# Patient Record
Sex: FEMALE | Race: WHITE | NOT HISPANIC OR LATINO | Employment: UNEMPLOYED | URBAN - METROPOLITAN AREA
[De-identification: names, ages, dates, MRNs, and addresses within clinical notes are randomized per-mention and may not be internally consistent; named-entity substitution may affect disease eponyms.]

---

## 2021-01-23 ENCOUNTER — HOSPITAL ENCOUNTER (INPATIENT)
Facility: HOSPITAL | Age: 66
LOS: 2 days | Discharge: HOME | DRG: 872 | End: 2021-01-25
Attending: FAMILY MEDICINE | Admitting: FAMILY MEDICINE
Payer: OTHER GOVERNMENT

## 2021-01-23 ENCOUNTER — HOSPITAL ENCOUNTER (EMERGENCY)
Facility: HOSPITAL | Age: 66
Discharge: ANOTHER HOSPITAL | End: 2021-01-23
Attending: EMERGENCY MEDICINE
Payer: OTHER GOVERNMENT

## 2021-01-23 VITALS
OXYGEN SATURATION: 98 % | WEIGHT: 132 LBS | HEART RATE: 90 BPM | HEIGHT: 67 IN | SYSTOLIC BLOOD PRESSURE: 104 MMHG | TEMPERATURE: 98 F | RESPIRATION RATE: 20 BRPM | DIASTOLIC BLOOD PRESSURE: 50 MMHG | BODY MASS INDEX: 20.72 KG/M2

## 2021-01-23 DIAGNOSIS — Z99.81 HYPOXEMIA REQUIRING SUPPLEMENTAL OXYGEN: ICD-10-CM

## 2021-01-23 DIAGNOSIS — E87.20 LACTIC ACIDOSIS: ICD-10-CM

## 2021-01-23 DIAGNOSIS — R07.9 CHEST PAIN: ICD-10-CM

## 2021-01-23 DIAGNOSIS — C78.7 COLON CANCER METASTASIZED TO LIVER: ICD-10-CM

## 2021-01-23 DIAGNOSIS — R19.7 NAUSEA VOMITING AND DIARRHEA: ICD-10-CM

## 2021-01-23 DIAGNOSIS — A41.9 SEPSIS, DUE TO UNSPECIFIED ORGANISM, UNSPECIFIED WHETHER ACUTE ORGAN DYSFUNCTION PRESENT: Primary | ICD-10-CM

## 2021-01-23 DIAGNOSIS — C19 COLORECTAL CARCINOMA: Primary | ICD-10-CM

## 2021-01-23 DIAGNOSIS — R53.1 WEAKNESS: ICD-10-CM

## 2021-01-23 DIAGNOSIS — Z85.850 HX OF THYROID CANCER: ICD-10-CM

## 2021-01-23 DIAGNOSIS — R11.2 NAUSEA VOMITING AND DIARRHEA: ICD-10-CM

## 2021-01-23 DIAGNOSIS — Z20.822 SUSPECTED COVID-19 VIRUS INFECTION: ICD-10-CM

## 2021-01-23 DIAGNOSIS — R09.02 HYPOXEMIA REQUIRING SUPPLEMENTAL OXYGEN: ICD-10-CM

## 2021-01-23 DIAGNOSIS — D64.9 ANEMIA REQUIRING TRANSFUSIONS: ICD-10-CM

## 2021-01-23 DIAGNOSIS — D50.9 MICROCYTIC ANEMIA: ICD-10-CM

## 2021-01-23 DIAGNOSIS — C18.9 COLON CANCER METASTASIZED TO LIVER: ICD-10-CM

## 2021-01-23 DIAGNOSIS — A41.9 SEPSIS: ICD-10-CM

## 2021-01-23 DIAGNOSIS — R74.01 TRANSAMINITIS: ICD-10-CM

## 2021-01-23 DIAGNOSIS — C78.7 SECONDARY MALIGNANT NEOPLASM OF LIVER: ICD-10-CM

## 2021-01-23 DIAGNOSIS — G93.41 ENCEPHALOPATHY IN SEPSIS: ICD-10-CM

## 2021-01-23 LAB
ABO + RH BLD: NORMAL
ALBUMIN SERPL BCP-MCNC: 2.6 G/DL (ref 3.5–5.2)
ALLENS TEST: ABNORMAL
ALP SERPL-CCNC: 477 U/L (ref 55–135)
ALT SERPL W/O P-5'-P-CCNC: 71 U/L (ref 10–44)
ANION GAP SERPL CALC-SCNC: 12 MMOL/L (ref 8–16)
ANISOCYTOSIS BLD QL SMEAR: ABNORMAL
APTT BLDCRRT: 27.2 SEC (ref 21–32)
AST SERPL-CCNC: 291 U/L (ref 10–40)
BASOPHILS NFR BLD: 0 % (ref 0–1.9)
BILIRUB SERPL-MCNC: 0.9 MG/DL (ref 0.1–1)
BLD GP AB SCN CELLS X3 SERPL QL: NORMAL
BNP SERPL-MCNC: 45 PG/ML (ref 0–99)
BUN SERPL-MCNC: 20 MG/DL (ref 8–23)
CALCIUM SERPL-MCNC: 8.1 MG/DL (ref 8.7–10.5)
CHLORIDE SERPL-SCNC: 99 MMOL/L (ref 95–110)
CK SERPL-CCNC: 1014 U/L (ref 20–180)
CO2 SERPL-SCNC: 22 MMOL/L (ref 23–29)
CREAT SERPL-MCNC: 0.8 MG/DL (ref 0.5–1.4)
CRP SERPL-MCNC: 8 MG/DL (ref 0–0.75)
DELSYS: ABNORMAL
DIFFERENTIAL METHOD: ABNORMAL
EOSINOPHIL NFR BLD: 0 % (ref 0–8)
ERYTHROCYTE [DISTWIDTH] IN BLOOD BY AUTOMATED COUNT: 19.8 % (ref 11.5–14.5)
EST. GFR  (AFRICAN AMERICAN): >60 ML/MIN/1.73 M^2
EST. GFR  (NON AFRICAN AMERICAN): >60 ML/MIN/1.73 M^2
FERRITIN SERPL-MCNC: 273 NG/ML (ref 20–300)
FIO2: 21
GLUCOSE SERPL-MCNC: 178 MG/DL (ref 70–110)
HCO3 UR-SCNC: 20.7 MMOL/L (ref 24–28)
HCT VFR BLD AUTO: 20.3 % (ref 37–48.5)
HGB BLD-MCNC: 5.5 G/DL (ref 12–16)
IMM GRANULOCYTES # BLD AUTO: ABNORMAL K/UL (ref 0–0.04)
IMM GRANULOCYTES NFR BLD AUTO: ABNORMAL % (ref 0–0.5)
INFLUENZA A, MOLECULAR: NEGATIVE
INFLUENZA B, MOLECULAR: NEGATIVE
INR PPP: 1.1 (ref 0.8–1.2)
LACTATE SERPL-SCNC: 1.5 MMOL/L (ref 0.5–2.2)
LACTATE SERPL-SCNC: 3.5 MMOL/L (ref 0.5–2.2)
LDH SERPL L TO P-CCNC: 4928 U/L (ref 110–260)
LIPASE SERPL-CCNC: 23 U/L (ref 4–60)
LYMPHOCYTES NFR BLD: 5 % (ref 18–48)
MCH RBC QN AUTO: 20.1 PG (ref 27–31)
MCHC RBC AUTO-ENTMCNC: 27.1 G/DL (ref 32–36)
MCV RBC AUTO: 74 FL (ref 82–98)
MODE: ABNORMAL
MONOCYTES NFR BLD: 3 % (ref 4–15)
NEUTROPHILS NFR BLD: 80 % (ref 38–73)
NEUTS BAND NFR BLD MANUAL: 12 %
NRBC BLD-RTO: 0 /100 WBC
PCO2 BLDA: 25.4 MMHG (ref 35–45)
PH SMN: 7.52 [PH] (ref 7.35–7.45)
PLATELET # BLD AUTO: 607 K/UL (ref 150–350)
PLATELET BLD QL SMEAR: ABNORMAL
PMV BLD AUTO: 9.1 FL (ref 9.2–12.9)
PO2 BLDA: 73 MMHG (ref 80–100)
POC BE: -2 MMOL/L
POC SATURATED O2: 96 % (ref 95–100)
POC TCO2: 21 MMOL/L (ref 23–27)
POIKILOCYTOSIS BLD QL SMEAR: ABNORMAL
POLYCHROMASIA BLD QL SMEAR: ABNORMAL
POTASSIUM SERPL-SCNC: 4.3 MMOL/L (ref 3.5–5.1)
PROCALCITONIN SERPL IA-MCNC: 30.6 NG/ML
PROT SERPL-MCNC: 6.9 G/DL (ref 6–8.4)
PROTHROMBIN TIME: 12 SEC (ref 9–12.5)
RBC # BLD AUTO: 2.74 M/UL (ref 4–5.4)
SAMPLE: ABNORMAL
SARS-COV-2 RDRP RESP QL NAA+PROBE: NEGATIVE
SITE: ABNORMAL
SODIUM SERPL-SCNC: 133 MMOL/L (ref 136–145)
SPECIMEN SOURCE: NORMAL
TARGETS BLD QL SMEAR: ABNORMAL
TOXIC GRANULES BLD QL SMEAR: PRESENT
TROPONIN I SERPL DL<=0.01 NG/ML-MCNC: 0.03 NG/ML (ref 0.02–0.5)
WBC # BLD AUTO: 24.64 K/UL (ref 3.9–12.7)

## 2021-01-23 PROCEDURE — 63600175 PHARM REV CODE 636 W HCPCS: Performed by: EMERGENCY MEDICINE

## 2021-01-23 PROCEDURE — 86902 BLOOD TYPE ANTIGEN DONOR EA: CPT | Mod: 59

## 2021-01-23 PROCEDURE — 86870 RBC ANTIBODY IDENTIFICATION: CPT | Mod: 59

## 2021-01-23 PROCEDURE — 84145 PROCALCITONIN (PCT): CPT

## 2021-01-23 PROCEDURE — 74176 CT ABD & PELVIS W/O CONTRAST: CPT | Mod: TC

## 2021-01-23 PROCEDURE — 25000003 PHARM REV CODE 250: Performed by: EMERGENCY MEDICINE

## 2021-01-23 PROCEDURE — U0002 COVID-19 LAB TEST NON-CDC: HCPCS

## 2021-01-23 PROCEDURE — 82550 ASSAY OF CK (CPK): CPT

## 2021-01-23 PROCEDURE — 82728 ASSAY OF FERRITIN: CPT

## 2021-01-23 PROCEDURE — 83690 ASSAY OF LIPASE: CPT

## 2021-01-23 PROCEDURE — 71045 X-RAY EXAM CHEST 1 VIEW: CPT | Mod: TC,FY

## 2021-01-23 PROCEDURE — 99291 CRITICAL CARE FIRST HOUR: CPT | Mod: 25

## 2021-01-23 PROCEDURE — 86900 BLOOD TYPING SEROLOGIC ABO: CPT

## 2021-01-23 PROCEDURE — 83605 ASSAY OF LACTIC ACID: CPT

## 2021-01-23 PROCEDURE — 93005 ELECTROCARDIOGRAM TRACING: CPT

## 2021-01-23 PROCEDURE — 36415 COLL VENOUS BLD VENIPUNCTURE: CPT

## 2021-01-23 PROCEDURE — 83880 ASSAY OF NATRIURETIC PEPTIDE: CPT

## 2021-01-23 PROCEDURE — 85730 THROMBOPLASTIN TIME PARTIAL: CPT

## 2021-01-23 PROCEDURE — 87040 BLOOD CULTURE FOR BACTERIA: CPT | Mod: 59

## 2021-01-23 PROCEDURE — 96365 THER/PROPH/DIAG IV INF INIT: CPT

## 2021-01-23 PROCEDURE — 85610 PROTHROMBIN TIME: CPT

## 2021-01-23 PROCEDURE — 82803 BLOOD GASES ANY COMBINATION: CPT

## 2021-01-23 PROCEDURE — 83605 ASSAY OF LACTIC ACID: CPT | Mod: 91

## 2021-01-23 PROCEDURE — 27000221 HC OXYGEN, UP TO 24 HOURS

## 2021-01-23 PROCEDURE — 99292 CRITICAL CARE ADDL 30 MIN: CPT | Mod: 25

## 2021-01-23 PROCEDURE — 96361 HYDRATE IV INFUSION ADD-ON: CPT

## 2021-01-23 PROCEDURE — 96367 TX/PROPH/DG ADDL SEQ IV INF: CPT

## 2021-01-23 PROCEDURE — 71045 XR CHEST AP PORTABLE: ICD-10-PCS | Mod: 26,,, | Performed by: RADIOLOGY

## 2021-01-23 PROCEDURE — 86870 RBC ANTIBODY IDENTIFICATION: CPT

## 2021-01-23 PROCEDURE — 20000000 HC ICU ROOM

## 2021-01-23 PROCEDURE — 86140 C-REACTIVE PROTEIN: CPT

## 2021-01-23 PROCEDURE — 36600 WITHDRAWAL OF ARTERIAL BLOOD: CPT

## 2021-01-23 PROCEDURE — 86900 BLOOD TYPING SEROLOGIC ABO: CPT | Mod: 91

## 2021-01-23 PROCEDURE — 86880 COOMBS TEST DIRECT: CPT

## 2021-01-23 PROCEDURE — 87502 INFLUENZA DNA AMP PROBE: CPT

## 2021-01-23 PROCEDURE — S0030 INJECTION, METRONIDAZOLE: HCPCS | Performed by: EMERGENCY MEDICINE

## 2021-01-23 PROCEDURE — 71045 X-RAY EXAM CHEST 1 VIEW: CPT | Mod: 26,,, | Performed by: RADIOLOGY

## 2021-01-23 PROCEDURE — 84484 ASSAY OF TROPONIN QUANT: CPT

## 2021-01-23 PROCEDURE — 85027 COMPLETE CBC AUTOMATED: CPT

## 2021-01-23 PROCEDURE — 80053 COMPREHEN METABOLIC PANEL: CPT

## 2021-01-23 PROCEDURE — 74176 CT ABDOMEN PELVIS WITHOUT CONTRAST: ICD-10-PCS | Mod: 26,,, | Performed by: RADIOLOGY

## 2021-01-23 PROCEDURE — 83615 LACTATE (LD) (LDH) ENZYME: CPT

## 2021-01-23 PROCEDURE — 85007 BL SMEAR W/DIFF WBC COUNT: CPT

## 2021-01-23 PROCEDURE — 74176 CT ABD & PELVIS W/O CONTRAST: CPT | Mod: 26,,, | Performed by: RADIOLOGY

## 2021-01-23 PROCEDURE — 99900035 HC TECH TIME PER 15 MIN (STAT)

## 2021-01-23 PROCEDURE — 86901 BLOOD TYPING SEROLOGIC RH(D): CPT

## 2021-01-23 RX ORDER — SODIUM CHLORIDE 0.9 % (FLUSH) 0.9 %
5 SYRINGE (ML) INJECTION
Status: DISCONTINUED | OUTPATIENT
Start: 2021-01-24 | End: 2021-01-26 | Stop reason: HOSPADM

## 2021-01-23 RX ORDER — GLUCAGON 1 MG
1 KIT INJECTION
Status: DISCONTINUED | OUTPATIENT
Start: 2021-01-24 | End: 2021-01-26 | Stop reason: HOSPADM

## 2021-01-23 RX ORDER — HYDROCODONE BITARTRATE AND ACETAMINOPHEN 500; 5 MG/1; MG/1
TABLET ORAL
Status: DISCONTINUED | OUTPATIENT
Start: 2021-01-23 | End: 2021-01-23 | Stop reason: HOSPADM

## 2021-01-23 RX ORDER — ACETAMINOPHEN 325 MG/1
650 TABLET ORAL EVERY 8 HOURS PRN
Status: DISCONTINUED | OUTPATIENT
Start: 2021-01-24 | End: 2021-01-26 | Stop reason: HOSPADM

## 2021-01-23 RX ORDER — MORPHINE SULFATE 4 MG/ML
4 INJECTION, SOLUTION INTRAMUSCULAR; INTRAVENOUS EVERY 4 HOURS PRN
Status: DISCONTINUED | OUTPATIENT
Start: 2021-01-24 | End: 2021-01-26 | Stop reason: HOSPADM

## 2021-01-23 RX ORDER — ACETAMINOPHEN 500 MG
1000 TABLET ORAL
Status: COMPLETED | OUTPATIENT
Start: 2021-01-23 | End: 2021-01-23

## 2021-01-23 RX ORDER — TALC
9 POWDER (GRAM) TOPICAL NIGHTLY PRN
Status: DISCONTINUED | OUTPATIENT
Start: 2021-01-24 | End: 2021-01-26 | Stop reason: HOSPADM

## 2021-01-23 RX ORDER — METRONIDAZOLE 500 MG/100ML
500 INJECTION, SOLUTION INTRAVENOUS
Status: COMPLETED | OUTPATIENT
Start: 2021-01-23 | End: 2021-01-23

## 2021-01-23 RX ORDER — IBUPROFEN 200 MG
24 TABLET ORAL
Status: DISCONTINUED | OUTPATIENT
Start: 2021-01-24 | End: 2021-01-26 | Stop reason: HOSPADM

## 2021-01-23 RX ORDER — IBUPROFEN 200 MG
16 TABLET ORAL
Status: DISCONTINUED | OUTPATIENT
Start: 2021-01-24 | End: 2021-01-26 | Stop reason: HOSPADM

## 2021-01-23 RX ORDER — MUPIROCIN 20 MG/G
OINTMENT TOPICAL 2 TIMES DAILY
Status: DISCONTINUED | OUTPATIENT
Start: 2021-01-24 | End: 2021-01-26 | Stop reason: HOSPADM

## 2021-01-23 RX ORDER — ONDANSETRON 8 MG/1
8 TABLET, ORALLY DISINTEGRATING ORAL EVERY 6 HOURS PRN
Status: DISCONTINUED | OUTPATIENT
Start: 2021-01-24 | End: 2021-01-26 | Stop reason: HOSPADM

## 2021-01-23 RX ORDER — IBUPROFEN 400 MG/1
800 TABLET ORAL
Status: COMPLETED | OUTPATIENT
Start: 2021-01-23 | End: 2021-01-23

## 2021-01-23 RX ORDER — LEVOTHYROXINE SODIUM 112 UG/1
112 TABLET ORAL
Status: DISCONTINUED | OUTPATIENT
Start: 2021-01-24 | End: 2021-01-26 | Stop reason: HOSPADM

## 2021-01-23 RX ORDER — ATENOLOL 25 MG/1
50 TABLET ORAL DAILY
Status: DISCONTINUED | OUTPATIENT
Start: 2021-01-24 | End: 2021-01-24

## 2021-01-23 RX ORDER — ACETAMINOPHEN 325 MG/1
650 TABLET ORAL EVERY 4 HOURS PRN
Status: DISCONTINUED | OUTPATIENT
Start: 2021-01-24 | End: 2021-01-26 | Stop reason: HOSPADM

## 2021-01-23 RX ADMIN — METRONIDAZOLE 500 MG: 500 INJECTION, SOLUTION INTRAVENOUS at 07:01

## 2021-01-23 RX ADMIN — ACETAMINOPHEN 1000 MG: 500 TABLET ORAL at 03:01

## 2021-01-23 RX ADMIN — CEFTRIAXONE 1 G: 1 INJECTION, SOLUTION INTRAVENOUS at 03:01

## 2021-01-23 RX ADMIN — IBUPROFEN 800 MG: 400 TABLET, FILM COATED ORAL at 03:01

## 2021-01-23 RX ADMIN — SODIUM CHLORIDE 1797 ML: 0.9 INJECTION, SOLUTION INTRAVENOUS at 03:01

## 2021-01-24 PROBLEM — D50.9 MICROCYTIC ANEMIA: Status: ACTIVE | Noted: 2021-01-23

## 2021-01-24 PROBLEM — C78.7 SECONDARY MALIGNANT NEOPLASM OF LIVER: Status: ACTIVE | Noted: 2021-01-24

## 2021-01-24 LAB
ABO + RH BLD: NORMAL
ALBUMIN SERPL BCP-MCNC: 2 G/DL (ref 3.5–5.2)
ALP SERPL-CCNC: 403 U/L (ref 55–135)
ALT SERPL W/O P-5'-P-CCNC: 53 U/L (ref 10–44)
ANION GAP SERPL CALC-SCNC: 11 MMOL/L (ref 8–16)
AST SERPL-CCNC: 156 U/L (ref 10–40)
BASOPHILS # BLD AUTO: 0.02 K/UL (ref 0–0.2)
BASOPHILS NFR BLD: 0.1 % (ref 0–1.9)
BILIRUB SERPL-MCNC: 0.4 MG/DL (ref 0.1–1)
BILIRUB UR QL STRIP: NEGATIVE
BLD GP AB SCN CELLS X3 SERPL QL: NORMAL
BLD PROD TYP BPU: NORMAL
BLD PROD TYP BPU: NORMAL
BLOOD UNIT EXPIRATION DATE: NORMAL
BLOOD UNIT EXPIRATION DATE: NORMAL
BLOOD UNIT TYPE CODE: 5100
BLOOD UNIT TYPE CODE: 5100
BLOOD UNIT TYPE: NORMAL
BLOOD UNIT TYPE: NORMAL
BUN SERPL-MCNC: 12 MG/DL (ref 8–23)
CALCIUM SERPL-MCNC: 7.2 MG/DL (ref 8.7–10.5)
CHLORIDE SERPL-SCNC: 109 MMOL/L (ref 95–110)
CHOLEST SERPL-MCNC: 157 MG/DL (ref 120–199)
CHOLEST/HDLC SERPL: 7.1 {RATIO} (ref 2–5)
CLARITY UR: CLEAR
CO2 SERPL-SCNC: 18 MMOL/L (ref 23–29)
CODING SYSTEM: NORMAL
CODING SYSTEM: NORMAL
COLOR UR: YELLOW
CREAT SERPL-MCNC: 0.6 MG/DL (ref 0.5–1.4)
DAT IGG-SP REAG RBC-IMP: NORMAL
DIFFERENTIAL METHOD: ABNORMAL
DISPENSE STATUS: NORMAL
DISPENSE STATUS: NORMAL
EOSINOPHIL # BLD AUTO: 0 K/UL (ref 0–0.5)
EOSINOPHIL NFR BLD: 0 % (ref 0–8)
ERYTHROCYTE [DISTWIDTH] IN BLOOD BY AUTOMATED COUNT: 19.7 % (ref 11.5–14.5)
EST. GFR  (AFRICAN AMERICAN): >60 ML/MIN/1.73 M^2
EST. GFR  (NON AFRICAN AMERICAN): >60 ML/MIN/1.73 M^2
ESTIMATED AVG GLUCOSE: 88 MG/DL (ref 68–131)
FOLATE SERPL-MCNC: 10.3 NG/ML (ref 4–24)
GLUCOSE SERPL-MCNC: 110 MG/DL (ref 70–110)
GLUCOSE UR QL STRIP: NEGATIVE
HAPTOGLOB SERPL-MCNC: 451 MG/DL (ref 30–250)
HBA1C MFR BLD HPLC: 4.7 % (ref 4–5.6)
HCT VFR BLD AUTO: 18.8 % (ref 37–48.5)
HCT VFR BLD AUTO: 21.1 % (ref 37–48.5)
HCT VFR BLD AUTO: 23.1 % (ref 37–48.5)
HCT VFR BLD AUTO: 25.3 % (ref 37–48.5)
HCT VFR BLD AUTO: 25.4 % (ref 37–48.5)
HDLC SERPL-MCNC: 22 MG/DL (ref 40–75)
HDLC SERPL: 14 % (ref 20–50)
HGB BLD-MCNC: 5 G/DL (ref 12–16)
HGB BLD-MCNC: 5.5 G/DL (ref 12–16)
HGB BLD-MCNC: 7 G/DL (ref 12–16)
HGB BLD-MCNC: 7.5 G/DL (ref 12–16)
HGB BLD-MCNC: 7.6 G/DL (ref 12–16)
HGB UR QL STRIP: NEGATIVE
IMM GRANULOCYTES # BLD AUTO: 0.11 K/UL (ref 0–0.04)
IMM GRANULOCYTES NFR BLD AUTO: 0.6 % (ref 0–0.5)
INR PPP: 1.3 (ref 0.8–1.2)
IRON SERPL-MCNC: <10 UG/DL (ref 30–160)
KETONES UR QL STRIP: NEGATIVE
LACTATE SERPL-SCNC: 1.5 MMOL/L (ref 0.5–2.2)
LDH SERPL L TO P-CCNC: 3329 U/L (ref 110–260)
LDLC SERPL CALC-MCNC: 118 MG/DL (ref 63–159)
LEUKOCYTE ESTERASE UR QL STRIP: NEGATIVE
LYMPHOCYTES # BLD AUTO: 0.9 K/UL (ref 1–4.8)
LYMPHOCYTES NFR BLD: 5.4 % (ref 18–48)
MAGNESIUM SERPL-MCNC: 1.8 MG/DL (ref 1.6–2.6)
MAGNESIUM SERPL-MCNC: 2.3 MG/DL (ref 1.6–2.6)
MCH RBC QN AUTO: 20.3 PG (ref 27–31)
MCHC RBC AUTO-ENTMCNC: 26.6 G/DL (ref 32–36)
MCV RBC AUTO: 76 FL (ref 82–98)
MONOCYTES # BLD AUTO: 1.3 K/UL (ref 0.3–1)
MONOCYTES NFR BLD: 7.4 % (ref 4–15)
NEUTROPHILS # BLD AUTO: 15 K/UL (ref 1.8–7.7)
NEUTROPHILS NFR BLD: 86.5 % (ref 38–73)
NITRITE UR QL STRIP: NEGATIVE
NONHDLC SERPL-MCNC: 135 MG/DL
NRBC BLD-RTO: 0 /100 WBC
PH UR STRIP: 6 [PH] (ref 5–8)
PHOSPHATE SERPL-MCNC: 2.3 MG/DL (ref 2.7–4.5)
PHOSPHATE SERPL-MCNC: 3.2 MG/DL (ref 2.7–4.5)
PLATELET # BLD AUTO: 503 K/UL (ref 150–350)
PMV BLD AUTO: 9.5 FL (ref 9.2–12.9)
POTASSIUM SERPL-SCNC: 4 MMOL/L (ref 3.5–5.1)
PROT SERPL-MCNC: 6.2 G/DL (ref 6–8.4)
PROT UR QL STRIP: ABNORMAL
PROTHROMBIN TIME: 13.7 SEC (ref 9–12.5)
RBC # BLD AUTO: 2.46 M/UL (ref 4–5.4)
RETICS/RBC NFR AUTO: 2.5 % (ref 0.5–2.5)
SATURATED IRON: ABNORMAL % (ref 20–50)
SODIUM SERPL-SCNC: 138 MMOL/L (ref 136–145)
SP GR UR STRIP: 1.01 (ref 1–1.03)
TOTAL IRON BINDING CAPACITY: 293 UG/DL (ref 250–450)
TRANS ERYTHROCYTES VOL PATIENT: NORMAL ML
TRANS ERYTHROCYTES VOL PATIENT: NORMAL ML
TRANSFERRIN SERPL-MCNC: 198 MG/DL (ref 200–375)
TRIGL SERPL-MCNC: 85 MG/DL (ref 30–150)
TSH SERPL DL<=0.005 MIU/L-ACNC: 2.56 UIU/ML (ref 0.4–4)
URN SPEC COLLECT METH UR: ABNORMAL
UROBILINOGEN UR STRIP-ACNC: NEGATIVE EU/DL
VIT B12 SERPL-MCNC: 898 PG/ML (ref 210–950)
WBC # BLD AUTO: 17.31 K/UL (ref 3.9–12.7)

## 2021-01-24 PROCEDURE — 25000003 PHARM REV CODE 250: Performed by: FAMILY MEDICINE

## 2021-01-24 PROCEDURE — 84443 ASSAY THYROID STIM HORMONE: CPT

## 2021-01-24 PROCEDURE — 85025 COMPLETE CBC W/AUTO DIFF WBC: CPT

## 2021-01-24 PROCEDURE — 82607 VITAMIN B-12: CPT

## 2021-01-24 PROCEDURE — 83540 ASSAY OF IRON: CPT

## 2021-01-24 PROCEDURE — 83605 ASSAY OF LACTIC ACID: CPT

## 2021-01-24 PROCEDURE — 25000003 PHARM REV CODE 250: Performed by: STUDENT IN AN ORGANIZED HEALTH CARE EDUCATION/TRAINING PROGRAM

## 2021-01-24 PROCEDURE — 36415 COLL VENOUS BLD VENIPUNCTURE: CPT

## 2021-01-24 PROCEDURE — 86880 COOMBS TEST DIRECT: CPT

## 2021-01-24 PROCEDURE — 94761 N-INVAS EAR/PLS OXIMETRY MLT: CPT

## 2021-01-24 PROCEDURE — 63600175 PHARM REV CODE 636 W HCPCS: Performed by: FAMILY MEDICINE

## 2021-01-24 PROCEDURE — 87086 URINE CULTURE/COLONY COUNT: CPT

## 2021-01-24 PROCEDURE — 63600175 PHARM REV CODE 636 W HCPCS: Performed by: STUDENT IN AN ORGANIZED HEALTH CARE EDUCATION/TRAINING PROGRAM

## 2021-01-24 PROCEDURE — 99222 PR INITIAL HOSPITAL CARE,LEVL II: ICD-10-PCS | Mod: ,,, | Performed by: INTERNAL MEDICINE

## 2021-01-24 PROCEDURE — 81003 URINALYSIS AUTO W/O SCOPE: CPT

## 2021-01-24 PROCEDURE — 85014 HEMATOCRIT: CPT

## 2021-01-24 PROCEDURE — 83036 HEMOGLOBIN GLYCOSYLATED A1C: CPT

## 2021-01-24 PROCEDURE — 85014 HEMATOCRIT: CPT | Mod: 91

## 2021-01-24 PROCEDURE — 82746 ASSAY OF FOLIC ACID SERUM: CPT

## 2021-01-24 PROCEDURE — 99223 PR INITIAL HOSPITAL CARE,LEVL III: ICD-10-PCS | Mod: ,,, | Performed by: INTERNAL MEDICINE

## 2021-01-24 PROCEDURE — 99222 1ST HOSP IP/OBS MODERATE 55: CPT | Mod: ,,, | Performed by: INTERNAL MEDICINE

## 2021-01-24 PROCEDURE — 83735 ASSAY OF MAGNESIUM: CPT | Mod: 91

## 2021-01-24 PROCEDURE — 85610 PROTHROMBIN TIME: CPT

## 2021-01-24 PROCEDURE — 80061 LIPID PANEL: CPT

## 2021-01-24 PROCEDURE — 86922 COMPATIBILITY TEST ANTIGLOB: CPT

## 2021-01-24 PROCEDURE — 86900 BLOOD TYPING SEROLOGIC ABO: CPT

## 2021-01-24 PROCEDURE — 85018 HEMOGLOBIN: CPT

## 2021-01-24 PROCEDURE — 80053 COMPREHEN METABOLIC PANEL: CPT

## 2021-01-24 PROCEDURE — 84100 ASSAY OF PHOSPHORUS: CPT | Mod: 91

## 2021-01-24 PROCEDURE — 83615 LACTATE (LD) (LDH) ENZYME: CPT

## 2021-01-24 PROCEDURE — 25000003 PHARM REV CODE 250: Performed by: HOSPITALIST

## 2021-01-24 PROCEDURE — 83010 ASSAY OF HAPTOGLOBIN QUANT: CPT

## 2021-01-24 PROCEDURE — 99223 1ST HOSP IP/OBS HIGH 75: CPT | Mod: ,,, | Performed by: INTERNAL MEDICINE

## 2021-01-24 PROCEDURE — P9021 RED BLOOD CELLS UNIT: HCPCS

## 2021-01-24 PROCEDURE — 11000001 HC ACUTE MED/SURG PRIVATE ROOM

## 2021-01-24 PROCEDURE — 84100 ASSAY OF PHOSPHORUS: CPT

## 2021-01-24 PROCEDURE — 99900035 HC TECH TIME PER 15 MIN (STAT)

## 2021-01-24 PROCEDURE — 27000221 HC OXYGEN, UP TO 24 HOURS

## 2021-01-24 PROCEDURE — 63600175 PHARM REV CODE 636 W HCPCS: Performed by: HOSPITALIST

## 2021-01-24 PROCEDURE — 83735 ASSAY OF MAGNESIUM: CPT

## 2021-01-24 PROCEDURE — 85018 HEMOGLOBIN: CPT | Mod: 91

## 2021-01-24 PROCEDURE — 85045 AUTOMATED RETICULOCYTE COUNT: CPT

## 2021-01-24 PROCEDURE — C9113 INJ PANTOPRAZOLE SODIUM, VIA: HCPCS | Performed by: HOSPITALIST

## 2021-01-24 RX ORDER — PANTOPRAZOLE SODIUM 40 MG/10ML
40 INJECTION, POWDER, LYOPHILIZED, FOR SOLUTION INTRAVENOUS DAILY
Status: DISCONTINUED | OUTPATIENT
Start: 2021-01-24 | End: 2021-01-26 | Stop reason: HOSPADM

## 2021-01-24 RX ORDER — SODIUM CHLORIDE, SODIUM LACTATE, POTASSIUM CHLORIDE, CALCIUM CHLORIDE 600; 310; 30; 20 MG/100ML; MG/100ML; MG/100ML; MG/100ML
INJECTION, SOLUTION INTRAVENOUS CONTINUOUS
Status: ACTIVE | OUTPATIENT
Start: 2021-01-24 | End: 2021-01-25

## 2021-01-24 RX ORDER — ACETAMINOPHEN 500 MG
1000 TABLET ORAL ONCE
Status: COMPLETED | OUTPATIENT
Start: 2021-01-24 | End: 2021-01-24

## 2021-01-24 RX ORDER — HYDROMORPHONE HYDROCHLORIDE 1 MG/ML
1 INJECTION, SOLUTION INTRAMUSCULAR; INTRAVENOUS; SUBCUTANEOUS ONCE
Status: COMPLETED | OUTPATIENT
Start: 2021-01-24 | End: 2021-01-24

## 2021-01-24 RX ORDER — ENOXAPARIN SODIUM 100 MG/ML
40 INJECTION SUBCUTANEOUS EVERY 24 HOURS
Status: DISCONTINUED | OUTPATIENT
Start: 2021-01-24 | End: 2021-01-26 | Stop reason: HOSPADM

## 2021-01-24 RX ORDER — HYDROCODONE BITARTRATE AND ACETAMINOPHEN 500; 5 MG/1; MG/1
TABLET ORAL
Status: DISCONTINUED | OUTPATIENT
Start: 2021-01-24 | End: 2021-01-26 | Stop reason: HOSPADM

## 2021-01-24 RX ADMIN — MUPIROCIN: 20 OINTMENT TOPICAL at 08:01

## 2021-01-24 RX ADMIN — MUPIROCIN: 20 OINTMENT TOPICAL at 10:01

## 2021-01-24 RX ADMIN — ENOXAPARIN SODIUM 40 MG: 40 INJECTION SUBCUTANEOUS at 05:01

## 2021-01-24 RX ADMIN — SODIUM CHLORIDE, SODIUM LACTATE, POTASSIUM CHLORIDE, AND CALCIUM CHLORIDE: .6; .31; .03; .02 INJECTION, SOLUTION INTRAVENOUS at 09:01

## 2021-01-24 RX ADMIN — HYDROMORPHONE HYDROCHLORIDE 1 MG: 1 INJECTION, SOLUTION INTRAMUSCULAR; INTRAVENOUS; SUBCUTANEOUS at 07:01

## 2021-01-24 RX ADMIN — VANCOMYCIN HYDROCHLORIDE 1500 MG: 1.5 INJECTION, POWDER, LYOPHILIZED, FOR SOLUTION INTRAVENOUS at 04:01

## 2021-01-24 RX ADMIN — MUPIROCIN: 20 OINTMENT TOPICAL at 01:01

## 2021-01-24 RX ADMIN — LEVOTHYROXINE SODIUM 112 MCG: 112 TABLET ORAL at 05:01

## 2021-01-24 RX ADMIN — ACETAMINOPHEN 650 MG: 325 TABLET ORAL at 04:01

## 2021-01-24 RX ADMIN — SODIUM CHLORIDE 2500 ML: 0.9 INJECTION, SOLUTION INTRAVENOUS at 01:01

## 2021-01-24 RX ADMIN — POTASSIUM PHOSPHATE, MONOBASIC AND POTASSIUM PHOSPHATE, DIBASIC 15 MMOL: 224; 236 INJECTION, SOLUTION, CONCENTRATE INTRAVENOUS at 10:01

## 2021-01-24 RX ADMIN — PANTOPRAZOLE SODIUM 40 MG: 40 INJECTION, POWDER, FOR SOLUTION INTRAVENOUS at 10:01

## 2021-01-24 RX ADMIN — ACETAMINOPHEN 650 MG: 325 TABLET ORAL at 11:01

## 2021-01-24 RX ADMIN — PIPERACILLIN AND TAZOBACTAM 4.5 G: 4; .5 INJECTION, POWDER, LYOPHILIZED, FOR SOLUTION INTRAVENOUS; PARENTERAL at 10:01

## 2021-01-24 RX ADMIN — ACETAMINOPHEN 1000 MG: 500 TABLET ORAL at 07:01

## 2021-01-24 RX ADMIN — PIPERACILLIN AND TAZOBACTAM 4.5 G: 4; .5 INJECTION, POWDER, LYOPHILIZED, FOR SOLUTION INTRAVENOUS; PARENTERAL at 01:01

## 2021-01-24 RX ADMIN — PIPERACILLIN AND TAZOBACTAM 4.5 G: 4; .5 INJECTION, POWDER, LYOPHILIZED, FOR SOLUTION INTRAVENOUS; PARENTERAL at 05:01

## 2021-01-25 VITALS
OXYGEN SATURATION: 98 % | HEART RATE: 112 BPM | TEMPERATURE: 101 F | RESPIRATION RATE: 20 BRPM | BODY MASS INDEX: 25.21 KG/M2 | WEIGHT: 160.94 LBS | DIASTOLIC BLOOD PRESSURE: 64 MMHG | SYSTOLIC BLOOD PRESSURE: 124 MMHG

## 2021-01-25 LAB
ALBUMIN SERPL BCP-MCNC: 1.8 G/DL (ref 3.5–5.2)
ALP SERPL-CCNC: 297 U/L (ref 55–135)
ALT SERPL W/O P-5'-P-CCNC: 34 U/L (ref 10–44)
ANION GAP SERPL CALC-SCNC: 9 MMOL/L (ref 8–16)
AST SERPL-CCNC: 64 U/L (ref 10–40)
BASOPHILS # BLD AUTO: 0.03 K/UL (ref 0–0.2)
BASOPHILS NFR BLD: 0.2 % (ref 0–1.9)
BILIRUB SERPL-MCNC: 0.7 MG/DL (ref 0.1–1)
BLD PROD TYP BPU: NORMAL
BLOOD GROUP ANTIBODIES SERPL: NORMAL
BLOOD UNIT EXPIRATION DATE: NORMAL
BLOOD UNIT TYPE CODE: 5100
BLOOD UNIT TYPE: NORMAL
BUN SERPL-MCNC: 10 MG/DL (ref 8–23)
CALCIUM SERPL-MCNC: 7.4 MG/DL (ref 8.7–10.5)
CHLORIDE SERPL-SCNC: 102 MMOL/L (ref 95–110)
CO2 SERPL-SCNC: 22 MMOL/L (ref 23–29)
CODING SYSTEM: NORMAL
CREAT SERPL-MCNC: 0.7 MG/DL (ref 0.5–1.4)
DIFFERENTIAL METHOD: ABNORMAL
DISPENSE STATUS: NORMAL
EOSINOPHIL # BLD AUTO: 0 K/UL (ref 0–0.5)
EOSINOPHIL NFR BLD: 0 % (ref 0–8)
ERYTHROCYTE [DISTWIDTH] IN BLOOD BY AUTOMATED COUNT: 20.3 % (ref 11.5–14.5)
EST. GFR  (AFRICAN AMERICAN): >60 ML/MIN/1.73 M^2
EST. GFR  (NON AFRICAN AMERICAN): >60 ML/MIN/1.73 M^2
GLUCOSE SERPL-MCNC: 114 MG/DL (ref 70–110)
HCT VFR BLD AUTO: 23 % (ref 37–48.5)
HCT VFR BLD AUTO: 23.3 % (ref 37–48.5)
HCT VFR BLD AUTO: 23.5 % (ref 37–48.5)
HGB BLD-MCNC: 6.9 G/DL (ref 12–16)
HGB BLD-MCNC: 6.9 G/DL (ref 12–16)
HGB BLD-MCNC: 7 G/DL (ref 12–16)
IMM GRANULOCYTES # BLD AUTO: 0.1 K/UL (ref 0–0.04)
IMM GRANULOCYTES NFR BLD AUTO: 0.6 % (ref 0–0.5)
LYMPHOCYTES # BLD AUTO: 1 K/UL (ref 1–4.8)
LYMPHOCYTES NFR BLD: 6.6 % (ref 18–48)
MAGNESIUM SERPL-MCNC: 2 MG/DL (ref 1.6–2.6)
MCH RBC QN AUTO: 23.4 PG (ref 27–31)
MCHC RBC AUTO-ENTMCNC: 29.6 G/DL (ref 32–36)
MCV RBC AUTO: 79 FL (ref 82–98)
MONOCYTES # BLD AUTO: 1.1 K/UL (ref 0.3–1)
MONOCYTES NFR BLD: 6.9 % (ref 4–15)
NEUTROPHILS # BLD AUTO: 13.5 K/UL (ref 1.8–7.7)
NEUTROPHILS NFR BLD: 85.7 % (ref 38–73)
NRBC BLD-RTO: 0 /100 WBC
OB PNL STL: POSITIVE
PHOSPHATE SERPL-MCNC: 2.9 MG/DL (ref 2.7–4.5)
PLATELET # BLD AUTO: 419 K/UL (ref 150–350)
PMV BLD AUTO: 9.2 FL (ref 9.2–12.9)
POTASSIUM SERPL-SCNC: 3.6 MMOL/L (ref 3.5–5.1)
PROT SERPL-MCNC: 5.9 G/DL (ref 6–8.4)
RBC # BLD AUTO: 2.95 M/UL (ref 4–5.4)
SODIUM SERPL-SCNC: 133 MMOL/L (ref 136–145)
TRANS ERYTHROCYTES VOL PATIENT: NORMAL ML
WBC # BLD AUTO: 15.71 K/UL (ref 3.9–12.7)

## 2021-01-25 PROCEDURE — 85014 HEMATOCRIT: CPT

## 2021-01-25 PROCEDURE — 25000003 PHARM REV CODE 250: Performed by: FAMILY MEDICINE

## 2021-01-25 PROCEDURE — 36430 TRANSFUSION BLD/BLD COMPNT: CPT

## 2021-01-25 PROCEDURE — 97116 GAIT TRAINING THERAPY: CPT | Performed by: PHYSICAL THERAPIST

## 2021-01-25 PROCEDURE — 27000221 HC OXYGEN, UP TO 24 HOURS

## 2021-01-25 PROCEDURE — 63600175 PHARM REV CODE 636 W HCPCS: Performed by: HOSPITALIST

## 2021-01-25 PROCEDURE — 82272 OCCULT BLD FECES 1-3 TESTS: CPT

## 2021-01-25 PROCEDURE — 97165 OT EVAL LOW COMPLEX 30 MIN: CPT

## 2021-01-25 PROCEDURE — 97535 SELF CARE MNGMENT TRAINING: CPT

## 2021-01-25 PROCEDURE — 11000001 HC ACUTE MED/SURG PRIVATE ROOM

## 2021-01-25 PROCEDURE — 83735 ASSAY OF MAGNESIUM: CPT

## 2021-01-25 PROCEDURE — 99900035 HC TECH TIME PER 15 MIN (STAT)

## 2021-01-25 PROCEDURE — 84100 ASSAY OF PHOSPHORUS: CPT

## 2021-01-25 PROCEDURE — C9113 INJ PANTOPRAZOLE SODIUM, VIA: HCPCS | Performed by: HOSPITALIST

## 2021-01-25 PROCEDURE — 97162 PT EVAL MOD COMPLEX 30 MIN: CPT | Performed by: PHYSICAL THERAPIST

## 2021-01-25 PROCEDURE — 94761 N-INVAS EAR/PLS OXIMETRY MLT: CPT

## 2021-01-25 PROCEDURE — 85025 COMPLETE CBC W/AUTO DIFF WBC: CPT

## 2021-01-25 PROCEDURE — 85018 HEMOGLOBIN: CPT

## 2021-01-25 PROCEDURE — P9021 RED BLOOD CELLS UNIT: HCPCS

## 2021-01-25 PROCEDURE — 63600175 PHARM REV CODE 636 W HCPCS: Performed by: FAMILY MEDICINE

## 2021-01-25 PROCEDURE — 80053 COMPREHEN METABOLIC PANEL: CPT

## 2021-01-25 PROCEDURE — 25000003 PHARM REV CODE 250: Performed by: STUDENT IN AN ORGANIZED HEALTH CARE EDUCATION/TRAINING PROGRAM

## 2021-01-25 PROCEDURE — 36415 COLL VENOUS BLD VENIPUNCTURE: CPT

## 2021-01-25 PROCEDURE — 63600175 PHARM REV CODE 636 W HCPCS: Performed by: STUDENT IN AN ORGANIZED HEALTH CARE EDUCATION/TRAINING PROGRAM

## 2021-01-25 RX ORDER — LOPERAMIDE HYDROCHLORIDE 2 MG/1
2 CAPSULE ORAL 4 TIMES DAILY PRN
Qty: 60 CAPSULE | Refills: 0 | Status: SHIPPED | OUTPATIENT
Start: 2021-01-25 | End: 2021-02-24

## 2021-01-25 RX ORDER — FERROUS SULFATE 325(65) MG
325 TABLET ORAL 2 TIMES DAILY
Qty: 60 TABLET | Refills: 0 | Status: SHIPPED | OUTPATIENT
Start: 2021-01-25 | End: 2021-02-24

## 2021-01-25 RX ORDER — HYDROCODONE BITARTRATE AND ACETAMINOPHEN 500; 5 MG/1; MG/1
TABLET ORAL
Status: DISCONTINUED | OUTPATIENT
Start: 2021-01-25 | End: 2021-01-26 | Stop reason: HOSPADM

## 2021-01-25 RX ADMIN — PANTOPRAZOLE SODIUM 40 MG: 40 INJECTION, POWDER, FOR SOLUTION INTRAVENOUS at 09:01

## 2021-01-25 RX ADMIN — VANCOMYCIN HYDROCHLORIDE 1500 MG: 1.5 INJECTION, POWDER, LYOPHILIZED, FOR SOLUTION INTRAVENOUS at 03:01

## 2021-01-25 RX ADMIN — ACETAMINOPHEN 650 MG: 325 TABLET ORAL at 05:01

## 2021-01-25 RX ADMIN — LEVOTHYROXINE SODIUM 112 MCG: 112 TABLET ORAL at 05:01

## 2021-01-25 RX ADMIN — MUPIROCIN: 20 OINTMENT TOPICAL at 09:01

## 2021-01-25 RX ADMIN — PIPERACILLIN AND TAZOBACTAM 4.5 G: 4; .5 INJECTION, POWDER, LYOPHILIZED, FOR SOLUTION INTRAVENOUS; PARENTERAL at 12:01

## 2021-01-25 RX ADMIN — ACETAMINOPHEN 650 MG: 325 TABLET ORAL at 10:01

## 2021-01-25 RX ADMIN — PIPERACILLIN AND TAZOBACTAM 4.5 G: 4; .5 INJECTION, POWDER, LYOPHILIZED, FOR SOLUTION INTRAVENOUS; PARENTERAL at 09:01

## 2021-01-26 LAB — BACTERIA UR CULT: NO GROWTH

## 2021-01-29 LAB
BACTERIA BLD CULT: NORMAL
BACTERIA BLD CULT: NORMAL

## 2021-02-08 ENCOUNTER — HOSPITAL ENCOUNTER (EMERGENCY)
Facility: HOSPITAL | Age: 66
Discharge: HOME | End: 2021-02-08
Attending: EMERGENCY MEDICINE
Payer: OTHER GOVERNMENT

## 2021-02-08 VITALS
SYSTOLIC BLOOD PRESSURE: 141 MMHG | HEIGHT: 67 IN | DIASTOLIC BLOOD PRESSURE: 76 MMHG | RESPIRATION RATE: 12 BRPM | HEART RATE: 82 BPM | WEIGHT: 144 LBS | TEMPERATURE: 98 F | BODY MASS INDEX: 22.6 KG/M2 | OXYGEN SATURATION: 96 %

## 2021-02-08 DIAGNOSIS — C78.7 METASTATIC COLON CANCER TO LIVER: ICD-10-CM

## 2021-02-08 DIAGNOSIS — Z09 CHEMOTHERAPY FOLLOW-UP EXAMINATION: ICD-10-CM

## 2021-02-08 DIAGNOSIS — C18.9 METASTATIC COLON CANCER TO LIVER: ICD-10-CM

## 2021-02-08 DIAGNOSIS — R00.0 TACHYCARDIA: ICD-10-CM

## 2021-02-08 DIAGNOSIS — R50.9 FEVER, UNSPECIFIED FEVER CAUSE: Primary | ICD-10-CM

## 2021-02-08 DIAGNOSIS — R74.01 TRANSAMINITIS: ICD-10-CM

## 2021-02-08 DIAGNOSIS — D64.9 CHRONIC ANEMIA: ICD-10-CM

## 2021-02-08 LAB
ALBUMIN SERPL BCP-MCNC: 2.5 G/DL (ref 3.5–5.2)
ALP SERPL-CCNC: 355 U/L (ref 55–135)
ALT SERPL W/O P-5'-P-CCNC: 46 U/L (ref 10–44)
ANION GAP SERPL CALC-SCNC: 11 MMOL/L (ref 8–16)
APTT BLDCRRT: 27.5 SEC (ref 21–32)
AST SERPL-CCNC: 126 U/L (ref 10–40)
BACTERIA #/AREA URNS HPF: NORMAL /HPF
BASOPHILS # BLD AUTO: 0.04 K/UL (ref 0–0.2)
BASOPHILS NFR BLD: 0.3 % (ref 0–1.9)
BILIRUB SERPL-MCNC: 1.1 MG/DL (ref 0.1–1)
BILIRUB UR QL STRIP: NEGATIVE
BUN SERPL-MCNC: 18 MG/DL (ref 8–23)
CALCIUM SERPL-MCNC: 7.6 MG/DL (ref 8.7–10.5)
CHLORIDE SERPL-SCNC: 99 MMOL/L (ref 95–110)
CLARITY UR: ABNORMAL
CO2 SERPL-SCNC: 22 MMOL/L (ref 23–29)
COLOR UR: YELLOW
CREAT SERPL-MCNC: 0.7 MG/DL (ref 0.5–1.4)
DIFFERENTIAL METHOD: ABNORMAL
EOSINOPHIL # BLD AUTO: 0 K/UL (ref 0–0.5)
EOSINOPHIL NFR BLD: 0.1 % (ref 0–8)
ERYTHROCYTE [DISTWIDTH] IN BLOOD BY AUTOMATED COUNT: 23.8 % (ref 11.5–14.5)
EST. GFR  (AFRICAN AMERICAN): >60 ML/MIN/1.73 M^2
EST. GFR  (NON AFRICAN AMERICAN): >60 ML/MIN/1.73 M^2
GLUCOSE SERPL-MCNC: 110 MG/DL (ref 70–110)
GLUCOSE UR QL STRIP: NEGATIVE
HCT VFR BLD AUTO: 32.8 % (ref 37–48.5)
HGB BLD-MCNC: 9.4 G/DL (ref 12–16)
HGB UR QL STRIP: ABNORMAL
IMM GRANULOCYTES # BLD AUTO: 0.08 K/UL (ref 0–0.04)
IMM GRANULOCYTES NFR BLD AUTO: 0.7 % (ref 0–0.5)
INFLUENZA A, MOLECULAR: NEGATIVE
INFLUENZA B, MOLECULAR: NEGATIVE
INR PPP: 1 (ref 0.8–1.2)
KETONES UR QL STRIP: NEGATIVE
LEUKOCYTE ESTERASE UR QL STRIP: ABNORMAL
LYMPHOCYTES # BLD AUTO: 1.8 K/UL (ref 1–4.8)
LYMPHOCYTES NFR BLD: 15.5 % (ref 18–48)
MCH RBC QN AUTO: 23.3 PG (ref 27–31)
MCHC RBC AUTO-ENTMCNC: 28.7 G/DL (ref 32–36)
MCV RBC AUTO: 81 FL (ref 82–98)
MICROSCOPIC COMMENT: NORMAL
MONOCYTES # BLD AUTO: 0.9 K/UL (ref 0.3–1)
MONOCYTES NFR BLD: 7.7 % (ref 4–15)
NEUTROPHILS # BLD AUTO: 8.9 K/UL (ref 1.8–7.7)
NEUTROPHILS NFR BLD: 75.7 % (ref 38–73)
NITRITE UR QL STRIP: NEGATIVE
NRBC BLD-RTO: 0 /100 WBC
PH UR STRIP: 7 [PH] (ref 5–8)
PLATELET # BLD AUTO: 534 K/UL (ref 150–350)
PMV BLD AUTO: 10.2 FL (ref 9.2–12.9)
POTASSIUM SERPL-SCNC: 3.4 MMOL/L (ref 3.5–5.1)
PROT SERPL-MCNC: 6.4 G/DL (ref 6–8.4)
PROT UR QL STRIP: NEGATIVE
PROTHROMBIN TIME: 11 SEC (ref 9–12.5)
RBC # BLD AUTO: 4.03 M/UL (ref 4–5.4)
RBC #/AREA URNS HPF: 1 /HPF (ref 0–4)
SARS-COV-2 RDRP RESP QL NAA+PROBE: NEGATIVE
SODIUM SERPL-SCNC: 132 MMOL/L (ref 136–145)
SP GR UR STRIP: 1.01 (ref 1–1.03)
SPECIMEN SOURCE: NORMAL
SQUAMOUS #/AREA URNS HPF: 2 /HPF
TROPONIN I SERPL DL<=0.01 NG/ML-MCNC: 0.02 NG/ML (ref 0.02–0.5)
URN SPEC COLLECT METH UR: ABNORMAL
UROBILINOGEN UR STRIP-ACNC: NEGATIVE EU/DL
WBC # BLD AUTO: 11.7 K/UL (ref 3.9–12.7)
WBC #/AREA URNS HPF: 2 /HPF (ref 0–5)

## 2021-02-08 PROCEDURE — 84484 ASSAY OF TROPONIN QUANT: CPT

## 2021-02-08 PROCEDURE — 85610 PROTHROMBIN TIME: CPT

## 2021-02-08 PROCEDURE — 25000003 PHARM REV CODE 250: Performed by: EMERGENCY MEDICINE

## 2021-02-08 PROCEDURE — 85025 COMPLETE CBC W/AUTO DIFF WBC: CPT

## 2021-02-08 PROCEDURE — 71045 X-RAY EXAM CHEST 1 VIEW: CPT | Mod: 26,,, | Performed by: RADIOLOGY

## 2021-02-08 PROCEDURE — 99285 EMERGENCY DEPT VISIT HI MDM: CPT | Mod: 25

## 2021-02-08 PROCEDURE — 71045 XR CHEST AP PORTABLE: ICD-10-PCS | Mod: 26,,, | Performed by: RADIOLOGY

## 2021-02-08 PROCEDURE — 71045 X-RAY EXAM CHEST 1 VIEW: CPT | Mod: TC,FY

## 2021-02-08 PROCEDURE — 93005 ELECTROCARDIOGRAM TRACING: CPT

## 2021-02-08 PROCEDURE — 81000 URINALYSIS NONAUTO W/SCOPE: CPT

## 2021-02-08 PROCEDURE — U0002 COVID-19 LAB TEST NON-CDC: HCPCS

## 2021-02-08 PROCEDURE — 87502 INFLUENZA DNA AMP PROBE: CPT

## 2021-02-08 PROCEDURE — 85730 THROMBOPLASTIN TIME PARTIAL: CPT

## 2021-02-08 PROCEDURE — 80053 COMPREHEN METABOLIC PANEL: CPT

## 2021-02-08 RX ORDER — CAPECITABINE 500 MG/1
500 TABLET, FILM COATED ORAL 2 TIMES DAILY
COMMUNITY
End: 2022-03-09

## 2021-02-08 RX ADMIN — SODIUM CHLORIDE 1000 ML: 0.9 INJECTION, SOLUTION INTRAVENOUS at 03:02

## 2021-05-03 ENCOUNTER — HOSPITAL ENCOUNTER (EMERGENCY)
Facility: HOSPITAL | Age: 66
Discharge: HOME | End: 2021-05-03
Attending: EMERGENCY MEDICINE
Payer: OTHER GOVERNMENT

## 2021-05-03 VITALS
RESPIRATION RATE: 12 BRPM | OXYGEN SATURATION: 95 % | BODY MASS INDEX: 22.76 KG/M2 | SYSTOLIC BLOOD PRESSURE: 126 MMHG | WEIGHT: 145 LBS | HEART RATE: 101 BPM | TEMPERATURE: 98 F | DIASTOLIC BLOOD PRESSURE: 74 MMHG | HEIGHT: 67 IN

## 2021-05-03 DIAGNOSIS — R19.7 DIARRHEA: ICD-10-CM

## 2021-05-03 DIAGNOSIS — K52.1 CHEMOTHERAPY INDUCED DIARRHEA: ICD-10-CM

## 2021-05-03 DIAGNOSIS — C18.9 MALIGNANT NEOPLASM OF COLON, UNSPECIFIED PART OF COLON: Primary | ICD-10-CM

## 2021-05-03 DIAGNOSIS — T45.1X5A CHEMOTHERAPY INDUCED DIARRHEA: ICD-10-CM

## 2021-05-03 LAB
ALBUMIN SERPL BCP-MCNC: 2.6 G/DL (ref 3.5–5.2)
ALP SERPL-CCNC: 154 U/L (ref 55–135)
ALT SERPL W/O P-5'-P-CCNC: 28 U/L (ref 10–44)
ANION GAP SERPL CALC-SCNC: 10 MMOL/L (ref 8–16)
AST SERPL-CCNC: 53 U/L (ref 10–40)
BASOPHILS # BLD AUTO: 0.01 K/UL (ref 0–0.2)
BASOPHILS NFR BLD: 0.2 % (ref 0–1.9)
BILIRUB SERPL-MCNC: 0.7 MG/DL (ref 0.1–1)
BUN SERPL-MCNC: 17 MG/DL (ref 8–23)
C DIFF GDH STL QL: NEGATIVE
C DIFF TOX A+B STL QL IA: NEGATIVE
CALCIUM SERPL-MCNC: 7.3 MG/DL (ref 8.7–10.5)
CHLORIDE SERPL-SCNC: 102 MMOL/L (ref 95–110)
CO2 SERPL-SCNC: 23 MMOL/L (ref 23–29)
CREAT SERPL-MCNC: 0.7 MG/DL (ref 0.5–1.4)
DIFFERENTIAL METHOD: ABNORMAL
EOSINOPHIL # BLD AUTO: 0 K/UL (ref 0–0.5)
EOSINOPHIL NFR BLD: 0.4 % (ref 0–8)
ERYTHROCYTE [DISTWIDTH] IN BLOOD BY AUTOMATED COUNT: 19.1 % (ref 11.5–14.5)
EST. GFR  (AFRICAN AMERICAN): >60 ML/MIN/1.73 M^2
EST. GFR  (NON AFRICAN AMERICAN): >60 ML/MIN/1.73 M^2
GLUCOSE SERPL-MCNC: 150 MG/DL (ref 70–110)
HCT VFR BLD AUTO: 42.7 % (ref 37–48.5)
HGB BLD-MCNC: 14.2 G/DL (ref 12–16)
IMM GRANULOCYTES # BLD AUTO: 0.02 K/UL (ref 0–0.04)
IMM GRANULOCYTES NFR BLD AUTO: 0.4 % (ref 0–0.5)
LIPASE SERPL-CCNC: 23 U/L (ref 4–60)
LYMPHOCYTES # BLD AUTO: 2 K/UL (ref 1–4.8)
LYMPHOCYTES NFR BLD: 39.7 % (ref 18–48)
MCH RBC QN AUTO: 33.6 PG (ref 27–31)
MCHC RBC AUTO-ENTMCNC: 33.3 G/DL (ref 32–36)
MCV RBC AUTO: 101 FL (ref 82–98)
MONOCYTES # BLD AUTO: 0.7 K/UL (ref 0.3–1)
MONOCYTES NFR BLD: 13.1 % (ref 4–15)
NEUTROPHILS # BLD AUTO: 2.3 K/UL (ref 1.8–7.7)
NEUTROPHILS NFR BLD: 46.2 % (ref 38–73)
NRBC BLD-RTO: 0 /100 WBC
PLATELET # BLD AUTO: 180 K/UL (ref 150–450)
PMV BLD AUTO: 9.8 FL (ref 9.2–12.9)
POTASSIUM SERPL-SCNC: 3.6 MMOL/L (ref 3.5–5.1)
PROT SERPL-MCNC: 5.6 G/DL (ref 6–8.4)
RBC # BLD AUTO: 4.22 M/UL (ref 4–5.4)
SODIUM SERPL-SCNC: 135 MMOL/L (ref 136–145)
TROPONIN I SERPL DL<=0.01 NG/ML-MCNC: <0.01 NG/ML (ref 0.02–0.5)
WBC # BLD AUTO: 4.96 K/UL (ref 3.9–12.7)

## 2021-05-03 PROCEDURE — 84484 ASSAY OF TROPONIN QUANT: CPT | Performed by: EMERGENCY MEDICINE

## 2021-05-03 PROCEDURE — 99284 EMERGENCY DEPT VISIT MOD MDM: CPT | Mod: 25

## 2021-05-03 PROCEDURE — 85025 COMPLETE CBC W/AUTO DIFF WBC: CPT | Performed by: EMERGENCY MEDICINE

## 2021-05-03 PROCEDURE — 93005 ELECTROCARDIOGRAM TRACING: CPT

## 2021-05-03 PROCEDURE — 96360 HYDRATION IV INFUSION INIT: CPT

## 2021-05-03 PROCEDURE — 83690 ASSAY OF LIPASE: CPT | Performed by: EMERGENCY MEDICINE

## 2021-05-03 PROCEDURE — 87324 CLOSTRIDIUM AG IA: CPT | Performed by: EMERGENCY MEDICINE

## 2021-05-03 PROCEDURE — 25000003 PHARM REV CODE 250: Performed by: EMERGENCY MEDICINE

## 2021-05-03 PROCEDURE — 87449 NOS EACH ORGANISM AG IA: CPT | Performed by: EMERGENCY MEDICINE

## 2021-05-03 PROCEDURE — 80053 COMPREHEN METABOLIC PANEL: CPT | Performed by: EMERGENCY MEDICINE

## 2021-05-03 RX ADMIN — SODIUM CHLORIDE 1000 ML: 0.9 INJECTION, SOLUTION INTRAVENOUS at 06:05

## 2021-08-09 ENCOUNTER — HOSPITAL ENCOUNTER (EMERGENCY)
Facility: HOSPITAL | Age: 66
Discharge: HOME | End: 2021-08-10
Attending: FAMILY MEDICINE
Payer: OTHER GOVERNMENT

## 2021-08-09 DIAGNOSIS — R11.2 NAUSEA & VOMITING: ICD-10-CM

## 2021-08-09 DIAGNOSIS — K52.9 ENTEROCOLITIS: Primary | ICD-10-CM

## 2021-08-09 LAB
ALBUMIN SERPL BCP-MCNC: 2.6 G/DL (ref 3.5–5.2)
ALP SERPL-CCNC: 153 U/L (ref 55–135)
ALT SERPL W/O P-5'-P-CCNC: 24 U/L (ref 10–44)
ANION GAP SERPL CALC-SCNC: 10 MMOL/L (ref 8–16)
AST SERPL-CCNC: 27 U/L (ref 10–40)
BASOPHILS # BLD AUTO: 0.01 K/UL (ref 0–0.2)
BASOPHILS NFR BLD: 0.1 % (ref 0–1.9)
BILIRUB SERPL-MCNC: 1.1 MG/DL (ref 0.1–1)
BUN SERPL-MCNC: 20 MG/DL (ref 8–23)
CALCIUM SERPL-MCNC: 8.4 MG/DL (ref 8.7–10.5)
CHLORIDE SERPL-SCNC: 101 MMOL/L (ref 95–110)
CO2 SERPL-SCNC: 22 MMOL/L (ref 23–29)
CREAT SERPL-MCNC: 1 MG/DL (ref 0.5–1.4)
DIFFERENTIAL METHOD: ABNORMAL
EOSINOPHIL # BLD AUTO: 0 K/UL (ref 0–0.5)
EOSINOPHIL NFR BLD: 0 % (ref 0–8)
ERYTHROCYTE [DISTWIDTH] IN BLOOD BY AUTOMATED COUNT: 18.6 % (ref 11.5–14.5)
EST. GFR  (AFRICAN AMERICAN): >60 ML/MIN/1.73 M^2
EST. GFR  (NON AFRICAN AMERICAN): 58.4 ML/MIN/1.73 M^2
GLUCOSE SERPL-MCNC: 146 MG/DL (ref 70–110)
HCT VFR BLD AUTO: 37.4 % (ref 37–48.5)
HGB BLD-MCNC: 12.9 G/DL (ref 12–16)
IMM GRANULOCYTES # BLD AUTO: 0.01 K/UL (ref 0–0.04)
IMM GRANULOCYTES NFR BLD AUTO: 0.1 % (ref 0–0.5)
LACTATE SERPL-SCNC: 3.5 MMOL/L (ref 0.5–2.2)
LIPASE SERPL-CCNC: 49 U/L (ref 4–60)
LYMPHOCYTES # BLD AUTO: 2.2 K/UL (ref 1–4.8)
LYMPHOCYTES NFR BLD: 30.8 % (ref 18–48)
MAGNESIUM SERPL-MCNC: 2 MG/DL (ref 1.6–2.6)
MAGNESIUM SERPL-MCNC: 2 MG/DL (ref 1.6–2.6)
MCH RBC QN AUTO: 34 PG (ref 27–31)
MCHC RBC AUTO-ENTMCNC: 34.5 G/DL (ref 32–36)
MCV RBC AUTO: 99 FL (ref 82–98)
MONOCYTES # BLD AUTO: 0.6 K/UL (ref 0.3–1)
MONOCYTES NFR BLD: 7.8 % (ref 4–15)
NEUTROPHILS # BLD AUTO: 4.4 K/UL (ref 1.8–7.7)
NEUTROPHILS NFR BLD: 61.2 % (ref 38–73)
NRBC BLD-RTO: 1 /100 WBC
PLATELET # BLD AUTO: 118 K/UL (ref 150–450)
PMV BLD AUTO: 10.5 FL (ref 9.2–12.9)
POTASSIUM SERPL-SCNC: 5.4 MMOL/L (ref 3.5–5.1)
PROT SERPL-MCNC: 5.2 G/DL (ref 6–8.4)
RBC # BLD AUTO: 3.79 M/UL (ref 4–5.4)
SARS-COV-2 RDRP RESP QL NAA+PROBE: NEGATIVE
SODIUM SERPL-SCNC: 133 MMOL/L (ref 136–145)
WBC # BLD AUTO: 7.27 K/UL (ref 3.9–12.7)

## 2021-08-09 PROCEDURE — 83605 ASSAY OF LACTIC ACID: CPT | Performed by: FAMILY MEDICINE

## 2021-08-09 PROCEDURE — 96360 HYDRATION IV INFUSION INIT: CPT

## 2021-08-09 PROCEDURE — U0002 COVID-19 LAB TEST NON-CDC: HCPCS | Performed by: FAMILY MEDICINE

## 2021-08-09 PROCEDURE — 83735 ASSAY OF MAGNESIUM: CPT | Performed by: FAMILY MEDICINE

## 2021-08-09 PROCEDURE — 85025 COMPLETE CBC W/AUTO DIFF WBC: CPT | Performed by: FAMILY MEDICINE

## 2021-08-09 PROCEDURE — 25000003 PHARM REV CODE 250: Performed by: FAMILY MEDICINE

## 2021-08-09 PROCEDURE — 74019 RADEX ABDOMEN 2 VIEWS: CPT | Mod: TC,FY

## 2021-08-09 PROCEDURE — 74019 XR ABDOMEN FLAT AND ERECT: ICD-10-PCS | Mod: 26,,, | Performed by: RADIOLOGY

## 2021-08-09 PROCEDURE — 96361 HYDRATE IV INFUSION ADD-ON: CPT

## 2021-08-09 PROCEDURE — 74019 RADEX ABDOMEN 2 VIEWS: CPT | Mod: 26,,, | Performed by: RADIOLOGY

## 2021-08-09 PROCEDURE — 99285 EMERGENCY DEPT VISIT HI MDM: CPT | Mod: 25

## 2021-08-09 PROCEDURE — 80053 COMPREHEN METABOLIC PANEL: CPT | Performed by: FAMILY MEDICINE

## 2021-08-09 PROCEDURE — 83690 ASSAY OF LIPASE: CPT | Performed by: FAMILY MEDICINE

## 2021-08-09 RX ORDER — PREDNISONE 10 MG/1
20 TABLET ORAL DAILY
COMMUNITY
Start: 2021-07-21 | End: 2022-05-19

## 2021-08-09 RX ORDER — LEVOTHYROXINE SODIUM 88 UG/1
TABLET ORAL
COMMUNITY
Start: 2021-03-21 | End: 2022-03-08 | Stop reason: DRUGHIGH

## 2021-08-09 RX ORDER — DIPHENOXYLATE HYDROCHLORIDE AND ATROPINE SULFATE 2.5; .025 MG/1; MG/1
TABLET ORAL
COMMUNITY
Start: 2021-08-04 | End: 2022-03-08

## 2021-08-09 RX ORDER — POTASSIUM CHLORIDE 1500 MG/1
20 TABLET, EXTENDED RELEASE ORAL 2 TIMES DAILY
Status: ON HOLD | COMMUNITY
Start: 2021-08-02 | End: 2022-07-01 | Stop reason: HOSPADM

## 2021-08-09 RX ADMIN — SODIUM CHLORIDE 1000 ML: 0.9 INJECTION, SOLUTION INTRAVENOUS at 10:08

## 2021-08-09 RX ADMIN — SODIUM CHLORIDE 1000 ML: 0.9 INJECTION, SOLUTION INTRAVENOUS at 09:08

## 2021-08-10 VITALS
OXYGEN SATURATION: 99 % | BODY MASS INDEX: 21.97 KG/M2 | WEIGHT: 140 LBS | HEIGHT: 67 IN | RESPIRATION RATE: 8 BRPM | HEART RATE: 92 BPM | DIASTOLIC BLOOD PRESSURE: 73 MMHG | TEMPERATURE: 98 F | SYSTOLIC BLOOD PRESSURE: 98 MMHG

## 2021-08-10 LAB — LACTATE SERPL-SCNC: 2.8 MMOL/L (ref 0.5–2.2)

## 2021-08-10 PROCEDURE — 83605 ASSAY OF LACTIC ACID: CPT | Performed by: EMERGENCY MEDICINE

## 2021-08-10 PROCEDURE — 25500020 PHARM REV CODE 255: Performed by: EMERGENCY MEDICINE

## 2021-08-10 PROCEDURE — 36415 COLL VENOUS BLD VENIPUNCTURE: CPT | Performed by: EMERGENCY MEDICINE

## 2021-08-10 RX ORDER — METRONIDAZOLE 500 MG/1
500 TABLET ORAL 3 TIMES DAILY
Qty: 21 TABLET | Refills: 0 | Status: SHIPPED | OUTPATIENT
Start: 2021-08-10 | End: 2021-08-17

## 2021-08-10 RX ORDER — CIPROFLOXACIN 500 MG/1
500 TABLET ORAL 2 TIMES DAILY
Qty: 14 TABLET | Refills: 0 | Status: SHIPPED | OUTPATIENT
Start: 2021-08-10 | End: 2021-08-17

## 2021-08-10 RX ADMIN — IOHEXOL 75 ML: 350 INJECTION, SOLUTION INTRAVENOUS at 05:08

## 2022-02-26 ENCOUNTER — HOSPITAL ENCOUNTER (EMERGENCY)
Facility: HOSPITAL | Age: 67
Discharge: ANOTHER HOSPITAL | End: 2022-02-27
Attending: FAMILY MEDICINE
Payer: OTHER GOVERNMENT

## 2022-02-26 DIAGNOSIS — E86.0 DEHYDRATION: ICD-10-CM

## 2022-02-26 DIAGNOSIS — R06.02 SOB (SHORTNESS OF BREATH): ICD-10-CM

## 2022-02-26 DIAGNOSIS — C18.9 MALIGNANT NEOPLASM OF COLON, UNSPECIFIED PART OF COLON: ICD-10-CM

## 2022-02-26 DIAGNOSIS — C79.9 METASTATIC MALIGNANT NEOPLASM, UNSPECIFIED SITE: ICD-10-CM

## 2022-02-26 DIAGNOSIS — R53.1 WEAKNESS: Primary | ICD-10-CM

## 2022-02-26 LAB
ALBUMIN SERPL BCP-MCNC: 2.7 G/DL (ref 3.5–5.2)
ALP SERPL-CCNC: 264 U/L (ref 55–135)
ALT SERPL W/O P-5'-P-CCNC: 58 U/L (ref 10–44)
ANION GAP SERPL CALC-SCNC: 16 MMOL/L (ref 8–16)
AST SERPL-CCNC: 60 U/L (ref 10–40)
BASOPHILS # BLD AUTO: 0.03 K/UL (ref 0–0.2)
BASOPHILS NFR BLD: 0.2 % (ref 0–1.9)
BILIRUB SERPL-MCNC: 2 MG/DL (ref 0.1–1)
BUN SERPL-MCNC: 23 MG/DL (ref 8–23)
CALCIUM SERPL-MCNC: 7.6 MG/DL (ref 8.7–10.5)
CHLORIDE SERPL-SCNC: 105 MMOL/L (ref 95–110)
CK SERPL-CCNC: 62 U/L (ref 20–180)
CO2 SERPL-SCNC: 17 MMOL/L (ref 23–29)
CREAT SERPL-MCNC: 0.8 MG/DL (ref 0.5–1.4)
DIFFERENTIAL METHOD: ABNORMAL
EOSINOPHIL # BLD AUTO: 0 K/UL (ref 0–0.5)
EOSINOPHIL NFR BLD: 0.1 % (ref 0–8)
ERYTHROCYTE [DISTWIDTH] IN BLOOD BY AUTOMATED COUNT: 23 % (ref 11.5–14.5)
EST. GFR  (AFRICAN AMERICAN): >60 ML/MIN/1.73 M^2
EST. GFR  (NON AFRICAN AMERICAN): >60 ML/MIN/1.73 M^2
GLUCOSE SERPL-MCNC: 169 MG/DL (ref 70–110)
HCT VFR BLD AUTO: 38.2 % (ref 37–48.5)
HGB BLD-MCNC: 13 G/DL (ref 12–16)
IMM GRANULOCYTES # BLD AUTO: 0.06 K/UL (ref 0–0.04)
IMM GRANULOCYTES NFR BLD AUTO: 0.5 % (ref 0–0.5)
LACTATE SERPL-SCNC: 2.7 MMOL/L (ref 0.5–2.2)
LYMPHOCYTES # BLD AUTO: 2.4 K/UL (ref 1–4.8)
LYMPHOCYTES NFR BLD: 19.4 % (ref 18–48)
MCH RBC QN AUTO: 31 PG (ref 27–31)
MCHC RBC AUTO-ENTMCNC: 34 G/DL (ref 32–36)
MCV RBC AUTO: 91 FL (ref 82–98)
MONOCYTES # BLD AUTO: 0.9 K/UL (ref 0.3–1)
MONOCYTES NFR BLD: 7.3 % (ref 4–15)
NEUTROPHILS # BLD AUTO: 9 K/UL (ref 1.8–7.7)
NEUTROPHILS NFR BLD: 72.5 % (ref 38–73)
NRBC BLD-RTO: 0 /100 WBC
PLATELET # BLD AUTO: 96 K/UL (ref 150–450)
PMV BLD AUTO: 11.9 FL (ref 9.2–12.9)
POTASSIUM SERPL-SCNC: 4.3 MMOL/L (ref 3.5–5.1)
PROT SERPL-MCNC: 4.9 G/DL (ref 6–8.4)
RBC # BLD AUTO: 4.2 M/UL (ref 4–5.4)
SARS-COV-2 RDRP RESP QL NAA+PROBE: NEGATIVE
SODIUM SERPL-SCNC: 138 MMOL/L (ref 136–145)
WBC # BLD AUTO: 12.38 K/UL (ref 3.9–12.7)

## 2022-02-26 PROCEDURE — 99285 EMERGENCY DEPT VISIT HI MDM: CPT | Mod: 25

## 2022-02-26 PROCEDURE — 71045 X-RAY EXAM CHEST 1 VIEW: CPT | Mod: TC,FY

## 2022-02-26 PROCEDURE — 25000003 PHARM REV CODE 250: Performed by: FAMILY MEDICINE

## 2022-02-26 PROCEDURE — 83605 ASSAY OF LACTIC ACID: CPT | Performed by: FAMILY MEDICINE

## 2022-02-26 PROCEDURE — 71045 X-RAY EXAM CHEST 1 VIEW: CPT | Mod: 26,,, | Performed by: RADIOLOGY

## 2022-02-26 PROCEDURE — 93005 ELECTROCARDIOGRAM TRACING: CPT

## 2022-02-26 PROCEDURE — U0002 COVID-19 LAB TEST NON-CDC: HCPCS | Performed by: FAMILY MEDICINE

## 2022-02-26 PROCEDURE — 71045 XR CHEST AP PORTABLE: ICD-10-PCS | Mod: 26,,, | Performed by: RADIOLOGY

## 2022-02-26 PROCEDURE — 96375 TX/PRO/DX INJ NEW DRUG ADDON: CPT

## 2022-02-26 PROCEDURE — 36415 COLL VENOUS BLD VENIPUNCTURE: CPT | Performed by: FAMILY MEDICINE

## 2022-02-26 PROCEDURE — 63600175 PHARM REV CODE 636 W HCPCS: Performed by: FAMILY MEDICINE

## 2022-02-26 PROCEDURE — 93010 ELECTROCARDIOGRAM REPORT: CPT | Mod: ,,, | Performed by: INTERNAL MEDICINE

## 2022-02-26 PROCEDURE — 93010 EKG 12-LEAD: ICD-10-PCS | Mod: ,,, | Performed by: INTERNAL MEDICINE

## 2022-02-26 PROCEDURE — 85025 COMPLETE CBC W/AUTO DIFF WBC: CPT | Performed by: FAMILY MEDICINE

## 2022-02-26 PROCEDURE — 80053 COMPREHEN METABOLIC PANEL: CPT | Performed by: FAMILY MEDICINE

## 2022-02-26 PROCEDURE — 96365 THER/PROPH/DIAG IV INF INIT: CPT

## 2022-02-26 PROCEDURE — 96361 HYDRATE IV INFUSION ADD-ON: CPT

## 2022-02-26 PROCEDURE — 82550 ASSAY OF CK (CPK): CPT | Performed by: FAMILY MEDICINE

## 2022-02-26 RX ORDER — SODIUM CHLORIDE 9 MG/ML
1000 INJECTION, SOLUTION INTRAVENOUS
Status: DISCONTINUED | OUTPATIENT
Start: 2022-02-26 | End: 2022-02-26

## 2022-02-26 RX ORDER — ONDANSETRON 2 MG/ML
4 INJECTION INTRAMUSCULAR; INTRAVENOUS
Status: COMPLETED | OUTPATIENT
Start: 2022-02-26 | End: 2022-02-26

## 2022-02-26 RX ORDER — SODIUM CHLORIDE 9 MG/ML
1000 INJECTION, SOLUTION INTRAVENOUS
Status: COMPLETED | OUTPATIENT
Start: 2022-02-26 | End: 2022-02-26

## 2022-02-26 RX ADMIN — SODIUM CHLORIDE 500 ML: 0.9 INJECTION, SOLUTION INTRAVENOUS at 10:02

## 2022-02-26 RX ADMIN — SODIUM CHLORIDE 500 ML: 0.9 INJECTION, SOLUTION INTRAVENOUS at 06:02

## 2022-02-26 RX ADMIN — SODIUM CHLORIDE 500 ML: 9 INJECTION, SOLUTION INTRAVENOUS at 11:02

## 2022-02-26 RX ADMIN — ONDANSETRON 4 MG: 2 INJECTION INTRAMUSCULAR; INTRAVENOUS at 06:02

## 2022-02-26 RX ADMIN — CEFTRIAXONE SODIUM 1 G: 1 INJECTION, POWDER, FOR SOLUTION INTRAMUSCULAR; INTRAVENOUS at 09:02

## 2022-02-26 RX ADMIN — SODIUM CHLORIDE 1000 ML: 0.9 INJECTION, SOLUTION INTRAVENOUS at 09:02

## 2022-02-27 ENCOUNTER — HOSPITAL ENCOUNTER (INPATIENT)
Facility: HOSPITAL | Age: 67
LOS: 6 days | Discharge: HOME | DRG: 871 | End: 2022-03-05
Attending: HOSPITALIST | Admitting: HOSPITALIST
Payer: OTHER GOVERNMENT

## 2022-02-27 VITALS
RESPIRATION RATE: 13 BRPM | OXYGEN SATURATION: 98 % | BODY MASS INDEX: 22.6 KG/M2 | HEIGHT: 67 IN | HEART RATE: 107 BPM | DIASTOLIC BLOOD PRESSURE: 65 MMHG | WEIGHT: 144 LBS | SYSTOLIC BLOOD PRESSURE: 100 MMHG | TEMPERATURE: 98 F

## 2022-02-27 DIAGNOSIS — A41.9 SEPSIS: ICD-10-CM

## 2022-02-27 DIAGNOSIS — A41.9 SEVERE SEPSIS: ICD-10-CM

## 2022-02-27 DIAGNOSIS — R65.20 SEVERE SEPSIS: ICD-10-CM

## 2022-02-27 DIAGNOSIS — E86.0 DEHYDRATION: Primary | ICD-10-CM

## 2022-02-27 PROBLEM — Z71.89 GOALS OF CARE, COUNSELING/DISCUSSION: Status: ACTIVE | Noted: 2022-02-27

## 2022-02-27 LAB
ALBUMIN SERPL BCP-MCNC: 2 G/DL (ref 3.5–5.2)
ALP SERPL-CCNC: 175 U/L (ref 55–135)
ALT SERPL W/O P-5'-P-CCNC: 39 U/L (ref 10–44)
AMMONIA PLAS-SCNC: 37 UMOL/L (ref 10–50)
ANION GAP SERPL CALC-SCNC: 8 MMOL/L (ref 8–16)
ANISOCYTOSIS BLD QL SMEAR: SLIGHT
AST SERPL-CCNC: 36 U/L (ref 10–40)
BASOPHILS NFR BLD: 0 % (ref 0–1.9)
BILIRUB SERPL-MCNC: 0.9 MG/DL (ref 0.1–1)
BILIRUB UR QL STRIP: NEGATIVE
BUN SERPL-MCNC: 21 MG/DL (ref 8–23)
CALCIUM SERPL-MCNC: 6.5 MG/DL (ref 8.7–10.5)
CHLORIDE SERPL-SCNC: 113 MMOL/L (ref 95–110)
CK MB SERPL-MCNC: 1.7 NG/ML (ref 0.1–6.5)
CK MB SERPL-RTO: 2.9 % (ref 0–5)
CK SERPL-CCNC: 58 U/L (ref 20–180)
CLARITY UR: CLEAR
CO2 SERPL-SCNC: 18 MMOL/L (ref 23–29)
COLOR UR: YELLOW
CREAT SERPL-MCNC: 0.6 MG/DL (ref 0.5–1.4)
DIFFERENTIAL METHOD: ABNORMAL
EOSINOPHIL NFR BLD: 1 % (ref 0–8)
ERYTHROCYTE [DISTWIDTH] IN BLOOD BY AUTOMATED COUNT: 23.3 % (ref 11.5–14.5)
EST. GFR  (AFRICAN AMERICAN): >60 ML/MIN/1.73 M^2
EST. GFR  (NON AFRICAN AMERICAN): >60 ML/MIN/1.73 M^2
GLUCOSE SERPL-MCNC: 102 MG/DL (ref 70–110)
GLUCOSE UR QL STRIP: NEGATIVE
HCT VFR BLD AUTO: 29.8 % (ref 37–48.5)
HGB BLD-MCNC: 9.7 G/DL (ref 12–16)
HGB UR QL STRIP: ABNORMAL
IMM GRANULOCYTES # BLD AUTO: ABNORMAL K/UL
IMM GRANULOCYTES NFR BLD AUTO: ABNORMAL %
KETONES UR QL STRIP: ABNORMAL
LACTATE SERPL-SCNC: 2.4 MMOL/L (ref 0.5–2.2)
LACTATE SERPL-SCNC: 2.4 MMOL/L (ref 0.5–2.2)
LACTATE SERPL-SCNC: 2.5 MMOL/L (ref 0.5–2.2)
LACTATE SERPL-SCNC: 2.7 MMOL/L (ref 0.5–2.2)
LACTATE SERPL-SCNC: 4.4 MMOL/L (ref 0.5–2.2)
LEUKOCYTE ESTERASE UR QL STRIP: NEGATIVE
LYMPHOCYTES NFR BLD: 40 % (ref 18–48)
MCH RBC QN AUTO: 30.4 PG (ref 27–31)
MCHC RBC AUTO-ENTMCNC: 32.6 G/DL (ref 32–36)
MCV RBC AUTO: 93 FL (ref 82–98)
MONOCYTES NFR BLD: 3 % (ref 4–15)
NEUTROPHILS NFR BLD: 56 % (ref 38–73)
NITRITE UR QL STRIP: NEGATIVE
NRBC BLD-RTO: 0 /100 WBC
OVALOCYTES BLD QL SMEAR: ABNORMAL
PH UR STRIP: 6 [PH] (ref 5–8)
PLATELET # BLD AUTO: 66 K/UL (ref 150–450)
PMV BLD AUTO: 12.2 FL (ref 9.2–12.9)
POTASSIUM SERPL-SCNC: 3.5 MMOL/L (ref 3.5–5.1)
PROCALCITONIN SERPL IA-MCNC: 4.69 NG/ML
PROT SERPL-MCNC: 3.8 G/DL (ref 6–8.4)
PROT UR QL STRIP: NEGATIVE
RBC # BLD AUTO: 3.19 M/UL (ref 4–5.4)
SODIUM SERPL-SCNC: 139 MMOL/L (ref 136–145)
SP GR UR STRIP: 1.02 (ref 1–1.03)
T4 FREE SERPL-MCNC: 0.58 NG/DL (ref 0.71–1.51)
TROPONIN I SERPL DL<=0.01 NG/ML-MCNC: 0.03 NG/ML (ref 0–0.03)
TSH SERPL DL<=0.005 MIU/L-ACNC: 18.79 UIU/ML (ref 0.4–4)
URN SPEC COLLECT METH UR: ABNORMAL
UROBILINOGEN UR STRIP-ACNC: NEGATIVE EU/DL
WBC # BLD AUTO: 5.65 K/UL (ref 3.9–12.7)

## 2022-02-27 PROCEDURE — 84443 ASSAY THYROID STIM HORMONE: CPT | Performed by: INTERNAL MEDICINE

## 2022-02-27 PROCEDURE — 81003 URINALYSIS AUTO W/O SCOPE: CPT | Performed by: INTERNAL MEDICINE

## 2022-02-27 PROCEDURE — 12000002 HC ACUTE/MED SURGE SEMI-PRIVATE ROOM

## 2022-02-27 PROCEDURE — 36415 COLL VENOUS BLD VENIPUNCTURE: CPT

## 2022-02-27 PROCEDURE — 83605 ASSAY OF LACTIC ACID: CPT | Mod: 91 | Performed by: INTERNAL MEDICINE

## 2022-02-27 PROCEDURE — 99223 1ST HOSP IP/OBS HIGH 75: CPT | Mod: ,,, | Performed by: INTERNAL MEDICINE

## 2022-02-27 PROCEDURE — 85027 COMPLETE CBC AUTOMATED: CPT | Performed by: INTERNAL MEDICINE

## 2022-02-27 PROCEDURE — 96361 HYDRATE IV INFUSION ADD-ON: CPT

## 2022-02-27 PROCEDURE — 87040 BLOOD CULTURE FOR BACTERIA: CPT | Performed by: FAMILY MEDICINE

## 2022-02-27 PROCEDURE — 85007 BL SMEAR W/DIFF WBC COUNT: CPT | Performed by: INTERNAL MEDICINE

## 2022-02-27 PROCEDURE — 25000003 PHARM REV CODE 250

## 2022-02-27 PROCEDURE — 25000003 PHARM REV CODE 250: Performed by: INTERNAL MEDICINE

## 2022-02-27 PROCEDURE — 36415 COLL VENOUS BLD VENIPUNCTURE: CPT | Performed by: FAMILY MEDICINE

## 2022-02-27 PROCEDURE — 94760 N-INVAS EAR/PLS OXIMETRY 1: CPT

## 2022-02-27 PROCEDURE — 84145 PROCALCITONIN (PCT): CPT | Performed by: INTERNAL MEDICINE

## 2022-02-27 PROCEDURE — 83605 ASSAY OF LACTIC ACID: CPT | Mod: 91

## 2022-02-27 PROCEDURE — 99223 PR INITIAL HOSPITAL CARE,LEVL III: ICD-10-PCS | Mod: ,,, | Performed by: INTERNAL MEDICINE

## 2022-02-27 PROCEDURE — 99900035 HC TECH TIME PER 15 MIN (STAT)

## 2022-02-27 PROCEDURE — 83605 ASSAY OF LACTIC ACID: CPT | Performed by: FAMILY MEDICINE

## 2022-02-27 PROCEDURE — 87449 NOS EACH ORGANISM AG IA: CPT | Performed by: NURSE PRACTITIONER

## 2022-02-27 PROCEDURE — 63600175 PHARM REV CODE 636 W HCPCS: Performed by: INTERNAL MEDICINE

## 2022-02-27 PROCEDURE — 82553 CREATINE MB FRACTION: CPT | Performed by: INTERNAL MEDICINE

## 2022-02-27 PROCEDURE — 36415 COLL VENOUS BLD VENIPUNCTURE: CPT | Performed by: INTERNAL MEDICINE

## 2022-02-27 PROCEDURE — 84484 ASSAY OF TROPONIN QUANT: CPT | Performed by: INTERNAL MEDICINE

## 2022-02-27 PROCEDURE — 27000207 HC ISOLATION

## 2022-02-27 PROCEDURE — 82140 ASSAY OF AMMONIA: CPT | Performed by: FAMILY MEDICINE

## 2022-02-27 PROCEDURE — 80053 COMPREHEN METABOLIC PANEL: CPT | Performed by: INTERNAL MEDICINE

## 2022-02-27 PROCEDURE — 84439 ASSAY OF FREE THYROXINE: CPT | Performed by: INTERNAL MEDICINE

## 2022-02-27 PROCEDURE — 80074 ACUTE HEPATITIS PANEL: CPT | Performed by: INTERNAL MEDICINE

## 2022-02-27 RX ORDER — IBUPROFEN 200 MG
24 TABLET ORAL
Status: DISCONTINUED | OUTPATIENT
Start: 2022-02-27 | End: 2022-03-05 | Stop reason: HOSPADM

## 2022-02-27 RX ORDER — TALC
9 POWDER (GRAM) TOPICAL NIGHTLY PRN
Status: DISCONTINUED | OUTPATIENT
Start: 2022-02-27 | End: 2022-03-05 | Stop reason: HOSPADM

## 2022-02-27 RX ORDER — LANOLIN ALCOHOL/MO/W.PET/CERES
800 CREAM (GRAM) TOPICAL
Status: DISCONTINUED | OUTPATIENT
Start: 2022-02-27 | End: 2022-03-05 | Stop reason: HOSPADM

## 2022-02-27 RX ORDER — ACETAMINOPHEN 325 MG/1
650 TABLET ORAL EVERY 6 HOURS PRN
Status: DISCONTINUED | OUTPATIENT
Start: 2022-02-27 | End: 2022-03-02

## 2022-02-27 RX ORDER — SODIUM CHLORIDE 9 MG/ML
INJECTION, SOLUTION INTRAVENOUS CONTINUOUS
Status: DISCONTINUED | OUTPATIENT
Start: 2022-02-27 | End: 2022-03-04

## 2022-02-27 RX ORDER — NALOXONE HCL 0.4 MG/ML
0.02 VIAL (ML) INJECTION
Status: DISCONTINUED | OUTPATIENT
Start: 2022-02-27 | End: 2022-03-05 | Stop reason: HOSPADM

## 2022-02-27 RX ORDER — ACETAMINOPHEN 325 MG/1
650 TABLET ORAL EVERY 4 HOURS PRN
Status: DISCONTINUED | OUTPATIENT
Start: 2022-02-27 | End: 2022-03-02

## 2022-02-27 RX ORDER — PANTOPRAZOLE SODIUM 40 MG/1
40 TABLET, DELAYED RELEASE ORAL DAILY
Status: DISCONTINUED | OUTPATIENT
Start: 2022-02-27 | End: 2022-03-05 | Stop reason: HOSPADM

## 2022-02-27 RX ORDER — AMOXICILLIN 250 MG
1 CAPSULE ORAL 2 TIMES DAILY
Status: DISCONTINUED | OUTPATIENT
Start: 2022-02-27 | End: 2022-03-03

## 2022-02-27 RX ORDER — SODIUM CHLORIDE 0.9 % (FLUSH) 0.9 %
10 SYRINGE (ML) INJECTION EVERY 12 HOURS PRN
Status: DISCONTINUED | OUTPATIENT
Start: 2022-02-27 | End: 2022-03-05 | Stop reason: HOSPADM

## 2022-02-27 RX ORDER — ONDANSETRON 2 MG/ML
4 INJECTION INTRAMUSCULAR; INTRAVENOUS EVERY 6 HOURS PRN
Status: DISCONTINUED | OUTPATIENT
Start: 2022-02-27 | End: 2022-03-05 | Stop reason: HOSPADM

## 2022-02-27 RX ORDER — ERGOCALCIFEROL 1.25 MG/1
50000 CAPSULE ORAL
Status: DISCONTINUED | OUTPATIENT
Start: 2022-02-27 | End: 2022-03-05 | Stop reason: HOSPADM

## 2022-02-27 RX ORDER — GLUCAGON 1 MG
1 KIT INJECTION
Status: DISCONTINUED | OUTPATIENT
Start: 2022-02-27 | End: 2022-03-05 | Stop reason: HOSPADM

## 2022-02-27 RX ORDER — IBUPROFEN 200 MG
16 TABLET ORAL
Status: DISCONTINUED | OUTPATIENT
Start: 2022-02-27 | End: 2022-03-05 | Stop reason: HOSPADM

## 2022-02-27 RX ORDER — LEVOTHYROXINE SODIUM 88 UG/1
88 TABLET ORAL
Status: DISCONTINUED | OUTPATIENT
Start: 2022-02-28 | End: 2022-03-05 | Stop reason: HOSPADM

## 2022-02-27 RX ORDER — IPRATROPIUM BROMIDE AND ALBUTEROL SULFATE 2.5; .5 MG/3ML; MG/3ML
3 SOLUTION RESPIRATORY (INHALATION) EVERY 6 HOURS PRN
Status: DISCONTINUED | OUTPATIENT
Start: 2022-02-27 | End: 2022-03-05 | Stop reason: HOSPADM

## 2022-02-27 RX ORDER — LOPERAMIDE HYDROCHLORIDE 2 MG/1
2 CAPSULE ORAL 4 TIMES DAILY PRN
Status: DISCONTINUED | OUTPATIENT
Start: 2022-02-27 | End: 2022-02-28 | Stop reason: SDUPTHER

## 2022-02-27 RX ORDER — ENOXAPARIN SODIUM 100 MG/ML
40 INJECTION SUBCUTANEOUS EVERY 24 HOURS
Status: DISCONTINUED | OUTPATIENT
Start: 2022-02-27 | End: 2022-03-05 | Stop reason: HOSPADM

## 2022-02-27 RX ADMIN — VANCOMYCIN HYDROCHLORIDE 1500 MG: 1.5 INJECTION, POWDER, LYOPHILIZED, FOR SOLUTION INTRAVENOUS at 04:02

## 2022-02-27 RX ADMIN — SODIUM CHLORIDE 500 ML: 0.9 INJECTION, SOLUTION INTRAVENOUS at 10:02

## 2022-02-27 RX ADMIN — ERGOCALCIFEROL 50000 UNITS: 1.25 CAPSULE ORAL at 10:02

## 2022-02-27 RX ADMIN — CALCIUM GLUCONATE 1 G: 98 INJECTION, SOLUTION INTRAVENOUS at 12:02

## 2022-02-27 RX ADMIN — ENOXAPARIN SODIUM 40 MG: 100 INJECTION SUBCUTANEOUS at 05:02

## 2022-02-27 RX ADMIN — PANTOPRAZOLE SODIUM 40 MG: 40 TABLET, DELAYED RELEASE ORAL at 10:02

## 2022-02-27 RX ADMIN — PIPERACILLIN AND TAZOBACTAM 4.5 G: 4; .5 INJECTION, POWDER, LYOPHILIZED, FOR SOLUTION INTRAVENOUS; PARENTERAL at 10:02

## 2022-02-27 RX ADMIN — PIPERACILLIN AND TAZOBACTAM 4.5 G: 4; .5 INJECTION, POWDER, LYOPHILIZED, FOR SOLUTION INTRAVENOUS; PARENTERAL at 06:02

## 2022-02-27 RX ADMIN — SODIUM CHLORIDE: 0.9 INJECTION, SOLUTION INTRAVENOUS at 08:02

## 2022-02-27 NOTE — ASSESSMENT & PLAN NOTE
Advance Care Planning     Living Will  During this visit, I engaged the patient  in the advance care planning process.  The patient and I reviewed the role for advance directives and their purpose in directing future healthcare if the patient's unable to speak for him/herself.  At this point in time, the patient does have full decision-making capacity.  We discussed different extreme health states that she could experience, and reviewed what kind of medical care she would want in those situations.  The patient communicated that if she were comatose and had little chance of a meaningful recovery, she would want machines/life-sustaining treatments used. I spent a total of 16 minutes engaging the patient in this advance care planning discussion.

## 2022-02-27 NOTE — PLAN OF CARE
[facility]  Initial Discharge Assessment       Primary Care Provider: Dr. Jad Hong DO [phone]    Admission Diagnosis: Sepsis [A41.9]    Admission Date: 2/27/2022  Expected Discharge Date:     Discharge Barriers Identified: None    Payor: [employer] [place] [place] / Plan: [employer] / Product Type: PPO /     Extended Emergency Contact Information  Primary Emergency Contact: Edouard Lemus   [place]  Work Phone: [phone]  Mobile Phone: [phone]  Relation: Spouse    Discharge Plan A: Home  Discharge Plan B: Home Health      [id number] - [place], [place] - [address]  [address] [place] [address]  Phone: [phone] Fax: [phone]    SW met with patient at bedside to complete discharge planning assessment.  Patient alert and oriented xs 4.  Patient verified all demographic information on facesheet is correct.  Patient verified PCP is Dr. Hal [place].  Patient verified primary health insurance is [employer] and secondary is Medicare PART A.  Patient with NO home health but has listed DME.  Patient with NO POA or Living Will.  Patient not on dialysis or medication coumadin.  Patient with no 30 day admission.  Patient with no financial issues at this time.  Patient family will provide transportation upon discharge from facility.  Patient independent with ADLs, live with spouse, drives self.      Initial Assessment (most recent)     Adult Discharge Assessment - 02/27/22 1625        Discharge Assessment    Assessment Type Discharge Planning Assessment     Confirmed/corrected address, phone number and insurance Yes     Confirmed Demographics Correct on Facesheet     Source of Information patient;family     Communicated CATHY with patient/caregiver Date not available/Unable to determine     Lives With spouse     Facility Arrived From: home     Do you expect to return to your current living situation? Yes     Do you have help at home or someone to help  you manage your care at home? Yes     Who are your caregiver(s) and their phone number(s)? spouse     Prior to hospitilization cognitive status: Alert/Oriented     Current cognitive status: Alert/Oriented     Walking or Climbing Stairs Difficulty ambulation difficulty, requires equipment;stair climbing difficulty, requires equipment     Dressing/Bathing Difficulty bathing difficulty, requires equipment     Equipment Currently Used at Home walker, rolling;cane, straight     Readmission within 30 days? No     Patient currently being followed by outpatient case management? No     Do you currently have service(s) that help you manage your care at home? No     Do you take prescription medications? Yes     Do you have prescription coverage? Yes     Do you have any problems affording any of your prescribed medications? No     Is the patient taking medications as prescribed? yes     Who is going to help you get home at discharge? spouse     How do you get to doctors appointments? car, drives self     Are you on dialysis? No     Do you take coumadin? No     Discharge Plan A Home     Discharge Plan B Home Health     DME Needed Upon Discharge  none     Discharge Plan discussed with: Patient;Spouse/sig other     Discharge Barriers Identified None

## 2022-02-27 NOTE — PROVIDER TRANSFER
(Physician in Lead of Transfers)   Outside Transfer Acceptance Note / Regional Referral Center    Referring facility: [facility]   Referring provider: MOO MEDINA WILLIAM E.  Accepting facility: [facility]  Accepting provider: VANCE FRANCIS JAMES S.  Reason for transfer:  Higher level of care  Transfer diagnosis: sepsis   Transfer specialty requested: Critical Care Medicine  Oncology  Transfer specialty notified: Yes  Transfer level: 2  Isolation status: No active isolations   Admission class or status: IP- Inpatient      Narrative     Patient is a 66 y.o. female who has a past medical history of Colon cancer on chemotherapy, Hypertension, Liver cancer, Nontoxic uninodular goiter, Osteoarthrosis, unspecified whether generalized or localized, hand, Other abnormal glucose, and Pure hypercholesterolemia presented with generalized weakness, fatigue and poor oral intake. On arrival to ED patient found to be hypotensive and tachycardic. Sepsis protocol initiated. Patient treated with IVF's and Rocephin. Source of sepsis unclear at this time. Work up still pending. Lactic acid elevated 2.7. BP improved with IVF's. Repeat lactic acid pending. Facility seeking transfer for higher level of care and evaluation by oncology.         Objective     Vitals: Temp: 98 °F (36.7 °C) (02/26/22 1738)  Pulse: 109 (02/27/22 0053)  Resp: 16 (02/27/22 0053)  BP: 90/64 (02/27/22 0053)  SpO2: 100 % (02/27/22 0053)    Recent Labs: see EPIC  CBC:  Recent Labs   Lab 02/26/22  1851   WBC 12.38   HGB 13.0   HCT 38.2   PLT 96*     CMP:  Recent Labs   Lab 02/26/22  1823   CALCIUM 7.6*   ALBUMIN 2.7*   PROT 4.9*      K 4.3   CO2 17*      BUN 23   CREATININE 0.8   ALKPHOS 264*   ALT 58*   AST 60*   BILITOT 2.0*     Recent Labs   Lab 02/26/22  2308   LACTATE 2.7*     Recent Labs   Lab 02/26/22 1823   CPK 62     BNP  No results for input(s): BNP,  BNPTRIAGEBLO in the last 168 hours.  ABG  No results for input(s): PH, PO2, PCO2, HCO3, BE in the last 168 hours.   Lab Results   Component Value Date    INR 1.0 02/08/2021    INR 1.3 (H) 01/24/2021    INR 1.1 01/23/2021       Recent imaging:   X-Ray Abdomen Flat And Erect  Narrative: EXAMINATION:  XR ABDOMEN FLAT AND ERECT    CLINICAL HISTORY:  Nausea with vomiting, unspecified    TECHNIQUE:  Flat and erect AP views of the abdomen were performed.    COMPARISON:  None    FINDINGS:  Air and stool throughout the colon and rectum.  No plain film evidence for bowel obstruction.  No dilated loops of small bowel.    No significant abdominal calcifications.    Bony pelvis intact.  Mild dextroscoliosis.  Impression: No plain film evidence for bowel obstruction.    Electronically signed by: Adalberto Gonzalez  Date:    08/10/2021  Time:    06:22  CT Abdomen Pelvis With Contrast  Narrative: EXAMINATION:  CT ABDOMEN PELVIS WITH CONTRAST    CLINICAL HISTORY:  Abdominal distension;Abdominal pain, acute, nonlocalized;    TECHNIQUE:  Low dose axial images, sagittal and coronal reformations were obtained from the lung bases to the pubic symphysis following the IV administration of 75 mL of Omnipaque 350 .  Oral contrast was not given.    COMPARISON:  CT 01/23/2021.    FINDINGS:  There is a small 4 mm focus of nodular pleural scarring along the anterior right lung base.  The left lung base is clear.  No pleural or pericardial effusions.    The liver is normal in size and attenuation.  There is an enlarged poorly defined hypoattenuating mass along the periphery of the junction of the right and left hepatic lobes demonstrating subtle postcontrast enhancement measuring approximately 4.1 x 7.4 cm.  This is increased in size over the interval in its consistent with either primary or secondary malignancy.  There are multiple small poorly defined peripherally enhancing hepatic lesions the largest within the left hepatic lobe measuring 2.0 cm.   This is consistent with metastatic disease.  No significant perihepatic ascites.    The gallbladder is distended.  No calcified gallstones.  No definite intra or extrahepatic biliary ductal dilatation.  The spleen, pancreas and adrenal glands are unremarkable.  Kidneys are normal in size and enhance homogeneously.  No renal calculi.  No changes of hydronephrosis.  No perinephric inflammatory change.    Surgical clips from previous partial distal colectomy.  Fluid distends the loops of colon.  There is edematous circumferential wall thickening of the descending and sigmoid colon extending to the rectum measuring up to 8 mm.  Minimal associated mesenteric inflammatory change.  There is also mild edematous circumferential bowel wall thickening of the distal loops and terminal ileum measuring up to 6 mm.  The SMA and ZION are identified and opacify normally with contrast.    The bladder is only partially distended.  Uterus and ovaries are mildly atrophic.  No free fluid within the cul-de-sac.    No significant mesenteric or retroperitoneal lymphadenopathy.  There is a mild S shaped scoliosis.  Impression: 1. Enlarging hypoattenuating mass involving the junction of the right and left hepatic lobes consistent with either primary or secondary malignancy.  2. Multiple small peripherally enhancing hepatic lesions consistent with diffuse metastatic disease.  3. Prior distal colectomy.  4. Edematous circumferential wall thickening of the small bowel and colon.  This is suspicious for either inflammatory or infectious enterocolitis.  Differential diagnosis includes ischemic bowel, however this is considered less likely as both the SMA and ZION appear to opacify normally.  This report was flagged in Epic as abnormal.    Electronically signed by: Adalberto Gonzalez  Date:    08/10/2021  Time:    06:02      Airway:     Vent settings:     IV access:        Peripheral IV - Single Lumen 02/26/22 1815 20 G Right Shoulder (Active)   Site  Assessment Clean;Dry;Intact 02/26/22 1815   Extremity Assessment Distal to IV No redness;No swelling;No warmth 02/26/22 1815   Line Status Blood return noted;Flushed 02/26/22 1815   Dressing Status Clean;Dry;Intact 02/26/22 1815   Dressing Intervention First dressing 02/26/22 1815     Allergies:   Review of patient's allergies indicates:   Allergen Reactions    Lisinopril Rash      NPO: No      Anticoagulation:   Anticoagulants     None           Instructions      [facility]  Admit to Hospital Medicine  Upon patient arrival to floor, please contact Hospital Medicine on call.

## 2022-02-27 NOTE — H&P
[facility] Hospital Medicine  History & Physical    Patient Name: Regine Lemus  MRN: [id number]  Patient Class: IP- Inpatient  Admission Date: 2/27/2022  Attending Physician: Jazz Higginbotham MD   Primary Care Provider: Primary Doctor No         Patient information was obtained from patient, spouse/SO and ER records.     Subjective:     Principal Problem:Severe sepsis    Chief Complaint:  Progressively worsening weakness and decreased oral intake.      HPI: Patient is a 66-year-old  female with past medical history significant for metastatic colon carcinoma (receiving chemotherapy including Xeloda and prednisone, to [facility]), liver Mets, hypertension, history of post surgical hypothyroidism who has been admitted to Hospital Medicine after getting transferred from [facility] Emergency Room for management of sepsis.  [facility] is on capacity diversion.  Patient went to the emergency room with complaint of progressively worsening generalized weakness and decreased appetite and reduced oral intake.  In the emergency room patient was noted to be hypotensive and tachycardic.  Reportedly patient received IV fluid hydration per sepsis protocol and received a dose of intravenous Rocephin.  Source of sepsis is not apparent at this time.  Blood cultures have been obtained.  Urine analysis is pending.  Patient denies any new abdominal pain, vomiting, melena, hematemesis, bleeding per rectum, cough and expectoration.  Denies any new focal neuro deficits.  No urinary difficulties or any rash reported.            Past Medical History:   Diagnosis Date    Allergy     Colon cancer     Hypertension     Liver cancer     Nontoxic uninodular goiter     Osteoarthrosis, unspecified whether generalized or localized, hand     Other abnormal glucose     Pure hypercholesterolemia        Past Surgical History:   Procedure Laterality Date    CYST REMOVAL  1994    cyst removed  from neck    infusaport placement Right     THYROIDECTOMY      TONSILLECTOMY  age 14       Review of patient's allergies indicates:   Allergen Reactions    Lisinopril Rash       Current Facility-Administered Medications on File Prior to Encounter   Medication    [COMPLETED] 0.9%  NaCl infusion    [COMPLETED] cefTRIAXone (ROCEPHIN) 1 g/50 mL D5W IVPB    [COMPLETED] ondansetron injection 4 mg    [COMPLETED] sodium chloride 0.9% bolus 500 mL    [COMPLETED] sodium chloride 0.9% bolus 500 mL    [COMPLETED] sodium chloride 0.9% bolus 500 mL    [COMPLETED] sodium chloride 0.9% bolus 500 mL    [DISCONTINUED] 0.9%  NaCl infusion     Current Outpatient Medications on File Prior to Encounter   Medication Sig    atenolol (TENORMIN) 50 MG tablet Take 1 tablet (50 mg total) by mouth once daily.    capecitabine (XELODA) 500 MG Tab Take 500 mg by mouth 2 (two) times daily.    diphenoxylate-atropine 2.5-0.025 mg (LOMOTIL) 2.5-0.025 mg per tablet Take by mouth.    ergocalciferol (ERGOCALCIFEROL) 50,000 unit Cap Take 50,000 Units by mouth every 7 days.    KLOR-CON M20 20 mEq tablet Take 20 mEq by mouth 2 (two) times daily.    levothyroxine (SYNTHROID) 88 MCG tablet TAKE 1 TABLET DAILY MONDAY THRU SATURDAY AND 1 & 1/2 TABS ON SUNDAYS AS DIRECTED    predniSONE (DELTASONE) 10 MG tablet Take 20 mg by mouth once daily.     Family History       Problem Relation (Age of Onset)    Lung cancer Father          Tobacco Use    Smoking status: Never Smoker    Smokeless tobacco: Not on file   Substance and Sexual Activity    Alcohol use: No    Drug use: Never    Sexual activity: Not on file     Review of Systems   Constitutional:  Positive for appetite change and fatigue.   Neurological:  Positive for weakness.   All other systems reviewed and are negative.  Objective:     Vital Signs (Most Recent):  Temp: 97.6 °F (36.4 °C) (02/27/22 0819)  Pulse: 108 (02/27/22 0819)  Resp: 16 (02/27/22 0819)  BP: (!) 115/57 (02/27/22  0819)  SpO2: 99 % (02/27/22 0819)   Vital Signs (24h Range):  Temp:  [97.6 °F (36.4 °C)-98 °F (36.7 °C)] 97.6 °F (36.4 °C)  Pulse:  [105-141] 108  Resp:  [11-36] 16  SpO2:  [92 %-100 %] 99 %  BP: ()/(42-81) 115/57        There is no height or weight on file to calculate BMI.    Physical Exam  Constitutional:       Appearance: She is well-developed.      Comments: Ill-appearing.   HENT:      Head: Normocephalic and atraumatic.      Mouth/Throat:      Mouth: Mucous membranes are dry.   Eyes:      Conjunctiva/sclera: Conjunctivae normal.      Pupils: Pupils are equal, round, and reactive to light.   Neck:      Thyroid: No thyromegaly.      Vascular: No JVD.   Cardiovascular:      Rate and Rhythm: Normal rate and regular rhythm.      Heart sounds: No murmur heard.    No friction rub. No gallop.   Pulmonary:      Effort: Pulmonary effort is normal.      Breath sounds: Normal breath sounds.   Abdominal:      General: Bowel sounds are normal. There is no distension.      Palpations: Abdomen is soft. There is no mass.      Tenderness: There is no abdominal tenderness.   Musculoskeletal:         General: Normal range of motion.      Cervical back: Neck supple.   Skin:     General: Skin is warm and dry.   Neurological:      Mental Status: She is oriented to person, place, and time.      Cranial Nerves: No cranial nerve deficit.   Psychiatric:         Behavior: Behavior normal.         CRANIAL NERVES     CN III, IV, VI   Pupils are equal, round, and reactive to light.     Significant Labs: All pertinent labs within the past 24 hours have been reviewed.  CBC:   Recent Labs   Lab 02/26/22  1851   WBC 12.38   HGB 13.0   HCT 38.2   PLT 96*     CMP:   Recent Labs   Lab 02/26/22  1823      K 4.3      CO2 17*   *   BUN 23   CREATININE 0.8   CALCIUM 7.6*   PROT 4.9*   ALBUMIN 2.7*   BILITOT 2.0*   ALKPHOS 264*   AST 60*   ALT 58*   ANIONGAP 16   EGFRNONAA >60.0     Coagulation: No results for input(s): PT,  "INR, APTT in the last 48 hours.  Lactic Acid:   Recent Labs   Lab 02/26/22 2308 02/27/22  0206   LACTATE 2.7* 4.4*     Lipase: No results for input(s): LIPASE in the last 48 hours.  TSH: No results for input(s): TSH in the last 4320 hours.  Urine Studies: No results for input(s): COLORU, APPEARANCEUA, PHUR, SPECGRAV, PROTEINUA, GLUCUA, KETONESU, BILIRUBINUA, OCCULTUA, NITRITE, UROBILINOGEN, LEUKOCYTESUR, RBCUA, WBCUA, BACTERIA, SQUAMEPITHEL, HYALINECASTS in the last 48 hours.    Invalid input(s): WRIGHTSUR    Significant Imaging:   CXR: There is right basilar atelectasis.  There is no focal pulmonary consolidation.    Assessment/Plan:     * Severe sepsis  This patient does have evidence of infective focus  My overall impression is sepsis. Vital signs were reviewed and noted in progress note.  Antibiotics given-   Antibiotics (From admission, onward)            Start     Stop Route Frequency Ordered    02/27/22 1015  piperacillin-tazobactam 4.5 g in dextrose 5 % 100 mL IVPB (ready to mix system)         -- IV Every 6 hours (non-standard times) 02/27/22 0911    02/27/22 1011  vancomycin - pharmacy to dose  (vancomycin IVPB)        "And" Linked Group Details    -- IV pharmacy to manage frequency 02/27/22 0911        Cultures were taken-   Microbiology Results (last 7 days)     ** No results found for the last 168 hours. **        Latest lactate reviewed, they are-  Recent Labs   Lab 02/26/22 2308 02/27/22  0206   LACTATE 2.7* 4.4*       Organ dysfunction indicated by Acute liver injury  Source- unspecified.    Source control Achieved by- start empiric antibiotic therapy with intravenous Zosyn and vancomycin.  Pharmacist to dose vancomycin.  Follow microbiology results and urine analysis and procalcitonin level.  If blood pressure remains low, will consider intravenous hydrocortisone supplementation.  Patient is considered immunocompromised host in the setting of recent chemotherapy use and colon carcinoma.      Goals " of care, counseling/discussion  Advance Care Planning     Living Will  During this visit, I engaged the patient  in the advance care planning process.  The patient and I reviewed the role for advance directives and their purpose in directing future healthcare if the patient's unable to speak for him/herself.  At this point in time, the patient does have full decision-making capacity.  We discussed different extreme health states that she could experience, and reviewed what kind of medical care she would want in those situations.  The patient communicated that if she were comatose and had little chance of a meaningful recovery, she would want machines/life-sustaining treatments used. I spent a total of 16 minutes engaging the patient in this advance care planning discussion.            Colon cancer  As above.      Secondary malignant neoplasm of liver  Abnormal liver enzyme likely related to liver Mets.  Follow hepatitis panel.  Trend LFTs.      Colorectal carcinoma  Receiving chemotherapy at [facility].      Postoperative hypothyroidism  Check TSH.  Continue Synthroid supplementation 88 mcg daily.      Essential (primary) hypertension  Hold antihypertensive agents for now.  Continue tele monitoring and blood pressure monitoring.      Hypercholesteremia  Noted.  Patient does not take any lipid-lowering agent.        VTE Risk Mitigation (From admission, onward)         Ordered     enoxaparin injection 40 mg  Daily         02/27/22 0912     IP VTE HIGH RISK PATIENT  Once         02/27/22 0838     Place sequential compression device  Until discontinued         02/27/22 0838     Reason for No Pharmacological VTE Prophylaxis  Once        Question:  Reasons:  Answer:  Thrombocytopenia    02/27/22 0838                   Jazz Higginbotham MD  Department of Hospital Medicine   [facility]

## 2022-02-27 NOTE — ASSESSMENT & PLAN NOTE
"This patient does have evidence of infective focus  My overall impression is sepsis. Vital signs were reviewed and noted in progress note.  Antibiotics given-   Antibiotics (From admission, onward)            Start     Stop Route Frequency Ordered    02/27/22 1015  piperacillin-tazobactam 4.5 g in dextrose 5 % 100 mL IVPB (ready to mix system)         -- IV Every 6 hours (non-standard times) 02/27/22 0911    02/27/22 1011  vancomycin - pharmacy to dose  (vancomycin IVPB)        "And" Linked Group Details    -- IV pharmacy to manage frequency 02/27/22 0911        Cultures were taken-   Microbiology Results (last 7 days)     ** No results found for the last 168 hours. **        Latest lactate reviewed, they are-  Recent Labs   Lab 02/26/22  2308 02/27/22  0206   LACTATE 2.7* 4.4*       Organ dysfunction indicated by Acute liver injury  Source- unspecified.    Source control Achieved by- start empiric antibiotic therapy with intravenous Zosyn and vancomycin.  Pharmacist to dose vancomycin.  Follow microbiology results and urine analysis and procalcitonin level.  If blood pressure remains low, will consider intravenous hydrocortisone supplementation.  Patient is considered immunocompromised host in the setting of recent chemotherapy use and colon carcinoma.    "

## 2022-02-27 NOTE — ED PROVIDER NOTES
Encounter Date: 2/26/2022       History     Chief Complaint   Patient presents with    Weakness     Not eating or drinking on chemo      66-year-old female presents to the ED per private vehicle brought in by her  complaining of decreased eating and drinking at home she is currently under active chemotherapy, no radiation, this is done and directed from [facility] in [place], she is status post diagnosis of colon cancer with apparent metastasis to the liver, she denies any fever chills but has been weak over the last 2 days she has no known exposure to COVID-19        Review of patient's allergies indicates:   Allergen Reactions    Lisinopril Rash     Past Medical History:   Diagnosis Date    Allergy     Colon cancer     Hypertension     Liver cancer     Nontoxic uninodular goiter     Osteoarthrosis, unspecified whether generalized or localized, hand     Other abnormal glucose     Pure hypercholesterolemia      Past Surgical History:   Procedure Laterality Date    CYST REMOVAL  1994    cyst removed from neck    infusaport placement Right     THYROIDECTOMY      TONSILLECTOMY  age 14     Family History   Problem Relation Age of Onset    Lung cancer Father      Social History     Tobacco Use    Smoking status: Never Smoker   Substance Use Topics    Alcohol use: No    Drug use: Never     Review of Systems   Constitutional: Positive for appetite change and fatigue. Negative for fever.   HENT: Negative for sore throat.    Respiratory: Negative for shortness of breath.    Cardiovascular: Negative for chest pain.   Gastrointestinal: Negative for nausea.   Genitourinary: Negative for dysuria.   Musculoskeletal: Negative for back pain.   Skin: Negative for rash.   Neurological: Positive for weakness. Negative for dizziness.   Hematological: Does not bruise/bleed easily.       Physical Exam     Initial Vitals [02/26/22 1738]   BP Pulse Resp Temp SpO2   (!) 87/65 (!) 141 (!) 22 98 °F  (36.7 °C) 95 %      MAP       --         Physical Exam    Nursing note and vitals reviewed.  Constitutional: She appears well-developed and well-nourished. She is not diaphoretic. No distress.   HENT:   Head: Normocephalic and atraumatic.   Right Ear: External ear normal.   Left Ear: External ear normal.   Eyes: Pupils are equal, round, and reactive to light. Right eye exhibits no discharge. Left eye exhibits no discharge.   Neck: No tracheal deviation present. No JVD present.   Cardiovascular: Exam reveals no friction rub.    No murmur heard.  Pulmonary/Chest: No stridor. No respiratory distress. She has no wheezes. She has no rales.   Abdominal: Bowel sounds are normal. She exhibits no distension.   Musculoskeletal:         General: Normal range of motion.     Neurological: She is alert.   Skin: Skin is warm.   Psychiatric: She has a normal mood and affect.         ED Course   Procedures  Labs Reviewed   CBC W/ AUTO DIFFERENTIAL - Abnormal; Notable for the following components:       Result Value    RDW 23.0 (*)     Platelets 96 (*)     Gran # (ANC) 9.0 (*)     Immature Grans (Abs) 0.06 (*)     All other components within normal limits    Narrative:     Recoll. [id number] by [facility] at 02/26/2022 18:31, reason: Specimen   clotted   COMPREHENSIVE METABOLIC PANEL - Abnormal; Notable for the following components:    CO2 17 (*)     Glucose 169 (*)     Calcium 7.6 (*)     Total Protein 4.9 (*)     Albumin 2.7 (*)     Total Bilirubin 2.0 (*)     Alkaline Phosphatase 264 (*)     AST 60 (*)     ALT 58 (*)     All other components within normal limits   LACTIC ACID, PLASMA - Abnormal; Notable for the following components:    Lactate (Lactic Acid) 2.7 (*)     All other components within normal limits   CULTURE, BLOOD   CULTURE, BLOOD   CK   SARS-COV-2 RNA AMPLIFICATION, QUAL    Narrative:     Is the patient symptomatic?->Yes   AMMONIA   URINALYSIS, REFLEX TO URINE CULTURE          Imaging Results          X-Ray Chest AP  Portable (In process)                  Medications   sodium chloride 0.9% bolus 500 mL (500 mLs Intravenous New Bag 2/26/22 2330)   sodium chloride 0.9% bolus 500 mL (0 mLs Intravenous Stopped 2/26/22 2015)   ondansetron injection 4 mg (4 mg Intravenous Given 2/26/22 1826)   0.9%  NaCl infusion (0 mLs Intravenous Stopped 2/26/22 2353)   cefTRIAXone (ROCEPHIN) 1 g/50 mL D5W IVPB (0 g Intravenous Stopped 2/26/22 2146)   sodium chloride 0.9% bolus 500 mL (0 mLs Intravenous Stopped 2/26/22 2312)                 ED Course as of 02/27/22 0617   Sat Feb 26, 2022   2226 Case discussed with [facility] referral [place] with request to transfer to [facility] where her oncology treatment and oncology team is [WK]   2258 Patient's next appointment for chemotherapy is March 11th [WK]   2337 There is no availability at [facility], the patient and her  mentioned [facility] as possibility, I have spoken to  Andry Fortune [WK]   Sun Feb 27, 2022   0341 Case discussed with Dr. JESUS edmond is accepted patient to [facility] [WK]   0508 Resting quietly blood pressure is 124/68 heart rate 104 [WK]      ED Course User Index  [WK] Luis Swenson MD           I have discussed case with incoming ED physician Dr. Burnette  who will finish care and disposition of patient.  Clinical Impression:   Final diagnoses:  [R06.02] SOB (shortness of breath)  [R53.1] Weakness (Primary)  [E86.0] Dehydration  [C18.9] Malignant neoplasm of colon, unspecified part of colon  [C79.9] Metastatic malignant neoplasm, unspecified site          ED Disposition Condition    Transfer to Another Facility Stable              Luis Swenson MD  02/27/22 0619

## 2022-02-27 NOTE — PROGRESS NOTES
"Pharmacist Renal Dose Adjustment Note    Regine Lemus is a 66 y.o. female being treated with the medication Zosyn    Patient Data:    Vital Signs (Most Recent):  Temp: 97.6 °F (36.4 °C) (02/27/22 0819)  Pulse: 108 (02/27/22 0819)  Resp: 16 (02/27/22 0819)  BP: (!) 115/57 (02/27/22 0819)  SpO2: 99 % (02/27/22 0819)   Vital Signs (72h Range):  Temp:  [97.6 °F (36.4 °C)-98 °F (36.7 °C)]   Pulse:  [105-141]   Resp:  [11-36]   BP: ()/(42-81)   SpO2:  [92 %-100 %]      Recent Labs   Lab 02/26/22 1823   CREATININE 0.8     Serum creatinine: 0.8 mg/dL 02/26/22 1823  Estimated creatinine clearance: 65.5 mL/min    Pharmacist Renal Dose Adjustment Note  Patient is on Zosyn 4.5 g IV Q6h over 30 minutes (intermittent infusion). Due to the patient's current diagnosis, zosyn via extended infusion provides better clinical outcomes regarding decreased mortality and decreased length of stay in comparison to intermittent infusion. Zosyn dose will be adjusted to Zosyn 4.5 g IV Q8h over 4 hours. Per pharmacy protocol, Zosyn can be adjusted automatically. Providers can override adjustment by re-ordering intermittent infusion with "dispense as written" in the administration instructions.     Odilon Toro  [phone]     "

## 2022-02-27 NOTE — CONSULTS
Pharmacokinetic Initial Assessment: IV Vancomycin    Assessment/Plan:    Initiate intravenous vancomycin with a dose of 1500 mg every 18 hours  Desired empiric serum trough concentration is 15 to 20 mcg/mL  Draw vancomycin trough level 60 min prior to third dose on 3/1 at approximately 0300  Pharmacy will continue to follow and monitor vancomycin.      Please contact pharmacy at extension [phone] with any questions regarding this assessment.     Thank you for the consult,   Odilon Toro       Patient brief summary:  Regine Lemus is a 66 y.o. female initiated on antimicrobial therapy with IV Vancomycin for treatment of suspected bacteremia    Drug Allergies:   Review of patient's allergies indicates:   Allergen Reactions    Lisinopril Rash       Actual Body Weight:   69.1    Renal Function:   Estimated Creatinine Clearance: 87.3 mL/min (based on SCr of 0.6 mg/dL).,     Dialysis Method (if applicable):  N/A    CBC (last 72 hours):  Recent Labs   Lab Result Units 02/26/22  1851 02/27/22  0955   WBC K/uL 12.38 5.65   Hemoglobin g/dL 13.0 9.7*   Hematocrit % 38.2 29.8*   Platelets K/uL 96* 66*   Gran % % 72.5 56.0   Lymph % % 19.4 40.0   Mono % % 7.3 3.0*   Eosinophil % % 0.1 1.0   Basophil % % 0.2 0.0   Differential Method  Automated Manual       Metabolic Panel (last 72 hours):  Recent Labs   Lab Result Units 02/26/22  1823 02/27/22  0955 02/27/22  1137   Sodium mmol/L 138 139  --    Potassium mmol/L 4.3 3.5  --    Chloride mmol/L 105 113*  --    CO2 mmol/L 17* 18*  --    Glucose mg/dL 169* 102  --    Glucose, UA   --   --  Negative   BUN mg/dL 23 21  --    Creatinine mg/dL 0.8 0.6  --    Albumin g/dL 2.7* 2.0*  --    Total Bilirubin mg/dL 2.0* 0.9  --    Alkaline Phosphatase U/L 264* 175*  --    AST U/L 60* 36  --    ALT U/L 58* 39  --        Drug levels (last 3 results):  No results for input(s): VANCOMYCINRA, VANCOMYCINPE, VANCOMYCINTR in the last 72 hours.    Microbiologic Results:  Microbiology Results (last 7  days)       ** No results found for the last 168 hours. **

## 2022-02-27 NOTE — PROGRESS NOTES
Monishalevia S Allan [id number] is a 66 y.o. female who had been consulted for vancomycin dosing.    Vancomycin trough has been rescheduled for 2/28 at 0900.      Thank you for allowing us to participate in this patient's care.     Odilon Toro

## 2022-02-27 NOTE — CONSULTS
[facility]  Hematology/Oncology  Consult Note    Patient Name: Regine Lemus  MRN: [id number]  Admission Date: 2/27/2022  Hospital Length of Stay: 0 days  Code Status: Full Code   Attending Provider: Jazz Higginbotham MD  Consulting Provider: Aurash Khoobehi, MD  Primary Care Physician: Primary Doctor No  Principal Problem:Severe sepsis    Consults  Subjective:     HPI:  This is a 66-year-old female who has been admitted with presumed sepsis.  Patient presented with 1 day of diarrhea with some episodes of nausea and vomiting.  She has not had any episodes since admission.  She denies any fever at home.  She currently has metastatic colorectal cancer and is being treated with XELOX for what she states has been about 1 year.  She sees a physician at [facility].  She has overall been tolerating her treatment well and has never had any diarrhea, nausea, or vomiting associated with it.  She denies any dysuria, cough, abdominal pain, or shortness of breath.    Oncology Treatment Plan:   [Could not find a treatment plan. This SmartLink may be configured incorrectly. Contact a  for help.]    Medications:  Continuous Infusions:   sodium chloride 0.9% 100 mL/hr at 02/27/22 0844     Scheduled Meds:   enoxaparin  40 mg Subcutaneous Daily    ergocalciferol  50,000 Units Oral Q7 Days    [START ON 2/28/2022] levothyroxine  88 mcg Oral Before breakfast    pantoprazole  40 mg Oral Daily    piperacillin-tazobactam (ZOSYN) IVPB  4.5 g Intravenous Q8H    senna-docusate 8.6-50 mg  1 tablet Oral BID    vancomycin (VANCOCIN) IVPB  1,500 mg Intravenous Q18H     PRN Meds:acetaminophen, acetaminophen, albuterol-ipratropium, dextrose 10%, dextrose 10%, glucagon (human recombinant), glucose, glucose, magnesium oxide, magnesium oxide, melatonin, naloxone, ondansetron, potassium bicarbonate, potassium bicarbonate, potassium bicarbonate, sodium chloride 0.9%, Pharmacy to dose Vancomycin consult  **AND** vancomycin - pharmacy to dose     Review of patient's allergies indicates:   Allergen Reactions    Lisinopril Rash        Past Medical History:   Diagnosis Date    Allergy     Colon cancer     Hypertension     Liver cancer     Nontoxic uninodular goiter     Osteoarthrosis, unspecified whether generalized or localized, hand     Other abnormal glucose     Pure hypercholesterolemia      Past Surgical History:   Procedure Laterality Date    CYST REMOVAL  1994    cyst removed from neck    infusaport placement Right     THYROIDECTOMY      TONSILLECTOMY  age 14     Family History     Problem Relation (Age of Onset)    Lung cancer Father        Tobacco Use    Smoking status: Never Smoker    Smokeless tobacco: Not on file   Substance and Sexual Activity    Alcohol use: No    Drug use: Never    Sexual activity: Not on file       Review of Systems   Constitutional: Negative.    HENT: Negative.    Eyes: Negative.    Respiratory: Negative.    Cardiovascular: Negative.    Gastrointestinal: Positive for diarrhea, nausea and vomiting.   Endocrine: Negative.    Genitourinary: Negative.    Musculoskeletal: Negative.    Integumentary:  Negative.   Neurological: Negative.    Hematological: Negative.    Psychiatric/Behavioral: Negative.      Objective:     Vital Signs (Most Recent):  Temp: 97.1 °F (36.2 °C) (02/27/22 1236)  Pulse: (!) 111 (02/27/22 1236)  Resp: 18 (02/27/22 1236)  BP: 131/81 (02/27/22 1236)  SpO2: 99 % (02/27/22 1236) Vital Signs (24h Range):  Temp:  [97.1 °F (36.2 °C)-98 °F (36.7 °C)] 97.1 °F (36.2 °C)  Pulse:  [105-141] 111  Resp:  [11-36] 18  SpO2:  [92 %-100 %] 99 %  BP: ()/(42-81) 131/81     Weight: 69.1 kg (152 lb 5.4 oz)  Body mass index is 23.86 kg/m².  Body surface area is 1.81 meters squared.    No intake or output data in the 24 hours ending 02/27/22 1344    Physical Exam  Constitutional:       Appearance: Normal appearance.   HENT:      Head: Normocephalic and atraumatic.       Nose: Nose normal.      Mouth/Throat:      Mouth: Mucous membranes are moist.      Pharynx: Oropharynx is clear.   Eyes:      Conjunctiva/sclera: Conjunctivae normal.   Cardiovascular:      Rate and Rhythm: Normal rate and regular rhythm.      Heart sounds: Normal heart sounds.   Pulmonary:      Effort: Pulmonary effort is normal.      Breath sounds: Normal breath sounds.   Abdominal:      General: Abdomen is flat. Bowel sounds are normal.      Palpations: Abdomen is soft.   Musculoskeletal:         General: Normal range of motion.      Cervical back: Normal range of motion and neck supple.   Skin:     General: Skin is warm and dry.   Neurological:      General: No focal deficit present.      Mental Status: She is alert and oriented to person, place, and time. Mental status is at baseline.   Psychiatric:         Mood and Affect: Mood normal.         Significant Labs:   CBC:   Recent Labs   Lab 02/26/22  1851 02/27/22  0955   WBC 12.38 5.65   HGB 13.0 9.7*   HCT 38.2 29.8*   PLT 96* 66*    and CMP:   Recent Labs   Lab 02/26/22  1823 02/27/22  0955    139   K 4.3 3.5    113*   CO2 17* 18*   * 102   BUN 23 21   CREATININE 0.8 0.6   CALCIUM 7.6* 6.5*   PROT 4.9* 3.8*   ALBUMIN 2.7* 2.0*   BILITOT 2.0* 0.9   ALKPHOS 264* 175*   AST 60* 36   ALT 58* 39   ANIONGAP 16 8   EGFRNONAA >60.0 >60       Diagnostic Results:  I have reviewed all pertinent imaging results/findings within the past 24 hours.    Assessment/Plan:     Active Diagnoses:    Diagnosis Date Noted POA    PRINCIPAL PROBLEM:  Severe sepsis [A41.9, R65.20]  Yes    Goals of care, counseling/discussion [Z71.89] 02/27/2022 Not Applicable    Colon cancer [C18.9]  Yes    Secondary malignant neoplasm of liver [C78.7] 01/24/2021 Yes    Colorectal carcinoma [C19] 01/23/2021 Yes    Postoperative hypothyroidism [E89.0] 12/14/2015 Yes    Hypercholesteremia [E78.00] 07/29/2014 Yes    Essential (primary) hypertension [I10] 07/23/2014 Yes       Problems Resolved During this Admission:       Metastatic colon cancer   -currently on treatment with XELOX for about 1 year  -recommend to hold capecitabine while patient is here with active infection presumably  -can resume treatment once discharged.  Patient is thinking about switching care over to [facility].  If she does decide to do this, she will need an appointment set up with us    Diarrhea/nausea/vomiting  -presumed to have infectious source with elevated procalcitonin.  As per primary team.    Thank you for your consult. I will follow-up with patient. Please contact us if you have any additional questions.    Aurash Khoobehi, MD  Hematology/Oncology  [facility]

## 2022-02-27 NOTE — HPI
Patient is a 66-year-old  female with past medical history significant for metastatic colon carcinoma (receiving chemotherapy including Xeloda and prednisone, [facility]), liver Mets, hypertension, history of post surgical hypothyroidism who has been admitted to Hospital Medicine after getting transferred from [facility] Emergency Room for management of sepsis.  [facility] is on capacity diversion.  Patient went to the emergency room with complaint of progressively worsening generalized weakness and decreased appetite and reduced oral intake.  In the emergency room patient was noted to be hypotensive and tachycardic.  Reportedly patient received IV fluid hydration per sepsis protocol and received a dose of intravenous Rocephin.  Source of sepsis is not apparent at this time.  Blood cultures have been obtained.  Urine analysis is pending.  Patient denies any new abdominal pain, vomiting, melena, hematemesis, bleeding per rectum, cough and expectoration.  Denies any new focal neuro deficits.  No urinary difficulties or any rash reported.

## 2022-02-27 NOTE — ED NOTES
Pt states that she has been able to eat due to chemo pt states that when she does try to drink or eat any little bit that she vomits. Pt states that she has had increased weakness over the last several of days. Pt states that she has been vomiting perusal.

## 2022-02-27 NOTE — PT/OT/SLP PROGRESS
Physical Therapy      Patient Name:  Regine DENTON Allan   MRN:  [id number]    Patient not seen today secondary to just received her lunch. Will follow-up on 02/28/22.

## 2022-02-28 PROBLEM — R19.7 DIARRHEA: Status: ACTIVE | Noted: 2022-02-28

## 2022-02-28 LAB
ALBUMIN SERPL BCP-MCNC: 2.2 G/DL (ref 3.5–5.2)
ALP SERPL-CCNC: 165 U/L (ref 55–135)
ALT SERPL W/O P-5'-P-CCNC: 35 U/L (ref 10–44)
ANION GAP SERPL CALC-SCNC: 10 MMOL/L (ref 8–16)
AST SERPL-CCNC: 39 U/L (ref 10–40)
BASOPHILS # BLD AUTO: 0.01 K/UL (ref 0–0.2)
BASOPHILS NFR BLD: 0.1 % (ref 0–1.9)
BILIRUB SERPL-MCNC: 1.2 MG/DL (ref 0.1–1)
BUN SERPL-MCNC: 12 MG/DL (ref 8–23)
C DIFF GDH STL QL: NEGATIVE
C DIFF TOX A+B STL QL IA: NEGATIVE
CA-I BLDV-SCNC: 1.03 MMOL/L (ref 1.06–1.42)
CALCIUM SERPL-MCNC: 6.8 MG/DL (ref 8.7–10.5)
CHLORIDE SERPL-SCNC: 111 MMOL/L (ref 95–110)
CO2 SERPL-SCNC: 14 MMOL/L (ref 23–29)
CREAT SERPL-MCNC: 0.7 MG/DL (ref 0.5–1.4)
DIFFERENTIAL METHOD: ABNORMAL
EOSINOPHIL # BLD AUTO: 0 K/UL (ref 0–0.5)
EOSINOPHIL NFR BLD: 0.5 % (ref 0–8)
ERYTHROCYTE [DISTWIDTH] IN BLOOD BY AUTOMATED COUNT: 23.1 % (ref 11.5–14.5)
EST. GFR  (AFRICAN AMERICAN): >60 ML/MIN/1.73 M^2
EST. GFR  (NON AFRICAN AMERICAN): >60 ML/MIN/1.73 M^2
GLUCOSE SERPL-MCNC: 119 MG/DL (ref 70–110)
HCT VFR BLD AUTO: 32.7 % (ref 37–48.5)
HGB BLD-MCNC: 10.7 G/DL (ref 12–16)
IMM GRANULOCYTES # BLD AUTO: 0.02 K/UL (ref 0–0.04)
IMM GRANULOCYTES NFR BLD AUTO: 0.3 % (ref 0–0.5)
LACTATE SERPL-SCNC: 1.7 MMOL/L (ref 0.5–2.2)
LACTATE SERPL-SCNC: 1.8 MMOL/L (ref 0.5–2.2)
LACTATE SERPL-SCNC: 1.9 MMOL/L (ref 0.5–2.2)
LACTATE SERPL-SCNC: 2.2 MMOL/L (ref 0.5–2.2)
LYMPHOCYTES # BLD AUTO: 2.5 K/UL (ref 1–4.8)
LYMPHOCYTES NFR BLD: 34.1 % (ref 18–48)
MAGNESIUM SERPL-MCNC: 1.5 MG/DL (ref 1.6–2.6)
MCH RBC QN AUTO: 30.4 PG (ref 27–31)
MCHC RBC AUTO-ENTMCNC: 32.7 G/DL (ref 32–36)
MCV RBC AUTO: 93 FL (ref 82–98)
MONOCYTES # BLD AUTO: 0.6 K/UL (ref 0.3–1)
MONOCYTES NFR BLD: 8.1 % (ref 4–15)
NEUTROPHILS # BLD AUTO: 4.2 K/UL (ref 1.8–7.7)
NEUTROPHILS NFR BLD: 56.9 % (ref 38–73)
NRBC BLD-RTO: 0 /100 WBC
PHOSPHATE SERPL-MCNC: 2.4 MG/DL (ref 2.7–4.5)
PLATELET # BLD AUTO: 77 K/UL (ref 150–450)
PMV BLD AUTO: 11.3 FL (ref 9.2–12.9)
POTASSIUM SERPL-SCNC: 3.3 MMOL/L (ref 3.5–5.1)
PROT SERPL-MCNC: 4.5 G/DL (ref 6–8.4)
RBC # BLD AUTO: 3.52 M/UL (ref 4–5.4)
SODIUM SERPL-SCNC: 135 MMOL/L (ref 136–145)
VANCOMYCIN TROUGH SERPL-MCNC: 5.3 UG/ML (ref 10–22)
WBC # BLD AUTO: 7.37 K/UL (ref 3.9–12.7)

## 2022-02-28 PROCEDURE — 80053 COMPREHEN METABOLIC PANEL: CPT

## 2022-02-28 PROCEDURE — 25000003 PHARM REV CODE 250: Performed by: INTERNAL MEDICINE

## 2022-02-28 PROCEDURE — 99900035 HC TECH TIME PER 15 MIN (STAT)

## 2022-02-28 PROCEDURE — 84100 ASSAY OF PHOSPHORUS: CPT

## 2022-02-28 PROCEDURE — 94761 N-INVAS EAR/PLS OXIMETRY MLT: CPT

## 2022-02-28 PROCEDURE — 83605 ASSAY OF LACTIC ACID: CPT | Mod: 91 | Performed by: INTERNAL MEDICINE

## 2022-02-28 PROCEDURE — 83735 ASSAY OF MAGNESIUM: CPT

## 2022-02-28 PROCEDURE — 63600175 PHARM REV CODE 636 W HCPCS

## 2022-02-28 PROCEDURE — 12000002 HC ACUTE/MED SURGE SEMI-PRIVATE ROOM

## 2022-02-28 PROCEDURE — 36415 COLL VENOUS BLD VENIPUNCTURE: CPT | Performed by: NURSE PRACTITIONER

## 2022-02-28 PROCEDURE — 82330 ASSAY OF CALCIUM: CPT | Performed by: NURSE PRACTITIONER

## 2022-02-28 PROCEDURE — 99233 PR SUBSEQUENT HOSPITAL CARE,LEVL III: ICD-10-PCS | Mod: ,,, | Performed by: INTERNAL MEDICINE

## 2022-02-28 PROCEDURE — 36415 COLL VENOUS BLD VENIPUNCTURE: CPT | Performed by: INTERNAL MEDICINE

## 2022-02-28 PROCEDURE — 99233 SBSQ HOSP IP/OBS HIGH 50: CPT | Mod: ,,, | Performed by: INTERNAL MEDICINE

## 2022-02-28 PROCEDURE — 25000003 PHARM REV CODE 250

## 2022-02-28 PROCEDURE — 25000003 PHARM REV CODE 250: Performed by: NURSE PRACTITIONER

## 2022-02-28 PROCEDURE — 63600175 PHARM REV CODE 636 W HCPCS: Performed by: INTERNAL MEDICINE

## 2022-02-28 PROCEDURE — 80202 ASSAY OF VANCOMYCIN: CPT | Performed by: INTERNAL MEDICINE

## 2022-02-28 PROCEDURE — 85025 COMPLETE CBC W/AUTO DIFF WBC: CPT

## 2022-02-28 RX ORDER — PREDNISONE 5 MG/1
15 TABLET ORAL DAILY
Status: DISCONTINUED | OUTPATIENT
Start: 2022-02-28 | End: 2022-03-05 | Stop reason: HOSPADM

## 2022-02-28 RX ORDER — MAGNESIUM SULFATE HEPTAHYDRATE 40 MG/ML
2 INJECTION, SOLUTION INTRAVENOUS ONCE
Status: COMPLETED | OUTPATIENT
Start: 2022-02-28 | End: 2022-02-28

## 2022-02-28 RX ORDER — DIPHENOXYLATE HYDROCHLORIDE AND ATROPINE SULFATE 2.5; .025 MG/1; MG/1
1 TABLET ORAL 4 TIMES DAILY PRN
Status: DISCONTINUED | OUTPATIENT
Start: 2022-02-28 | End: 2022-03-02

## 2022-02-28 RX ORDER — VANCOMYCIN HCL IN 5 % DEXTROSE 1G/250ML
1000 PLASTIC BAG, INJECTION (ML) INTRAVENOUS
Status: DISCONTINUED | OUTPATIENT
Start: 2022-02-28 | End: 2022-03-01

## 2022-02-28 RX ORDER — L. ACIDOPHILUS/L.BULGARICUS 100MM CELL
1 GRANULES IN PACKET (EA) ORAL 2 TIMES DAILY
Status: DISCONTINUED | OUTPATIENT
Start: 2022-02-28 | End: 2022-03-05 | Stop reason: HOSPADM

## 2022-02-28 RX ADMIN — ONDANSETRON 4 MG: 2 INJECTION INTRAMUSCULAR; INTRAVENOUS at 03:02

## 2022-02-28 RX ADMIN — VANCOMYCIN HYDROCHLORIDE 1000 MG: 1 INJECTION, POWDER, LYOPHILIZED, FOR SOLUTION INTRAVENOUS at 05:02

## 2022-02-28 RX ADMIN — DIPHENOXYLATE HYDROCHLORIDE AND ATROPINE SULFATE 1 TABLET: 2.5; .025 TABLET ORAL at 10:02

## 2022-02-28 RX ADMIN — Medication 9 MG: at 11:02

## 2022-02-28 RX ADMIN — LEVOTHYROXINE SODIUM 88 MCG: 0.09 TABLET ORAL at 06:02

## 2022-02-28 RX ADMIN — DIPHENOXYLATE HYDROCHLORIDE AND ATROPINE SULFATE 1 TABLET: 2.5; .025 TABLET ORAL at 11:02

## 2022-02-28 RX ADMIN — DIPHENOXYLATE HYDROCHLORIDE AND ATROPINE SULFATE 1 TABLET: 2.5; .025 TABLET ORAL at 05:02

## 2022-02-28 RX ADMIN — PREDNISONE 15 MG: 5 TABLET ORAL at 05:02

## 2022-02-28 RX ADMIN — LACTOBACILLUS ACIDOPHILUS / LACTOBACILLUS BULGARICUS 1 EACH: 100 MILLION CFU STRENGTH GRANULES at 09:02

## 2022-02-28 RX ADMIN — ENOXAPARIN SODIUM 40 MG: 100 INJECTION SUBCUTANEOUS at 05:02

## 2022-02-28 RX ADMIN — SODIUM CHLORIDE: 0.9 INJECTION, SOLUTION INTRAVENOUS at 11:02

## 2022-02-28 RX ADMIN — CALCIUM GLUCONATE 1 G: 98 INJECTION, SOLUTION INTRAVENOUS at 10:02

## 2022-02-28 RX ADMIN — PANTOPRAZOLE SODIUM 40 MG: 40 TABLET, DELAYED RELEASE ORAL at 10:02

## 2022-02-28 RX ADMIN — ONDANSETRON 4 MG: 2 INJECTION INTRAMUSCULAR; INTRAVENOUS at 10:02

## 2022-02-28 RX ADMIN — SODIUM CHLORIDE: 0.9 INJECTION, SOLUTION INTRAVENOUS at 07:02

## 2022-02-28 RX ADMIN — DIPHENOXYLATE HYDROCHLORIDE AND ATROPINE SULFATE 1 TABLET: 2.5; .025 TABLET ORAL at 03:02

## 2022-02-28 RX ADMIN — POTASSIUM PHOSPHATE, MONOBASIC AND POTASSIUM PHOSPHATE, DIBASIC 30 MMOL: 224; 236 INJECTION, SOLUTION, CONCENTRATE INTRAVENOUS at 07:02

## 2022-02-28 RX ADMIN — MAGNESIUM SULFATE HEPTAHYDRATE 2 G: 2 INJECTION, SOLUTION INTRAVENOUS at 11:02

## 2022-02-28 RX ADMIN — PIPERACILLIN AND TAZOBACTAM 4.5 G: 4; .5 INJECTION, POWDER, LYOPHILIZED, FOR SOLUTION INTRAVENOUS; PARENTERAL at 03:02

## 2022-02-28 RX ADMIN — PIPERACILLIN AND TAZOBACTAM 4.5 G: 4; .5 INJECTION, POWDER, LYOPHILIZED, FOR SOLUTION INTRAVENOUS; PARENTERAL at 02:02

## 2022-02-28 RX ADMIN — PIPERACILLIN AND TAZOBACTAM 4.5 G: 4; .5 INJECTION, POWDER, LYOPHILIZED, FOR SOLUTION INTRAVENOUS; PARENTERAL at 10:02

## 2022-02-28 NOTE — PROGRESS NOTES
Pharmacokinetic Assessment Follow Up: IV Vancomycin    Vancomycin serum concentration assessment(s):    The random level was drawn correctly and can be used to guide therapy at this time. The measurement is below the desired definitive target range of 15 to 20 mcg/mL.    Vancomycin Regimen Plan:    Change regimen to Vancomycin 1000 mg IV every 12 hours with next serum trough concentration measured at one hour prior to 3rd dose on 3/1 @ 1345    Drug levels (last 3 results):  Recent Labs   Lab Result Units 02/28/22  0926   Vancomycin-Trough ug/mL 5.3*       Pharmacy will continue to follow and monitor vancomycin.    Please contact pharmacy at extension [phone] for questions regarding this assessment.    Thank you for the consult,   Anaid Ortiz       Patient brief summary:  Regine Lemus is a 66 y.o. female initiated on antimicrobial therapy with IV Vancomycin for treatment of bacteremia      Drug Allergies:   Review of patient's allergies indicates:   Allergen Reactions    Lisinopril Rash       Actual Body Weight:   69.1 kg    Renal Function:   Estimated Creatinine Clearance: 74.8 mL/min (based on SCr of 0.7 mg/dL).,     Dialysis Method (if applicable):  N/A    CBC (last 72 hours):  Recent Labs   Lab Result Units 02/26/22  1851 02/27/22  0955 02/28/22  0540   WBC K/uL 12.38 5.65 7.37   Hemoglobin g/dL 13.0 9.7* 10.7*   Hematocrit % 38.2 29.8* 32.7*   Platelets K/uL 96* 66* 77*   Gran % % 72.5 56.0 56.9   Lymph % % 19.4 40.0 34.1   Mono % % 7.3 3.0* 8.1   Eosinophil % % 0.1 1.0 0.5   Basophil % % 0.2 0.0 0.1   Differential Method  Automated Manual Automated       Metabolic Panel (last 72 hours):  Recent Labs   Lab Result Units 02/26/22  1823 02/27/22  0955 02/27/22  1137 02/28/22  0540   Sodium mmol/L 138 139  --  135*   Potassium mmol/L 4.3 3.5  --  3.3*   Chloride mmol/L 105 113*  --  111*   CO2 mmol/L 17* 18*  --  14*   Glucose mg/dL 169* 102  --  119*   Glucose, UA   --   --  Negative  --    BUN mg/dL 23 21  --   12   Creatinine mg/dL 0.8 0.6  --  0.7   Albumin g/dL 2.7* 2.0*  --  2.2*   Total Bilirubin mg/dL 2.0* 0.9  --  1.2*   Alkaline Phosphatase U/L 264* 175*  --  165*   AST U/L 60* 36  --  39   ALT U/L 58* 39  --  35   Magnesium mg/dL  --   --   --  1.5*   Phosphorus mg/dL  --   --   --  2.4*       Vancomycin Administrations:  vancomycin given in the last 96 hours                     vancomycin 1.5 g in dextrose 5 % 250 mL IVPB (ready to mix) (mg) 1,500 mg New Bag 02/27/22 1633                    Microbiologic Results:  Microbiology Results (last 7 days)       Procedure Component Value Units Date/Time    Clostridium difficile EIA [[id number]] Collected: 02/27/22 2231    Order Status: Completed Specimen: Stool Updated: 02/28/22 0255     C. diff Antigen Negative     C difficile Toxins A+B, EIA Negative     Comment: Testing not recommended for children <24 months old.

## 2022-02-28 NOTE — PROGRESS NOTES
Pt remains free of falls, VS stable, RR even and unlabored, Abd non-distended, Hyperactive BS in all four quadrants, Multiple loose BM's, clear speech, makes needs known, bed in low position with wheels locked call light in reach,  at bedside, continues with ABT's, no signs of adverse reaction

## 2022-02-28 NOTE — NURSING
Spoke with Dr. Higginbotham about MRI with sedation scheduled Wednesday 3/2/22 @ 1823. Md stated that MRI is canceled and Pt can reschedule as outpatient. Stated that MRI is not related to Pt's current reason for admission. Md stated that pt's  is aware. RN informed surgery desk via phone and anesthesia & MRI via email.

## 2022-02-28 NOTE — PLAN OF CARE
Plan of care reviewed with patient. IV fluids /IV antibiotics administered as per orders. SR on telemetry.no complaints of pain during night. Multiple watery stools during night . Stool sent for C-diff ( negative) prn Imodium given. Remains free from falls/injury. Instructed to call for assistance as needed, verbalized understanding. Call light in reach, bed alarm on .

## 2022-02-28 NOTE — SUBJECTIVE & OBJECTIVE
Interval History: Afebrile. Mircobiology results negative.  Reports 8 lose bowel movements.  Tested negative for C diff.  Now receiving Lomotil.  No abdominal pain.   at bedside.  Patient denies any chest pain shortness breast or any focal neuro deficits.    Review of Systems   Constitutional:  Positive for appetite change and fatigue.   Neurological:  Positive for weakness.   All other systems reviewed and are negative.  Objective:     Vital Signs (Most Recent):  Temp: 96.8 °F (36 °C) (02/28/22 0743)  Pulse: 86 (02/28/22 0743)  Resp: 14 (02/28/22 0743)  BP: 129/70 (02/28/22 0743)  SpO2: 98 % (02/28/22 0743) Vital Signs (24h Range):  Temp:  [96.3 °F (35.7 °C)-97.6 °F (36.4 °C)] 96.8 °F (36 °C)  Pulse:  [] 86  Resp:  [14-18] 14  SpO2:  [97 %-99 %] 98 %  BP: (129-140)/(60-81) 129/70     Weight: 69.1 kg (152 lb 5.4 oz)  Body mass index is 23.86 kg/m².    Intake/Output Summary (Last 24 hours) at 2/28/2022 0914  Last data filed at 2/28/2022 0804  Gross per 24 hour   Intake 1140 ml   Output --   Net 1140 ml      Physical Exam  Constitutional:       Appearance: She is well-developed.      Comments: Ill-appearing.   HENT:      Head: Normocephalic and atraumatic.      Mouth/Throat:      Mouth: Mucous membranes are dry.   Eyes:      Conjunctiva/sclera: Conjunctivae normal.      Pupils: Pupils are equal, round, and reactive to light.   Neck:      Thyroid: No thyromegaly.      Vascular: No JVD.   Cardiovascular:      Rate and Rhythm: Normal rate and regular rhythm.      Heart sounds: No murmur heard.    No friction rub. No gallop.   Pulmonary:      Effort: Pulmonary effort is normal.      Breath sounds: Normal breath sounds.   Abdominal:      General: Bowel sounds are normal. There is no distension.      Palpations: Abdomen is soft. There is no mass.      Tenderness: There is no abdominal tenderness.   Musculoskeletal:         General: Normal range of motion.      Cervical back: Neck supple.   Skin:     General:  Skin is warm and dry.   Neurological:      Mental Status: She is oriented to person, place, and time.      Cranial Nerves: No cranial nerve deficit.   Psychiatric:         Behavior: Behavior normal.       Significant Labs: All pertinent labs within the past 24 hours have been reviewed.  CBC:   Recent Labs   Lab 02/26/22  1851 02/27/22  0955 02/28/22  0540   WBC 12.38 5.65 7.37   HGB 13.0 9.7* 10.7*   HCT 38.2 29.8* 32.7*   PLT 96* 66* 77*     CMP:   Recent Labs   Lab 02/26/22  1823 02/27/22  0955 02/28/22  0540    139 135*   K 4.3 3.5 3.3*    113* 111*   CO2 17* 18* 14*   * 102 119*   BUN 23 21 12   CREATININE 0.8 0.6 0.7   CALCIUM 7.6* 6.5* 6.8*   PROT 4.9* 3.8* 4.5*   ALBUMIN 2.7* 2.0* 2.2*   BILITOT 2.0* 0.9 1.2*   ALKPHOS 264* 175* 165*   AST 60* 36 39   ALT 58* 39 35   ANIONGAP 16 8 10   EGFRNONAA >60.0 >60 >60     Coagulation: No results for input(s): PT, INR, APTT in the last 48 hours.  Lactic Acid:   Recent Labs   Lab 02/27/22 2035 02/28/22  0030 02/28/22  0540   LACTATE 2.4* 1.8 2.2     Urine Studies:   Recent Labs   Lab 02/27/22  1137   COLORU Yellow   APPEARANCEUA Clear   PHUR 6.0   SPECGRAV 1.025   PROTEINUA Negative   GLUCUA Negative   KETONESU Trace*   BILIRUBINUA Negative   OCCULTUA Trace*   NITRITE Negative   UROBILINOGEN Negative   LEUKOCYTESUR Negative     Microbiology Results (last 7 days)       Procedure Component Value Units Date/Time    Clostridium difficile EIA [[id number]] Collected: 02/27/22 2231    Order Status: Completed Specimen: Stool Updated: 02/28/22 0255     C. diff Antigen Negative     C difficile Toxins A+B, EIA Negative     Comment: Testing not recommended for children <24 months old.             Significant Imaging:   CXR: There is right basilar atelectasis.  There is no focal pulmonary consolidation.    RUQ US:  There are sonographic findings suggesting cirrhosis.  The patient's known hepatic lesion is not well appreciated on today's ultrasound. The gallbladder  is not visualized and may be contracted.

## 2022-02-28 NOTE — PT/OT/SLP PROGRESS
Physical Therapy      Patient Name:  Regine DENTON Allan   MRN:  [id number]    Patient not seen today secondary to patient declines in a.m. secondary to fatigue reporting she did not sleep well. She is sleeping in p.m. with spouse present requesting PT evaluation deferred until tomorrow. Will follow-up 03/01/22.

## 2022-02-28 NOTE — PROGRESS NOTES
[facility] Medicine  Progress Note    Patient Name: Regine DENTON Allan  MRN: [id number]  Patient Class: IP- Inpatient   Admission Date: 2/27/2022  Length of Stay: 1 days  Attending Physician: Jazz Higginbotham MD  Primary Care Provider: Primary Doctor No        Subjective:     Principal Problem:Severe sepsis        HPI:  Patient is a 66-year-old  female with past medical history significant for metastatic colon carcinoma (receiving chemotherapy including Xeloda and prednisone, [facility]), liver Mets, hypertension, history of post surgical hypothyroidism who has been admitted to Hospital Medicine after getting transferred from [facility] Emergency Room for management of sepsis.  [facility] is on capacity diversion.  Patient went to the emergency room with complaint of progressively worsening generalized weakness and decreased appetite and reduced oral intake.  In the emergency room patient was noted to be hypotensive and tachycardic.  Reportedly patient received IV fluid hydration per sepsis protocol and received a dose of intravenous Rocephin.  Source of sepsis is not apparent at this time.  Blood cultures have been obtained.  Urine analysis is pending.  Patient denies any new abdominal pain, vomiting, melena, hematemesis, bleeding per rectum, cough and expectoration.  Denies any new focal neuro deficits.  No urinary difficulties or any rash reported.            Overview/Hospital Course:  No notes on file    Interval History: Afebrile. Mircobiology results negative.  Reports 8 lose bowel movements.  Tested negative for C diff.  Now receiving Lomotil.  No abdominal pain.   at bedside.  Patient denies any chest pain shortness breast or any focal neuro deficits.    Review of Systems   Constitutional:  Positive for appetite change and fatigue.   Neurological:  Positive for weakness.   All other systems reviewed and are negative.  Objective:     Vital  Signs (Most Recent):  Temp: 96.8 °F (36 °C) (02/28/22 0743)  Pulse: 86 (02/28/22 0743)  Resp: 14 (02/28/22 0743)  BP: 129/70 (02/28/22 0743)  SpO2: 98 % (02/28/22 0743) Vital Signs (24h Range):  Temp:  [96.3 °F (35.7 °C)-97.6 °F (36.4 °C)] 96.8 °F (36 °C)  Pulse:  [] 86  Resp:  [14-18] 14  SpO2:  [97 %-99 %] 98 %  BP: (129-140)/(60-81) 129/70     Weight: 69.1 kg (152 lb 5.4 oz)  Body mass index is 23.86 kg/m².    Intake/Output Summary (Last 24 hours) at 2/28/2022 0914  Last data filed at 2/28/2022 0804  Gross per 24 hour   Intake 1140 ml   Output --   Net 1140 ml      Physical Exam  Constitutional:       Appearance: She is well-developed.      Comments: Ill-appearing.   HENT:      Head: Normocephalic and atraumatic.      Mouth/Throat:      Mouth: Mucous membranes are dry.   Eyes:      Conjunctiva/sclera: Conjunctivae normal.      Pupils: Pupils are equal, round, and reactive to light.   Neck:      Thyroid: No thyromegaly.      Vascular: No JVD.   Cardiovascular:      Rate and Rhythm: Normal rate and regular rhythm.      Heart sounds: No murmur heard.    No friction rub. No gallop.   Pulmonary:      Effort: Pulmonary effort is normal.      Breath sounds: Normal breath sounds.   Abdominal:      General: Bowel sounds are normal. There is no distension.      Palpations: Abdomen is soft. There is no mass.      Tenderness: There is no abdominal tenderness.   Musculoskeletal:         General: Normal range of motion.      Cervical back: Neck supple.   Skin:     General: Skin is warm and dry.   Neurological:      Mental Status: She is oriented to person, place, and time.      Cranial Nerves: No cranial nerve deficit.   Psychiatric:         Behavior: Behavior normal.       Significant Labs: All pertinent labs within the past 24 hours have been reviewed.  CBC:   Recent Labs   Lab 02/26/22  1851 02/27/22  0955 02/28/22  0540   WBC 12.38 5.65 7.37   HGB 13.0 9.7* 10.7*   HCT 38.2 29.8* 32.7*   PLT 96* 66* 77*     CMP:    Recent Labs   Lab 02/26/22  1823 02/27/22  0955 02/28/22  0540    139 135*   K 4.3 3.5 3.3*    113* 111*   CO2 17* 18* 14*   * 102 119*   BUN 23 21 12   CREATININE 0.8 0.6 0.7   CALCIUM 7.6* 6.5* 6.8*   PROT 4.9* 3.8* 4.5*   ALBUMIN 2.7* 2.0* 2.2*   BILITOT 2.0* 0.9 1.2*   ALKPHOS 264* 175* 165*   AST 60* 36 39   ALT 58* 39 35   ANIONGAP 16 8 10   EGFRNONAA >60.0 >60 >60     Coagulation: No results for input(s): PT, INR, APTT in the last 48 hours.  Lactic Acid:   Recent Labs   Lab 02/27/22  2035 02/28/22  0030 02/28/22  0540   LACTATE 2.4* 1.8 2.2     Urine Studies:   Recent Labs   Lab 02/27/22  1137   COLORU Yellow   APPEARANCEUA Clear   PHUR 6.0   SPECGRAV 1.025   PROTEINUA Negative   GLUCUA Negative   KETONESU Trace*   BILIRUBINUA Negative   OCCULTUA Trace*   NITRITE Negative   UROBILINOGEN Negative   LEUKOCYTESUR Negative     Microbiology Results (last 7 days)       Procedure Component Value Units Date/Time    Clostridium difficile EIA [[id number]] Collected: 02/27/22 2231    Order Status: Completed Specimen: Stool Updated: 02/28/22 0255     C. diff Antigen Negative     C difficile Toxins A+B, EIA Negative     Comment: Testing not recommended for children <24 months old.             Significant Imaging:   CXR: There is right basilar atelectasis.  There is no focal pulmonary consolidation.    RUQ US:  There are sonographic findings suggesting cirrhosis.  The patient's known hepatic lesion is not well appreciated on today's ultrasound. The gallbladder is not visualized and may be contracted.      Assessment/Plan:      * Severe sepsis  This patient does have evidence of infective focus  My overall impression is sepsis. Vital signs were reviewed and noted in progress note.  Antibiotics given-   Antibiotics (From admission, onward)            Start     Stop Route Frequency Ordered    02/27/22 1015  piperacillin-tazobactam 4.5 g in dextrose 5 % 100 mL IVPB (ready to mix system)         -- IV Every  "6 hours (non-standard times) 02/27/22 0911    02/27/22 1011  vancomycin - pharmacy to dose  (vancomycin IVPB)        "And" Linked Group Details    -- IV pharmacy to manage frequency 02/27/22 0911        Cultures were taken-   Microbiology Results (last 7 days)     ** No results found for the last 168 hours. **        Latest lactate reviewed, they are-  Recent Labs   Lab 02/26/22  2308 02/27/22  0206   LACTATE 2.7* 4.4*       Organ dysfunction indicated by Acute liver injury  Source- unspecified.    Source control Achieved by- start empiric antibiotic therapy with intravenous Zosyn and vancomycin.  Pharmacist to dose vancomycin.  Follow microbiology results and urine analysis and procalcitonin level.  If blood pressure remains low, will consider intravenous hydrocortisone supplementation.  Patient is considered immunocompromised host in the setting of recent chemotherapy use and colon carcinoma.      Diarrhea  Tested negative for C diff.  Continue supportive care, IV fluid hydration, antiemetics and use of Lactinex and Lomotil p.r.n.      Goals of care, counseling/discussion  Advance Care Planning     Living Will  During this visit, I engaged the patient  in the advance care planning process.  The patient and I reviewed the role for advance directives and their purpose in directing future healthcare if the patient's unable to speak for him/herself.  At this point in time, the patient does have full decision-making capacity.  We discussed different extreme health states that she could experience, and reviewed what kind of medical care she would want in those situations.  The patient communicated that if she were comatose and had little chance of a meaningful recovery, she would want machines/life-sustaining treatments used. I spent a total of 16 minutes engaging the patient in this advance care planning discussion.            Colon cancer  As above.      Secondary malignant neoplasm of liver  Abnormal liver enzyme " likely related to liver Mets.  Follow hepatitis panel.  Trend LFTs.      Colorectal carcinoma  Receiving chemotherapy at [facility].  Patient is requesting to switch Oncology follow-up with [facility] oncology services, CM notified.  Continue to hold Capecitabine during hospitalization.      Postoperative hypothyroidism  Check TSH.  Continue Synthroid supplementation 88 mcg daily.      Essential (primary) hypertension  Hold antihypertensive agents for now.  Continue tele monitoring and blood pressure monitoring.      Hypercholesteremia  Noted.  Patient does not take any lipid-lowering agent.      Discussed with patient's , answered all questions.  Discussed with  who will be assisting patient with post hospitalization oncology follow-up with Dr. Pino.  VTE Risk Mitigation (From admission, onward)         Ordered     enoxaparin injection 40 mg  Daily         02/27/22 0912     IP VTE HIGH RISK PATIENT  Once         02/27/22 0838     Place sequential compression device  Until discontinued         02/27/22 0838     Reason for No Pharmacological VTE Prophylaxis  Once        Question:  Reasons:  Answer:  Thrombocytopenia    02/27/22 0838                Discharge Planning   CATHY:  03/2/22    Code Status: Full Code   Is the patient medically ready for discharge?:     Reason for patient still in hospital (select all that apply): Patient trending condition  Discharge Plan A: Home                  Jazz Higginbotham MD  Department of Hospital Medicine   [facility]

## 2022-02-28 NOTE — ASSESSMENT & PLAN NOTE
Receiving chemotherapy at [facility].  Patient is requesting to switch Oncology follow-up with [facility] oncology services, CM notified.  Continue to hold Capecitabine during hospitalization.

## 2022-02-28 NOTE — ASSESSMENT & PLAN NOTE
Tested negative for C diff.  Continue supportive care, IV fluid hydration, antiemetics and use of Lactinex and Lomotil p.r.n.

## 2022-02-28 NOTE — PT/OT/SLP PROGRESS
Occupational Therapy      Patient Name:  Regine DENTON Allan   MRN:  [id number]    Patient not seen today secondary to Patient ill (Comment) (Patient pleasantly deferring OT today secondary to up all night with diarrhea and multiple episodes of diarrhea this morning. Nurse, Celeste aware. Per patient (Celeste confirming), patient able to ambulate bed<>toilet with SBA and able to feed/groom with Set-up of items). Patient reports she lives with spouse and PLOF of Independent. Will follow-up 3/1/2022.    2/28/2022

## 2022-02-28 NOTE — PROGRESS NOTES
HPI    This is a 66-year-old female who has been admitted with presumed sepsis.  Patient presented with 1 day of diarrhea with some episodes of nausea and vomiting.  She has not had any episodes since admission.  She denies any fever at home.  She currently has metastatic colorectal cancer and is being treated with XELOX for what she states has been about 1 year.  She sees a physician at [facility].  She has overall been tolerating her treatment well and has never had any diarrhea, nausea, or vomiting associated with it.  She denies any dysuria, cough, abdominal pain, or shortness of breath.     Oncology Treatment Plan:   [Could not find a treatment plan. This SmartLink may be configured incorrectly. Contact a  for help.]    Past Medical History:   Diagnosis Date    Allergy     Colon cancer     Hypertension     Liver cancer     Nontoxic uninodular goiter     Osteoarthrosis, unspecified whether generalized or localized, hand     Other abnormal glucose     Pure hypercholesterolemia        Past Surgical History:   Procedure Laterality Date    CYST REMOVAL  1994    cyst removed from neck    infusaport placement Right     THYROIDECTOMY      TONSILLECTOMY  age 14     Social History     Socioeconomic History    Marital status:    Tobacco Use    Smoking status: Never Smoker   Substance and Sexual Activity    Alcohol use: No    Drug use: Never     Review of patient's allergies indicates:   Allergen Reactions    Lisinopril Rash       Physical exam  Vitals:    02/28/22 1123   BP: (!) 147/73   Pulse: 88   Resp: 17   Temp: 96.3 °F (35.7 °C)     Physical Exam  Constitutional:       Appearance: She is well-developed.      Comments: Ill-appearing.   HENT:      Head: Normocephalic and atraumatic.      Mouth/Throat:      Mouth: Mucous membranes are dry.   Eyes:      Conjunctiva/sclera: Conjunctivae normal.      Pupils: Pupils are equal, round, and reactive to light.   Neck:       Thyroid: No thyromegaly.      Vascular: No JVD.   Cardiovascular:      Rate and Rhythm: Normal rate and regular rhythm.      Heart sounds: No murmur heard.    No friction rub. No gallop.   Pulmonary:      Effort: Pulmonary effort is normal.      Breath sounds: Normal breath sounds.   Abdominal:      General: Bowel sounds are normal. There is no distension.      Palpations: Abdomen is soft. There is no mass.      Tenderness: There is no abdominal tenderness.   Musculoskeletal:         General: Normal range of motion.      Cervical back: Neck supple.   Skin:     General: Skin is warm and dry.   Neurological:      Mental Status: She is oriented to person, place, and time.      Cranial Nerves: No cranial nerve deficit.   Psychiatric:         Behavior: Behavior normal.     Lab Results   Component Value Date    WBC 7.37 02/28/2022    HGB 10.7 (L) 02/28/2022    HCT 32.7 (L) 02/28/2022    MCV 93 02/28/2022    PLT 77 (L) 02/28/2022       CMP  Sodium   Date Value Ref Range Status   02/28/2022 135 (L) 136 - 145 mmol/L Final     Potassium   Date Value Ref Range Status   02/28/2022 3.3 (L) 3.5 - 5.1 mmol/L Final     Chloride   Date Value Ref Range Status   02/28/2022 111 (H) 95 - 110 mmol/L Final     CO2   Date Value Ref Range Status   02/28/2022 14 (L) 23 - 29 mmol/L Final     Glucose   Date Value Ref Range Status   02/28/2022 119 (H) 70 - 110 mg/dL Final     BUN   Date Value Ref Range Status   02/28/2022 12 8 - 23 mg/dL Final     Creatinine   Date Value Ref Range Status   02/28/2022 0.7 0.5 - 1.4 mg/dL Final     Calcium   Date Value Ref Range Status   02/28/2022 6.8 (LL) 8.7 - 10.5 mg/dL Final     Comment:     CA critical result(s) called and verbal readback obtained from   Eusebia Márquez RN Med Surg by [email or web address] 02/28/2022 06:36       Total Protein   Date Value Ref Range Status   02/28/2022 4.5 (L) 6.0 - 8.4 g/dL Final     Albumin   Date Value Ref Range Status   02/28/2022 2.2 (L) 3.5 - 5.2 g/dL Final     Total Bilirubin   Date Value  Ref Range Status   02/28/2022 1.2 (H) 0.1 - 1.0 mg/dL Final     Comment:     For infants and newborns, interpretation of results should be based  on gestational age, weight and in agreement with clinical  observations.    Premature Infant recommended reference ranges:  Up to 24 hours.............<8.0 mg/dL  Up to 48 hours............<12.0 mg/dL  3-5 days..................<15.0 mg/dL  6-29 days.................<15.0 mg/dL       Alkaline Phosphatase   Date Value Ref Range Status   02/28/2022 165 (H) 55 - 135 U/L Final     AST   Date Value Ref Range Status   02/28/2022 39 10 - 40 U/L Final     ALT   Date Value Ref Range Status   02/28/2022 35 10 - 44 U/L Final     Anion Gap   Date Value Ref Range Status   02/28/2022 10 8 - 16 mmol/L Final     eGFR if    Date Value Ref Range Status   02/28/2022 >60 >60 mL/min/1.73 m^2 Final     eGFR if non    Date Value Ref Range Status   02/28/2022 >60 >60 mL/min/1.73 m^2 Final     Comment:     Calculation used to obtain the estimated glomerular filtration  rate (eGFR) is the CKD-EPI equation.          Assessment plan    Metastatic colon cancer   -currently on treatment with XELOX for about 1 year  -hold capecitabine   -Patient is thinking about switching care over to [facility]. Will have her follow up with us after D/C     Diarrhea/nausea/vomiting  -presumed to have infectious source with elevated procalcitonin.  As per primary team.

## 2022-03-01 LAB
ALBUMIN SERPL BCP-MCNC: 1.9 G/DL (ref 3.5–5.2)
ALP SERPL-CCNC: 124 U/L (ref 55–135)
ALT SERPL W/O P-5'-P-CCNC: 27 U/L (ref 10–44)
ANION GAP SERPL CALC-SCNC: 7 MMOL/L (ref 8–16)
AST SERPL-CCNC: 28 U/L (ref 10–40)
BASOPHILS # BLD AUTO: 0 K/UL (ref 0–0.2)
BASOPHILS NFR BLD: 0 % (ref 0–1.9)
BILIRUB SERPL-MCNC: 0.6 MG/DL (ref 0.1–1)
BUN SERPL-MCNC: 8 MG/DL (ref 8–23)
CA-I BLDV-SCNC: 0.94 MMOL/L (ref 1.06–1.42)
CALCIUM SERPL-MCNC: 6.2 MG/DL (ref 8.7–10.5)
CHLORIDE SERPL-SCNC: 113 MMOL/L (ref 95–110)
CO2 SERPL-SCNC: 15 MMOL/L (ref 23–29)
CREAT SERPL-MCNC: 0.7 MG/DL (ref 0.5–1.4)
DIFFERENTIAL METHOD: ABNORMAL
EOSINOPHIL # BLD AUTO: 0 K/UL (ref 0–0.5)
EOSINOPHIL NFR BLD: 0 % (ref 0–8)
ERYTHROCYTE [DISTWIDTH] IN BLOOD BY AUTOMATED COUNT: 22.6 % (ref 11.5–14.5)
EST. GFR  (AFRICAN AMERICAN): >60 ML/MIN/1.73 M^2
EST. GFR  (NON AFRICAN AMERICAN): >60 ML/MIN/1.73 M^2
GLUCOSE SERPL-MCNC: 159 MG/DL (ref 70–110)
HCT VFR BLD AUTO: 29 % (ref 37–48.5)
HGB BLD-MCNC: 9.2 G/DL (ref 12–16)
IMM GRANULOCYTES # BLD AUTO: 0.01 K/UL (ref 0–0.04)
IMM GRANULOCYTES NFR BLD AUTO: 0.3 % (ref 0–0.5)
LACTATE SERPL-SCNC: 2.9 MMOL/L (ref 0.5–2.2)
LYMPHOCYTES # BLD AUTO: 0.9 K/UL (ref 1–4.8)
LYMPHOCYTES NFR BLD: 25.8 % (ref 18–48)
MAGNESIUM SERPL-MCNC: 1.8 MG/DL (ref 1.6–2.6)
MCH RBC QN AUTO: 30.9 PG (ref 27–31)
MCHC RBC AUTO-ENTMCNC: 31.7 G/DL (ref 32–36)
MCV RBC AUTO: 97 FL (ref 82–98)
MONOCYTES # BLD AUTO: 0.3 K/UL (ref 0.3–1)
MONOCYTES NFR BLD: 9.2 % (ref 4–15)
NEUTROPHILS # BLD AUTO: 2.3 K/UL (ref 1.8–7.7)
NEUTROPHILS NFR BLD: 64.7 % (ref 38–73)
NRBC BLD-RTO: 0 /100 WBC
PHOSPHATE SERPL-MCNC: 3.5 MG/DL (ref 2.7–4.5)
PLATELET # BLD AUTO: 63 K/UL (ref 150–450)
PMV BLD AUTO: 10.9 FL (ref 9.2–12.9)
POTASSIUM SERPL-SCNC: 3.6 MMOL/L (ref 3.5–5.1)
PROT SERPL-MCNC: 3.9 G/DL (ref 6–8.4)
RBC # BLD AUTO: 2.98 M/UL (ref 4–5.4)
SODIUM SERPL-SCNC: 135 MMOL/L (ref 136–145)
VANCOMYCIN TROUGH SERPL-MCNC: 9.9 UG/ML (ref 10–22)
WBC # BLD AUTO: 3.57 K/UL (ref 3.9–12.7)

## 2022-03-01 PROCEDURE — 63600175 PHARM REV CODE 636 W HCPCS

## 2022-03-01 PROCEDURE — 63600175 PHARM REV CODE 636 W HCPCS: Performed by: HOSPITALIST

## 2022-03-01 PROCEDURE — 97165 OT EVAL LOW COMPLEX 30 MIN: CPT

## 2022-03-01 PROCEDURE — 97161 PT EVAL LOW COMPLEX 20 MIN: CPT

## 2022-03-01 PROCEDURE — 97116 GAIT TRAINING THERAPY: CPT

## 2022-03-01 PROCEDURE — 99900035 HC TECH TIME PER 15 MIN (STAT)

## 2022-03-01 PROCEDURE — 83735 ASSAY OF MAGNESIUM: CPT

## 2022-03-01 PROCEDURE — 12000002 HC ACUTE/MED SURGE SEMI-PRIVATE ROOM

## 2022-03-01 PROCEDURE — 85025 COMPLETE CBC W/AUTO DIFF WBC: CPT

## 2022-03-01 PROCEDURE — 36415 COLL VENOUS BLD VENIPUNCTURE: CPT | Performed by: HOSPITALIST

## 2022-03-01 PROCEDURE — 83605 ASSAY OF LACTIC ACID: CPT | Performed by: INTERNAL MEDICINE

## 2022-03-01 PROCEDURE — 80053 COMPREHEN METABOLIC PANEL: CPT

## 2022-03-01 PROCEDURE — 36415 COLL VENOUS BLD VENIPUNCTURE: CPT | Performed by: INTERNAL MEDICINE

## 2022-03-01 PROCEDURE — 80202 ASSAY OF VANCOMYCIN: CPT | Performed by: INTERNAL MEDICINE

## 2022-03-01 PROCEDURE — 94761 N-INVAS EAR/PLS OXIMETRY MLT: CPT

## 2022-03-01 PROCEDURE — 25000003 PHARM REV CODE 250

## 2022-03-01 PROCEDURE — 97530 THERAPEUTIC ACTIVITIES: CPT

## 2022-03-01 PROCEDURE — 36415 COLL VENOUS BLD VENIPUNCTURE: CPT

## 2022-03-01 PROCEDURE — 84100 ASSAY OF PHOSPHORUS: CPT

## 2022-03-01 PROCEDURE — 82330 ASSAY OF CALCIUM: CPT | Performed by: HOSPITALIST

## 2022-03-01 PROCEDURE — 25000003 PHARM REV CODE 250: Performed by: INTERNAL MEDICINE

## 2022-03-01 PROCEDURE — 25000003 PHARM REV CODE 250: Performed by: NURSE PRACTITIONER

## 2022-03-01 PROCEDURE — 25000003 PHARM REV CODE 250: Performed by: HOSPITALIST

## 2022-03-01 PROCEDURE — 63600175 PHARM REV CODE 636 W HCPCS: Performed by: INTERNAL MEDICINE

## 2022-03-01 RX ORDER — HYDROCODONE BITARTRATE AND ACETAMINOPHEN 5; 325 MG/1; MG/1
1 TABLET ORAL EVERY 6 HOURS PRN
Status: DISCONTINUED | OUTPATIENT
Start: 2022-03-01 | End: 2022-03-05 | Stop reason: HOSPADM

## 2022-03-01 RX ORDER — CALCIUM CARBONATE 200(500)MG
1000 TABLET,CHEWABLE ORAL
Status: DISCONTINUED | OUTPATIENT
Start: 2022-03-01 | End: 2022-03-05 | Stop reason: HOSPADM

## 2022-03-01 RX ADMIN — DIPHENOXYLATE HYDROCHLORIDE AND ATROPINE SULFATE 1 TABLET: 2.5; .025 TABLET ORAL at 05:03

## 2022-03-01 RX ADMIN — CALCIUM CARBONATE (ANTACID) CHEW TAB 500 MG 1000 MG: 500 CHEW TAB at 01:03

## 2022-03-01 RX ADMIN — VANCOMYCIN HYDROCHLORIDE 1000 MG: 1 INJECTION, POWDER, LYOPHILIZED, FOR SOLUTION INTRAVENOUS at 05:03

## 2022-03-01 RX ADMIN — CALCIUM CARBONATE (ANTACID) CHEW TAB 500 MG 1000 MG: 500 CHEW TAB at 05:03

## 2022-03-01 RX ADMIN — DIPHENOXYLATE HYDROCHLORIDE AND ATROPINE SULFATE 1 TABLET: 2.5; .025 TABLET ORAL at 07:03

## 2022-03-01 RX ADMIN — PIPERACILLIN AND TAZOBACTAM 4.5 G: 4; .5 INJECTION, POWDER, LYOPHILIZED, FOR SOLUTION INTRAVENOUS; PARENTERAL at 05:03

## 2022-03-01 RX ADMIN — ONDANSETRON 4 MG: 2 INJECTION INTRAMUSCULAR; INTRAVENOUS at 03:03

## 2022-03-01 RX ADMIN — LEVOTHYROXINE SODIUM 88 MCG: 0.09 TABLET ORAL at 05:03

## 2022-03-01 RX ADMIN — LACTOBACILLUS ACIDOPHILUS / LACTOBACILLUS BULGARICUS 1 EACH: 100 MILLION CFU STRENGTH GRANULES at 09:03

## 2022-03-01 RX ADMIN — PIPERACILLIN AND TAZOBACTAM 4.5 G: 4; .5 INJECTION, POWDER, LYOPHILIZED, FOR SOLUTION INTRAVENOUS; PARENTERAL at 01:03

## 2022-03-01 RX ADMIN — Medication 800 MG: at 10:03

## 2022-03-01 RX ADMIN — PANTOPRAZOLE SODIUM 40 MG: 40 TABLET, DELAYED RELEASE ORAL at 09:03

## 2022-03-01 RX ADMIN — VANCOMYCIN HYDROCHLORIDE 1250 MG: 1.25 INJECTION, POWDER, LYOPHILIZED, FOR SOLUTION INTRAVENOUS at 06:03

## 2022-03-01 RX ADMIN — DIPHENOXYLATE HYDROCHLORIDE AND ATROPINE SULFATE 1 TABLET: 2.5; .025 TABLET ORAL at 01:03

## 2022-03-01 RX ADMIN — PIPERACILLIN AND TAZOBACTAM 4.5 G: 4; .5 INJECTION, POWDER, LYOPHILIZED, FOR SOLUTION INTRAVENOUS; PARENTERAL at 10:03

## 2022-03-01 RX ADMIN — ENOXAPARIN SODIUM 40 MG: 100 INJECTION SUBCUTANEOUS at 05:03

## 2022-03-01 RX ADMIN — SODIUM CHLORIDE: 0.9 INJECTION, SOLUTION INTRAVENOUS at 06:03

## 2022-03-01 RX ADMIN — PREDNISONE 15 MG: 5 TABLET ORAL at 09:03

## 2022-03-01 RX ADMIN — CALCIUM GLUCONATE 1 G: 98 INJECTION, SOLUTION INTRAVENOUS at 09:03

## 2022-03-01 RX ADMIN — Medication 800 MG: at 04:03

## 2022-03-01 NOTE — ASSESSMENT & PLAN NOTE
Patient has chronic hypothyroidism. TFTs reviewed-   Lab Results   Component Value Date    TSH 18.792 (H) 02/27/2022   . Will continue chronic levothyroxine and adjust for and clinical changes.

## 2022-03-01 NOTE — SUBJECTIVE & OBJECTIVE
Interval History:  Patient seen and examined.  Diarrhea appears to be improved.  Lactic acid remains elevated, the patient symptomatically feels much better.  Plan of care discussed with the patient and her  at the bedside in detail.    Review of Systems   Constitutional:  Positive for fatigue.   Respiratory:  Negative for cough and shortness of breath.    Cardiovascular:  Negative for chest pain and leg swelling.   Gastrointestinal:  Positive for abdominal pain. Negative for nausea.   Neurological:  Negative for weakness.   Psychiatric/Behavioral:  Negative for confusion.    All other systems reviewed and are negative.  Objective:     Vital Signs (Most Recent):  Temp: 97.2 °F (36.2 °C) (03/01/22 1300)  Pulse: 82 (03/01/22 1300)  Resp: 16 (03/01/22 1300)  BP: 123/67 (03/01/22 1300)  SpO2: 99 % (03/01/22 1300) Vital Signs (24h Range):  Temp:  [97 °F (36.1 °C)-98.7 °F (37.1 °C)] 97.2 °F (36.2 °C)  Pulse:  [77-91] 82  Resp:  [15-18] 16  SpO2:  [98 %-100 %] 99 %  BP: (104-154)/(57-71) 123/67     Weight: 69.1 kg (152 lb 5.4 oz)  Body mass index is 23.86 kg/m².    Intake/Output Summary (Last 24 hours) at 3/1/2022 1357  Last data filed at 3/1/2022 1311  Gross per 24 hour   Intake 2835.65 ml   Output 300 ml   Net 2535.65 ml      Physical Exam  Vitals and nursing note reviewed.   Constitutional:       General: She is not in acute distress.  Eyes:      Pupils: Pupils are equal, round, and reactive to light.   Cardiovascular:      Rate and Rhythm: Normal rate and regular rhythm.      Heart sounds: No murmur heard.  Pulmonary:      Effort: Pulmonary effort is normal.      Breath sounds: Normal breath sounds.   Abdominal:      General: There is no distension.      Palpations: Abdomen is soft.      Tenderness: There is no abdominal tenderness.   Skin:     Coloration: Skin is not pale.      Findings: No rash.      Comments: Multiple ecchymoses   Neurological:      Mental Status: She is alert and oriented to person, place,  and time.       Significant Labs: All pertinent labs within the past 24 hours have been reviewed.    Significant Imaging: I have reviewed all pertinent imaging results/findings within the past 24 hours.

## 2022-03-01 NOTE — ASSESSMENT & PLAN NOTE
"This patient does have evidence of infective focus  My overall impression is sepsis. Vital signs were reviewed and noted in progress note.  Antibiotics given-   Antibiotics (From admission, onward)            Start     Stop Route Frequency Ordered    02/28/22 1345  vancomycin in dextrose 5 % 1 gram/250 mL IVPB 1,000 mg         -- IV Every 12 hours (non-standard times) 02/28/22 1246    02/27/22 1030  piperacillin-tazobactam 4.5 g in dextrose 5 % 100 mL IVPB (ready to mix system)         -- IV Every 8 hours (non-standard times) 02/27/22 0911    02/27/22 1011  vancomycin - pharmacy to dose  (vancomycin IVPB)        "And" Linked Group Details    -- IV pharmacy to manage frequency 02/27/22 0911        Cultures were taken-   Microbiology Results (last 7 days)     Procedure Component Value Units Date/Time    Clostridium difficile EIA [[id number]] Collected: 02/27/22 2231    Order Status: Completed Specimen: Stool Updated: 02/28/22 0255     C. diff Antigen Negative     C difficile Toxins A+B, EIA Negative     Comment: Testing not recommended for children <24 months old.           Latest lactate reviewed, they are-  Recent Labs   Lab 02/28/22  0926 02/28/22  1504 03/01/22  0828   LACTATE 1.9 1.7 2.9*       Organ dysfunction indicated by Acute liver injury  Source- unspecified.    Will continue IV antibiotics for now even though source is as yet unidentified. Track cultures/    "

## 2022-03-01 NOTE — PT/OT/SLP EVAL
Occupational Therapy   Evaluation and Discharge Note    Name: Regine Lemus  MRN: [id number]  Admitting Diagnosis:  Severe sepsis   Recent Surgery: * No surgery found *      Recommendations:     Discharge Recommendations:    Discharge Equipment Recommendations:  none  Barriers to discharge:       Assessment:     Regine Lemus is a 66 y.o. female with a medical diagnosis of Severe sepsis. At this time, patient demonstrates decreased endurance for ADL's/IADL's. Pt was agreeable to OT Eval, but ultimately did not feel that further OT was indicated. OT provided instruction in home safety with ADL's/IADL's including review of home set up and DME/AE. Pt/spouse verbalized understanding    Plan:     During this hospitalization, patient does not require further acute OT services.  Please re-consult if situation changes.    · Plan of Care Reviewed with: patient, spouse    Subjective     Chief Complaint: Fatigue  Patient/Family Comments/goals: To go home    Occupational Profile:  Living Environment: Pt lives with spouse in 1 story home with threshold ROYCE. Pt has walk in shower and standard toilet.  Previous level of function: Independent  Equipment Used at home:  cane, straight, walker, rolling, bedside commode, shower chair  Assistance upon Discharge: Spouse    Pain/Comfort:  · Pain Rating 1: 0/10  · Pain Rating Post-Intervention 1: 0/10    Patients cultural, spiritual, Anabaptism conflicts given the current situation:      Objective:     Communicated with: Nurse Contreras prior to session.  Patient found HOB elevated with peripheral IV, telemetry upon OT entry to room.    General Precautions: Standard, fall   Orthopedic Precautions:N/A   Braces: N/A  Respiratory Status: Room air     Occupational Performance:    Bed Mobility:    · Patient completed Supine to Sit with supervision  · Patient completed Sit to Supine with supervision    Functional Mobility/Transfers:  · Patient completed Sit <> Stand Transfer with stand by  assistance  with  no assistive device     Activities of Daily Living:  · Feeding:  independence    · Grooming: stand by assistance set up in sitting  · Bathing: stand by assistance  as a precaution  · Upper Body Dressing: stand by assistance set up in sitting  · Lower Body Dressing: minimum assistance    · Toileting: stand by assistance       Cognitive/Visual Perceptual:  Pt alert and oriented    Physical Exam:  Upper Extremity Strength:    -       Right Upper Extremity: WFL  -       Left Upper Extremity: WFL    AMPAC 6 Click ADL:  AMPAC Total Score: 24    Treatment & Education:  OT provided education in role of OT. Pt/spouse verbalized understanding and pt was agreeable to OT Eval.  OT provided instruction in home safety with ADL's/IADL's including review of home set up and DME/AE. Pt/spouse verbalized understanding.  Education:    Patient left HOB elevated with all lines intact, call button in reach, Nurse Diane notified and Spouse present    GOALS:   Multidisciplinary Problems     Occupational Therapy Goals        Problem: Occupational Therapy Goal                    History:     Past Medical History:   Diagnosis Date    Allergy     Colon cancer     Hypertension     Liver cancer     Nontoxic uninodular goiter     Osteoarthrosis, unspecified whether generalized or localized, hand     Other abnormal glucose     Pure hypercholesterolemia        Past Surgical History:   Procedure Laterality Date    CYST REMOVAL  1994    cyst removed from neck    infusaport placement Right     THYROIDECTOMY      TONSILLECTOMY  age 14       Time Tracking:     OT Date of Treatment: 03/01/22  OT Start Time: 1047  OT Stop Time: 1123  OT Total Time (min): 36 min    Billable Minutes:Evaluation 8  Therapeutic Activity 28    3/1/2022

## 2022-03-01 NOTE — ASSESSMENT & PLAN NOTE
Receiving chemotherapy at [facility].  Patient is requesting to switch Oncology follow-up with [facility] oncology services, CM notified.  Continue to hold Capecitabine during hospitalization.

## 2022-03-01 NOTE — PROGRESS NOTES
Pharmacokinetic Assessment Follow Up: IV Vancomycin    Vancomycin serum concentration assessment(s):    The trough level was drawn correctly and can be used to guide therapy at this time. The measurement is below the desired definitive target range of 15 to 20 mcg/mL.    Vancomycin Regimen Plan:    Change regimen to Vancomycin 1250 mg IV every 12 hours with next serum trough concentration measured at 1800 prior to 3rd dose on 3/2/2022.    Drug levels (last 3 results):  Recent Labs   Lab Result Units 02/28/22  0926 03/01/22  1713   Vancomycin-Trough ug/mL 5.3* 9.9*       Pharmacy will continue to follow and monitor vancomycin.    Please contact pharmacy at extension [phone] for questions regarding this assessment.    Thank you for the consult,   Satnam Velez, PharmD  [phone]         Patient brief summary:  Regine Lemus is a 66 y.o. female initiated on antimicrobial therapy with IV Vancomycin for treatment of bacteremia    The patient's current regimen is vancomycin 1000 mg every 12 hours.    Drug Allergies:   Review of patient's allergies indicates:   Allergen Reactions    Lisinopril Rash       Actual Body Weight:   69.1 kg (152 lb 5.4 oz)    Renal Function:   Estimated Creatinine Clearance: 74.8 mL/min (based on SCr of 0.7 mg/dL).,     Dialysis Method (if applicable):  N/A    CBC (last 72 hours):  Recent Labs   Lab Result Units 02/26/22  1851 02/27/22  0955 02/28/22  0540 03/01/22  0448   WBC K/uL 12.38 5.65 7.37 3.57*   Hemoglobin g/dL 13.0 9.7* 10.7* 9.2*   Hematocrit % 38.2 29.8* 32.7* 29.0*   Platelets K/uL 96* 66* 77* 63*   Gran % % 72.5 56.0 56.9 64.7   Lymph % % 19.4 40.0 34.1 25.8   Mono % % 7.3 3.0* 8.1 9.2   Eosinophil % % 0.1 1.0 0.5 0.0   Basophil % % 0.2 0.0 0.1 0.0   Differential Method  Automated Manual Automated Automated       Metabolic Panel (last 72 hours):  Recent Labs   Lab Result Units 02/26/22  1823 02/27/22  0955 02/27/22  1137 02/28/22  0540 03/01/22  0448   Sodium mmol/L 138 139  --   135* 135*   Potassium mmol/L 4.3 3.5  --  3.3* 3.6   Chloride mmol/L 105 113*  --  111* 113*   CO2 mmol/L 17* 18*  --  14* 15*   Glucose mg/dL 169* 102  --  119* 159*   Glucose, UA   --   --  Negative  --   --    BUN mg/dL 23 21  --  12 8   Creatinine mg/dL 0.8 0.6  --  0.7 0.7   Albumin g/dL 2.7* 2.0*  --  2.2* 1.9*   Total Bilirubin mg/dL 2.0* 0.9  --  1.2* 0.6   Alkaline Phosphatase U/L 264* 175*  --  165* 124   AST U/L 60* 36  --  39 28   ALT U/L 58* 39  --  35 27   Magnesium mg/dL  --   --   --  1.5* 1.8   Phosphorus mg/dL  --   --   --  2.4* 3.5       Vancomycin Administrations:  vancomycin given in the last 96 hours                     vancomycin in dextrose 5 % 1 gram/250 mL IVPB 1,000 mg (mg) 1,000 mg New Bag 03/01/22 0532     1,000 mg New Bag 02/28/22 1750    vancomycin 1.5 g in dextrose 5 % 250 mL IVPB (ready to mix) (mg) 1,500 mg New Bag 02/27/22 1633                    Microbiologic Results:  Microbiology Results (last 7 days)       Procedure Component Value Units Date/Time    Clostridium difficile EIA [[id number]] Collected: 02/27/22 2231    Order Status: Completed Specimen: Stool Updated: 02/28/22 0255     C. diff Antigen Negative     C difficile Toxins A+B, EIA Negative     Comment: Testing not recommended for children <24 months old.

## 2022-03-01 NOTE — CARE UPDATE
03/01/22 0823   Patient Assessment/Suction   Level of Consciousness (AVPU) alert   Respiratory Effort Normal   Expansion/Accessory Muscles/Retractions no retractions   All Lung Fields Breath Sounds Anterior:   EDILMA Breath Sounds clear   RUL Breath Sounds clear   Rhythm/Pattern, Respiratory pattern regular   Cough Frequency no cough   PRE-TX-O2   O2 Device (Oxygen Therapy) room air   Oxygen Concentration (%) 21   SpO2 98 %   Pulse Oximetry Type Intermittent   $ Pulse Oximetry - Multiple Charge Pulse Oximetry - Multiple   Pulse 82   Resp 15   Positioning HOB elevated 30 degrees   Aerosol Therapy   $ Aerosol Therapy Charges PRN treatment not required   POx, nebs prn

## 2022-03-01 NOTE — ASSESSMENT & PLAN NOTE
Chronic, uncontrolled.  Latest blood pressure and vitals reviewed-   Temp:  [97 °F (36.1 °C)-98.7 °F (37.1 °C)]   Pulse:  [77-91]   Resp:  [15-18]   BP: (104-154)/(57-71)   SpO2:  [98 %-100 %] .   Home meds for hypertension were reviewed and noted below.   Hypertension Medications             atenolol (TENORMIN) 50 MG tablet Take 1 tablet (50 mg total) by mouth once daily.          While in the hospital, will manage blood pressure as follows; Adjust home antihypertensive regimen as follows- -  Hold BP meds for now d/t low BP.    Will utilize p.r.n. blood pressure medication only if patient's blood pressure greater than  180/110 and she develops symptoms such as worsening chest pain or shortness of breath.

## 2022-03-01 NOTE — PT/OT/SLP EVAL
Physical Therapy Evaluation    Patient Name:  Regine Lemus   MRN:  [id number]    Recommendations:     Discharge Recommendations:  home health PT (however, patient declines need for follow up PT after discharge despite noting increased weakness right now)   Discharge Equipment Recommendations: none   Barriers to discharge: None    Assessment:     Regine Lemus is a 66 y.o. female admitted with a medical diagnosis of Severe sepsis.  She presents with the following impairments/functional limitations:  weakness, impaired endurance, impaired functional mobilty, gait instability, impaired balance, decreased lower extremity function. Patient is agreeable to participation with PT evaluation. She reports typically using a rollator for ambulation at baseline. She states she has compression fractures in her spine, but they do not cause her pain. She requires SBA for supine to sit and CGA for sit to stand with patient holding IV pole. She ambulated 10' to restroom and 10' to sink holding IV pole with CGA. She then ambulated 160' with no AD and CGA. She declines sitting up in chair upon return to room and returned to bed with all needs met and questions answered.     Rehab Prognosis: Good; patient would benefit from acute skilled PT services to address these deficits and reach maximum level of function.    Recent Surgery: * No surgery found *      Plan:     During this hospitalization, patient to be seen 6 x/week to address the identified rehab impairments via gait training, therapeutic activities, therapeutic exercises and progress toward the following goals:    · Plan of Care Expires:  04/01/22    Subjective     Chief Complaint: tired  Patient/Family Comments/goals: use restroom   Pain/Comfort:  · Pain Rating 1: 0/10    Patients cultural, spiritual, Latter-day conflicts given the current situation:      Living Environment:  Patient lives with spouse in a [facility] with threshold to enter through garage   Prior to admission,  patients level of function was [place] with rollator. She denies any recent falls. She had OPPT for dry needling for back pain prior to compression fracture diagnosis. She endorses osteoporosis attributed to cancer treatment.   Equipment used at home: rollator, cane, straight, bedside commode, shower chair.  DME owned (not currently used): none.  Upon discharge, patient will have assistance from spouse.    Objective:     Communicated with JYACEE Contreras prior to session.  Patient found HOB elevated with peripheral IV, telemetry  upon PT entry to room.    General Precautions: Standard, fall   Orthopedic Precautions:N/A   Braces: N/A  Respiratory Status: Room air    Exams:  · RUE ROM: WFL AROM shoulder flexion  · LUE ROM: WFL AROM shoulder flexion  · RLE Strength: 3+/5  · LLE Strength: 3+/5    Functional Mobility:  · Bed Mobility:     · Supine to Sit: stand by assistance  · Transfers:     · Sit to Stand:  contact guard assistance with no AD  · Gait: 10' to restroom and 10' to sink holding IV pole with CGA. She then ambulated 160' with no AD and CGA    Therapeutic Activities and Exercises:   Patient was educated on the importance of OOB activity and functional mobility to negate negative effects of prolonged bed rest during hospitalization, safe transfers and ambulation, placing BSC over toilet, and D/C planning     Patient requires SBA and VC for toilet transfer and performs hygiene independently     AM-PAC 6 CLICK MOBILITY  Total Score:21     Patient left HOB elevated with all lines intact, call button in reach, RN notified and spouse present.    GOALS:   Multidisciplinary Problems     Physical Therapy Goals        Problem: Physical Therapy Goal    Goal Priority Disciplines Outcome Goal Variances Interventions   Physical Therapy Goal     PT, PT/OT      Description: Goals to be met by: 22    Patient will increase functional independence with mobility by performin. Supine to sit with Supervision  2. Sit to stand  transfer with Supervision  3. Bed to chair transfer with Supervision using no AD or Rolling Walker  4. Gait  x 250 feet with Supervision using no AD or Rolling Walker.   5. Lower extremity exercise program x20 reps per handout, with supervision                     History:     Past Medical History:   Diagnosis Date    Allergy     Colon cancer     Hypertension     Liver cancer     Nontoxic uninodular goiter     Osteoarthrosis, unspecified whether generalized or localized, hand     Other abnormal glucose     Pure hypercholesterolemia        Past Surgical History:   Procedure Laterality Date    CYST REMOVAL  1994    cyst removed from neck    infusaport placement Right     THYROIDECTOMY      TONSILLECTOMY  age 14       Time Tracking:     PT Received On: 03/01/22  PT Start Time: 0852     PT Stop Time: 0913  PT Total Time (min): 21 min     Billable Minutes: Evaluation 10 and Gait Training 11      03/01/2022

## 2022-03-01 NOTE — PLAN OF CARE
Plan of care reviewed with patient. IV fluids /IV antibiotics administered as per orders. SR on telemetry.no complaints of pain during night. Multiple watery stools during night ,prn Imodium given. Remains free from falls/injury. Instructed to call for assistance as needed, verbalized understanding. Call light in reach, bed alarm on .

## 2022-03-01 NOTE — PLAN OF CARE
Patient resting in bed comfortably. Iv intact and infusing free of irration,  fall precautions maintained no falls noted. Call light in reach bed locked and in lowest position. Non skid socks on while out of bed. Patient instructed to call for assistance. Skin integrity maintained as patient is independent with frequent positioning, C/o pain managed with PRN meds, No other complaints or concerns. Will continue to monitor and follow careplan of care.

## 2022-03-01 NOTE — PROGRESS NOTES
Monishalevia S Allan [id number] is a 66 y.o. female who has been consulted for vancomycin dosing.    Vancomycin trough has been changed to 3/1 at 1700.      Patient will be followed by pharmacy for changes in renal function, toxicity, and efficacy.    Thank you for allowing us to participate in this patient's care.     Neftali ChaudhryD

## 2022-03-01 NOTE — ASSESSMENT & PLAN NOTE
Patient is not chronically on statin.will not continue for now. Monitor clinically. Last LDL was   Lab Results   Component Value Date    LDLCALC 118.0 01/24/2021

## 2022-03-01 NOTE — PROGRESS NOTES
[facility] Medicine  Progress Note    Patient Name: Regine DENTON Allan  MRN: [id number]  Patient Class: IP- Inpatient   Admission Date: 2/27/2022  Length of Stay: 2 days  Attending Physician: Xavier Prado MD  Primary Care Provider: Primary Doctor No        Subjective:     Principal Problem:Severe sepsis        HPI:  Patient is a 66-year-old  female with past medical history significant for metastatic colon carcinoma (receiving chemotherapy including Xeloda and prednisone, [facility]), liver Mets, hypertension, history of post surgical hypothyroidism who has been admitted to Hospital Medicine after getting transferred from [facility] Emergency Room for management of sepsis.  [facility] is on capacity diversion.  Patient went to the emergency room with complaint of progressively worsening generalized weakness and decreased appetite and reduced oral intake.  In the emergency room patient was noted to be hypotensive and tachycardic.  Reportedly patient received IV fluid hydration per sepsis protocol and received a dose of intravenous Rocephin.  Source of sepsis is not apparent at this time.  Blood cultures have been obtained.  Urine analysis is pending.  Patient denies any new abdominal pain, vomiting, melena, hematemesis, bleeding per rectum, cough and expectoration.  Denies any new focal neuro deficits.  No urinary difficulties or any rash reported.            Overview/Hospital Course:  No notes on file    Interval History:  Patient seen and examined.  Diarrhea appears to be improved.  Lactic acid remains elevated, the patient symptomatically feels much better.  Plan of care discussed with the patient and her  at the bedside in detail.    Review of Systems   Constitutional:  Positive for fatigue.   Respiratory:  Negative for cough and shortness of breath.    Cardiovascular:  Negative for chest pain and leg swelling.   Gastrointestinal:   Positive for abdominal pain. Negative for nausea.   Neurological:  Negative for weakness.   Psychiatric/Behavioral:  Negative for confusion.    All other systems reviewed and are negative.  Objective:     Vital Signs (Most Recent):  Temp: 97.2 °F (36.2 °C) (03/01/22 1300)  Pulse: 82 (03/01/22 1300)  Resp: 16 (03/01/22 1300)  BP: 123/67 (03/01/22 1300)  SpO2: 99 % (03/01/22 1300) Vital Signs (24h Range):  Temp:  [97 °F (36.1 °C)-98.7 °F (37.1 °C)] 97.2 °F (36.2 °C)  Pulse:  [77-91] 82  Resp:  [15-18] 16  SpO2:  [98 %-100 %] 99 %  BP: (104-154)/(57-71) 123/67     Weight: 69.1 kg (152 lb 5.4 oz)  Body mass index is 23.86 kg/m².    Intake/Output Summary (Last 24 hours) at 3/1/2022 1357  Last data filed at 3/1/2022 1311  Gross per 24 hour   Intake 2835.65 ml   Output 300 ml   Net 2535.65 ml      Physical Exam  Vitals and nursing note reviewed.   Constitutional:       General: She is not in acute distress.  Eyes:      Pupils: Pupils are equal, round, and reactive to light.   Cardiovascular:      Rate and Rhythm: Normal rate and regular rhythm.      Heart sounds: No murmur heard.  Pulmonary:      Effort: Pulmonary effort is normal.      Breath sounds: Normal breath sounds.   Abdominal:      General: There is no distension.      Palpations: Abdomen is soft.      Tenderness: There is no abdominal tenderness.   Skin:     Coloration: Skin is not pale.      Findings: No rash.      Comments: Multiple ecchymoses   Neurological:      Mental Status: She is alert and oriented to person, place, and time.       Significant Labs: All pertinent labs within the past 24 hours have been reviewed.    Significant Imaging: I have reviewed all pertinent imaging results/findings within the past 24 hours.      Assessment/Plan:      * Severe sepsis  This patient does have evidence of infective focus  My overall impression is sepsis. Vital signs were reviewed and noted in progress note.  Antibiotics given-   Antibiotics (From admission, onward)     "        Start     Stop Route Frequency Ordered    02/28/22 1345  vancomycin in dextrose 5 % 1 gram/250 mL IVPB 1,000 mg         -- IV Every 12 hours (non-standard times) 02/28/22 1246    02/27/22 1030  piperacillin-tazobactam 4.5 g in dextrose 5 % 100 mL IVPB (ready to mix system)         -- IV Every 8 hours (non-standard times) 02/27/22 0911    02/27/22 1011  vancomycin - pharmacy to dose  (vancomycin IVPB)        "And" Linked Group Details    -- IV pharmacy to manage frequency 02/27/22 0911        Cultures were taken-   Microbiology Results (last 7 days)     Procedure Component Value Units Date/Time    Clostridium difficile EIA [[id number]] Collected: 02/27/22 2231    Order Status: Completed Specimen: Stool Updated: 02/28/22 0255     C. diff Antigen Negative     C difficile Toxins A+B, EIA Negative     Comment: Testing not recommended for children <24 months old.           Latest lactate reviewed, they are-  Recent Labs   Lab 02/28/22  0926 02/28/22  1504 03/01/22  0828   LACTATE 1.9 1.7 2.9*       Organ dysfunction indicated by Acute liver injury  Source- unspecified.    Will continue IV antibiotics for now even though source is as yet unidentified. Track cultures/      Diarrhea  Tested negative for C diff.  Continue supportive care, IV fluid hydration, antiemetics and use of Lactinex and Lomotil p.r.n.      Goals of care, counseling/discussion  Advance Care Planning     Date: 03/01/2022    Code Status  In light of the patients advanced and life limiting illness,I engaged the the patient and family in a conversation about the patient's preferences for care  at the very end of life. The patient wishes to have a natural, peaceful death.  Along those lines, the patient does not wish to have CPR or other invasive treatments performed when her heart and/or breathing stops. I communicated to the patient and family that a DNR order would be placed in her medical record to reflect this preference.  I spent a total of 37 " minutes engaging the patient in this advance care planning discussion.                   Colon cancer  As above.      Secondary malignant neoplasm of liver  Abnormal liver enzyme likely related to liver Mets.  Follow hepatitis panel.  Trend LFTs.      Colorectal carcinoma  Receiving chemotherapy at [facility].  Patient is requesting to switch Oncology follow-up with [facility] oncology services, CM notified.  Continue to hold Capecitabine during hospitalization.      Postoperative hypothyroidism  Patient has chronic hypothyroidism. TFTs reviewed-   Lab Results   Component Value Date    TSH 18.792 (H) 02/27/2022   . Will continue chronic levothyroxine and adjust for and clinical changes.        Essential (primary) hypertension  Chronic, uncontrolled.  Latest blood pressure and vitals reviewed-   Temp:  [97 °F (36.1 °C)-98.7 °F (37.1 °C)]   Pulse:  [77-91]   Resp:  [15-18]   BP: (104-154)/(57-71)   SpO2:  [98 %-100 %] .   Home meds for hypertension were reviewed and noted below.   Hypertension Medications             atenolol (TENORMIN) 50 MG tablet Take 1 tablet (50 mg total) by mouth once daily.          While in the hospital, will manage blood pressure as follows; Adjust home antihypertensive regimen as follows- -  Hold BP meds for now d/t low BP.    Will utilize p.r.n. blood pressure medication only if patient's blood pressure greater than  180/110 and she develops symptoms such as worsening chest pain or shortness of breath.        Hypercholesteremia   Patient is not chronically on statin.will not continue for now. Monitor clinically. Last LDL was   Lab Results   Component Value Date    LDLCALC 118.0 01/24/2021              VTE Risk Mitigation (From admission, onward)         Ordered     enoxaparin injection 40 mg  Daily         02/27/22 0912     IP VTE HIGH RISK PATIENT  Once         02/27/22 0838     Place sequential compression device  Until discontinued         02/27/22 0838     Reason for No  Pharmacological VTE Prophylaxis  Once        Question:  Reasons:  Answer:  Thrombocytopenia    02/27/22 0838                Discharge Planning   CATHY:      Code Status: DNR   Is the patient medically ready for discharge?:     Reason for patient still in hospital (select all that apply): Treatment  Discharge Plan A: Home                  Xavier Prado MD  Department of Hospital Medicine   [facility]

## 2022-03-01 NOTE — ASSESSMENT & PLAN NOTE
Advance Care Planning     Date: 03/01/2022    Code Status  In light of the patients advanced and life limiting illness,I engaged the the patient and family in a conversation about the patient's preferences for care  at the very end of life. The patient wishes to have a natural, peaceful death.  Along those lines, the patient does not wish to have CPR or other invasive treatments performed when her heart and/or breathing stops. I communicated to the patient and family that a DNR order would be placed in her medical record to reflect this preference.  I spent a total of 37 minutes engaging the patient in this advance care planning discussion.

## 2022-03-01 NOTE — PLAN OF CARE
Patient A&Ox4, resting in bed comfortably. Patient continues to have diarrhea through the shift multiple times. Medication administered as ordered. Tele in place NSR. Patient ate very minimal today, stated she just didn't have an appetite. Vitals WNL. Will continue to monitor for changes.

## 2022-03-02 PROBLEM — E43 SEVERE MALNUTRITION: Status: ACTIVE | Noted: 2022-03-02

## 2022-03-02 PROBLEM — D50.9 MICROCYTIC ANEMIA: Status: RESOLVED | Noted: 2021-01-23 | Resolved: 2022-03-02

## 2022-03-02 PROBLEM — A41.9 SEPSIS: Status: RESOLVED | Noted: 2021-01-23 | Resolved: 2022-03-02

## 2022-03-02 LAB
ALBUMIN SERPL BCP-MCNC: 2.1 G/DL (ref 3.5–5.2)
ALP SERPL-CCNC: 132 U/L (ref 55–135)
ALT SERPL W/O P-5'-P-CCNC: 24 U/L (ref 10–44)
ANION GAP SERPL CALC-SCNC: 9 MMOL/L (ref 8–16)
AST SERPL-CCNC: 26 U/L (ref 10–40)
BASOPHILS # BLD AUTO: 0.03 K/UL (ref 0–0.2)
BASOPHILS NFR BLD: 0.6 % (ref 0–1.9)
BILIRUB SERPL-MCNC: 1 MG/DL (ref 0.1–1)
BUN SERPL-MCNC: 6 MG/DL (ref 8–23)
CALCIUM SERPL-MCNC: 6.5 MG/DL (ref 8.7–10.5)
CHLORIDE SERPL-SCNC: 112 MMOL/L (ref 95–110)
CO2 SERPL-SCNC: 17 MMOL/L (ref 23–29)
CREAT SERPL-MCNC: 0.8 MG/DL (ref 0.5–1.4)
DIFFERENTIAL METHOD: ABNORMAL
EOSINOPHIL # BLD AUTO: 0 K/UL (ref 0–0.5)
EOSINOPHIL NFR BLD: 0.4 % (ref 0–8)
ERYTHROCYTE [DISTWIDTH] IN BLOOD BY AUTOMATED COUNT: 23.6 % (ref 11.5–14.5)
EST. GFR  (AFRICAN AMERICAN): >60 ML/MIN/1.73 M^2
EST. GFR  (NON AFRICAN AMERICAN): >60 ML/MIN/1.73 M^2
GLUCOSE SERPL-MCNC: 126 MG/DL (ref 70–110)
HAV IGM SERPL QL IA: NEGATIVE
HBV CORE IGM SERPL QL IA: NEGATIVE
HBV SURFACE AG SERPL QL IA: NEGATIVE
HCT VFR BLD AUTO: 32.4 % (ref 37–48.5)
HCV AB SERPL QL IA: NEGATIVE
HGB BLD-MCNC: 10.3 G/DL (ref 12–16)
IMM GRANULOCYTES # BLD AUTO: 0.01 K/UL (ref 0–0.04)
IMM GRANULOCYTES NFR BLD AUTO: 0.2 % (ref 0–0.5)
LACTATE SERPL-SCNC: 3.2 MMOL/L (ref 0.5–2.2)
LYMPHOCYTES # BLD AUTO: 1.2 K/UL (ref 1–4.8)
LYMPHOCYTES NFR BLD: 25.3 % (ref 18–48)
MAGNESIUM SERPL-MCNC: 1.6 MG/DL (ref 1.6–2.6)
MCH RBC QN AUTO: 30.7 PG (ref 27–31)
MCHC RBC AUTO-ENTMCNC: 31.8 G/DL (ref 32–36)
MCV RBC AUTO: 96 FL (ref 82–98)
MONOCYTES # BLD AUTO: 0.8 K/UL (ref 0.3–1)
MONOCYTES NFR BLD: 15.8 % (ref 4–15)
NEUTROPHILS # BLD AUTO: 2.7 K/UL (ref 1.8–7.7)
NEUTROPHILS NFR BLD: 57.7 % (ref 38–73)
NRBC BLD-RTO: 0 /100 WBC
PHOSPHATE SERPL-MCNC: 3.2 MG/DL (ref 2.7–4.5)
PLATELET # BLD AUTO: 77 K/UL (ref 150–450)
PMV BLD AUTO: 9.7 FL (ref 9.2–12.9)
POTASSIUM SERPL-SCNC: 3.1 MMOL/L (ref 3.5–5.1)
PROT SERPL-MCNC: 4.2 G/DL (ref 6–8.4)
RBC # BLD AUTO: 3.36 M/UL (ref 4–5.4)
SODIUM SERPL-SCNC: 138 MMOL/L (ref 136–145)
VANCOMYCIN TROUGH SERPL-MCNC: 23.3 UG/ML (ref 10–22)
WBC # BLD AUTO: 4.74 K/UL (ref 3.9–12.7)

## 2022-03-02 PROCEDURE — 36415 COLL VENOUS BLD VENIPUNCTURE: CPT

## 2022-03-02 PROCEDURE — 80202 ASSAY OF VANCOMYCIN: CPT | Performed by: HOSPITALIST

## 2022-03-02 PROCEDURE — 99900035 HC TECH TIME PER 15 MIN (STAT): Performed by: HOSPITALIST

## 2022-03-02 PROCEDURE — 99900035 HC TECH TIME PER 15 MIN (STAT)

## 2022-03-02 PROCEDURE — 85025 COMPLETE CBC W/AUTO DIFF WBC: CPT

## 2022-03-02 PROCEDURE — 63600175 PHARM REV CODE 636 W HCPCS: Performed by: HOSPITALIST

## 2022-03-02 PROCEDURE — 83735 ASSAY OF MAGNESIUM: CPT

## 2022-03-02 PROCEDURE — 12000002 HC ACUTE/MED SURGE SEMI-PRIVATE ROOM

## 2022-03-02 PROCEDURE — 63600175 PHARM REV CODE 636 W HCPCS

## 2022-03-02 PROCEDURE — 25000003 PHARM REV CODE 250: Performed by: INTERNAL MEDICINE

## 2022-03-02 PROCEDURE — 84100 ASSAY OF PHOSPHORUS: CPT

## 2022-03-02 PROCEDURE — 25000003 PHARM REV CODE 250

## 2022-03-02 PROCEDURE — 83605 ASSAY OF LACTIC ACID: CPT | Performed by: HOSPITALIST

## 2022-03-02 PROCEDURE — 63600175 PHARM REV CODE 636 W HCPCS: Performed by: INTERNAL MEDICINE

## 2022-03-02 PROCEDURE — 25000003 PHARM REV CODE 250: Performed by: NURSE PRACTITIONER

## 2022-03-02 PROCEDURE — 25000003 PHARM REV CODE 250: Performed by: HOSPITALIST

## 2022-03-02 PROCEDURE — 80053 COMPREHEN METABOLIC PANEL: CPT

## 2022-03-02 PROCEDURE — 36415 COLL VENOUS BLD VENIPUNCTURE: CPT | Performed by: HOSPITALIST

## 2022-03-02 PROCEDURE — 94761 N-INVAS EAR/PLS OXIMETRY MLT: CPT

## 2022-03-02 RX ORDER — ACETAMINOPHEN 500 MG
1000 TABLET ORAL EVERY 6 HOURS PRN
Status: DISCONTINUED | OUTPATIENT
Start: 2022-03-02 | End: 2022-03-05 | Stop reason: HOSPADM

## 2022-03-02 RX ORDER — DIPHENOXYLATE HYDROCHLORIDE AND ATROPINE SULFATE 2.5; .025 MG/1; MG/1
1 TABLET ORAL 4 TIMES DAILY
Status: DISCONTINUED | OUTPATIENT
Start: 2022-03-02 | End: 2022-03-05 | Stop reason: HOSPADM

## 2022-03-02 RX ADMIN — POTASSIUM BICARBONATE 35 MEQ: 391 TABLET, EFFERVESCENT ORAL at 10:03

## 2022-03-02 RX ADMIN — Medication 9 MG: at 02:03

## 2022-03-02 RX ADMIN — CALCIUM CARBONATE (ANTACID) CHEW TAB 500 MG 1000 MG: 500 CHEW TAB at 04:03

## 2022-03-02 RX ADMIN — DIPHENOXYLATE HYDROCHLORIDE AND ATROPINE SULFATE 1 TABLET: 2.5; .025 TABLET ORAL at 09:03

## 2022-03-02 RX ADMIN — Medication 9 MG: at 09:03

## 2022-03-02 RX ADMIN — CALCIUM GLUCONATE 1 G: 94 INJECTION, SOLUTION INTRAVENOUS at 10:03

## 2022-03-02 RX ADMIN — VANCOMYCIN HYDROCHLORIDE 1250 MG: 1.25 INJECTION, POWDER, LYOPHILIZED, FOR SOLUTION INTRAVENOUS at 08:03

## 2022-03-02 RX ADMIN — PANTOPRAZOLE SODIUM 40 MG: 40 TABLET, DELAYED RELEASE ORAL at 08:03

## 2022-03-02 RX ADMIN — PIPERACILLIN AND TAZOBACTAM 4.5 G: 4; .5 INJECTION, POWDER, LYOPHILIZED, FOR SOLUTION INTRAVENOUS; PARENTERAL at 09:03

## 2022-03-02 RX ADMIN — DIPHENOXYLATE HYDROCHLORIDE AND ATROPINE SULFATE 1 TABLET: 2.5; .025 TABLET ORAL at 01:03

## 2022-03-02 RX ADMIN — ENOXAPARIN SODIUM 40 MG: 100 INJECTION SUBCUTANEOUS at 04:03

## 2022-03-02 RX ADMIN — ONDANSETRON 4 MG: 2 INJECTION INTRAMUSCULAR; INTRAVENOUS at 04:03

## 2022-03-02 RX ADMIN — PIPERACILLIN AND TAZOBACTAM 4.5 G: 4; .5 INJECTION, POWDER, LYOPHILIZED, FOR SOLUTION INTRAVENOUS; PARENTERAL at 06:03

## 2022-03-02 RX ADMIN — PIPERACILLIN AND TAZOBACTAM 4.5 G: 4; .5 INJECTION, POWDER, LYOPHILIZED, FOR SOLUTION INTRAVENOUS; PARENTERAL at 01:03

## 2022-03-02 RX ADMIN — SENNOSIDES AND DOCUSATE SODIUM 1 TABLET: 50; 8.6 TABLET ORAL at 09:03

## 2022-03-02 RX ADMIN — LACTOBACILLUS ACIDOPHILUS / LACTOBACILLUS BULGARICUS 1 EACH: 100 MILLION CFU STRENGTH GRANULES at 09:03

## 2022-03-02 RX ADMIN — PREDNISONE 15 MG: 5 TABLET ORAL at 08:03

## 2022-03-02 RX ADMIN — LEVOTHYROXINE SODIUM 88 MCG: 0.09 TABLET ORAL at 06:03

## 2022-03-02 RX ADMIN — POTASSIUM BICARBONATE 35 MEQ: 391 TABLET, EFFERVESCENT ORAL at 01:03

## 2022-03-02 RX ADMIN — DIPHENOXYLATE HYDROCHLORIDE AND ATROPINE SULFATE 1 TABLET: 2.5; .025 TABLET ORAL at 04:03

## 2022-03-02 RX ADMIN — CALCIUM CARBONATE (ANTACID) CHEW TAB 500 MG 1000 MG: 500 CHEW TAB at 10:03

## 2022-03-02 RX ADMIN — LACTOBACILLUS ACIDOPHILUS / LACTOBACILLUS BULGARICUS 1 EACH: 100 MILLION CFU STRENGTH GRANULES at 08:03

## 2022-03-02 RX ADMIN — SODIUM CHLORIDE: 0.9 INJECTION, SOLUTION INTRAVENOUS at 06:03

## 2022-03-02 RX ADMIN — DIPHENOXYLATE HYDROCHLORIDE AND ATROPINE SULFATE 1 TABLET: 2.5; .025 TABLET ORAL at 02:03

## 2022-03-02 NOTE — SUBJECTIVE & OBJECTIVE
Interval History:  Patient seen and examined. Having worse diarrhea overnight Lactic acid remains elevated, the patient symptomatically feels much better.  Plan of care discussed with the patient and her  at the bedside in detail.    Review of Systems   Constitutional:  Positive for activity change, appetite change and fatigue.   Respiratory:  Negative for cough and shortness of breath.    Cardiovascular:  Negative for chest pain and leg swelling.   Gastrointestinal:  Positive for abdominal pain. Negative for nausea.   Neurological:  Negative for weakness.   Psychiatric/Behavioral:  Negative for confusion.    All other systems reviewed and are negative.  Objective:     Vital Signs (Most Recent):  Temp: 97.5 °F (36.4 °C) (03/02/22 1138)  Pulse: 81 (03/02/22 1138)  Resp: 17 (03/02/22 1138)  BP: 128/60 (03/02/22 1138)  SpO2: 98 % (03/02/22 1138) Vital Signs (24h Range):  Temp:  [96.9 °F (36.1 °C)-98 °F (36.7 °C)] 97.5 °F (36.4 °C)  Pulse:  [81-94] 81  Resp:  [16-18] 17  SpO2:  [95 %-99 %] 98 %  BP: (116-133)/(58-66) 128/60     Weight: 69.1 kg (152 lb 5.4 oz)  Body mass index is 23.86 kg/m².    Intake/Output Summary (Last 24 hours) at 3/2/2022 1449  Last data filed at 3/2/2022 0613  Gross per 24 hour   Intake 3041.09 ml   Output --   Net 3041.09 ml        Physical Exam  Vitals and nursing note reviewed.   Constitutional:       General: She is not in acute distress.  Eyes:      Pupils: Pupils are equal, round, and reactive to light.   Cardiovascular:      Rate and Rhythm: Normal rate and regular rhythm.      Heart sounds: No murmur heard.  Pulmonary:      Effort: Pulmonary effort is normal.      Breath sounds: Normal breath sounds.   Abdominal:      General: There is no distension.      Palpations: Abdomen is soft.      Tenderness: There is no abdominal tenderness.   Skin:     Coloration: Skin is not pale.      Findings: No rash.      Comments: Multiple ecchymoses   Neurological:      Mental Status: She is alert  and oriented to person, place, and time.       Significant Labs: All pertinent labs within the past 24 hours have been reviewed.    Significant Imaging: I have reviewed all pertinent imaging results/findings within the past 24 hours.

## 2022-03-02 NOTE — PT/OT/SLP DISCHARGE
Physical Therapy Discharge Summary    Name: Regine Lemus  MRN: [id number]   Principal Problem: Severe sepsis     Patient Discharged from acute Physical Therapy on 3/2/2022.  Please refer to prior PT noted date on 3/1/22 for functional status.     Assessment:     Spouse present (patient sleeping) and requests that PT orders be discontinued and states that he and his wife will request when they would like PT to be resumed. Discussed with charge nurse.    Objective:     GOALS:   Multidisciplinary Problems     Physical Therapy Goals        Problem: Physical Therapy Goal    Goal Priority Disciplines Outcome Goal Variances Interventions   Physical Therapy Goal     PT, PT/OT      Description: Goals to be met by: 22    Patient will increase functional independence with mobility by performin. Supine to sit with Supervision  2. Sit to stand transfer with Supervision  3. Bed to chair transfer with Supervision using no AD or Rolling Walker  4. Gait  x 250 feet with Supervision using no AD or Rolling Walker.   5. Lower extremity exercise program x20 reps per handout, with supervision                     Reasons for Discontinuation of Therapy Services  Patient/family declines to continue care.      Plan:     Patient Discharged to: nursing care.      3/2/2022

## 2022-03-02 NOTE — ASSESSMENT & PLAN NOTE
Chronic, uncontrolled.  Latest blood pressure and vitals reviewed-   Temp:  [96.9 °F (36.1 °C)-98 °F (36.7 °C)]   Pulse:  [81-94]   Resp:  [16-18]   BP: (116-133)/(58-66)   SpO2:  [95 %-99 %] .   Home meds for hypertension were reviewed and noted below.   Hypertension Medications             atenolol (TENORMIN) 50 MG tablet Take 1 tablet (50 mg total) by mouth once daily.          While in the hospital, will manage blood pressure as follows; Adjust home antihypertensive regimen as follows- -  Hold BP meds for now d/t low BP.    Will utilize p.r.n. blood pressure medication only if patient's blood pressure greater than  180/110 and she develops symptoms such as worsening chest pain or shortness of breath.

## 2022-03-02 NOTE — CARE UPDATE
03/02/22 0913   Patient Assessment/Suction   Level of Consciousness (AVPU) alert   Respiratory Effort Normal   All Lung Fields Breath Sounds diminished;clear   PRE-TX-O2   O2 Device (Oxygen Therapy) room air   SpO2 95 %   Pulse Oximetry Type Intermittent   $ Pulse Oximetry - Multiple Charge Pulse Oximetry - Multiple

## 2022-03-02 NOTE — PLAN OF CARE
Plan of care reviewed with patient. IV fluids /IV antibiotics administered as per orders. SR on telemetry.no complaints of pain during night. Multiple watery stools during night ,prn Imodium given. Medicated for nausea once during night, mild relief obtained. Remains free from falls/injury. Instructed to call for assistance as needed, verbalized understanding. Call light in reach, bed alarm on .

## 2022-03-02 NOTE — ASSESSMENT & PLAN NOTE
"This patient does have evidence of infective focus  My overall impression is sepsis. Vital signs were reviewed and noted in progress note.  Antibiotics given-   Antibiotics (From admission, onward)            Start     Stop Route Frequency Ordered    03/01/22 1900  vancomycin 1.25 g in dextrose 5% 250 mL IVPB (ready to mix)         -- IV Every 12 hours (non-standard times) 03/01/22 1755    02/27/22 1030  piperacillin-tazobactam 4.5 g in dextrose 5 % 100 mL IVPB (ready to mix system)         -- IV Every 8 hours (non-standard times) 02/27/22 0911    02/27/22 1011  vancomycin - pharmacy to dose  (vancomycin IVPB)        "And" Linked Group Details    -- IV pharmacy to manage frequency 02/27/22 0911        Cultures were taken-   Microbiology Results (last 7 days)     Procedure Component Value Units Date/Time    Clostridium difficile EIA [[id number]] Collected: 02/27/22 2231    Order Status: Completed Specimen: Stool Updated: 02/28/22 0255     C. diff Antigen Negative     C difficile Toxins A+B, EIA Negative     Comment: Testing not recommended for children <24 months old.           Latest lactate reviewed, they are-  Recent Labs   Lab 02/28/22  0926 02/28/22  1504 03/01/22  0828   LACTATE 1.9 1.7 2.9*       Organ dysfunction indicated by Acute liver injury  Source- unspecified.    Will continue IV antibiotics for now even though source is as yet unidentified. Track cultures.     "

## 2022-03-02 NOTE — CARE UPDATE
03/01/22 2023   Patient Assessment/Suction   Level of Consciousness (AVPU) alert   Respiratory Effort Normal;Unlabored   Expansion/Accessory Muscles/Retractions no use of accessory muscles;no retractions;expansion symmetric   All Lung Fields Breath Sounds Anterior:;Lateral:;clear   Rhythm/Pattern, Respiratory pattern regular;unlabored;depth regular   PRE-TX-O2   O2 Device (Oxygen Therapy) room air   SpO2 98 %   Pulse Oximetry Type Intermittent   $ Pulse Oximetry - Multiple Charge Pulse Oximetry - Multiple   Pulse 86   Resp 18   Aerosol Therapy   $ Aerosol Therapy Charges PRN treatment not required   Respiratory Treatment Status (SVN) PRN treatment not required

## 2022-03-02 NOTE — ASSESSMENT & PLAN NOTE
Tested negative for C diff.  Continue supportive care, IV fluid hydration, antiemetics and schedule lomotil. Likely d/t colon cancer and malnutrition.

## 2022-03-02 NOTE — PT/OT/SLP PROGRESS
Physical Therapy      Patient Name:  Regine DENTON Allan   MRN:  [id number]    Patient not seen today secondary to patient sleeping upon entering the room. Spouse present and requests that PT orders be discontinued and states that he and his wife will request when they would like PT to be resumed. Discussed with charge nurse.

## 2022-03-02 NOTE — ASSESSMENT & PLAN NOTE
Nutrition consulted. Most recent weight and BMI monitored-   Wt Readings from Last 1 Encounters:   02/27/22 69.1 kg (152 lb 5.4 oz)   Body mass index is 23.86 kg/m².. Encourage maximal PO intake. Diet supplementation ordered per nutrition approval. Will encourage PO and monitor closely for weight changes.

## 2022-03-02 NOTE — PROGRESS NOTES
[facility] Medicine  Progress Note    Patient Name: Regine DENTON Allan  MRN: [id number]  Patient Class: IP- Inpatient   Admission Date: 2/27/2022  Length of Stay: 3 days  Attending Physician: Xavier Prado MD  Primary Care Provider: Primary Doctor No        Subjective:     Principal Problem:Severe sepsis        HPI:  Patient is a 66-year-old  female with past medical history significant for metastatic colon carcinoma (receiving chemotherapy including Xeloda and prednisone, [facility]), liver Mets, hypertension, history of post surgical hypothyroidism who has been admitted to Hospital Medicine after getting transferred from [facility] Emergency Room for management of sepsis.  [facility] is on capacity diversion.  Patient went to the emergency room with complaint of progressively worsening generalized weakness and decreased appetite and reduced oral intake.  In the emergency room patient was noted to be hypotensive and tachycardic.  Reportedly patient received IV fluid hydration per sepsis protocol and received a dose of intravenous Rocephin.  Source of sepsis is not apparent at this time.  Blood cultures have been obtained.  Urine analysis is pending.  Patient denies any new abdominal pain, vomiting, melena, hematemesis, bleeding per rectum, cough and expectoration.  Denies any new focal neuro deficits.  No urinary difficulties or any rash reported.            Overview/Hospital Course:  No notes on file    Interval History:  Patient seen and examined. Having worse diarrhea overnight Lactic acid remains elevated, the patient symptomatically feels much better.  Plan of care discussed with the patient and her  at the bedside in detail.    Review of Systems   Constitutional:  Positive for activity change, appetite change and fatigue.   Respiratory:  Negative for cough and shortness of breath.    Cardiovascular:  Negative for chest pain and leg  swelling.   Gastrointestinal:  Positive for abdominal pain. Negative for nausea.   Neurological:  Negative for weakness.   Psychiatric/Behavioral:  Negative for confusion.    All other systems reviewed and are negative.  Objective:     Vital Signs (Most Recent):  Temp: 97.5 °F (36.4 °C) (03/02/22 1138)  Pulse: 81 (03/02/22 1138)  Resp: 17 (03/02/22 1138)  BP: 128/60 (03/02/22 1138)  SpO2: 98 % (03/02/22 1138) Vital Signs (24h Range):  Temp:  [96.9 °F (36.1 °C)-98 °F (36.7 °C)] 97.5 °F (36.4 °C)  Pulse:  [81-94] 81  Resp:  [16-18] 17  SpO2:  [95 %-99 %] 98 %  BP: (116-133)/(58-66) 128/60     Weight: 69.1 kg (152 lb 5.4 oz)  Body mass index is 23.86 kg/m².    Intake/Output Summary (Last 24 hours) at 3/2/2022 1449  Last data filed at 3/2/2022 0613  Gross per 24 hour   Intake 3041.09 ml   Output --   Net 3041.09 ml        Physical Exam  Vitals and nursing note reviewed.   Constitutional:       General: She is not in acute distress.  Eyes:      Pupils: Pupils are equal, round, and reactive to light.   Cardiovascular:      Rate and Rhythm: Normal rate and regular rhythm.      Heart sounds: No murmur heard.  Pulmonary:      Effort: Pulmonary effort is normal.      Breath sounds: Normal breath sounds.   Abdominal:      General: There is no distension.      Palpations: Abdomen is soft.      Tenderness: There is no abdominal tenderness.   Skin:     Coloration: Skin is not pale.      Findings: No rash.      Comments: Multiple ecchymoses   Neurological:      Mental Status: She is alert and oriented to person, place, and time.       Significant Labs: All pertinent labs within the past 24 hours have been reviewed.    Significant Imaging: I have reviewed all pertinent imaging results/findings within the past 24 hours.      Assessment/Plan:      * Severe sepsis  This patient does have evidence of infective focus  My overall impression is sepsis. Vital signs were reviewed and noted in progress note.  Antibiotics given-   Antibiotics  "(From admission, onward)            Start     Stop Route Frequency Ordered    03/01/22 1900  vancomycin 1.25 g in dextrose 5% 250 mL IVPB (ready to mix)         -- IV Every 12 hours (non-standard times) 03/01/22 1755    02/27/22 1030  piperacillin-tazobactam 4.5 g in dextrose 5 % 100 mL IVPB (ready to mix system)         -- IV Every 8 hours (non-standard times) 02/27/22 0911    02/27/22 1011  vancomycin - pharmacy to dose  (vancomycin IVPB)        "And" Linked Group Details    -- IV pharmacy to manage frequency 02/27/22 0911        Cultures were taken-   Microbiology Results (last 7 days)     Procedure Component Value Units Date/Time    Clostridium difficile EIA [[id number]] Collected: 02/27/22 2231    Order Status: Completed Specimen: Stool Updated: 02/28/22 0255     C. diff Antigen Negative     C difficile Toxins A+B, EIA Negative     Comment: Testing not recommended for children <24 months old.           Latest lactate reviewed, they are-  Recent Labs   Lab 02/28/22  0926 02/28/22  1504 03/01/22  0828   LACTATE 1.9 1.7 2.9*       Organ dysfunction indicated by Acute liver injury  Source- unspecified.    Will continue IV antibiotics for now even though source is as yet unidentified. Track cultures.       Severe malnutrition  Nutrition consulted. Most recent weight and BMI monitored-   Wt Readings from Last 1 Encounters:   02/27/22 69.1 kg (152 lb 5.4 oz)   Body mass index is 23.86 kg/m².. Encourage maximal PO intake. Diet supplementation ordered per nutrition approval. Will encourage PO and monitor closely for weight changes.        Diarrhea  Tested negative for C diff.  Continue supportive care, IV fluid hydration, antiemetics and schedule lomotil. Likely d/t colon cancer and malnutrition.      Goals of care, counseling/discussion  Advance Care Planning     Date: 03/01/2022    Code Status  In light of the patients advanced and life limiting illness,I engaged the the patient and family in a conversation about the " patient's preferences for care  at the very end of life. The patient wishes to have a natural, peaceful death.  Along those lines, the patient does not wish to have CPR or other invasive treatments performed when her heart and/or breathing stops. I communicated to the patient and family that a DNR order would be placed in her medical record to reflect this preference.  I spent a total of 37 minutes engaging the patient in this advance care planning discussion.                   Colon cancer  As above.      Secondary malignant neoplasm of liver  Abnormal liver enzyme likely related to liver Mets. Hep panel negative.  Trend LFTs.      Colorectal carcinoma  Receiving chemotherapy at [facility].  Patient is requesting to switch Oncology follow-up with [facility] oncology services, CM notified.  Continue to hold Capecitabine during hospitalization.      Postoperative hypothyroidism  Patient has chronic hypothyroidism. TFTs reviewed-   Lab Results   Component Value Date    TSH 18.792 (H) 02/27/2022   . Will continue chronic levothyroxine and adjust for and clinical changes.        Essential (primary) hypertension  Chronic, uncontrolled.  Latest blood pressure and vitals reviewed-   Temp:  [96.9 °F (36.1 °C)-98 °F (36.7 °C)]   Pulse:  [81-94]   Resp:  [16-18]   BP: (116-133)/(58-66)   SpO2:  [95 %-99 %] .   Home meds for hypertension were reviewed and noted below.   Hypertension Medications             atenolol (TENORMIN) 50 MG tablet Take 1 tablet (50 mg total) by mouth once daily.          While in the hospital, will manage blood pressure as follows; Adjust home antihypertensive regimen as follows- -  Hold BP meds for now d/t low BP.    Will utilize p.r.n. blood pressure medication only if patient's blood pressure greater than  180/110 and she develops symptoms such as worsening chest pain or shortness of breath.        Hypercholesteremia   Patient is not chronically on statin.will not continue for now. Monitor  clinically. Last LDL was   Lab Results   Component Value Date    LDLCALC 118.0 01/24/2021              VTE Risk Mitigation (From admission, onward)         Ordered     enoxaparin injection 40 mg  Daily         02/27/22 0912     IP VTE HIGH RISK PATIENT  Once         02/27/22 0838     Place sequential compression device  Until discontinued         02/27/22 0838     Reason for No Pharmacological VTE Prophylaxis  Once        Question:  Reasons:  Answer:  Thrombocytopenia    02/27/22 0838                Discharge Planning   CATHY:      Code Status: DNR   Is the patient medically ready for discharge?:     Reason for patient still in hospital (select all that apply): Treatment  Discharge Plan A: Home                  Xavier Prado MD  Department of Hospital Medicine   [facility]

## 2022-03-03 ENCOUNTER — TELEPHONE (OUTPATIENT)
Dept: HEMATOLOGY/ONCOLOGY | Facility: CLINIC | Age: 67
End: 2022-03-03
Payer: OTHER GOVERNMENT

## 2022-03-03 LAB
ANION GAP SERPL CALC-SCNC: 10 MMOL/L (ref 8–16)
BASOPHILS # BLD AUTO: 0.01 K/UL (ref 0–0.2)
BASOPHILS NFR BLD: 0.2 % (ref 0–1.9)
BUN SERPL-MCNC: 8 MG/DL (ref 8–23)
CA-I BLDV-SCNC: 0.99 MMOL/L (ref 1.06–1.42)
CALCIUM SERPL-MCNC: 6.6 MG/DL (ref 8.7–10.5)
CHLORIDE SERPL-SCNC: 113 MMOL/L (ref 95–110)
CO2 SERPL-SCNC: 16 MMOL/L (ref 23–29)
CREAT SERPL-MCNC: 1 MG/DL (ref 0.5–1.4)
DIFFERENTIAL METHOD: ABNORMAL
EOSINOPHIL # BLD AUTO: 0 K/UL (ref 0–0.5)
EOSINOPHIL NFR BLD: 0.7 % (ref 0–8)
ERYTHROCYTE [DISTWIDTH] IN BLOOD BY AUTOMATED COUNT: 24.5 % (ref 11.5–14.5)
EST. GFR  (AFRICAN AMERICAN): >60 ML/MIN/1.73 M^2
EST. GFR  (NON AFRICAN AMERICAN): 58 ML/MIN/1.73 M^2
GLUCOSE SERPL-MCNC: 85 MG/DL (ref 70–110)
HCT VFR BLD AUTO: 28.7 % (ref 37–48.5)
HGB BLD-MCNC: 9.3 G/DL (ref 12–16)
IMM GRANULOCYTES # BLD AUTO: 0.01 K/UL (ref 0–0.04)
IMM GRANULOCYTES NFR BLD AUTO: 0.2 % (ref 0–0.5)
LACTATE SERPL-SCNC: 2 MMOL/L (ref 0.5–2.2)
LYMPHOCYTES # BLD AUTO: 2 K/UL (ref 1–4.8)
LYMPHOCYTES NFR BLD: 43.7 % (ref 18–48)
MAGNESIUM SERPL-MCNC: 1.6 MG/DL (ref 1.6–2.6)
MCH RBC QN AUTO: 30.2 PG (ref 27–31)
MCHC RBC AUTO-ENTMCNC: 32.4 G/DL (ref 32–36)
MCV RBC AUTO: 93 FL (ref 82–98)
MONOCYTES # BLD AUTO: 1 K/UL (ref 0.3–1)
MONOCYTES NFR BLD: 22 % (ref 4–15)
NEUTROPHILS # BLD AUTO: 1.5 K/UL (ref 1.8–7.7)
NEUTROPHILS NFR BLD: 33.2 % (ref 38–73)
NRBC BLD-RTO: 0 /100 WBC
PLATELET # BLD AUTO: 86 K/UL (ref 150–450)
PMV BLD AUTO: 9.4 FL (ref 9.2–12.9)
POTASSIUM SERPL-SCNC: 3.1 MMOL/L (ref 3.5–5.1)
RBC # BLD AUTO: 3.08 M/UL (ref 4–5.4)
SODIUM SERPL-SCNC: 139 MMOL/L (ref 136–145)
WBC # BLD AUTO: 4.46 K/UL (ref 3.9–12.7)

## 2022-03-03 PROCEDURE — 25000003 PHARM REV CODE 250: Performed by: INTERNAL MEDICINE

## 2022-03-03 PROCEDURE — 83735 ASSAY OF MAGNESIUM: CPT | Performed by: HOSPITALIST

## 2022-03-03 PROCEDURE — 25000003 PHARM REV CODE 250

## 2022-03-03 PROCEDURE — 94761 N-INVAS EAR/PLS OXIMETRY MLT: CPT

## 2022-03-03 PROCEDURE — 25000003 PHARM REV CODE 250: Performed by: HOSPITALIST

## 2022-03-03 PROCEDURE — 85025 COMPLETE CBC W/AUTO DIFF WBC: CPT | Performed by: HOSPITALIST

## 2022-03-03 PROCEDURE — 82330 ASSAY OF CALCIUM: CPT | Performed by: HOSPITALIST

## 2022-03-03 PROCEDURE — 83605 ASSAY OF LACTIC ACID: CPT | Performed by: HOSPITALIST

## 2022-03-03 PROCEDURE — 36415 COLL VENOUS BLD VENIPUNCTURE: CPT | Performed by: HOSPITALIST

## 2022-03-03 PROCEDURE — 12000002 HC ACUTE/MED SURGE SEMI-PRIVATE ROOM

## 2022-03-03 PROCEDURE — 99233 SBSQ HOSP IP/OBS HIGH 50: CPT | Mod: ,,, | Performed by: NURSE PRACTITIONER

## 2022-03-03 PROCEDURE — 99900035 HC TECH TIME PER 15 MIN (STAT)

## 2022-03-03 PROCEDURE — 99233 PR SUBSEQUENT HOSPITAL CARE,LEVL III: ICD-10-PCS | Mod: ,,, | Performed by: NURSE PRACTITIONER

## 2022-03-03 PROCEDURE — 80048 BASIC METABOLIC PNL TOTAL CA: CPT | Performed by: HOSPITALIST

## 2022-03-03 PROCEDURE — 63600175 PHARM REV CODE 636 W HCPCS: Performed by: INTERNAL MEDICINE

## 2022-03-03 PROCEDURE — 63600175 PHARM REV CODE 636 W HCPCS: Performed by: HOSPITALIST

## 2022-03-03 RX ORDER — LOPERAMIDE HYDROCHLORIDE 2 MG/1
2 CAPSULE ORAL 4 TIMES DAILY PRN
Status: DISCONTINUED | OUTPATIENT
Start: 2022-03-03 | End: 2022-03-05 | Stop reason: HOSPADM

## 2022-03-03 RX ORDER — POTASSIUM CHLORIDE 20 MEQ/1
60 TABLET, EXTENDED RELEASE ORAL DAILY
Status: DISCONTINUED | OUTPATIENT
Start: 2022-03-03 | End: 2022-03-05 | Stop reason: HOSPADM

## 2022-03-03 RX ADMIN — Medication 9 MG: at 10:03

## 2022-03-03 RX ADMIN — LEVOTHYROXINE SODIUM 88 MCG: 0.09 TABLET ORAL at 05:03

## 2022-03-03 RX ADMIN — SODIUM CHLORIDE: 0.9 INJECTION, SOLUTION INTRAVENOUS at 04:03

## 2022-03-03 RX ADMIN — CALCIUM CARBONATE (ANTACID) CHEW TAB 500 MG 1000 MG: 500 CHEW TAB at 04:03

## 2022-03-03 RX ADMIN — PREDNISONE 15 MG: 5 TABLET ORAL at 10:03

## 2022-03-03 RX ADMIN — DIPHENOXYLATE HYDROCHLORIDE AND ATROPINE SULFATE 1 TABLET: 2.5; .025 TABLET ORAL at 09:03

## 2022-03-03 RX ADMIN — CALCIUM CARBONATE (ANTACID) CHEW TAB 500 MG 1000 MG: 500 CHEW TAB at 10:03

## 2022-03-03 RX ADMIN — PIPERACILLIN AND TAZOBACTAM 4.5 G: 4; .5 INJECTION, POWDER, LYOPHILIZED, FOR SOLUTION INTRAVENOUS; PARENTERAL at 05:03

## 2022-03-03 RX ADMIN — PIPERACILLIN AND TAZOBACTAM 4.5 G: 4; .5 INJECTION, POWDER, LYOPHILIZED, FOR SOLUTION INTRAVENOUS; PARENTERAL at 10:03

## 2022-03-03 RX ADMIN — LACTOBACILLUS ACIDOPHILUS / LACTOBACILLUS BULGARICUS 1 EACH: 100 MILLION CFU STRENGTH GRANULES at 10:03

## 2022-03-03 RX ADMIN — ENOXAPARIN SODIUM 40 MG: 100 INJECTION SUBCUTANEOUS at 04:03

## 2022-03-03 RX ADMIN — POTASSIUM CHLORIDE 60 MEQ: 1500 TABLET, EXTENDED RELEASE ORAL at 10:03

## 2022-03-03 RX ADMIN — VANCOMYCIN HYDROCHLORIDE 1250 MG: 1.25 INJECTION, POWDER, LYOPHILIZED, FOR SOLUTION INTRAVENOUS at 10:03

## 2022-03-03 RX ADMIN — SODIUM CHLORIDE: 0.9 INJECTION, SOLUTION INTRAVENOUS at 05:03

## 2022-03-03 RX ADMIN — DIPHENOXYLATE HYDROCHLORIDE AND ATROPINE SULFATE 1 TABLET: 2.5; .025 TABLET ORAL at 04:03

## 2022-03-03 RX ADMIN — CALCIUM CARBONATE (ANTACID) CHEW TAB 500 MG 1000 MG: 500 CHEW TAB at 06:03

## 2022-03-03 RX ADMIN — PIPERACILLIN AND TAZOBACTAM 4.5 G: 4; .5 INJECTION, POWDER, LYOPHILIZED, FOR SOLUTION INTRAVENOUS; PARENTERAL at 01:03

## 2022-03-03 RX ADMIN — LACTOBACILLUS ACIDOPHILUS / LACTOBACILLUS BULGARICUS 1 EACH: 100 MILLION CFU STRENGTH GRANULES at 09:03

## 2022-03-03 RX ADMIN — Medication 800 MG: at 10:03

## 2022-03-03 RX ADMIN — DIPHENOXYLATE HYDROCHLORIDE AND ATROPINE SULFATE 1 TABLET: 2.5; .025 TABLET ORAL at 01:03

## 2022-03-03 RX ADMIN — DIPHENOXYLATE HYDROCHLORIDE AND ATROPINE SULFATE 1 TABLET: 2.5; .025 TABLET ORAL at 10:03

## 2022-03-03 RX ADMIN — PANTOPRAZOLE SODIUM 40 MG: 40 TABLET, DELAYED RELEASE ORAL at 10:03

## 2022-03-03 NOTE — ASSESSMENT & PLAN NOTE
Receiving chemotherapy at [facility].  Patient is requesting to switch Oncology follow-up with [facility] oncology services, CM notified.  Continue to hold Capecitabine during hospitalization.

## 2022-03-03 NOTE — PROGRESS NOTES
[facility]  Adult Nutrition  Progress Note    SUMMARY    Intervention: general healthful diet and nutrition supplement therapy    Recommendation:   1) Continue regular diet + Pt preferences   2) Premier protein shakes ( brought in by ) 2 x daily   3) weigh weekly   4) probiotic and/or soluble fiber supplement for diarrhea    Goals: 1) PO intakes > 50% of meals and supplement at f/u  Nutrition Goal Status: new  Communication of RD Recs:  (POC, sticky note)    Assessment and Plan    Inadequate energy intake  R/t decreased appetite  As evidenced by PO intakes < 50% of needs x > 5 days  Intervention: above  improving     Malnutrition Assessment     Skin (Micronutrient):  (Wei = 18)  Teeth (Micronutrient):  (WDL)   Micronutrient Evaluation: suspected deficiency  Micronutrient Evaluation Comments: check iron, K   Energy Intake (Malnutrition): less than or equal to 50% for greater than or equal to 5 days           Edema (Fluid Accumulation): 0-->no edema present   Subcutaneous Fat Loss (Final Summary): well nourished  Muscle Loss Evaluation (Final Summary): well nourished         Reason for Assessment    Reason For Assessment: identified at risk by screening criteria  Diagnosis:  (severe sepsis)  Relevant Medical History: HTN, metestatic colon CA ( to liver) on chemo xeloda/prednisone  Interdisciplinary Rounds: did not attend    General Information Comments: 65 y/o female admitted with sepsis nausea and watery stool. PTA x 1 week poor appetite after chemo, appetite just started coming back since yesterday, tells me  bringing in food- drank 1 premier protein shake this morning, 1/2 cup cereal 1/2 banana and some walnuts, sipping on a fruit/vegetable and peanut butter shake from smoothie karolina now. Can't tolerate Boost- diarrhea worse. We discussed pt preferences as she has such little appetite at this time. NFPE done 3/3/22 no wasting seen. No significant wt loss per chart  "review.    Nutrition Discharge Planning: To be determined- Regular to promote PO intakes + premier protein shakes BID    Nutrition Risk Screen    Nutrition Risk Screen: no indicators present    Nutrition/Diet History    Patient Reported Diet/Restrictions/Preferences: general  Typical Food/Fluid Intake: Poor appetite for a few days s/p each chemo treatment  Spiritual, Cultural Beliefs, Adventism Practices, Values that Affect Care: no  Food Allergies: NKFA    Anthropometrics    Temp: 96.1 °F (35.6 °C)  Height Method: Measured  Height: 5' 7"  Height (inches): 67 in  Weight Method: Bed Scale  Weight: 69.1 kg (152 lb 5.4 oz)  Weight (lb): 152.34 lb  Ideal Body Weight (IBW), Female: 135 lb  % Ideal Body Weight, Female (lb): 112.84 %  BMI (Calculated): 23.9  Usual Body Weight (UBW), k kg (2021)  % Usual Body Weight: 94.86  % Weight Change From Usual Weight: -5.34 %       Lab/Procedures/Meds    Pertinent Labs Reviewed: reviewed  BMP  Lab Results   Component Value Date     2022    K 3.1 (L) 2022     (H) 2022    CO2 16 (L) 2022    BUN 8 2022    CREATININE 1.0 2022    CALCIUM 6.6 (LL) 2022    ANIONGAP 10 2022    ESTGFRAFRICA >60 2022    EGFRNONAA 58 (A) 2022     Lab Results   Component Value Date    CALCIUM 6.6 (LL) 2022    PHOS 3.2 2022     Lab Results   Component Value Date    ALBUMIN 2.1 (L) 2022       Pertinent Medications Reviewed: reviewed  Pertinent Medications Comments: probiotic, vitamin D, KCl, prednisone, zofran, Ca, NS @ 100 ml/hr    Estimated/Assessed Needs    Weight Used For Calorie Calculations: 69.1 kg (152 lb 5.4 oz)  Energy Calorie Requirements (kcal): MSJ ( X 1.2-1.4) = 8025-4292 kcal  Energy Need Method: [place]- [place]  Protein Requirements: 1.2 g protein/kg ( chemo/age) = 83 g  Weight Used For Protein Calculations: 69.1 kg (152 lb 5.4 oz)  Fluid Requirements (mL): 7428-4110 ml or per MD     CHO Requirement: " N/A      Nutrition Prescription Ordered    Current Diet Order: Regular + boost plus with meals    Evaluation of Received Nutrient/Fluid Intake    Energy Calories Required: not meeting needs  Protein Required: not meeting needs  Fluid Required: meeting needs  Tolerance: tolerating  % Intake of Estimated Energy Needs: 25-50%  % Meal Intake: 25-50%    Nutrition Risk    Level of Risk/Frequency of Follow-up: moderate 2 x weekly    Monitor and Evaluation    Food and Nutrient Intake: energy intake, food and beverage intake  Food and Nutrient Adminstration: diet order  Anthropometric Measurements: weight  Biochemical Data, Medical Tests and Procedures: electrolyte and renal panel, gastrointestinal profile  Nutrition-Focused Physical Findings: overall appearance     Nutrition Follow-Up    RD Follow-up?: Yes

## 2022-03-03 NOTE — PROGRESS NOTES
Pharmacokinetic Assessment Follow Up: IV Vancomycin    Vancomycin serum concentration assessment(s):    The trough level was drawn correctly and can be used to guide therapy at this time. The measurement is above the desired definitive target range of 15 to 20 mcg/mL.    Vancomycin Regimen Plan:    Change regimen to Vancomycin 1250 mg IV every 24 hours with next serum trough concentration measured at 0800 prior to third dose on 3/5/22    Drug levels (last 3 results):  Recent Labs   Lab Result Units 02/28/22  0926 03/01/22  1713 03/02/22  1754   Vancomycin-Trough ug/mL 5.3* 9.9* 23.3*       Pharmacy will continue to follow and monitor vancomycin.    Please contact pharmacy at extension [phone] for questions regarding this assessment.    Thank you for the consult,   Christy Velez       Patient brief summary:  Regine Lemus is a 66 y.o. female initiated on antimicrobial therapy with IV Vancomycin for treatment of bacteremia      Drug Allergies:   Review of patient's allergies indicates:   Allergen Reactions    Lisinopril Rash       Actual Body Weight:   69.1 kg    Renal Function:   Estimated Creatinine Clearance: 65.5 mL/min (based on SCr of 0.8 mg/dL).,     Dialysis Method (if applicable):  N/A    CBC (last 72 hours):  Recent Labs   Lab Result Units 02/28/22  0540 03/01/22  0448 03/02/22  0537   WBC K/uL 7.37 3.57* 4.74   Hemoglobin g/dL 10.7* 9.2* 10.3*   Hematocrit % 32.7* 29.0* 32.4*   Platelets K/uL 77* 63* 77*   Gran % % 56.9 64.7 57.7   Lymph % % 34.1 25.8 25.3   Mono % % 8.1 9.2 15.8*   Eosinophil % % 0.5 0.0 0.4   Basophil % % 0.1 0.0 0.6   Differential Method  Automated Automated Automated       Metabolic Panel (last 72 hours):  Recent Labs   Lab Result Units 02/28/22  0540 03/01/22  0448 03/02/22  0537   Sodium mmol/L 135* 135* 138   Potassium mmol/L 3.3* 3.6 3.1*   Chloride mmol/L 111* 113* 112*   CO2 mmol/L 14* 15* 17*   Glucose mg/dL 119* 159* 126*   BUN mg/dL 12 8 6*   Creatinine mg/dL 0.7 0.7 0.8   Albumin  g/dL 2.2* 1.9* 2.1*   Total Bilirubin mg/dL 1.2* 0.6 1.0   Alkaline Phosphatase U/L 165* 124 132   AST U/L 39 28 26   ALT U/L 35 27 24   Magnesium mg/dL 1.5* 1.8 1.6   Phosphorus mg/dL 2.4* 3.5 3.2       Vancomycin Administrations:  vancomycin given in the last 96 hours                     vancomycin 1.25 g in dextrose 5% 250 mL IVPB (ready to mix) (mg) 1,250 mg New Bag 03/02/22 0812     1,250 mg New Bag 03/01/22 1830    vancomycin in dextrose 5 % 1 gram/250 mL IVPB 1,000 mg (mg) 1,000 mg New Bag 03/01/22 0532     1,000 mg New Bag 02/28/22 1750    vancomycin 1.5 g in dextrose 5 % 250 mL IVPB (ready to mix) (mg) 1,500 mg New Bag 02/27/22 1633                    Microbiologic Results:  Microbiology Results (last 7 days)       Procedure Component Value Units Date/Time    Clostridium difficile EIA [[id number]] Collected: 02/27/22 2231    Order Status: Completed Specimen: Stool Updated: 02/28/22 0255     C. diff Antigen Negative     C difficile Toxins A+B, EIA Negative     Comment: Testing not recommended for children <24 months old.

## 2022-03-03 NOTE — SUBJECTIVE & OBJECTIVE
Interval History:  Patient seen and examined. Diarrhea improved with medication. Lactate trending down.    Review of Systems   Constitutional:  Positive for activity change, appetite change and fatigue.   Respiratory:  Negative for cough and shortness of breath.    Cardiovascular:  Negative for chest pain and leg swelling.   Gastrointestinal:  Positive for abdominal pain. Negative for nausea.   Neurological:  Negative for weakness.   Psychiatric/Behavioral:  Negative for confusion.    All other systems reviewed and are negative.  Objective:     Vital Signs (Most Recent):  Temp: 96.8 °F (36 °C) (03/03/22 1207)  Pulse: 80 (03/03/22 1207)  Resp: 18 (03/03/22 1207)  BP: 119/62 (03/03/22 1207)  SpO2: 98 % (03/03/22 1207) Vital Signs (24h Range):  Temp:  [96.1 °F (35.6 °C)-98.1 °F (36.7 °C)] 96.8 °F (36 °C)  Pulse:  [74-83] 80  Resp:  [17-18] 18  SpO2:  [97 %-100 %] 98 %  BP: (109-128)/(55-73) 119/62     Weight: 69.1 kg (152 lb 5.4 oz)  Body mass index is 23.86 kg/m².    Intake/Output Summary (Last 24 hours) at 3/3/2022 1324  Last data filed at 3/2/2022 1837  Gross per 24 hour   Intake 1456.92 ml   Output --   Net 1456.92 ml        Physical Exam  Vitals and nursing note reviewed.   Constitutional:       General: She is not in acute distress.  Eyes:      Pupils: Pupils are equal, round, and reactive to light.   Cardiovascular:      Rate and Rhythm: Normal rate and regular rhythm.      Heart sounds: No murmur heard.  Pulmonary:      Effort: Pulmonary effort is normal.      Breath sounds: Normal breath sounds.   Abdominal:      General: There is no distension.      Palpations: Abdomen is soft.      Tenderness: There is no abdominal tenderness.   Skin:     Coloration: Skin is not pale.      Findings: No rash.      Comments: Multiple ecchymoses   Neurological:      Mental Status: She is alert and oriented to person, place, and time.       Significant Labs: All pertinent labs within the past 24 hours have been  reviewed.    Significant Imaging: I have reviewed all pertinent imaging results/findings within the past 24 hours.

## 2022-03-03 NOTE — PLAN OF CARE
Intervention: general healthful diet and nutrition supplement therapy    Recommendation:   1) Continue regular diet + Pt preferences   2) Premier protein shakes ( brought in by ) 2 x daily   3) weigh weekly   4) probiotic and/or soluble fiber supplement for diarrhea    Goals: 1) PO intakes > 50% of meals and supplement at f/u  Nutrition Goal Status: new  Communication of RD Recs:  (POC, sticky note)

## 2022-03-03 NOTE — PROGRESS NOTES
[facility] Medicine  Progress Note    Patient Name: Regine DENTON Allan  MRN: [id number]  Patient Class: IP- Inpatient   Admission Date: 2/27/2022  Length of Stay: 4 days  Attending Physician: Xavier Prado MD  Primary Care Provider: Primary Doctor No        Subjective:     Principal Problem:Severe sepsis        HPI:  Patient is a 66-year-old  female with past medical history significant for metastatic colon carcinoma (receiving chemotherapy including Xeloda and prednisone, [facility]), liver Mets, hypertension, history of post surgical hypothyroidism who has been admitted to Hospital Medicine after getting transferred from [facility] Emergency Room for management of sepsis.  [facility] is on capacity diversion.  Patient went to the emergency room with complaint of progressively worsening generalized weakness and decreased appetite and reduced oral intake.  In the emergency room patient was noted to be hypotensive and tachycardic.  Reportedly patient received IV fluid hydration per sepsis protocol and received a dose of intravenous Rocephin.  Source of sepsis is not apparent at this time.  Blood cultures have been obtained.  Urine analysis is pending.  Patient denies any new abdominal pain, vomiting, melena, hematemesis, bleeding per rectum, cough and expectoration.  Denies any new focal neuro deficits.  No urinary difficulties or any rash reported.            Overview/Hospital Course:  No notes on file    Interval History:  Patient seen and examined. Diarrhea improved with medication. Lactate trending down.    Review of Systems   Constitutional:  Positive for activity change, appetite change and fatigue.   Respiratory:  Negative for cough and shortness of breath.    Cardiovascular:  Negative for chest pain and leg swelling.   Gastrointestinal:  Positive for abdominal pain. Negative for nausea.   Neurological:  Negative for weakness.    Psychiatric/Behavioral:  Negative for confusion.    All other systems reviewed and are negative.  Objective:     Vital Signs (Most Recent):  Temp: 96.8 °F (36 °C) (03/03/22 1207)  Pulse: 80 (03/03/22 1207)  Resp: 18 (03/03/22 1207)  BP: 119/62 (03/03/22 1207)  SpO2: 98 % (03/03/22 1207) Vital Signs (24h Range):  Temp:  [96.1 °F (35.6 °C)-98.1 °F (36.7 °C)] 96.8 °F (36 °C)  Pulse:  [74-83] 80  Resp:  [17-18] 18  SpO2:  [97 %-100 %] 98 %  BP: (109-128)/(55-73) 119/62     Weight: 69.1 kg (152 lb 5.4 oz)  Body mass index is 23.86 kg/m².    Intake/Output Summary (Last 24 hours) at 3/3/2022 1324  Last data filed at 3/2/2022 1837  Gross per 24 hour   Intake 1456.92 ml   Output --   Net 1456.92 ml        Physical Exam  Vitals and nursing note reviewed.   Constitutional:       General: She is not in acute distress.  Eyes:      Pupils: Pupils are equal, round, and reactive to light.   Cardiovascular:      Rate and Rhythm: Normal rate and regular rhythm.      Heart sounds: No murmur heard.  Pulmonary:      Effort: Pulmonary effort is normal.      Breath sounds: Normal breath sounds.   Abdominal:      General: There is no distension.      Palpations: Abdomen is soft.      Tenderness: There is no abdominal tenderness.   Skin:     Coloration: Skin is not pale.      Findings: No rash.      Comments: Multiple ecchymoses   Neurological:      Mental Status: She is alert and oriented to person, place, and time.       Significant Labs: All pertinent labs within the past 24 hours have been reviewed.    Significant Imaging: I have reviewed all pertinent imaging results/findings within the past 24 hours.      Assessment/Plan:      * Severe sepsis  This patient does have evidence of infective focus  My overall impression is sepsis. Vital signs were reviewed and noted in progress note.  Antibiotics given-   Antibiotics (From admission, onward)            Start     Stop Route Frequency Ordered    03/03/22 0900  vancomycin 1.25 g in  "dextrose 5% 250 mL IVPB (ready to mix)         -- IV Every 24 hours (non-standard times) 03/02/22 1936    02/27/22 1030  piperacillin-tazobactam 4.5 g in dextrose 5 % 100 mL IVPB (ready to mix system)         -- IV Every 8 hours (non-standard times) 02/27/22 0911    02/27/22 1011  vancomycin - pharmacy to dose  (vancomycin IVPB)        "And" Linked Group Details    -- IV pharmacy to manage frequency 02/27/22 0911        Cultures were taken-   Microbiology Results (last 7 days)     Procedure Component Value Units Date/Time    Clostridium difficile EIA [[id number]] Collected: 02/27/22 2231    Order Status: Completed Specimen: Stool Updated: 02/28/22 0255     C. diff Antigen Negative     C difficile Toxins A+B, EIA Negative     Comment: Testing not recommended for children <24 months old.           Latest lactate reviewed, they are-  Recent Labs   Lab 03/01/22  0828 03/02/22  1428 03/03/22  0521   LACTATE 2.9* 3.2* 2.0       Organ dysfunction indicated by Acute liver injury  Source- unspecified.    Will continue IV antibiotics for now even though source is as yet unidentified. Track cultures. Goal for 5 days broad spectrum ABX.      Severe malnutrition  Nutrition consulted. Most recent weight and BMI monitored-   Wt Readings from Last 1 Encounters:   03/03/22 69.1 kg (152 lb 5.4 oz)   Body mass index is 23.86 kg/m².. Encourage maximal PO intake. Diet supplementation ordered per nutrition approval. Will encourage PO and monitor closely for weight changes.        Diarrhea  Tested negative for C diff.  Continue supportive care, IV fluid hydration, antiemetics and schedule lomotil, adding PRN loperamide. Likely d/t colon cancer and malnutrition.      Goals of care, counseling/discussion  Advance Care Planning     Date: 03/01/2022    Code Status  In light of the patients advanced and life limiting illness,I engaged the the patient and family in a conversation about the patient's preferences for care  at the very end of " life. The patient wishes to have a natural, peaceful death.  Along those lines, the patient does not wish to have CPR or other invasive treatments performed when her heart and/or breathing stops. I communicated to the patient and family that a DNR order would be placed in her medical record to reflect this preference.  I spent a total of 37 minutes engaging the patient in this advance care planning discussion.                   Colon cancer  As above.      Secondary malignant neoplasm of liver  Abnormal liver enzyme likely related to liver Mets. Hep panel negative.  Trend LFTs.      Colorectal carcinoma  Receiving chemotherapy at [facility].  Patient is requesting to switch Oncology follow-up with [facility] oncology services, CM notified.  Continue to hold Capecitabine during hospitalization.      Postoperative hypothyroidism  Patient has chronic hypothyroidism. TFTs reviewed-   Lab Results   Component Value Date    TSH 18.792 (H) 02/27/2022   . Will continue chronic levothyroxine and adjust for and clinical changes.        Essential (primary) hypertension  Chronic, uncontrolled.  Latest blood pressure and vitals reviewed-   Temp:  [96.1 °F (35.6 °C)-98.1 °F (36.7 °C)]   Pulse:  [74-83]   Resp:  [17-18]   BP: (109-128)/(55-73)   SpO2:  [97 %-100 %] .   Home meds for hypertension were reviewed and noted below.   Hypertension Medications             atenolol (TENORMIN) 50 MG tablet Take 1 tablet (50 mg total) by mouth once daily.          While in the hospital, will manage blood pressure as follows; Adjust home antihypertensive regimen as follows- -  Hold BP meds for now d/t low BP.    Will utilize p.r.n. blood pressure medication only if patient's blood pressure greater than  180/110 and she develops symptoms such as worsening chest pain or shortness of breath.        Hypercholesteremia   Patient is not chronically on statin.will not continue for now. Monitor clinically. Last LDL was   Lab Results    Component Value Date    LDLCALC 118.0 01/24/2021              VTE Risk Mitigation (From admission, onward)         Ordered     enoxaparin injection 40 mg  Daily         02/27/22 0912     IP VTE HIGH RISK PATIENT  Once         02/27/22 0838     Place sequential compression device  Until discontinued         02/27/22 0838     Reason for No Pharmacological VTE Prophylaxis  Once        Question:  Reasons:  Answer:  Thrombocytopenia    02/27/22 0838                Discharge Planning   CATHY:      Code Status: DNR   Is the patient medically ready for discharge?:     Reason for patient still in hospital (select all that apply): Treatment  Discharge Plan A: Home                  Xavier Prado MD  Department of Hospital Medicine   [facility]

## 2022-03-03 NOTE — HOSPITAL COURSE
Patient was admitted to the hospital for sepsis of unknown etiology. She was started on broad-spectrum IV antibiotics, and her lactic remained persistently elevated. Initial cultures were negative, as well as chest x-ray but given the patient's immune suppression, she may not be able to mount an adequate immune response.  Patient had severe diarrhea, after which C diff was checked and was negative.  She was started on Lomotil and the diarrhea improved.  Patient also remained hypokalemic and hypomagnesemic in both were replaced.  She also had hypocalcemia which was also replaced.  Patient continue improved over the course of hospital stay.  Cultures remain negative patient remained afebrile white count stable patient was medically stable for discharge

## 2022-03-03 NOTE — ASSESSMENT & PLAN NOTE
"This patient does have evidence of infective focus  My overall impression is sepsis. Vital signs were reviewed and noted in progress note.  Antibiotics given-   Antibiotics (From admission, onward)            Start     Stop Route Frequency Ordered    03/03/22 0900  vancomycin 1.25 g in dextrose 5% 250 mL IVPB (ready to mix)         -- IV Every 24 hours (non-standard times) 03/02/22 1936    02/27/22 1030  piperacillin-tazobactam 4.5 g in dextrose 5 % 100 mL IVPB (ready to mix system)         -- IV Every 8 hours (non-standard times) 02/27/22 0911    02/27/22 1011  vancomycin - pharmacy to dose  (vancomycin IVPB)        "And" Linked Group Details    -- IV pharmacy to manage frequency 02/27/22 0911        Cultures were taken-   Microbiology Results (last 7 days)     Procedure Component Value Units Date/Time    Clostridium difficile EIA [[id number]] Collected: 02/27/22 2231    Order Status: Completed Specimen: Stool Updated: 02/28/22 0255     C. diff Antigen Negative     C difficile Toxins A+B, EIA Negative     Comment: Testing not recommended for children <24 months old.           Latest lactate reviewed, they are-  Recent Labs   Lab 03/01/22  0828 03/02/22  1428 03/03/22  0521   LACTATE 2.9* 3.2* 2.0       Organ dysfunction indicated by Acute liver injury  Source- unspecified.    Will continue IV antibiotics for now even though source is as yet unidentified. Track cultures. Goal for 5 days broad spectrum ABX.    " on apixaban.  Hold for thoracentesis

## 2022-03-03 NOTE — ASSESSMENT & PLAN NOTE
Chronic, uncontrolled.  Latest blood pressure and vitals reviewed-   Temp:  [96.1 °F (35.6 °C)-98.1 °F (36.7 °C)]   Pulse:  [74-83]   Resp:  [17-18]   BP: (109-128)/(55-73)   SpO2:  [97 %-100 %] .   Home meds for hypertension were reviewed and noted below.   Hypertension Medications             atenolol (TENORMIN) 50 MG tablet Take 1 tablet (50 mg total) by mouth once daily.          While in the hospital, will manage blood pressure as follows; Adjust home antihypertensive regimen as follows- -  Hold BP meds for now d/t low BP.    Will utilize p.r.n. blood pressure medication only if patient's blood pressure greater than  180/110 and she develops symptoms such as worsening chest pain or shortness of breath.

## 2022-03-03 NOTE — CARE UPDATE
03/02/22 1951   Patient Assessment/Suction   Level of Consciousness (AVPU) alert   Respiratory Effort Normal;Unlabored   Expansion/Accessory Muscles/Retractions no use of accessory muscles   All Lung Fields Breath Sounds clear   Rhythm/Pattern, Respiratory depth regular;pattern regular   PRE-TX-O2   O2 Device (Oxygen Therapy) room air   SpO2 99 %   Pulse Oximetry Type Intermittent   Aerosol Therapy   $ Aerosol Therapy Charges PRN treatment not required

## 2022-03-03 NOTE — ASSESSMENT & PLAN NOTE
Nutrition consulted. Most recent weight and BMI monitored-   Wt Readings from Last 1 Encounters:   03/03/22 69.1 kg (152 lb 5.4 oz)   Body mass index is 23.86 kg/m².. Encourage maximal PO intake. Diet supplementation ordered per nutrition approval. Will encourage PO and monitor closely for weight changes.

## 2022-03-03 NOTE — PROGRESS NOTES
HPI    This is a 66-year-old female who has been admitted with presumed sepsis.  Patient presented with 1 day of diarrhea with some episodes of nausea and vomiting.  She has not had any episodes since admission.  She denies any fever at home.  She currently has metastatic colorectal cancer and is being treated with XELOX for what she states has been about 1 year.  She sees a physician at [facility].  She has overall been tolerating her treatment well and has never had any diarrhea, nausea, or vomiting associated with it.  She denies any dysuria, cough, abdominal pain, or shortness of breath.     Oncology Treatment Plan:   [Could not find a treatment plan. This SmartLink may be configured incorrectly. Contact a  for help.]    Past Medical History:   Diagnosis Date    Allergy     Colon cancer     Hypertension     Liver cancer     Nontoxic uninodular goiter     Osteoarthrosis, unspecified whether generalized or localized, hand     Other abnormal glucose     Pure hypercholesterolemia        Past Surgical History:   Procedure Laterality Date    CYST REMOVAL  1994    cyst removed from neck    infusaport placement Right     THYROIDECTOMY      TONSILLECTOMY  age 14     Social History     Socioeconomic History    Marital status:    Tobacco Use    Smoking status: Never Smoker   Substance and Sexual Activity    Alcohol use: No    Drug use: Never     Review of patient's allergies indicates:   Allergen Reactions    Lisinopril Rash       Physical exam  Vitals:    03/03/22 1207   BP: 119/62   Pulse: 80   Resp: 18   Temp: 96.8 °F (36 °C)     Physical Exam  Constitutional:       Appearance: She is well-developed.      Comments: Ill-appearing.   HENT:      Head: Normocephalic and atraumatic.      Mouth/Throat:      Mouth: Mucous membranes are dry.   Eyes:      Conjunctiva/sclera: Conjunctivae normal.      Pupils: Pupils are equal, round, and reactive to light.   Neck:       Thyroid: No thyromegaly.      Vascular: No JVD.   Cardiovascular:      Rate and Rhythm: Normal rate and regular rhythm.      Heart sounds: No murmur heard.    No friction rub. No gallop.   Pulmonary:      Effort: Pulmonary effort is normal.      Breath sounds: Normal breath sounds.   Abdominal:      General: Bowel sounds are normal. There is no distension.      Palpations: Abdomen is soft. There is no mass.      Tenderness: There is no abdominal tenderness.   Musculoskeletal:         General: Normal range of motion.      Cervical back: Neck supple.   Skin:     General: Skin is warm and dry.   Neurological:      Mental Status: She is oriented to person, place, and time.      Cranial Nerves: No cranial nerve deficit.   Psychiatric:         Behavior: Behavior normal.     Lab Results   Component Value Date    WBC 4.46 03/03/2022    HGB 9.3 (L) 03/03/2022    HCT 28.7 (L) 03/03/2022    MCV 93 03/03/2022    PLT 86 (L) 03/03/2022       CMP  Sodium   Date Value Ref Range Status   03/03/2022 139 136 - 145 mmol/L Final     Potassium   Date Value Ref Range Status   03/03/2022 3.1 (L) 3.5 - 5.1 mmol/L Final     Chloride   Date Value Ref Range Status   03/03/2022 113 (H) 95 - 110 mmol/L Final     CO2   Date Value Ref Range Status   03/03/2022 16 (L) 23 - 29 mmol/L Final     Glucose   Date Value Ref Range Status   03/03/2022 85 70 - 110 mg/dL Final     BUN   Date Value Ref Range Status   03/03/2022 8 8 - 23 mg/dL Final     Creatinine   Date Value Ref Range Status   03/03/2022 1.0 0.5 - 1.4 mg/dL Final     Calcium   Date Value Ref Range Status   03/03/2022 6.6 (LL) 8.7 - 10.5 mg/dL Final     Comment:     CA critical result(s) called and verbal readback obtained from   Trevor Godoy by LWTawanda 03/03/2022 07:11       Total Protein   Date Value Ref Range Status   03/02/2022 4.2 (L) 6.0 - 8.4 g/dL Final     Albumin   Date Value Ref Range Status   03/02/2022 2.1 (L) 3.5 - 5.2 g/dL Final     Total Bilirubin   Date Value Ref Range Status    03/02/2022 1.0 0.1 - 1.0 mg/dL Final     Comment:     For infants and newborns, interpretation of results should be based  on gestational age, weight and in agreement with clinical  observations.    Premature Infant recommended reference ranges:  Up to 24 hours.............<8.0 mg/dL  Up to 48 hours............<12.0 mg/dL  3-5 days..................<15.0 mg/dL  6-29 days.................<15.0 mg/dL       Alkaline Phosphatase   Date Value Ref Range Status   03/02/2022 132 55 - 135 U/L Final     AST   Date Value Ref Range Status   03/02/2022 26 10 - 40 U/L Final     ALT   Date Value Ref Range Status   03/02/2022 24 10 - 44 U/L Final     Anion Gap   Date Value Ref Range Status   03/03/2022 10 8 - 16 mmol/L Final     eGFR if    Date Value Ref Range Status   03/03/2022 >60 >60 mL/min/1.73 m^2 Final     eGFR if non    Date Value Ref Range Status   03/03/2022 58 (A) >60 mL/min/1.73 m^2 Final     Comment:     Calculation used to obtain the estimated glomerular filtration  rate (eGFR) is the CKD-EPI equation.          Assessment plan    Metastatic colon cancer   -currently on treatment with XELOX for about 1 year  -hold capecitabine   -[facility]. I will provide information to our navigator     Diarrhea/nausea/vomiting  -slowly improving   -pt reports feeling better    Will continue to follow distantly and arrange outpatient follow-up with Dr. Whaley

## 2022-03-03 NOTE — PLAN OF CARE
Problem: Adult Inpatient Plan of Care  Goal: Plan of Care Review  Outcome: Ongoing, Progressing  Goal: Patient-Specific Goal (Individualized)  Outcome: Ongoing, Progressing  Goal: Absence of Hospital-Acquired Illness or Injury  Outcome: Ongoing, Progressing  Goal: Optimal Comfort and Wellbeing  Outcome: Ongoing, Progressing  Goal: Readiness for Transition of Care  Outcome: Ongoing, Progressing     Problem: Adjustment to Illness (Sepsis/Septic Shock)  Goal: Optimal Coping  Outcome: Ongoing, Progressing     Problem: Bleeding (Sepsis/Septic Shock)  Goal: Absence of Bleeding  Outcome: Ongoing, Progressing     Problem: Glycemic Control Impaired (Sepsis/Septic Shock)  Goal: Blood Glucose Level Within Desired Range  Outcome: Ongoing, Progressing     Problem: Infection Progression (Sepsis/Septic Shock)  Goal: Absence of Infection Signs and Symptoms  Outcome: Ongoing, Progressing     Problem: Nutrition Impaired (Sepsis/Septic Shock)  Goal: Optimal Nutrition Intake  Outcome: Ongoing, Progressing     Problem: Infection  Goal: Absence of Infection Signs and Symptoms  Outcome: Ongoing, Progressing     Problem: Skin Injury Risk Increased  Goal: Skin Health and Integrity  Outcome: Ongoing, Progressing

## 2022-03-03 NOTE — ASSESSMENT & PLAN NOTE
Tested negative for C diff.  Continue supportive care, IV fluid hydration, antiemetics and schedule lomotil, adding PRN loperamide. Likely d/t colon cancer and malnutrition.

## 2022-03-03 NOTE — PLAN OF CARE
Problem: Adult Inpatient Plan of Care  Goal: Plan of Care Review  Outcome: Ongoing, Progressing  Goal: Patient-Specific Goal (Individualized)  Outcome: Ongoing, Progressing  Goal: Absence of Hospital-Acquired Illness or Injury  Outcome: Ongoing, Progressing  Goal: Optimal Comfort and Wellbeing  Outcome: Ongoing, Progressing  Goal: Readiness for Transition of Care  Outcome: Ongoing, Progressing     Problem: Adjustment to Illness (Sepsis/Septic Shock)  Goal: Optimal Coping  Outcome: Ongoing, Progressing     Problem: Bleeding (Sepsis/Septic Shock)  Goal: Absence of Bleeding  Outcome: Ongoing, Progressing     Problem: Glycemic Control Impaired (Sepsis/Septic Shock)  Goal: Blood Glucose Level Within Desired Range  Outcome: Ongoing, Progressing     Problem: Infection Progression (Sepsis/Septic Shock)  Goal: Absence of Infection Signs and Symptoms  Outcome: Ongoing, Progressing     Problem: Nutrition Impaired (Sepsis/Septic Shock)  Goal: Optimal Nutrition Intake  Outcome: Ongoing, Progressing     Problem: Infection  Goal: Absence of Infection Signs and Symptoms  Outcome: Ongoing, Progressing     Problem: Skin Injury Risk Increased  Goal: Skin Health and Integrity  Outcome: Ongoing, Progressing     Patient safety measures maintained throughout shift. Patient given medications as ordered. Patient having diarrhea and continues to be weak. Patient potassium and calcium replacements given. Lactic acid ordered was 3.2, MD notified; no new orders at this time.

## 2022-03-04 LAB
ANION GAP SERPL CALC-SCNC: 9 MMOL/L (ref 8–16)
BACTERIA BLD CULT: NORMAL
BASOPHILS # BLD AUTO: 0 K/UL (ref 0–0.2)
BASOPHILS NFR BLD: 0 % (ref 0–1.9)
BUN SERPL-MCNC: 9 MG/DL (ref 8–23)
CA-I BLDV-SCNC: 0.95 MMOL/L (ref 1.06–1.42)
CALCIUM SERPL-MCNC: 6.5 MG/DL (ref 8.7–10.5)
CHLORIDE SERPL-SCNC: 114 MMOL/L (ref 95–110)
CO2 SERPL-SCNC: 16 MMOL/L (ref 23–29)
CREAT SERPL-MCNC: 1.2 MG/DL (ref 0.5–1.4)
DIFFERENTIAL METHOD: ABNORMAL
EOSINOPHIL # BLD AUTO: 0 K/UL (ref 0–0.5)
EOSINOPHIL NFR BLD: 0.4 % (ref 0–8)
ERYTHROCYTE [DISTWIDTH] IN BLOOD BY AUTOMATED COUNT: 25.2 % (ref 11.5–14.5)
EST. GFR  (AFRICAN AMERICAN): 54 ML/MIN/1.73 M^2
EST. GFR  (NON AFRICAN AMERICAN): 47 ML/MIN/1.73 M^2
GLUCOSE SERPL-MCNC: 107 MG/DL (ref 70–110)
HCT VFR BLD AUTO: 29.6 % (ref 37–48.5)
HGB BLD-MCNC: 9.5 G/DL (ref 12–16)
IMM GRANULOCYTES # BLD AUTO: 0.02 K/UL (ref 0–0.04)
IMM GRANULOCYTES NFR BLD AUTO: 0.4 % (ref 0–0.5)
LACTATE SERPL-SCNC: 2.1 MMOL/L (ref 0.5–2.2)
LYMPHOCYTES # BLD AUTO: 1.7 K/UL (ref 1–4.8)
LYMPHOCYTES NFR BLD: 36.4 % (ref 18–48)
MAGNESIUM SERPL-MCNC: 1.6 MG/DL (ref 1.6–2.6)
MCH RBC QN AUTO: 30.9 PG (ref 27–31)
MCHC RBC AUTO-ENTMCNC: 32.1 G/DL (ref 32–36)
MCV RBC AUTO: 96 FL (ref 82–98)
MONOCYTES # BLD AUTO: 1.1 K/UL (ref 0.3–1)
MONOCYTES NFR BLD: 24.3 % (ref 4–15)
NEUTROPHILS # BLD AUTO: 1.8 K/UL (ref 1.8–7.7)
NEUTROPHILS NFR BLD: 38.5 % (ref 38–73)
NRBC BLD-RTO: 0 /100 WBC
PLATELET # BLD AUTO: 90 K/UL (ref 150–450)
PMV BLD AUTO: 10.8 FL (ref 9.2–12.9)
POTASSIUM SERPL-SCNC: 3.2 MMOL/L (ref 3.5–5.1)
RBC # BLD AUTO: 3.07 M/UL (ref 4–5.4)
SODIUM SERPL-SCNC: 139 MMOL/L (ref 136–145)
WBC # BLD AUTO: 4.56 K/UL (ref 3.9–12.7)

## 2022-03-04 PROCEDURE — 83735 ASSAY OF MAGNESIUM: CPT | Performed by: HOSPITALIST

## 2022-03-04 PROCEDURE — 82330 ASSAY OF CALCIUM: CPT | Performed by: HOSPITALIST

## 2022-03-04 PROCEDURE — 80048 BASIC METABOLIC PNL TOTAL CA: CPT | Performed by: HOSPITALIST

## 2022-03-04 PROCEDURE — 99900035 HC TECH TIME PER 15 MIN (STAT)

## 2022-03-04 PROCEDURE — 12000002 HC ACUTE/MED SURGE SEMI-PRIVATE ROOM

## 2022-03-04 PROCEDURE — 36415 COLL VENOUS BLD VENIPUNCTURE: CPT | Performed by: HOSPITALIST

## 2022-03-04 PROCEDURE — 63600175 PHARM REV CODE 636 W HCPCS: Performed by: INTERNAL MEDICINE

## 2022-03-04 PROCEDURE — 25000003 PHARM REV CODE 250: Performed by: HOSPITALIST

## 2022-03-04 PROCEDURE — 85025 COMPLETE CBC W/AUTO DIFF WBC: CPT | Performed by: HOSPITALIST

## 2022-03-04 PROCEDURE — 83605 ASSAY OF LACTIC ACID: CPT | Performed by: HOSPITALIST

## 2022-03-04 PROCEDURE — 99233 PR SUBSEQUENT HOSPITAL CARE,LEVL III: ICD-10-PCS | Mod: ,,, | Performed by: INTERNAL MEDICINE

## 2022-03-04 PROCEDURE — 94761 N-INVAS EAR/PLS OXIMETRY MLT: CPT

## 2022-03-04 PROCEDURE — 63600175 PHARM REV CODE 636 W HCPCS

## 2022-03-04 PROCEDURE — 63600175 PHARM REV CODE 636 W HCPCS: Performed by: HOSPITALIST

## 2022-03-04 PROCEDURE — 25000003 PHARM REV CODE 250

## 2022-03-04 PROCEDURE — 99233 SBSQ HOSP IP/OBS HIGH 50: CPT | Mod: ,,, | Performed by: INTERNAL MEDICINE

## 2022-03-04 PROCEDURE — 25000003 PHARM REV CODE 250: Performed by: INTERNAL MEDICINE

## 2022-03-04 RX ORDER — MAGNESIUM SULFATE HEPTAHYDRATE 40 MG/ML
4 INJECTION, SOLUTION INTRAVENOUS
Status: DISCONTINUED | OUTPATIENT
Start: 2022-03-04 | End: 2022-03-05 | Stop reason: HOSPADM

## 2022-03-04 RX ORDER — MAGNESIUM SULFATE HEPTAHYDRATE 40 MG/ML
2 INJECTION, SOLUTION INTRAVENOUS
Status: DISCONTINUED | OUTPATIENT
Start: 2022-03-04 | End: 2022-03-05 | Stop reason: HOSPADM

## 2022-03-04 RX ADMIN — LACTOBACILLUS ACIDOPHILUS / LACTOBACILLUS BULGARICUS 1 EACH: 100 MILLION CFU STRENGTH GRANULES at 09:03

## 2022-03-04 RX ADMIN — LACTOBACILLUS ACIDOPHILUS / LACTOBACILLUS BULGARICUS 1 EACH: 100 MILLION CFU STRENGTH GRANULES at 08:03

## 2022-03-04 RX ADMIN — DIPHENOXYLATE HYDROCHLORIDE AND ATROPINE SULFATE 1 TABLET: 2.5; .025 TABLET ORAL at 08:03

## 2022-03-04 RX ADMIN — CEFTRIAXONE 1 G: 1 INJECTION, SOLUTION INTRAVENOUS at 12:03

## 2022-03-04 RX ADMIN — CALCIUM CARBONATE (ANTACID) CHEW TAB 500 MG 1000 MG: 500 CHEW TAB at 06:03

## 2022-03-04 RX ADMIN — PANTOPRAZOLE SODIUM 40 MG: 40 TABLET, DELAYED RELEASE ORAL at 08:03

## 2022-03-04 RX ADMIN — VANCOMYCIN HYDROCHLORIDE 1250 MG: 1.25 INJECTION, POWDER, LYOPHILIZED, FOR SOLUTION INTRAVENOUS at 08:03

## 2022-03-04 RX ADMIN — DIPHENOXYLATE HYDROCHLORIDE AND ATROPINE SULFATE 1 TABLET: 2.5; .025 TABLET ORAL at 05:03

## 2022-03-04 RX ADMIN — PIPERACILLIN AND TAZOBACTAM 4.5 G: 4; .5 INJECTION, POWDER, LYOPHILIZED, FOR SOLUTION INTRAVENOUS; PARENTERAL at 06:03

## 2022-03-04 RX ADMIN — DIPHENOXYLATE HYDROCHLORIDE AND ATROPINE SULFATE 1 TABLET: 2.5; .025 TABLET ORAL at 09:03

## 2022-03-04 RX ADMIN — LEVOTHYROXINE SODIUM 88 MCG: 0.09 TABLET ORAL at 05:03

## 2022-03-04 RX ADMIN — Medication 9 MG: at 09:03

## 2022-03-04 RX ADMIN — CALCIUM CARBONATE (ANTACID) CHEW TAB 500 MG 1000 MG: 500 CHEW TAB at 12:03

## 2022-03-04 RX ADMIN — Medication 800 MG: at 01:03

## 2022-03-04 RX ADMIN — ONDANSETRON 4 MG: 2 INJECTION INTRAMUSCULAR; INTRAVENOUS at 02:03

## 2022-03-04 RX ADMIN — Medication 800 MG: at 08:03

## 2022-03-04 RX ADMIN — LOPERAMIDE HYDROCHLORIDE 2 MG: 2 CAPSULE ORAL at 12:03

## 2022-03-04 RX ADMIN — ENOXAPARIN SODIUM 40 MG: 100 INJECTION SUBCUTANEOUS at 05:03

## 2022-03-04 RX ADMIN — CALCIUM CARBONATE (ANTACID) CHEW TAB 500 MG 1000 MG: 500 CHEW TAB at 05:03

## 2022-03-04 RX ADMIN — POTASSIUM CHLORIDE AND SODIUM CHLORIDE 75 ML/HR: 450; 150 INJECTION, SOLUTION INTRAVENOUS at 01:03

## 2022-03-04 RX ADMIN — PREDNISONE 15 MG: 5 TABLET ORAL at 08:03

## 2022-03-04 RX ADMIN — POTASSIUM CHLORIDE 60 MEQ: 1500 TABLET, EXTENDED RELEASE ORAL at 08:03

## 2022-03-04 RX ADMIN — DIPHENOXYLATE HYDROCHLORIDE AND ATROPINE SULFATE 1 TABLET: 2.5; .025 TABLET ORAL at 01:03

## 2022-03-04 NOTE — PROGRESS NOTES
[facility] Hospital Medicine  Progress Note    Patient Name: Regine Edwardsn  MRN: [id number]  Patient Class: IP- Inpatient   Admission Date: 2/27/2022  Length of Stay: 5 days  Attending Physician: Oneyda Mccoy MD  Primary Care Provider: Primary Doctor No        Subjective:     Principal Problem:Severe sepsis        HPI:  Patient is a 66-year-old  female with past medical history significant for metastatic colon carcinoma (receiving chemotherapy including Xeloda and prednisone, [facility]), liver Mets, hypertension, history of post surgical hypothyroidism who has been admitted to Hospital Medicine after getting transferred from [facility] Emergency Room for management of sepsis.  [facility] is on capacity diversion.  Patient went to the emergency room with complaint of progressively worsening generalized weakness and decreased appetite and reduced oral intake.  In the emergency room patient was noted to be hypotensive and tachycardic.  Reportedly patient received IV fluid hydration per sepsis protocol and received a dose of intravenous Rocephin.  Source of sepsis is not apparent at this time.  Blood cultures have been obtained.  Urine analysis is pending.  Patient denies any new abdominal pain, vomiting, melena, hematemesis, bleeding per rectum, cough and expectoration.  Denies any new focal neuro deficits.  No urinary difficulties or any rash reported.            Overview/Hospital Course:  Patient was admitted to the hospital for sepsis of unknown etiology. She was started on broad-spectrum IV antibiotics, and her lactic remained persistently elevated. Initial cultures were negative, as well as chest x-ray but given the patient's immune suppression, she may not be able to mount an adequate immune response.  Patient had severe diarrhea, after which C diff was checked and was negative.  She was started on Lomotil and the diarrhea improved.  Patient  also remained hypokalemic and hypomagnesemic in both were replaced.  She also had hypocalcemia which was also replaced.      Interval History:  No acute events overnight continues to be hypercalcemic hypokalemic.  Continues to complain of fatigue, pain.     Review of Systems   Constitutional:  Positive for activity change, appetite change and fatigue.   Respiratory:  Negative for cough and shortness of breath.    Cardiovascular:  Negative for chest pain and leg swelling.   Gastrointestinal:  Positive for abdominal pain. Negative for nausea.   Neurological:  Negative for weakness.   Psychiatric/Behavioral:  Negative for confusion.    All other systems reviewed and are negative.  Objective:     Vital Signs (Most Recent):  Temp: 97.1 °F (36.2 °C) (03/04/22 0741)  Pulse: 91 (03/04/22 0741)  Resp: 18 (03/04/22 0741)  BP: (!) 144/80 (03/04/22 0741)  SpO2: 98 % (03/04/22 0741)   Vital Signs (24h Range):  Temp:  [96.8 °F (36 °C)-98 °F (36.7 °C)] 97.1 °F (36.2 °C)  Pulse:  [79-91] 91  Resp:  [16-18] 18  SpO2:  [97 %-100 %] 98 %  BP: (116-144)/(57-80) 144/80     Weight: 69.1 kg (152 lb 5.4 oz)  Body mass index is 23.86 kg/m².    Intake/Output Summary (Last 24 hours) at 3/4/2022 1024  Last data filed at 3/3/2022 2203  Gross per 24 hour   Intake 240 ml   Output --   Net 240 ml      Physical Exam  Vitals and nursing note reviewed.   Constitutional:       General: She is not in acute distress.  Eyes:      Pupils: Pupils are equal, round, and reactive to light.   Cardiovascular:      Rate and Rhythm: Normal rate and regular rhythm.      Heart sounds: No murmur heard.  Pulmonary:      Effort: Pulmonary effort is normal.      Breath sounds: Normal breath sounds.   Abdominal:      General: There is no distension.      Palpations: Abdomen is soft.      Tenderness: There is no abdominal tenderness.   Skin:     Coloration: Skin is not pale.      Findings: No rash.      Comments: Multiple ecchymoses   Neurological:      Mental Status:  She is alert and oriented to person, place, and time.       Significant Labs: All pertinent labs within the past 24 hours have been reviewed.  CBC:   Recent Labs   Lab 03/03/22  0521 03/04/22  0441   WBC 4.46 4.56   HGB 9.3* 9.5*   HCT 28.7* 29.6*   PLT 86* 90*     CMP:   Recent Labs   Lab 03/03/22  0521 03/04/22  0441    139   K 3.1* 3.2*   * 114*   CO2 16* 16*   GLU 85 107   BUN 8 9   CREATININE 1.0 1.2   CALCIUM 6.6* 6.5*   ANIONGAP 10 9   EGFRNONAA 58* 47*       Significant Imaging: I have reviewed all pertinent imaging results/findings within the past 24 hours.      Assessment/Plan:      Severe malnutrition  Nutrition consulted. Most recent weight and BMI monitored-   Wt Readings from Last 1 Encounters:   03/03/22 69.1 kg (152 lb 5.4 oz)   Body mass index is 23.86 kg/m².. Encourage maximal PO intake. Diet supplementation ordered per nutrition approval. Will encourage PO and monitor closely for weight changes.        Diarrhea  Tested negative for C diff.  Continue supportive care, IV fluid hydration, antiemetics and schedule lomotil, adding PRN loperamide. Likely d/t colon cancer and malnutrition.      Goals of care, counseling/discussion  Advance Care Planning     Date: 03/01/2022    Code Status  In light of the patients advanced and life limiting illness,I engaged the the patient and family in a conversation about the patient's preferences for care  at the very end of life. The patient wishes to have a natural, peaceful death.  Along those lines, the patient does not wish to have CPR or other invasive treatments performed when her heart and/or breathing stops. I communicated to the patient and family that a DNR order would be placed in her medical record to reflect this preference.  I spent a total of 37 minutes engaging the patient in this advance care planning discussion.                   Colon cancer  As above.      Secondary malignant neoplasm of liver  Abnormal liver enzyme likely related to  liver Mets. Hep panel negative.  Trend LFTs.      Colorectal carcinoma  Receiving chemotherapy at [facility].  Patient is requesting to switch Oncology follow-up with [facility] oncology services, CM notified.  Continue to hold Capecitabine during hospitalization.      Postoperative hypothyroidism  Patient has chronic hypothyroidism. TFTs reviewed-   Lab Results   Component Value Date    TSH 18.792 (H) 02/27/2022   . Will continue chronic levothyroxine and adjust for and clinical changes.        Essential (primary) hypertension  Chronic, uncontrolled.  Latest blood pressure and vitals reviewed-   Temp:  [96.1 °F (35.6 °C)-98.1 °F (36.7 °C)]   Pulse:  [74-83]   Resp:  [17-18]   BP: (109-128)/(55-73)   SpO2:  [97 %-100 %] .   Home meds for hypertension were reviewed and noted below.   Hypertension Medications             atenolol (TENORMIN) 50 MG tablet Take 1 tablet (50 mg total) by mouth once daily.          While in the hospital, will manage blood pressure as follows; Adjust home antihypertensive regimen as follows- -  Hold BP meds for now d/t low BP.    Will utilize p.r.n. blood pressure medication only if patient's blood pressure greater than  180/110 and she develops symptoms such as worsening chest pain or shortness of breath.        Hypercholesteremia   Patient is not chronically on statin.will not continue for now. Monitor clinically. Last LDL was   Lab Results   Component Value Date    LDLCALC 118.0 01/24/2021              VTE Risk Mitigation (From admission, onward)         Ordered     enoxaparin injection 40 mg  Daily         02/27/22 0912     IP VTE HIGH RISK PATIENT  Once         02/27/22 0838     Place sequential compression device  Until discontinued         02/27/22 0838     Reason for No Pharmacological VTE Prophylaxis  Once        Question:  Reasons:  Answer:  Thrombocytopenia    02/27/22 0838                Discharge Planning   CATHY:      Code Status: DNR   Is the patient medically ready for  discharge?:     Reason for patient still in hospital (select all that apply): Patient trending condition and Treatment  Discharge Plan A: Home                  Oneyda Mccoy MD  Department of Hospital Medicine   [facility]

## 2022-03-04 NOTE — PROGRESS NOTES
Regine DENTON Allan [id number] is a 66 y.o. female who has been consulted for vancomycin dosing.    Pharmacy consult for vancomycin dosing in no longer required.  Vancomycin was discontinued.    Thank you for allowing us to participate in this patient's care.     Satnam Velez, PharmD  [phone]

## 2022-03-04 NOTE — PLAN OF CARE
POC reviewed with pt and . Pt denies pain. VSS. IVFs infusing without difficulty. Pt ambulating with sba to bathroom. NAD at this time. Will cont to monitor. Safety maintained.

## 2022-03-04 NOTE — ASSESSMENT & PLAN NOTE
Receiving chemotherapy at [facility].  Patient is requesting to switch Oncology follow-up with [facility] oncology services, CM notified.  Continue to hold Capecitabine during hospitalization.

## 2022-03-04 NOTE — SUBJECTIVE & OBJECTIVE
Interval History:  No acute events overnight continues to be hypercalcemic hypokalemic.  Continues to complain of fatigue, pain.     Review of Systems   Constitutional:  Positive for activity change, appetite change and fatigue.   Respiratory:  Negative for cough and shortness of breath.    Cardiovascular:  Negative for chest pain and leg swelling.   Gastrointestinal:  Positive for abdominal pain. Negative for nausea.   Neurological:  Negative for weakness.   Psychiatric/Behavioral:  Negative for confusion.    All other systems reviewed and are negative.  Objective:     Vital Signs (Most Recent):  Temp: 97.1 °F (36.2 °C) (03/04/22 0741)  Pulse: 91 (03/04/22 0741)  Resp: 18 (03/04/22 0741)  BP: (!) 144/80 (03/04/22 0741)  SpO2: 98 % (03/04/22 0741)   Vital Signs (24h Range):  Temp:  [96.8 °F (36 °C)-98 °F (36.7 °C)] 97.1 °F (36.2 °C)  Pulse:  [79-91] 91  Resp:  [16-18] 18  SpO2:  [97 %-100 %] 98 %  BP: (116-144)/(57-80) 144/80     Weight: 69.1 kg (152 lb 5.4 oz)  Body mass index is 23.86 kg/m².    Intake/Output Summary (Last 24 hours) at 3/4/2022 1024  Last data filed at 3/3/2022 2203  Gross per 24 hour   Intake 240 ml   Output --   Net 240 ml      Physical Exam  Vitals and nursing note reviewed.   Constitutional:       General: She is not in acute distress.  Eyes:      Pupils: Pupils are equal, round, and reactive to light.   Cardiovascular:      Rate and Rhythm: Normal rate and regular rhythm.      Heart sounds: No murmur heard.  Pulmonary:      Effort: Pulmonary effort is normal.      Breath sounds: Normal breath sounds.   Abdominal:      General: There is no distension.      Palpations: Abdomen is soft.      Tenderness: There is no abdominal tenderness.   Skin:     Coloration: Skin is not pale.      Findings: No rash.      Comments: Multiple ecchymoses   Neurological:      Mental Status: She is alert and oriented to person, place, and time.       Significant Labs: All pertinent labs within the past 24 hours have  been reviewed.  CBC:   Recent Labs   Lab 03/03/22  0521 03/04/22  0441   WBC 4.46 4.56   HGB 9.3* 9.5*   HCT 28.7* 29.6*   PLT 86* 90*     CMP:   Recent Labs   Lab 03/03/22  0521 03/04/22  0441    139   K 3.1* 3.2*   * 114*   CO2 16* 16*   GLU 85 107   BUN 8 9   CREATININE 1.0 1.2   CALCIUM 6.6* 6.5*   ANIONGAP 10 9   EGFRNONAA 58* 47*       Significant Imaging: I have reviewed all pertinent imaging results/findings within the past 24 hours.

## 2022-03-04 NOTE — ASSESSMENT & PLAN NOTE
"This patient does have evidence of infective focus  My overall impression is sepsis. Vital signs were reviewed and noted in progress note.  Antibiotics given-   Antibiotics (From admission, onward)            Start     Stop Route Frequency Ordered    03/03/22 0900  vancomycin 1.25 g in dextrose 5% 250 mL IVPB (ready to mix)         -- IV Every 24 hours (non-standard times) 03/02/22 1936    02/27/22 1030  piperacillin-tazobactam 4.5 g in dextrose 5 % 100 mL IVPB (ready to mix system)         -- IV Every 8 hours (non-standard times) 02/27/22 0911    02/27/22 1011  vancomycin - pharmacy to dose  (vancomycin IVPB)        "And" Linked Group Details    -- IV pharmacy to manage frequency 02/27/22 0911        Cultures were taken-   Microbiology Results (last 7 days)     Procedure Component Value Units Date/Time    Clostridium difficile EIA [[id number]] Collected: 02/27/22 2231    Order Status: Completed Specimen: Stool Updated: 02/28/22 0255     C. diff Antigen Negative     C difficile Toxins A+B, EIA Negative     Comment: Testing not recommended for children <24 months old.           Latest lactate reviewed, they are-  Recent Labs   Lab 03/02/22  1428 03/03/22  0521 03/04/22  0441   LACTATE 3.2* 2.0 2.1       Organ dysfunction indicated by Acute liver injury  Source- unspecified.    Will continue IV antibiotics for now even though source is as yet unidentified. Track cultures. Goal for 5 days broad spectrum ABX.    "

## 2022-03-04 NOTE — PROGRESS NOTES
Patient has an appt at [facility] in [place] 3/8/2022 at 0920  Hematology/oncology will sign off at this time.  Please call with any questions or concerns.  Patient will continue to hold capecitabine until follow-up with oncology

## 2022-03-05 VITALS
WEIGHT: 152.31 LBS | TEMPERATURE: 96 F | SYSTOLIC BLOOD PRESSURE: 128 MMHG | HEIGHT: 67 IN | OXYGEN SATURATION: 97 % | DIASTOLIC BLOOD PRESSURE: 58 MMHG | BODY MASS INDEX: 23.91 KG/M2 | HEART RATE: 83 BPM | RESPIRATION RATE: 18 BRPM

## 2022-03-05 LAB
ANION GAP SERPL CALC-SCNC: 8 MMOL/L (ref 8–16)
BASOPHILS # BLD AUTO: 0.01 K/UL (ref 0–0.2)
BASOPHILS NFR BLD: 0.2 % (ref 0–1.9)
BUN SERPL-MCNC: 10 MG/DL (ref 8–23)
CA-I BLDV-SCNC: 0.99 MMOL/L (ref 1.06–1.42)
CALCIUM SERPL-MCNC: 6.5 MG/DL (ref 8.7–10.5)
CHLORIDE SERPL-SCNC: 113 MMOL/L (ref 95–110)
CO2 SERPL-SCNC: 18 MMOL/L (ref 23–29)
CREAT SERPL-MCNC: 1 MG/DL (ref 0.5–1.4)
DIFFERENTIAL METHOD: ABNORMAL
EOSINOPHIL # BLD AUTO: 0 K/UL (ref 0–0.5)
EOSINOPHIL NFR BLD: 0.2 % (ref 0–8)
ERYTHROCYTE [DISTWIDTH] IN BLOOD BY AUTOMATED COUNT: 25.7 % (ref 11.5–14.5)
EST. GFR  (AFRICAN AMERICAN): >60 ML/MIN/1.73 M^2
EST. GFR  (NON AFRICAN AMERICAN): 58 ML/MIN/1.73 M^2
GLUCOSE SERPL-MCNC: 112 MG/DL (ref 70–110)
HCT VFR BLD AUTO: 30.8 % (ref 37–48.5)
HGB BLD-MCNC: 9.8 G/DL (ref 12–16)
IMM GRANULOCYTES # BLD AUTO: 0.04 K/UL (ref 0–0.04)
IMM GRANULOCYTES NFR BLD AUTO: 0.7 % (ref 0–0.5)
LACTATE SERPL-SCNC: 1.5 MMOL/L (ref 0.5–2.2)
LYMPHOCYTES # BLD AUTO: 1.5 K/UL (ref 1–4.8)
LYMPHOCYTES NFR BLD: 25.8 % (ref 18–48)
MAGNESIUM SERPL-MCNC: 1.7 MG/DL (ref 1.6–2.6)
MCH RBC QN AUTO: 31 PG (ref 27–31)
MCHC RBC AUTO-ENTMCNC: 31.8 G/DL (ref 32–36)
MCV RBC AUTO: 98 FL (ref 82–98)
MONOCYTES # BLD AUTO: 1 K/UL (ref 0.3–1)
MONOCYTES NFR BLD: 17.7 % (ref 4–15)
NEUTROPHILS # BLD AUTO: 3.2 K/UL (ref 1.8–7.7)
NEUTROPHILS NFR BLD: 55.4 % (ref 38–73)
NRBC BLD-RTO: 0 /100 WBC
PLATELET # BLD AUTO: 108 K/UL (ref 150–450)
PMV BLD AUTO: 10.3 FL (ref 9.2–12.9)
POTASSIUM SERPL-SCNC: 3.8 MMOL/L (ref 3.5–5.1)
RBC # BLD AUTO: 3.16 M/UL (ref 4–5.4)
SODIUM SERPL-SCNC: 139 MMOL/L (ref 136–145)
VANCOMYCIN TROUGH SERPL-MCNC: 13.7 UG/ML (ref 10–22)
WBC # BLD AUTO: 5.81 K/UL (ref 3.9–12.7)

## 2022-03-05 PROCEDURE — 25000003 PHARM REV CODE 250: Performed by: HOSPITALIST

## 2022-03-05 PROCEDURE — 63600175 PHARM REV CODE 636 W HCPCS: Performed by: INTERNAL MEDICINE

## 2022-03-05 PROCEDURE — 82330 ASSAY OF CALCIUM: CPT | Performed by: HOSPITALIST

## 2022-03-05 PROCEDURE — 85025 COMPLETE CBC W/AUTO DIFF WBC: CPT | Performed by: HOSPITALIST

## 2022-03-05 PROCEDURE — 25000003 PHARM REV CODE 250: Performed by: INTERNAL MEDICINE

## 2022-03-05 PROCEDURE — 94761 N-INVAS EAR/PLS OXIMETRY MLT: CPT

## 2022-03-05 PROCEDURE — 36415 COLL VENOUS BLD VENIPUNCTURE: CPT | Performed by: HOSPITALIST

## 2022-03-05 PROCEDURE — 99900035 HC TECH TIME PER 15 MIN (STAT)

## 2022-03-05 PROCEDURE — 80202 ASSAY OF VANCOMYCIN: CPT | Performed by: HOSPITALIST

## 2022-03-05 PROCEDURE — 80048 BASIC METABOLIC PNL TOTAL CA: CPT | Performed by: HOSPITALIST

## 2022-03-05 PROCEDURE — 83735 ASSAY OF MAGNESIUM: CPT | Performed by: HOSPITALIST

## 2022-03-05 PROCEDURE — 83605 ASSAY OF LACTIC ACID: CPT | Performed by: HOSPITALIST

## 2022-03-05 RX ORDER — HYDROCODONE BITARTRATE AND ACETAMINOPHEN 5; 325 MG/1; MG/1
1 TABLET ORAL EVERY 6 HOURS PRN
Qty: 20 TABLET | Refills: 0 | Status: SHIPPED | OUTPATIENT
Start: 2022-03-05 | End: 2022-03-05 | Stop reason: SDUPTHER

## 2022-03-05 RX ORDER — CALCIUM CARBONATE 200(500)MG
1000 TABLET,CHEWABLE ORAL
Qty: 180 TABLET | Refills: 11 | Status: ON HOLD | OUTPATIENT
Start: 2022-03-05 | End: 2022-07-01 | Stop reason: HOSPADM

## 2022-03-05 RX ORDER — HYDROCODONE BITARTRATE AND ACETAMINOPHEN 5; 325 MG/1; MG/1
1 TABLET ORAL EVERY 6 HOURS PRN
Qty: 20 TABLET | Refills: 0 | Status: ON HOLD | OUTPATIENT
Start: 2022-03-05 | End: 2022-03-29 | Stop reason: SDUPTHER

## 2022-03-05 RX ORDER — FAMOTIDINE 20 MG/1
20 TABLET, FILM COATED ORAL DAILY
Qty: 30 TABLET | Refills: 11 | Status: SHIPPED | OUTPATIENT
Start: 2022-03-05 | End: 2023-03-05

## 2022-03-05 RX ORDER — LOPERAMIDE HYDROCHLORIDE 2 MG/1
2 CAPSULE ORAL 4 TIMES DAILY PRN
Qty: 20 CAPSULE | Refills: 1 | Status: ON HOLD | OUTPATIENT
Start: 2022-03-05 | End: 2022-07-01 | Stop reason: HOSPADM

## 2022-03-05 RX ADMIN — CEFTRIAXONE 1 G: 1 INJECTION, SOLUTION INTRAVENOUS at 11:03

## 2022-03-05 RX ADMIN — CALCIUM CARBONATE (ANTACID) CHEW TAB 500 MG 1000 MG: 500 CHEW TAB at 03:03

## 2022-03-05 RX ADMIN — PREDNISONE 15 MG: 5 TABLET ORAL at 08:03

## 2022-03-05 RX ADMIN — POTASSIUM CHLORIDE 60 MEQ: 1500 TABLET, EXTENDED RELEASE ORAL at 08:03

## 2022-03-05 RX ADMIN — CALCIUM CARBONATE (ANTACID) CHEW TAB 500 MG 1000 MG: 500 CHEW TAB at 06:03

## 2022-03-05 RX ADMIN — POTASSIUM CHLORIDE AND SODIUM CHLORIDE 75 ML/HR: 450; 150 INJECTION, SOLUTION INTRAVENOUS at 04:03

## 2022-03-05 RX ADMIN — LEVOTHYROXINE SODIUM 88 MCG: 0.09 TABLET ORAL at 06:03

## 2022-03-05 RX ADMIN — CALCIUM GLUCONATE 2 G: 98 INJECTION, SOLUTION INTRAVENOUS at 12:03

## 2022-03-05 RX ADMIN — PANTOPRAZOLE SODIUM 40 MG: 40 TABLET, DELAYED RELEASE ORAL at 08:03

## 2022-03-05 RX ADMIN — DIPHENOXYLATE HYDROCHLORIDE AND ATROPINE SULFATE 1 TABLET: 2.5; .025 TABLET ORAL at 08:03

## 2022-03-05 RX ADMIN — LACTOBACILLUS ACIDOPHILUS / LACTOBACILLUS BULGARICUS 1 EACH: 100 MILLION CFU STRENGTH GRANULES at 08:03

## 2022-03-05 NOTE — PLAN OF CARE
Problem: Adult Inpatient Plan of Care  Goal: Plan of Care Review  Outcome: Ongoing, Progressing  Goal: Patient-Specific Goal (Individualized)  Outcome: Ongoing, Progressing  Goal: Absence of Hospital-Acquired Illness or Injury  Outcome: Ongoing, Progressing  Goal: Optimal Comfort and Wellbeing  Outcome: Ongoing, Progressing  Goal: Readiness for Transition of Care  Outcome: Ongoing, Progressing     Problem: Adjustment to Illness (Sepsis/Septic Shock)  Goal: Optimal Coping  Outcome: Ongoing, Progressing     Problem: Bleeding (Sepsis/Septic Shock)  Goal: Absence of Bleeding  Outcome: Ongoing, Progressing     Problem: Glycemic Control Impaired (Sepsis/Septic Shock)  Goal: Blood Glucose Level Within Desired Range  Outcome: Ongoing, Progressing     Problem: Infection Progression (Sepsis/Septic Shock)  Goal: Absence of Infection Signs and Symptoms  Outcome: Ongoing, Progressing     Problem: Nutrition Impaired (Sepsis/Septic Shock)  Goal: Optimal Nutrition Intake  Outcome: Ongoing, Progressing     Problem: Infection  Goal: Absence of Infection Signs and Symptoms  Outcome: Ongoing, Progressing     Problem: Skin Injury Risk Increased  Goal: Skin Health and Integrity  Outcome: Ongoing, Progressing     Problem: Oral Intake Inadequate  Goal: Improved Oral Intake  Outcome: Ongoing, Progressing     Problem: Fall Injury Risk  Goal: Absence of Fall and Fall-Related Injury  Outcome: Ongoing, Progressing   Patient safety measures maintained throughout shift. Patient medications given as ordered. Patient report nausea; given zofran. Patient  at bedside. Patient reporting lack of appetite. Patient magnesium and potassium replaced.

## 2022-03-05 NOTE — PLAN OF CARE
Problem: Adult Inpatient Plan of Care  Goal: Plan of Care Review  Outcome: Ongoing, Progressing  Goal: Patient-Specific Goal (Individualized)  Outcome: Ongoing, Progressing  Goal: Absence of Hospital-Acquired Illness or Injury  Outcome: Ongoing, Progressing  Goal: Optimal Comfort and Wellbeing  Outcome: Ongoing, Progressing  Goal: Readiness for Transition of Care  Outcome: Ongoing, Progressing     Problem: Adjustment to Illness (Sepsis/Septic Shock)  Goal: Optimal Coping  Outcome: Ongoing, Progressing     Problem: Bleeding (Sepsis/Septic Shock)  Goal: Absence of Bleeding  Outcome: Ongoing, Progressing     Problem: Glycemic Control Impaired (Sepsis/Septic Shock)  Goal: Blood Glucose Level Within Desired Range  Outcome: Ongoing, Progressing     Problem: Infection Progression (Sepsis/Septic Shock)  Goal: Absence of Infection Signs and Symptoms  Outcome: Ongoing, Progressing     Problem: Nutrition Impaired (Sepsis/Septic Shock)  Goal: Optimal Nutrition Intake  Outcome: Ongoing, Progressing     Problem: Infection  Goal: Absence of Infection Signs and Symptoms  Outcome: Ongoing, Progressing     Problem: Skin Injury Risk Increased  Goal: Skin Health and Integrity  Outcome: Ongoing, Progressing     Problem: Oral Intake Inadequate  Goal: Improved Oral Intake  Outcome: Ongoing, Progressing     Problem: Fall Injury Risk  Goal: Absence of Fall and Fall-Related Injury  Outcome: Ongoing, Progressing

## 2022-03-05 NOTE — PLAN OF CARE
Problem: Adult Inpatient Plan of Care  Goal: Plan of Care Review  3/5/2022 1412 by Kaykay Samson RN  Outcome: Met  3/5/2022 1411 by Kaykay Samson RN  Outcome: Ongoing, Progressing  Goal: Patient-Specific Goal (Individualized)  3/5/2022 1412 by Kaykay Samson RN  Outcome: Met  3/5/2022 1411 by Kaykay Samson RN  Outcome: Ongoing, Progressing  Goal: Absence of Hospital-Acquired Illness or Injury  3/5/2022 1412 by Kaykay Samson RN  Outcome: Met  3/5/2022 1411 by Kaykay Samson RN  Outcome: Ongoing, Progressing  Goal: Optimal Comfort and Wellbeing  3/5/2022 1412 by Kaykay Samson RN  Outcome: Met  3/5/2022 1411 by Kaykay Samson RN  Outcome: Ongoing, Progressing  Goal: Readiness for Transition of Care  3/5/2022 1412 by Kaykay Samson RN  Outcome: Met  3/5/2022 1411 by Kaykay Samson RN  Outcome: Ongoing, Progressing     Problem: Adjustment to Illness (Sepsis/Septic Shock)  Goal: Optimal Coping  3/5/2022 1412 by Kaykay Samson RN  Outcome: Met  3/5/2022 1411 by Kaykay Samson RN  Outcome: Ongoing, Progressing     Problem: Bleeding (Sepsis/Septic Shock)  Goal: Absence of Bleeding  3/5/2022 1412 by Kaykay Samson RN  Outcome: Met  3/5/2022 1411 by Kaykay Samson RN  Outcome: Ongoing, Progressing     Problem: Glycemic Control Impaired (Sepsis/Septic Shock)  Goal: Blood Glucose Level Within Desired Range  3/5/2022 1412 by Kaykay Samson RN  Outcome: Met  3/5/2022 1411 by Kaykay Samson RN  Outcome: Ongoing, Progressing     Problem: Infection Progression (Sepsis/Septic Shock)  Goal: Absence of Infection Signs and Symptoms  3/5/2022 1412 by Kaykay Samson RN  Outcome: Met  3/5/2022 1411 by Kaykay Samson RN  Outcome: Ongoing, Progressing     Problem: Nutrition Impaired (Sepsis/Septic Shock)  Goal: Optimal Nutrition Intake  3/5/2022 1412 by Kaykay Samson RN  Outcome: Met  3/5/2022 1411 by Kaykay Samson RN  Outcome: Ongoing, Progressing     Problem: Infection  Goal: Absence of Infection Signs and Symptoms  3/5/2022 1412 by Kaykay Samson RN  Outcome: Met  3/5/2022 1411 by Kaykay Samson RN  Outcome: Ongoing,  Progressing     Problem: Skin Injury Risk Increased  Goal: Skin Health and Integrity  3/5/2022 1412 by Kaykay Samson RN  Outcome: Met  3/5/2022 1411 by Kaykay Samson RN  Outcome: Ongoing, Progressing     Problem: Oral Intake Inadequate  Goal: Improved Oral Intake  3/5/2022 1412 by Kaykay Samson, RN  Outcome: Met  3/5/2022 1411 by Kaykay Samson RN  Outcome: Ongoing, Progressing     Problem: Fall Injury Risk  Goal: Absence of Fall and Fall-Related Injury  3/5/2022 1412 by Kaykay Samson RN  Outcome: Met  3/5/2022 1411 by Kaykay Samson, RN  Outcome: Ongoing, Progressing

## 2022-03-05 NOTE — PLAN OF CARE
Patient is clear for discharge from case management standpoint AFTER MD sees the patient.         03/05/22 1033   Final Note   Assessment Type Final Discharge Note   Anticipated Discharge Disposition Home   What phone number can be called within the next 1-3 days to see how you are doing after discharge? [phone]   Post-Acute Status   Discharge Delays None known at this time

## 2022-03-05 NOTE — PLAN OF CARE
Plan of care reviewed with patient. IV fluids infusing as per orders. SR on telemetry. No complaints of pain. Ambulated to restroom during night, tolerated well. Remains free from falls/injury. Instructed to call for assistance as needed , verbalized understanding. Call light in reach, bed alarm on .

## 2022-03-05 NOTE — CARE UPDATE
03/05/22 1009   PRE-TX-O2   O2 Device (Oxygen Therapy) room air   SpO2 99 %   Pulse Oximetry Type Intermittent   $ Pulse Oximetry - Multiple Charge Pulse Oximetry - Multiple   Aerosol Therapy   $ Aerosol Therapy Charges PRN treatment not required

## 2022-03-05 NOTE — DISCHARGE SUMMARY
[facility] Medicine  Discharge Summary      Patient Name: Regine Lemus  MRN: [id number]  Patient Class: IP- Inpatient  Admission Date: 2/27/2022  Hospital Length of Stay: 6 days  Discharge Date and Time:  03/05/2022 11:07 AM  Attending Physician: Oneyda Mccoy MD   Discharging Provider: Oneyda Mccoy MD  Primary Care Provider: Primary Doctor No      HPI:   Patient is a 66-year-old  female with past medical history significant for metastatic colon carcinoma (receiving chemotherapy including Xeloda and prednisone, [facility]), liver Mets, hypertension, history of post surgical hypothyroidism who has been admitted to Hospital Medicine after getting transferred from [facility] Emergency Room for management of sepsis.  [facility], operated by [facility] is on capacity diversion.  Patient went to the emergency room with complaint of progressively worsening generalized weakness and decreased appetite and reduced oral intake.  In the emergency room patient was noted to be hypotensive and tachycardic.  Reportedly patient received IV fluid hydration per sepsis protocol and received a dose of intravenous Rocephin.  Source of sepsis is not apparent at this time.  Blood cultures have been obtained.  Urine analysis is pending.  Patient denies any new abdominal pain, vomiting, melena, hematemesis, bleeding per rectum, cough and expectoration.  Denies any new focal neuro deficits.  No urinary difficulties or any rash reported.            * No surgery found *      Hospital Course:   Patient was admitted to the hospital for sepsis of unknown etiology. She was started on broad-spectrum IV antibiotics, and her lactic remained persistently elevated. Initial cultures were negative, as well as chest x-ray but given the patient's immune suppression, she may not be able to mount an adequate immune response.  Patient had severe diarrhea, after which C diff was checked and was  "negative.  She was started on Lomotil and the diarrhea improved.  Patient also remained hypokalemic and hypomagnesemic in both were replaced.  She also had hypocalcemia which was also replaced.  Patient continue improved over the course of hospital stay.  Cultures remain negative patient remained afebrile white count stable patient was medically stable for discharge       Goals of Care Treatment Preferences:  Code Status: DNR      Consults:   Consults (From admission, onward)        Status Ordering Provider     Inpatient consult to Oncology  Once        Provider:  Aurash Khoobehi, MD    Acknowledged NIKOLAI TOSCANO     Pharmacy to dose Vancomycin consult  Once        Provider:  (Not yet assigned)   "And" Linked Group Details    Acknowledged NIKOLAI TOSCANO     Inpatient consult to Hematology/Oncology  Once        Provider:  Aurash Khoobehi, MD    Completed CHRISTIAN HAYWARD     Inpatient consult to Social Work  Once        Provider:  (Not yet assigned)    Acknowledged CHRISTIAN HAYWARD          No new Assessment & Plan notes have been filed under this hospital service since the last note was generated.  Service: Hospital Medicine    Final Active Diagnoses:    Diagnosis Date Noted POA    Severe malnutrition [E43] 03/02/2022 Yes    Diarrhea [R19.7] 02/28/2022 No    Goals of care, counseling/discussion [Z71.89] 02/27/2022 Not Applicable    Colon cancer [C18.9]  Yes    Secondary malignant neoplasm of liver [C78.7] 01/24/2021 Yes    Colorectal carcinoma [C19] 01/23/2021 Yes    Postoperative hypothyroidism [E89.0] 12/14/2015 Yes    Hypercholesteremia [E78.00] 07/29/2014 Yes    Essential (primary) hypertension [I10] 07/23/2014 Yes      Problems Resolved During this Admission:    Diagnosis Date Noted Date Resolved POA    PRINCIPAL PROBLEM:  Severe sepsis [A41.9, R65.20]  03/04/2022 Yes       Discharged Condition: stable    Disposition: Home or Self Care    Follow Up:   Follow-up Information     Primary Doctor No Follow up " in 1 week(s).                     Patient Instructions:      Activity as tolerated       Significant Diagnostic Studies: Labs:   BMP:   Recent Labs   Lab 03/04/22  0441 03/05/22  0542    112*    139   K 3.2* 3.8   * 113*   CO2 16* 18*   BUN 9 10   CREATININE 1.2 1.0   CALCIUM 6.5* 6.5*   MG 1.6 1.7    and CBC   Recent Labs   Lab 03/04/22  0441 03/05/22  0542   WBC 4.56 5.81   HGB 9.5* 9.8*   HCT 29.6* 30.8*   PLT 90* 108*     Microbiology:   Blood Culture   Lab Results   Component Value Date    LABBLOO No growth after 5 days. 02/27/2022   , Sputum Culture No results found for: GSRESP, RESPIRATORYC and Urine Culture    Lab Results   Component Value Date    LABURIN No growth 01/24/2021       Pending Diagnostic Studies:     None         Medications:  Reconciled Home Medications:      Medication List      START taking these medications    calcium carbonate 200 mg calcium (500 mg) chewable tablet  Commonly known as: TUMS  Take 2 tablets (1,000 mg total) by mouth 3 (three) times daily before meals.     famotidine 20 MG tablet  Commonly known as: PEPCID  Take 1 tablet (20 mg total) by mouth once daily.     HYDROcodone-acetaminophen 5-325 mg per tablet  Commonly known as: NORCO  Take 1 tablet by mouth every 6 (six) hours as needed for Pain.     loperamide 2 mg capsule  Commonly known as: IMODIUM  Take 1 capsule (2 mg total) by mouth 4 (four) times daily as needed for Diarrhea.        CONTINUE taking these medications    atenoloL 50 MG tablet  Commonly known as: TENORMIN  Take 1 tablet (50 mg total) by mouth once daily.     capecitabine 500 MG Tab  Commonly known as: XELODA  Take 500 mg by mouth 2 (two) times daily.     diphenoxylate-atropine 2.5-0.025 mg 2.5-0.025 mg per tablet  Commonly known as: LOMOTIL  Take by mouth.     ergocalciferol 50,000 unit Cap  Commonly known as: ERGOCALCIFEROL  Take 50,000 Units by mouth every 7 days.     KLOR-CON M20 20 MEQ tablet  Generic drug: potassium chloride SA  Take  20 mEq by mouth 2 (two) times daily.     levothyroxine 88 MCG tablet  Commonly known as: SYNTHROID  TAKE 1 TABLET DAILY MONDAY THRU SATURDAY AND 1 & 1/2 TABS ON SUNDAYS AS DIRECTED     predniSONE 10 MG tablet  Commonly known as: DELTASONE  Take 20 mg by mouth once daily.            Indwelling Lines/Drains at time of discharge:   Lines/Drains/Airways     None                 Time spent on the discharge of patient: 60 minutes         Oneyda Mccoy MD  Department of Hospital Medicine  [facility]

## 2022-03-05 NOTE — NURSING
Discharge patient with . Discharge instructions given pt stated understood. Removed two IV and telly box. Medication instructions give. Pt was given a paper script.

## 2022-03-07 NOTE — PHYSICIAN QUERY
PT Name: Regine DENTON Allan  MR #: [id number]     Documentation Clarification      CDS: Victoriano Nathan RN CCDS               Contact information: [email or web address]     This form is a permanent document in the medical record.     Query Date: March 7, 2022    By submitting this query, we are merely seeking further clarification of documentation. Please utilize your independent clinical judgment when addressing the question(s) below.    The Medical Record reflects the following:    Supporting Clinical Findings Location in Medical Record   admitted to Hospital Medicine after getting transferred from [facility] Room for management of sepsis.   In the emergency room patient was noted to be hypotensive and tachycardic.  Reportedly patient received IV fluid hydration per sepsis protocol     Severe sepsis  Colorectal carcinoma  Secondary malignant neoplasm of liver 2/27/2022 H&P    02/27/22 02:06 02/27/22 09:02 02/27/22 17:26 02/27/22 20:35   Lactate, Mode 4.4 (HH)  2.5 (H) 2.4 (H)  2.4 (H)     Lab values                                                                            Provider, please provide diagnosis or diagnoses associated with above clinical findings.    [ x  ] Hyperlactatemia   [   ] Lactic Acidosis   [   ] Other (please specify): ____________   [  ] Clinically undetermined

## 2022-03-07 NOTE — PHYSICIAN QUERY
PT Name: Regine DENTON Allan  MR #: [id number]     Documentation Clarification      CDS: Victoriano Nathan RN CCDS               Contact information: [email or web address]     This form is a permanent document in the medical record.     Query Date: 2022    By submitting this query, we are merely seeking further clarification of documentation. Please utilize your independent clinical judgment when addressing the question(s) below.    The Medical Record reflects the following:    Clinical Findings Location in Medical Records   Severe malnutrition  Nutrition consulted. Most recent weight and BMI monitored-       Wt Readings from Last 1 Encounters:   22 69.1 kg (152 lb 5.4 oz)   Body mass index is 23.86 kg/m².. Encourage maximal PO intake. Diet supplementation ordered per nutrition approval. Will encourage PO and monitor closely for weight changes.      Severe sepsis  Diarrhea  Tested negative for C diff.  Continue supportive care, IV fluid hydration, antiemetics and schedule lomotil. Likely d/t colon cancer and malnutrition.     Patient went to the emergency room with complaint of progressively worsening generalized weakness and decreased appetite and reduced oral intake.     past medical history significant for metastatic colon carcinoma (receiving chemotherapy including Xeloda and prednisone, [facility]), liver Mets.  3/2/2022 Hospital Medicine progress notes     Assessment and Plan     Inadequate energy intake  R/t decreased appetite  As evidenced by PO intakes < 50% of needs x > 5 days  Intervention: above improving     Edema (Fluid Accumulation): 0-->no edema present   Subcutaneous Fat Loss (Final Summary): well nourished  Muscle Loss Evaluation (Final Summary): well nourished      NFPE done 3/3/22 no wasting seen. No significant wt loss per chart review.    Weight: 69.1 kg (152 lb 5.4 oz)  Usual Body Weight (UBW), k kg (2021)  % Weight Change From Usual Weight: -5.34 %  BMI (Calculated):  23.9    Recommendation:   1) Continue regular diet + Pt preferences   2) Premier protein shakes ( brought in by ) 2 x daily   3) weigh weekly   4) probiotic and/or soluble fiber supplement for diarrhea 3/3/2022 RD consult       AND / ASPEN Clinical Characteristics (October 2011)    A minimum of two characteristics is recommended for diagnosing either moderate or severe malnutrition     Mild Malnutrition Moderate Malnutrition Severe Malnutrition   Energy Intake from p.o., TF or TPN. < 75% intake of estimated energy needs for less than 7 days < 75% intake of estimated energy needs for greater than 7 days < 50% intake of estimated energy needs for > 5 days   Weight Loss 1-2% in 1 month  5% in 3 months  7.5% in 6 months  10% in 1 year 1-2 % in 1 week  5% in 1 month  7.5% in 3 months  10% in 6 months  20% in 1 year > 2% in 1 week  > 5% in 1 month  > 7.5% in 3 months  > 10% in 6 months  > 20% in 1 year   Physical Findings     None *Mild subcutaneous fat and/or muscle loss  *Mild fluid accumulation  *Stage II decubitus  *Surgical wound or non-healing wound *Mod/severe subcutaneous fat and/or muscle loss  *Mod/severe fluid accumulation  *Stage III or IV decubitus  *Non-healing surgical wound                                                                              Provider, please clarify the diagnosis of Severe malnutrition:      [  x ] Severe malnutrition is not confirmed and/or it has been ruled out, other diagnosis ruled in:     [  x ] Inadequate energy intake     [   ] Other, (please specify):_______________   [   ] Severe malnutrition is not confirmed and/or it has been ruled out   [   ] Severe malnutrition is confirmed and additional clinical support/decision-making indicators for the diagnosis include (please specify):________________   [   ] Other clarification (please specify): ___________________   [  ] Clinically undetermined

## 2022-03-08 ENCOUNTER — PATIENT MESSAGE (OUTPATIENT)
Dept: HEMATOLOGY/ONCOLOGY | Facility: CLINIC | Age: 67
End: 2022-03-08

## 2022-03-08 ENCOUNTER — TELEPHONE (OUTPATIENT)
Dept: HEMATOLOGY/ONCOLOGY | Facility: CLINIC | Age: 67
End: 2022-03-08

## 2022-03-08 ENCOUNTER — OFFICE VISIT (OUTPATIENT)
Dept: HEMATOLOGY/ONCOLOGY | Facility: CLINIC | Age: 67
End: 2022-03-08
Payer: OTHER GOVERNMENT

## 2022-03-08 VITALS
SYSTOLIC BLOOD PRESSURE: 176 MMHG | BODY MASS INDEX: 26.16 KG/M2 | TEMPERATURE: 98 F | OXYGEN SATURATION: 99 % | HEART RATE: 113 BPM | DIASTOLIC BLOOD PRESSURE: 88 MMHG | HEIGHT: 67 IN | RESPIRATION RATE: 12 BRPM | WEIGHT: 166.69 LBS

## 2022-03-08 DIAGNOSIS — R63.0 LOSS OF APPETITE: Primary | ICD-10-CM

## 2022-03-08 DIAGNOSIS — C78.7 SECONDARY MALIGNANT NEOPLASM OF LIVER: ICD-10-CM

## 2022-03-08 DIAGNOSIS — C19 COLORECTAL CARCINOMA: ICD-10-CM

## 2022-03-08 PROCEDURE — 99999 PR PBB SHADOW E&M-EST. PATIENT-LVL V: ICD-10-PCS | Mod: PBBFAC,,, | Performed by: INTERNAL MEDICINE

## 2022-03-08 PROCEDURE — 99999 PR PBB SHADOW E&M-EST. PATIENT-LVL V: CPT | Mod: PBBFAC,,, | Performed by: INTERNAL MEDICINE

## 2022-03-08 PROCEDURE — 99214 OFFICE O/P EST MOD 30 MIN: CPT | Mod: S$GLB,,, | Performed by: INTERNAL MEDICINE

## 2022-03-08 PROCEDURE — 99214 PR OFFICE/OUTPT VISIT, EST, LEVL IV, 30-39 MIN: ICD-10-PCS | Mod: S$GLB,,, | Performed by: INTERNAL MEDICINE

## 2022-03-08 RX ORDER — DRONABINOL 10 MG/1
10 CAPSULE ORAL
Qty: 60 CAPSULE | Refills: 0 | Status: SHIPPED | OUTPATIENT
Start: 2022-03-08 | End: 2022-03-08 | Stop reason: SDUPTHER

## 2022-03-08 RX ORDER — DRONABINOL 10 MG/1
10 CAPSULE ORAL
Qty: 60 CAPSULE | Refills: 0 | Status: ON HOLD | OUTPATIENT
Start: 2022-03-08 | End: 2022-03-29 | Stop reason: SDUPTHER

## 2022-03-08 RX ORDER — LEVOTHYROXINE SODIUM 125 UG/1
125 TABLET ORAL DAILY
Status: ON HOLD | COMMUNITY
Start: 2021-10-06 | End: 2022-03-31 | Stop reason: HOSPADM

## 2022-03-08 NOTE — TELEPHONE ENCOUNTER
----- Message from Aurash Khoobehi, MD sent at 3/8/2022  9:48 AM CST -----  Auth for chemo. I want to see her the day she starts chemo to make sure she's ok, not the week after like I usually do.

## 2022-03-08 NOTE — PROGRESS NOTES
Service Date:  3/8/22    Chief Complaint: Hospital Follow Up, Fatigue, and colorectal carcinoma    Regine Lemus is a 66 y.o. female with colorectal cancer.  Patient was recently hospitalized for diarrhea and abdominal pain.  Since that discharge she has been feeling better.  She was C diff negative.  According to records she had a similar episode in April of last year.    Per records:  Diagnosis of adenocarcinoma 1/13/21 with biopsy of liver lesion.  Patient has been treated with XELOX plus Avastin ever since.  CEA has been trending down.  Last CT scan was on 1/28/22 which showed further response.    Review of Systems   Constitutional: Negative.    HENT: Negative.    Eyes: Negative.    Respiratory: Negative.    Cardiovascular: Negative.    Gastrointestinal: Negative.    Endocrine: Negative.    Genitourinary: Negative.    Neurological: Negative.    Hematological: Negative.    Psychiatric/Behavioral: Negative.         Current Outpatient Medications   Medication Instructions    calcium carbonate (TUMS) 1,000 mg, Oral, 3 times daily before meals    capecitabine (XELODA) 500 mg, Oral, 2 times daily    dronabinoL (MARINOL) 10 mg, Oral, 2 times daily before meals    ergocalciferol (ERGOCALCIFEROL) 50,000 Units, Oral, Every 7 days    famotidine (PEPCID) 20 mg, Oral, Daily    HYDROcodone-acetaminophen (NORCO) 5-325 mg per tablet 1 tablet, Oral, Every 6 hours PRN    KLOR-CON M20 20 mEq tablet 20 mEq, Oral, 2 times daily    levothyroxine (SYNTHROID) 125 mcg, Oral, Daily    loperamide (IMODIUM) 2 mg, Oral, 4 times daily PRN    predniSONE (DELTASONE) 20 mg, Oral, Daily, Pt taking 15mg daily        Past Medical History:   Diagnosis Date    Allergy     Colon cancer     Hypertension     Liver cancer     Nontoxic uninodular goiter     Osteoarthrosis, unspecified whether generalized or localized, hand     Other abnormal glucose     Pure hypercholesterolemia     Severe sepsis         Past Surgical History:  "  Procedure Laterality Date    CYST REMOVAL  1994    cyst removed from neck    infusaport placement Right     THYROIDECTOMY      TONSILLECTOMY  age 14        Family History   Problem Relation Age of Onset    Lung cancer Father        Social History     Tobacco Use    Smoking status: Never Smoker    Smokeless tobacco: Never Used   Substance Use Topics    Alcohol use: No    Drug use: Never         Vitals:    03/08/22 0920   BP: (!) 176/88   Pulse: (!) 113   Resp: 12   Temp: 97.5 °F (36.4 °C)        Physical Exam:  BP (!) 176/88 (BP Location: Right arm, Patient Position: Sitting)   Pulse (!) 113   Temp 97.5 °F (36.4 °C) (Temporal)   Resp 12   Ht 5' 7" (1.702 m)   Wt 75.6 kg (166 lb 10.7 oz)   LMP  (LMP Unknown)   SpO2 99%   BMI 26.10 kg/m²     Physical Exam  Constitutional:       Appearance: Normal appearance.   HENT:      Head: Normocephalic and atraumatic.      Nose: Nose normal.      Mouth/Throat:      Mouth: Mucous membranes are moist.      Pharynx: Oropharynx is clear.   Eyes:      Conjunctiva/sclera: Conjunctivae normal.   Cardiovascular:      Rate and Rhythm: Normal rate and regular rhythm.      Heart sounds: Normal heart sounds.   Pulmonary:      Effort: Pulmonary effort is normal.      Breath sounds: Normal breath sounds.   Abdominal:      General: Abdomen is flat. Bowel sounds are normal.      Palpations: Abdomen is soft.   Musculoskeletal:         General: Normal range of motion.      Cervical back: Normal range of motion and neck supple.   Skin:     General: Skin is warm and dry.   Neurological:      General: No focal deficit present.      Mental Status: She is alert and oriented to person, place, and time. Mental status is at baseline.   Psychiatric:         Mood and Affect: Mood normal.          Labs:  Lab Results   Component Value Date    WBC 5.81 03/05/2022    RBC 3.16 (L) 03/05/2022    HGB 9.8 (L) 03/05/2022    HCT 30.8 (L) 03/05/2022    MCV 98 03/05/2022    MCH 31.0 03/05/2022    MCHC " 31.8 (L) 03/05/2022    RDW 25.7 (H) 03/05/2022     (L) 03/05/2022    MPV 10.3 03/05/2022    GRAN 3.2 03/05/2022    GRAN 55.4 03/05/2022    LYMPH 1.5 03/05/2022    LYMPH 25.8 03/05/2022    MONO 1.0 03/05/2022    MONO 17.7 (H) 03/05/2022    EOS 0.0 03/05/2022    BASO 0.01 03/05/2022    EOSINOPHIL 0.2 03/05/2022    BASOPHIL 0.2 03/05/2022     Sodium   Date Value Ref Range Status   03/05/2022 139 136 - 145 mmol/L Final     Potassium   Date Value Ref Range Status   03/05/2022 3.8 3.5 - 5.1 mmol/L Final     Chloride   Date Value Ref Range Status   03/05/2022 113 (H) 95 - 110 mmol/L Final     CO2   Date Value Ref Range Status   03/05/2022 18 (L) 23 - 29 mmol/L Final     Glucose   Date Value Ref Range Status   03/05/2022 112 (H) 70 - 110 mg/dL Final     BUN   Date Value Ref Range Status   03/05/2022 10 8 - 23 mg/dL Final     Creatinine   Date Value Ref Range Status   03/05/2022 1.0 0.5 - 1.4 mg/dL Final     Calcium   Date Value Ref Range Status   03/05/2022 6.5 (LL) 8.7 - 10.5 mg/dL Final     Comment:     calcium   critical result(s) called and verbal readback obtained from   Kaykay Samson by KIMBERLY 03/05/2022 07:05       Total Protein   Date Value Ref Range Status   03/02/2022 4.2 (L) 6.0 - 8.4 g/dL Final     Albumin   Date Value Ref Range Status   03/02/2022 2.1 (L) 3.5 - 5.2 g/dL Final     Total Bilirubin   Date Value Ref Range Status   03/02/2022 1.0 0.1 - 1.0 mg/dL Final     Comment:     For infants and newborns, interpretation of results should be based  on gestational age, weight and in agreement with clinical  observations.    Premature Infant recommended reference ranges:  Up to 24 hours.............<8.0 mg/dL  Up to 48 hours............<12.0 mg/dL  3-5 days..................<15.0 mg/dL  6-29 days.................<15.0 mg/dL       Alkaline Phosphatase   Date Value Ref Range Status   03/02/2022 132 55 - 135 U/L Final     AST   Date Value Ref Range Status   03/02/2022 26 10 - 40 U/L Final     ALT   Date Value Ref  Range Status   03/02/2022 24 10 - 44 U/L Final     Anion Gap   Date Value Ref Range Status   03/05/2022 8 8 - 16 mmol/L Final     eGFR if    Date Value Ref Range Status   03/05/2022 >60 >60 mL/min/1.73 m^2 Final     eGFR if non    Date Value Ref Range Status   03/05/2022 58 (A) >60 mL/min/1.73 m^2 Final     Comment:     Calculation used to obtain the estimated glomerular filtration  rate (eGFR) is the CKD-EPI equation.          A/P:    Metastatic colorectal cancer  -patient has been on XELOX with Avastin since around January of 2021.  She has been having a good response to treatment.  Her last CT scan on 1/28/22 continue to show good response.  -I will plan to resume her XELOX plus Avastin once her diarrhea symptoms have improved, which are slowly improving.  She has had episodes of this she states about 3 times over the past year.  -once have obtained approval we can start treatment if all her symptoms have resolved  -return to clinic on the day she is ready to start chemotherapy    Adrenal insufficiency  -currently on prednisone 20 mg daily.      Aurash Khoobehi, MD  Hematology and Oncology

## 2022-03-08 NOTE — Clinical Note
Auth for chemo. I want to see her the day she starts chemo to make sure she's ok, not the week after like I usually do.

## 2022-03-08 NOTE — TELEPHONE ENCOUNTER
Spoke with patients  regarding message below. Message sent to dr khoobehi and leslie charge nurse in [facility] infusion clinic. Will call patient back once I have a response regarding drawing chemo clear labs from port for every lab appointment.           ----- Message from Haylie Godoy sent at 3/8/2022  3:53 PM CST -----  Contact: Edouard Lemus (Spouse)  Type: Patient Call Back    Who called:Edouard Lemus (Spouse)    What is the request in detail: The patient  would like a call in regards to the patient lab work     Can the clinic reply by [facility]?    Would the patient rather a call back or a response via [facility]?     Best call back number: [phone] (mobile)    Additional Information:

## 2022-03-09 ENCOUNTER — TELEPHONE (OUTPATIENT)
Dept: HEMATOLOGY/ONCOLOGY | Facility: CLINIC | Age: 67
End: 2022-03-09
Payer: OTHER GOVERNMENT

## 2022-03-09 RX ORDER — CAPECITABINE 500 MG/1
TABLET, FILM COATED ORAL
Qty: 196 TABLET | Refills: 6 | OUTPATIENT
Start: 2022-03-09 | End: 2022-03-18

## 2022-03-09 NOTE — PLAN OF CARE
START ON PATHWAY REGIMEN - Colorectal    [id number]        Capecitabine (Xeloda)       Bevacizumab-xxxx       Oxaliplatin (Eloxatin)           Additional Orders: Do not administer bevacizumab for at least 28 days   prior to elective surgery and for at least 28 days following surgery and until   the wound is fully healed.    **Always confirm dose/schedule in your pharmacy ordering system**    Patient Characteristics:  Distant Metastases, Nonsurgical Candidate, KRAS/NRAS Mutation Positive/Unknown   (BRAF V600 Wild-Type/Unknown), Standard Cytotoxic Therapy, First Line Standard   Cytotoxic Therapy, Bevacizumab Eligible, PS = 0,1  Tumor Location: Rectal  Therapeutic Status: Distant Metastases  Microsatellite/Mismatch Repair Status: BROOKLYNN/pMMR  BRAF Mutation Status: Wild-Type (no mutation)  KRAS/NRAS Mutation Status: Mutation Positive  Standard Cytotoxic Line of Therapy: First Line Standard Cytotoxic Therapy  ECOG Performance Status: 1  Bevacizumab Eligibility: Eligible  Intent of Therapy:  Non-Curative / Palliative Intent, Discussed with Patient

## 2022-03-10 DIAGNOSIS — C19 COLORECTAL CARCINOMA: Primary | ICD-10-CM

## 2022-03-11 ENCOUNTER — TELEPHONE (OUTPATIENT)
Dept: HEMATOLOGY/ONCOLOGY | Facility: CLINIC | Age: 67
End: 2022-03-11
Payer: OTHER GOVERNMENT

## 2022-03-11 DIAGNOSIS — M79.89 LEG SWELLING: Primary | ICD-10-CM

## 2022-03-11 RX ORDER — SODIUM CHLORIDE 0.9 % (FLUSH) 0.9 %
10 SYRINGE (ML) INJECTION
Status: CANCELLED | OUTPATIENT
Start: 2022-03-11

## 2022-03-11 RX ORDER — HEPARIN 100 UNIT/ML
500 SYRINGE INTRAVENOUS
Status: CANCELLED | OUTPATIENT
Start: 2022-03-11

## 2022-03-11 RX ORDER — FUROSEMIDE 20 MG/1
20 TABLET ORAL DAILY
Qty: 7 TABLET | Refills: 0 | Status: SHIPPED | OUTPATIENT
Start: 2022-03-11 | End: 2022-03-14 | Stop reason: SDUPTHER

## 2022-03-11 NOTE — TELEPHONE ENCOUNTER
outgoing call to patient and  to confirm appointments. The patient  stated the patient's abdomen is distended and legs are swollen, per dr khoobehi : he will order lasix for 7 days and if that does not help and the patient gets worse bring her to the emergency department. This nurse informed the patient's  of dr khoobehi orders. The patient's  was also informed that all chemo including oral chemo will start when infusion treatment starts per dr khoobehi. No further questions at this time.

## 2022-03-11 NOTE — TELEPHONE ENCOUNTER
Called patient regarding message below. Patient did not answer, callback number left via .         ----- Message from Deniseclaribel Alexander sent at 3/11/2022  1:08 PM CST -----  Contact: Patient's   Type: Patient Call Back         Who called:  Edward         What is the request in detail: calling to get advice for patient on some otc Rx she can get for some problems she has been having; please advise         Best call back number: [phone]         Additional Information:   [facility]/pharmacy #[id number] - [place] - [address]  [address] [place] [address]  Phone: [phone] Fax: [phone]             Thank You

## 2022-03-14 ENCOUNTER — TELEPHONE (OUTPATIENT)
Dept: HEMATOLOGY/ONCOLOGY | Facility: CLINIC | Age: 67
End: 2022-03-14

## 2022-03-14 ENCOUNTER — INFUSION (OUTPATIENT)
Dept: INFUSION THERAPY | Facility: HOSPITAL | Age: 67
End: 2022-03-14
Attending: INTERNAL MEDICINE
Payer: OTHER GOVERNMENT

## 2022-03-14 ENCOUNTER — TELEPHONE (OUTPATIENT)
Dept: INFUSION THERAPY | Facility: HOSPITAL | Age: 67
End: 2022-03-14

## 2022-03-14 ENCOUNTER — OFFICE VISIT (OUTPATIENT)
Dept: HEMATOLOGY/ONCOLOGY | Facility: CLINIC | Age: 67
End: 2022-03-14
Payer: OTHER GOVERNMENT

## 2022-03-14 VITALS
TEMPERATURE: 98 F | SYSTOLIC BLOOD PRESSURE: 165 MMHG | RESPIRATION RATE: 16 BRPM | BODY MASS INDEX: 25.69 KG/M2 | HEART RATE: 110 BPM | HEIGHT: 67 IN | DIASTOLIC BLOOD PRESSURE: 85 MMHG | OXYGEN SATURATION: 99 % | WEIGHT: 163.69 LBS

## 2022-03-14 VITALS
BODY MASS INDEX: 25.78 KG/M2 | HEART RATE: 102 BPM | DIASTOLIC BLOOD PRESSURE: 90 MMHG | RESPIRATION RATE: 12 BRPM | TEMPERATURE: 98 F | OXYGEN SATURATION: 99 % | HEIGHT: 67 IN | WEIGHT: 164.25 LBS | SYSTOLIC BLOOD PRESSURE: 180 MMHG

## 2022-03-14 DIAGNOSIS — C78.7 SECONDARY MALIGNANT NEOPLASM OF LIVER: ICD-10-CM

## 2022-03-14 DIAGNOSIS — C19 COLORECTAL CARCINOMA: Primary | ICD-10-CM

## 2022-03-14 DIAGNOSIS — M79.89 LEG SWELLING: ICD-10-CM

## 2022-03-14 PROCEDURE — 99214 PR OFFICE/OUTPT VISIT, EST, LEVL IV, 30-39 MIN: ICD-10-PCS | Mod: S$GLB,,, | Performed by: INTERNAL MEDICINE

## 2022-03-14 PROCEDURE — 63600175 PHARM REV CODE 636 W HCPCS: Performed by: INTERNAL MEDICINE

## 2022-03-14 PROCEDURE — 99214 OFFICE O/P EST MOD 30 MIN: CPT | Mod: S$GLB,,, | Performed by: INTERNAL MEDICINE

## 2022-03-14 PROCEDURE — 99999 PR PBB SHADOW E&M-EST. PATIENT-LVL V: CPT | Mod: PBBFAC,,, | Performed by: INTERNAL MEDICINE

## 2022-03-14 PROCEDURE — 99999 PR PBB SHADOW E&M-EST. PATIENT-LVL V: ICD-10-PCS | Mod: PBBFAC,,, | Performed by: INTERNAL MEDICINE

## 2022-03-14 PROCEDURE — A4216 STERILE WATER/SALINE, 10 ML: HCPCS | Performed by: INTERNAL MEDICINE

## 2022-03-14 PROCEDURE — 96523 IRRIG DRUG DELIVERY DEVICE: CPT

## 2022-03-14 PROCEDURE — 25000003 PHARM REV CODE 250: Performed by: INTERNAL MEDICINE

## 2022-03-14 RX ORDER — SODIUM CHLORIDE 0.9 % (FLUSH) 0.9 %
10 SYRINGE (ML) INJECTION
Status: DISCONTINUED | OUTPATIENT
Start: 2022-03-14 | End: 2022-03-14 | Stop reason: HOSPADM

## 2022-03-14 RX ORDER — EPINEPHRINE 0.3 MG/.3ML
0.3 INJECTION SUBCUTANEOUS ONCE AS NEEDED
Status: CANCELLED | OUTPATIENT
Start: 2022-03-16

## 2022-03-14 RX ORDER — FUROSEMIDE 20 MG/1
20 TABLET ORAL DAILY
Qty: 30 TABLET | Refills: 0 | Status: SHIPPED | OUTPATIENT
Start: 2022-03-14 | End: 2022-03-31

## 2022-03-14 RX ORDER — DIPHENHYDRAMINE HYDROCHLORIDE 50 MG/ML
50 INJECTION INTRAMUSCULAR; INTRAVENOUS ONCE AS NEEDED
Status: CANCELLED | OUTPATIENT
Start: 2022-03-16

## 2022-03-14 RX ORDER — HEPARIN 100 UNIT/ML
500 SYRINGE INTRAVENOUS
Status: CANCELLED | OUTPATIENT
Start: 2022-03-14

## 2022-03-14 RX ORDER — SODIUM CHLORIDE 0.9 % (FLUSH) 0.9 %
10 SYRINGE (ML) INJECTION
Status: CANCELLED | OUTPATIENT
Start: 2022-03-14

## 2022-03-14 RX ORDER — SODIUM CHLORIDE 0.9 % (FLUSH) 0.9 %
10 SYRINGE (ML) INJECTION
Status: CANCELLED | OUTPATIENT
Start: 2022-03-16

## 2022-03-14 RX ORDER — HEPARIN 100 UNIT/ML
500 SYRINGE INTRAVENOUS
Status: DISCONTINUED | OUTPATIENT
Start: 2022-03-14 | End: 2022-03-14 | Stop reason: HOSPADM

## 2022-03-14 RX ORDER — HEPARIN 100 UNIT/ML
500 SYRINGE INTRAVENOUS
Status: CANCELLED | OUTPATIENT
Start: 2022-03-16

## 2022-03-14 RX ADMIN — HEPARIN 500 UNITS: 100 SYRINGE at 08:03

## 2022-03-14 RX ADMIN — SODIUM CHLORIDE 10 ML: 9 INJECTION, SOLUTION INTRAMUSCULAR; INTRAVENOUS; SUBCUTANEOUS at 08:03

## 2022-03-14 NOTE — Clinical Note
Labs are still saying needs to be collected. When labs are back, we can do treatment on Wednesday. Then needs tox check the next week. After that, she wants her labs, visit with me, and treatment all on the same day if possible.

## 2022-03-14 NOTE — TELEPHONE ENCOUNTER
Called patient and  to review appts and inform patient that she will need labs drawn tomorrow morning. Patients  verbalized understanding and also stated he would like to follow up with dr gregory. The patient's  stated dr gregory saw mrs spain while she was in the hospital. The patient was scheduled with dr mojica for her 1 week tox check. Will inform dr khoobehi. No further questions at this time.

## 2022-03-14 NOTE — TELEPHONE ENCOUNTER
Called infusion clinic to see why labs are reading need to be collected. Spoke with shanthi infusion nurse and she stated she believe the patient's port was only flushed this am. shanthi stated she will get with her charge nurse leslie to schedule the patient for labs tomorrow and inform the patient. Labs are to be drawn from port a cath. No further questions at this time.

## 2022-03-14 NOTE — PLAN OF CARE
Problem: Fatigue  Goal: Improved Activity Tolerance  Outcome: Ongoing, Progressing  Intervention: Promote Improved Energy  Flowsheets (Taken 3/14/2022 6509)  Fatigue Management: frequent rest breaks encouraged  Sleep/Rest Enhancement:   regular sleep/rest pattern promoted   relaxation techniques promoted  Activity Management: Ambulated -L4

## 2022-03-14 NOTE — PROGRESS NOTES
Service Date:  3/14/22    Chief Complaint: Colon Cancer    Regine Lemus is a 66 y.o. female with colorectal cancer.      Diarrhea continues to improve. Has BLE swelling and abdominal distention. States that the lasix prescribed last week did help some.    Per records:  Diagnosis of adenocarcinoma 1/13/21 with biopsy of liver lesion.  Patient has been treated with XELOX plus Avastin ever since.  CEA has been trending down.  Last CT scan was on 1/28/22 which showed further response.    Review of Systems   Constitutional: Negative.    HENT: Negative.    Eyes: Negative.    Respiratory: Negative.    Cardiovascular: Positive for leg swelling.   Gastrointestinal: Negative.    Endocrine: Negative.    Genitourinary: Negative.    Neurological: Negative.    Hematological: Negative.    Psychiatric/Behavioral: Negative.         Current Outpatient Medications   Medication Instructions    calcium carbonate (TUMS) 1,000 mg, Oral, 3 times daily before meals    capecitabine (XELODA) 500 MG Tab Take 4 tablets in the morning and 3 tablet at night    dronabinoL (MARINOL) 10 mg, Oral, 2 times daily before meals    ergocalciferol (ERGOCALCIFEROL) 50,000 Units, Oral, Every 7 days    famotidine (PEPCID) 20 mg, Oral, Daily    furosemide (LASIX) 20 mg, Oral, Daily    HYDROcodone-acetaminophen (NORCO) 5-325 mg per tablet 1 tablet, Oral, Every 6 hours PRN    KLOR-CON M20 20 mEq tablet 20 mEq, Oral, 2 times daily    levothyroxine (SYNTHROID) 125 mcg, Oral, Daily    loperamide (IMODIUM) 2 mg, Oral, 4 times daily PRN    predniSONE (DELTASONE) 20 mg, Oral, Daily, Pt taking 15mg daily        Past Medical History:   Diagnosis Date    Allergy     Colon cancer     Hypertension     Liver cancer     Nontoxic uninodular goiter     Osteoarthrosis, unspecified whether generalized or localized, hand     Other abnormal glucose     Pure hypercholesterolemia     Severe sepsis         Past Surgical History:   Procedure Laterality Date     "CYST REMOVAL  1994    cyst removed from neck    infusaport placement Right     THYROIDECTOMY      TONSILLECTOMY  age 14        Family History   Problem Relation Age of Onset    Lung cancer Father        Social History     Tobacco Use    Smoking status: Never Smoker    Smokeless tobacco: Never Used   Substance Use Topics    Alcohol use: No    Drug use: Never         Vitals:    03/14/22 1417   BP: (!) 180/90   Pulse: 102   Resp: 12   Temp: 97.6 °F (36.4 °C)        Physical Exam:  BP (!) 180/90 (BP Location: Right arm, Patient Position: Sitting)   Pulse 102   Temp 97.6 °F (36.4 °C) (Temporal)   Resp 12   Ht 5' 7" (1.702 m)   Wt 74.5 kg (164 lb 3.9 oz)   LMP  (LMP Unknown)   SpO2 99%   BMI 25.72 kg/m²     Physical Exam  Constitutional:       Appearance: Normal appearance.   HENT:      Head: Normocephalic and atraumatic.      Nose: Nose normal.      Mouth/Throat:      Mouth: Mucous membranes are moist.      Pharynx: Oropharynx is clear.   Eyes:      Conjunctiva/sclera: Conjunctivae normal.   Cardiovascular:      Rate and Rhythm: Normal rate and regular rhythm.      Heart sounds: Normal heart sounds.   Pulmonary:      Effort: Pulmonary effort is normal.      Breath sounds: Normal breath sounds.   Abdominal:      General: Abdomen is flat. Bowel sounds are normal. There is distension.      Palpations: Abdomen is soft.   Musculoskeletal:         General: Normal range of motion.      Cervical back: Normal range of motion and neck supple.      Right lower leg: Edema present.      Left lower leg: Edema present.   Skin:     General: Skin is warm and dry.   Neurological:      General: No focal deficit present.      Mental Status: She is alert and oriented to person, place, and time. Mental status is at baseline.   Psychiatric:         Mood and Affect: Mood normal.          Labs:  Lab Results   Component Value Date    WBC 5.81 03/05/2022    RBC 3.16 (L) 03/05/2022    HGB 9.8 (L) 03/05/2022    HCT 30.8 (L) " 03/05/2022    MCV 98 03/05/2022    MCH 31.0 03/05/2022    MCHC 31.8 (L) 03/05/2022    RDW 25.7 (H) 03/05/2022     (L) 03/05/2022    MPV 10.3 03/05/2022    GRAN 3.2 03/05/2022    GRAN 55.4 03/05/2022    LYMPH 1.5 03/05/2022    LYMPH 25.8 03/05/2022    MONO 1.0 03/05/2022    MONO 17.7 (H) 03/05/2022    EOS 0.0 03/05/2022    BASO 0.01 03/05/2022    EOSINOPHIL 0.2 03/05/2022    BASOPHIL 0.2 03/05/2022     Sodium   Date Value Ref Range Status   03/05/2022 139 136 - 145 mmol/L Final     Potassium   Date Value Ref Range Status   03/05/2022 3.8 3.5 - 5.1 mmol/L Final     Chloride   Date Value Ref Range Status   03/05/2022 113 (H) 95 - 110 mmol/L Final     CO2   Date Value Ref Range Status   03/05/2022 18 (L) 23 - 29 mmol/L Final     Glucose   Date Value Ref Range Status   03/05/2022 112 (H) 70 - 110 mg/dL Final     BUN   Date Value Ref Range Status   03/05/2022 10 8 - 23 mg/dL Final     Creatinine   Date Value Ref Range Status   03/05/2022 1.0 0.5 - 1.4 mg/dL Final     Calcium   Date Value Ref Range Status   03/05/2022 6.5 (LL) 8.7 - 10.5 mg/dL Final     Comment:     calcium   critical result(s) called and verbal readback obtained from   Kaykay Samson by KIMBERLY 03/05/2022 07:05       Total Protein   Date Value Ref Range Status   03/02/2022 4.2 (L) 6.0 - 8.4 g/dL Final     Albumin   Date Value Ref Range Status   03/02/2022 2.1 (L) 3.5 - 5.2 g/dL Final     Total Bilirubin   Date Value Ref Range Status   03/02/2022 1.0 0.1 - 1.0 mg/dL Final     Comment:     For infants and newborns, interpretation of results should be based  on gestational age, weight and in agreement with clinical  observations.    Premature Infant recommended reference ranges:  Up to 24 hours.............<8.0 mg/dL  Up to 48 hours............<12.0 mg/dL  3-5 days..................<15.0 mg/dL  6-29 days.................<15.0 mg/dL       Alkaline Phosphatase   Date Value Ref Range Status   03/02/2022 132 55 - 135 U/L Final     AST   Date Value Ref Range Status    03/02/2022 26 10 - 40 U/L Final     ALT   Date Value Ref Range Status   03/02/2022 24 10 - 44 U/L Final     Anion Gap   Date Value Ref Range Status   03/05/2022 8 8 - 16 mmol/L Final     eGFR if    Date Value Ref Range Status   03/05/2022 >60 >60 mL/min/1.73 m^2 Final     eGFR if non    Date Value Ref Range Status   03/05/2022 58 (A) >60 mL/min/1.73 m^2 Final     Comment:     Calculation used to obtain the estimated glomerular filtration  rate (eGFR) is the CKD-EPI equation.          A/P:    Metastatic colorectal cancer  -patient has been on XELOX with Avastin since around January of 2021.  She has been having a good response to treatment.  Her last CT scan on 1/28/22 continue to show good response.  -Resume XELOX on Wednesday  -rtc 1 week after for tox check    BLE swelling  -improving with lasix. Will continue to monitor.    Adrenal insufficiency  -currently on prednisone 20 mg daily.      Aurash Khoobehi, MD  Hematology and Oncology

## 2022-03-15 ENCOUNTER — TELEPHONE (OUTPATIENT)
Dept: HEMATOLOGY/ONCOLOGY | Facility: CLINIC | Age: 67
End: 2022-03-15
Payer: OTHER GOVERNMENT

## 2022-03-15 ENCOUNTER — INFUSION (OUTPATIENT)
Dept: INFUSION THERAPY | Facility: HOSPITAL | Age: 67
End: 2022-03-15
Attending: INTERNAL MEDICINE
Payer: OTHER GOVERNMENT

## 2022-03-15 ENCOUNTER — DOCUMENTATION ONLY (OUTPATIENT)
Dept: INFUSION THERAPY | Facility: HOSPITAL | Age: 67
End: 2022-03-15

## 2022-03-15 ENCOUNTER — DOCUMENTATION ONLY (OUTPATIENT)
Dept: HEMATOLOGY/ONCOLOGY | Facility: CLINIC | Age: 67
End: 2022-03-15
Payer: OTHER GOVERNMENT

## 2022-03-15 VITALS
RESPIRATION RATE: 16 BRPM | WEIGHT: 164.25 LBS | SYSTOLIC BLOOD PRESSURE: 154 MMHG | DIASTOLIC BLOOD PRESSURE: 79 MMHG | OXYGEN SATURATION: 97 % | HEART RATE: 108 BPM | BODY MASS INDEX: 25.72 KG/M2 | TEMPERATURE: 97 F

## 2022-03-15 DIAGNOSIS — C78.7 SECONDARY MALIGNANT NEOPLASM OF LIVER: ICD-10-CM

## 2022-03-15 DIAGNOSIS — C19 COLORECTAL CARCINOMA: Primary | ICD-10-CM

## 2022-03-15 LAB
ALBUMIN SERPL BCP-MCNC: 2.6 G/DL (ref 3.5–5.2)
ALP SERPL-CCNC: 613 U/L (ref 55–135)
ALT SERPL W/O P-5'-P-CCNC: 58 U/L (ref 10–44)
ANION GAP SERPL CALC-SCNC: 11 MMOL/L (ref 8–16)
AST SERPL-CCNC: 72 U/L (ref 10–40)
BASOPHILS # BLD AUTO: 0.03 K/UL (ref 0–0.2)
BASOPHILS NFR BLD: 0.2 % (ref 0–1.9)
BILIRUB SERPL-MCNC: 1.4 MG/DL (ref 0.1–1)
BUN SERPL-MCNC: 18 MG/DL (ref 8–23)
CALCIUM SERPL-MCNC: 6.8 MG/DL (ref 8.7–10.5)
CEA SERPL-MCNC: 155.9 NG/ML (ref 0–5)
CHLORIDE SERPL-SCNC: 105 MMOL/L (ref 95–110)
CO2 SERPL-SCNC: 26 MMOL/L (ref 23–29)
CREAT SERPL-MCNC: 1.1 MG/DL (ref 0.5–1.4)
DIFFERENTIAL METHOD: ABNORMAL
EOSINOPHIL # BLD AUTO: 0 K/UL (ref 0–0.5)
EOSINOPHIL NFR BLD: 0.1 % (ref 0–8)
ERYTHROCYTE [DISTWIDTH] IN BLOOD BY AUTOMATED COUNT: 26.3 % (ref 11.5–14.5)
EST. GFR  (AFRICAN AMERICAN): 59.6 ML/MIN/1.73 M^2
EST. GFR  (NON AFRICAN AMERICAN): 51.7 ML/MIN/1.73 M^2
GLUCOSE SERPL-MCNC: 216 MG/DL (ref 70–110)
HCT VFR BLD AUTO: 36.4 % (ref 37–48.5)
HGB BLD-MCNC: 11.3 G/DL (ref 12–16)
IMM GRANULOCYTES # BLD AUTO: 0.1 K/UL (ref 0–0.04)
IMM GRANULOCYTES NFR BLD AUTO: 0.8 % (ref 0–0.5)
LYMPHOCYTES # BLD AUTO: 1 K/UL (ref 1–4.8)
LYMPHOCYTES NFR BLD: 7.6 % (ref 18–48)
MAGNESIUM SERPL-MCNC: 1.4 MG/DL (ref 1.6–2.6)
MCH RBC QN AUTO: 31.8 PG (ref 27–31)
MCHC RBC AUTO-ENTMCNC: 31 G/DL (ref 32–36)
MCV RBC AUTO: 103 FL (ref 82–98)
MONOCYTES # BLD AUTO: 0.5 K/UL (ref 0.3–1)
MONOCYTES NFR BLD: 3.9 % (ref 4–15)
NEUTROPHILS # BLD AUTO: 11.6 K/UL (ref 1.8–7.7)
NEUTROPHILS NFR BLD: 87.4 % (ref 38–73)
NRBC BLD-RTO: 0 /100 WBC
PLATELET # BLD AUTO: 94 K/UL (ref 150–450)
PMV BLD AUTO: 10.8 FL (ref 9.2–12.9)
POTASSIUM SERPL-SCNC: 3.5 MMOL/L (ref 3.5–5.1)
PROT SERPL-MCNC: 5.6 G/DL (ref 6–8.4)
RBC # BLD AUTO: 3.55 M/UL (ref 4–5.4)
SODIUM SERPL-SCNC: 142 MMOL/L (ref 136–145)
WBC # BLD AUTO: 13.28 K/UL (ref 3.9–12.7)

## 2022-03-15 PROCEDURE — 82378 CARCINOEMBRYONIC ANTIGEN: CPT | Performed by: INTERNAL MEDICINE

## 2022-03-15 PROCEDURE — 96523 IRRIG DRUG DELIVERY DEVICE: CPT

## 2022-03-15 PROCEDURE — A4216 STERILE WATER/SALINE, 10 ML: HCPCS | Performed by: INTERNAL MEDICINE

## 2022-03-15 PROCEDURE — 83735 ASSAY OF MAGNESIUM: CPT | Performed by: INTERNAL MEDICINE

## 2022-03-15 PROCEDURE — 25000003 PHARM REV CODE 250: Performed by: INTERNAL MEDICINE

## 2022-03-15 PROCEDURE — 36591 DRAW BLOOD OFF VENOUS DEVICE: CPT

## 2022-03-15 PROCEDURE — 85025 COMPLETE CBC W/AUTO DIFF WBC: CPT | Performed by: INTERNAL MEDICINE

## 2022-03-15 PROCEDURE — 63600175 PHARM REV CODE 636 W HCPCS

## 2022-03-15 PROCEDURE — 80053 COMPREHEN METABOLIC PANEL: CPT | Performed by: INTERNAL MEDICINE

## 2022-03-15 RX ORDER — SODIUM CHLORIDE 0.9 % (FLUSH) 0.9 %
10 SYRINGE (ML) INJECTION
Status: DISCONTINUED | OUTPATIENT
Start: 2022-03-15 | End: 2022-03-15 | Stop reason: HOSPADM

## 2022-03-15 RX ORDER — HEPARIN 100 UNIT/ML
500 SYRINGE INTRAVENOUS
Status: DISCONTINUED | OUTPATIENT
Start: 2022-03-15 | End: 2022-03-15 | Stop reason: HOSPADM

## 2022-03-15 RX ORDER — HEPARIN 100 UNIT/ML
500 SYRINGE INTRAVENOUS
Status: CANCELLED | OUTPATIENT
Start: 2022-03-15

## 2022-03-15 RX ORDER — SODIUM CHLORIDE 0.9 % (FLUSH) 0.9 %
10 SYRINGE (ML) INJECTION
Status: CANCELLED | OUTPATIENT
Start: 2022-03-15

## 2022-03-15 RX ORDER — HEPARIN 100 UNIT/ML
SYRINGE INTRAVENOUS
Status: COMPLETED
Start: 2022-03-15 | End: 2022-03-15

## 2022-03-15 RX ORDER — LORAZEPAM/0.9% SODIUM CHLORIDE 100MG/0.1L
2 PLASTIC BAG, INJECTION (ML) INTRAVENOUS
Status: CANCELLED | OUTPATIENT
Start: 2022-03-15

## 2022-03-15 RX ADMIN — SODIUM CHLORIDE, PRESERVATIVE FREE 10 ML: 5 INJECTION INTRAVENOUS at 01:03

## 2022-03-15 RX ADMIN — HEPARIN 500 UNITS: 100 SYRINGE at 01:03

## 2022-03-15 RX ADMIN — Medication 500 UNITS: at 01:03

## 2022-03-15 NOTE — PLAN OF CARE
Patient port flushed labs collected. Patient unable to leave urine sample. Patient to return tomorrow with urine sample prior to treatment initiation.

## 2022-03-15 NOTE — TELEPHONE ENCOUNTER
Called patient back regarding message below. Patient did not answer. Callback number left via voicemail.     ----- Message from Karina Collazo sent at 3/15/2022 10:08 AM CDT -----  Regarding: advice  Contact: /[place]/[phone]  Type: Needs Medical Advice  Who Called:  /[place]/[phone]    Additional Information: Needs to talk to the office and give you all the information that the office has been asking for. Please call for the details.

## 2022-03-15 NOTE — NURSING
Patient had port placed yesterday. Hold Avastin for C1 ok to give C2 t.o. Dr. Khoobehi/JAYCEE Estevez

## 2022-03-16 ENCOUNTER — DOCUMENTATION ONLY (OUTPATIENT)
Dept: HEMATOLOGY/ONCOLOGY | Facility: CLINIC | Age: 67
End: 2022-03-16
Payer: OTHER GOVERNMENT

## 2022-03-16 ENCOUNTER — TELEPHONE (OUTPATIENT)
Dept: HEMATOLOGY/ONCOLOGY | Facility: CLINIC | Age: 67
End: 2022-03-16
Payer: OTHER GOVERNMENT

## 2022-03-16 ENCOUNTER — INFUSION (OUTPATIENT)
Dept: INFUSION THERAPY | Facility: HOSPITAL | Age: 67
End: 2022-03-16
Attending: INTERNAL MEDICINE
Payer: OTHER GOVERNMENT

## 2022-03-16 VITALS
HEART RATE: 97 BPM | RESPIRATION RATE: 18 BRPM | TEMPERATURE: 98 F | OXYGEN SATURATION: 97 % | HEIGHT: 67 IN | BODY MASS INDEX: 25.58 KG/M2 | DIASTOLIC BLOOD PRESSURE: 94 MMHG | SYSTOLIC BLOOD PRESSURE: 153 MMHG | WEIGHT: 163 LBS

## 2022-03-16 DIAGNOSIS — C19 COLORECTAL CARCINOMA: ICD-10-CM

## 2022-03-16 DIAGNOSIS — C78.7 SECONDARY MALIGNANT NEOPLASM OF LIVER: Primary | ICD-10-CM

## 2022-03-16 LAB
BILIRUB UR QL STRIP: NEGATIVE
CLARITY UR: CLEAR
COLOR UR: COLORLESS
GLUCOSE UR QL STRIP: NEGATIVE
HGB UR QL STRIP: NEGATIVE
KETONES UR QL STRIP: NEGATIVE
LEUKOCYTE ESTERASE UR QL STRIP: NEGATIVE
NITRITE UR QL STRIP: NEGATIVE
PH UR STRIP: 7 [PH] (ref 5–8)
PROT UR QL STRIP: NEGATIVE
SP GR UR STRIP: 1.01 (ref 1–1.03)
URN SPEC COLLECT METH UR: ABNORMAL
UROBILINOGEN UR STRIP-ACNC: NEGATIVE EU/DL

## 2022-03-16 PROCEDURE — 63600175 PHARM REV CODE 636 W HCPCS

## 2022-03-16 PROCEDURE — 96366 THER/PROPH/DIAG IV INF ADDON: CPT

## 2022-03-16 PROCEDURE — 25000003 PHARM REV CODE 250: Performed by: INTERNAL MEDICINE

## 2022-03-16 PROCEDURE — 96413 CHEMO IV INFUSION 1 HR: CPT

## 2022-03-16 PROCEDURE — 25000003 PHARM REV CODE 250

## 2022-03-16 PROCEDURE — 63600175 PHARM REV CODE 636 W HCPCS: Performed by: INTERNAL MEDICINE

## 2022-03-16 PROCEDURE — 96415 CHEMO IV INFUSION ADDL HR: CPT

## 2022-03-16 PROCEDURE — A4216 STERILE WATER/SALINE, 10 ML: HCPCS | Performed by: INTERNAL MEDICINE

## 2022-03-16 PROCEDURE — 96367 TX/PROPH/DG ADDL SEQ IV INF: CPT

## 2022-03-16 PROCEDURE — 81003 URINALYSIS AUTO W/O SCOPE: CPT | Performed by: INTERNAL MEDICINE

## 2022-03-16 RX ORDER — SODIUM CHLORIDE 0.9 % (FLUSH) 0.9 %
10 SYRINGE (ML) INJECTION
Status: DISCONTINUED | OUTPATIENT
Start: 2022-03-16 | End: 2022-03-16 | Stop reason: HOSPADM

## 2022-03-16 RX ORDER — HEPARIN 100 UNIT/ML
500 SYRINGE INTRAVENOUS
Status: DISCONTINUED | OUTPATIENT
Start: 2022-03-16 | End: 2022-03-16 | Stop reason: HOSPADM

## 2022-03-16 RX ORDER — EPINEPHRINE 0.3 MG/.3ML
0.3 INJECTION SUBCUTANEOUS ONCE AS NEEDED
Status: DISCONTINUED | OUTPATIENT
Start: 2022-03-16 | End: 2022-03-16 | Stop reason: HOSPADM

## 2022-03-16 RX ORDER — DIPHENHYDRAMINE HYDROCHLORIDE 50 MG/ML
50 INJECTION INTRAMUSCULAR; INTRAVENOUS ONCE AS NEEDED
Status: DISCONTINUED | OUTPATIENT
Start: 2022-03-16 | End: 2022-03-16 | Stop reason: HOSPADM

## 2022-03-16 RX ADMIN — MAGNESIUM SULFATE HEPTAHYDRATE: 500 INJECTION, SOLUTION INTRAMUSCULAR; INTRAVENOUS at 09:03

## 2022-03-16 RX ADMIN — OXALIPLATIN 246 MG: 100 INJECTION, SOLUTION, CONCENTRATE INTRAVENOUS at 08:03

## 2022-03-16 RX ADMIN — CALCIUM GLUCONATE 1 G: 98 INJECTION, SOLUTION INTRAVENOUS at 07:03

## 2022-03-16 RX ADMIN — SODIUM CHLORIDE, PRESERVATIVE FREE 10 ML: 5 INJECTION INTRAVENOUS at 11:03

## 2022-03-16 RX ADMIN — DEXTROSE MONOHYDRATE: 50 INJECTION, SOLUTION INTRAVENOUS at 07:03

## 2022-03-16 RX ADMIN — DEXAMETHASONE SODIUM PHOSPHATE 0.25 MG: 4 INJECTION, SOLUTION INTRA-ARTICULAR; INTRALESIONAL; INTRAMUSCULAR; INTRAVENOUS; SOFT TISSUE at 07:03

## 2022-03-16 RX ADMIN — Medication 500 UNITS: at 11:03

## 2022-03-16 NOTE — PLAN OF CARE
Problem: Fatigue  Goal: Improved Activity Tolerance  Outcome: Ongoing, Progressing  Intervention: Promote Improved Energy  Flowsheets (Taken 3/16/2022 0184)  Fatigue Management:   paced activity encouraged   fatigue-related activity identified   frequent rest breaks encouraged  Sleep/Rest Enhancement:   reading promoted   regular sleep/rest pattern promoted   family presence promoted   relaxation techniques promoted

## 2022-03-16 NOTE — TELEPHONE ENCOUNTER
Called patients  back and told him that I would send a message to [facility] Scheduling to see if they could move the appointment up to the morning. The patient's  expressed that he still works full time and they are only able to make doctors appointments if they are in the morning.       ----- Message from Bina Stone sent at 3/16/2022  4:42 PM CDT -----  Who Called:     What is the reqeust in detail: Requesting to reschedule 03/21/22 3pm appointment to a morning appointment due to transportation. Please advise.     Can the clinic reply by [facility]? No    Best Call Back Number: [phone]    Additional Information:

## 2022-03-17 ENCOUNTER — SOCIAL WORK (OUTPATIENT)
Dept: HEMATOLOGY/ONCOLOGY | Facility: CLINIC | Age: 67
End: 2022-03-17
Payer: OTHER GOVERNMENT

## 2022-03-17 ENCOUNTER — SPECIALTY PHARMACY (OUTPATIENT)
Dept: PHARMACY | Facility: CLINIC | Age: 67
End: 2022-03-17
Payer: OTHER GOVERNMENT

## 2022-03-17 DIAGNOSIS — C78.7 SECONDARY MALIGNANT NEOPLASM OF LIVER: ICD-10-CM

## 2022-03-17 DIAGNOSIS — C19 COLORECTAL CARCINOMA: ICD-10-CM

## 2022-03-17 RX ORDER — CAPECITABINE 500 MG/1
TABLET, FILM COATED ORAL
Qty: 98 TABLET | Refills: 6 | Status: CANCELLED | OUTPATIENT
Start: 2022-03-17

## 2022-03-17 NOTE — TELEPHONE ENCOUNTER
Xeloda script received. Looks like this is a renewal prescription. Messaging MD about dose. Will call insurance to get PA information.

## 2022-03-18 DIAGNOSIS — C19 COLORECTAL CARCINOMA: ICD-10-CM

## 2022-03-18 DIAGNOSIS — C78.7 SECONDARY MALIGNANT NEOPLASM OF LIVER: ICD-10-CM

## 2022-03-18 RX ORDER — CAPECITABINE 500 MG/1
TABLET, FILM COATED ORAL
Qty: 98 TABLET | Refills: 6 | Status: SHIPPED | OUTPATIENT
Start: 2022-03-18 | End: 2022-04-05

## 2022-03-21 ENCOUNTER — TELEPHONE (OUTPATIENT)
Dept: HEMATOLOGY/ONCOLOGY | Facility: CLINIC | Age: 67
End: 2022-03-21
Payer: OTHER GOVERNMENT

## 2022-03-21 ENCOUNTER — DOCUMENTATION ONLY (OUTPATIENT)
Dept: HEMATOLOGY/ONCOLOGY | Facility: CLINIC | Age: 67
End: 2022-03-21
Payer: OTHER GOVERNMENT

## 2022-03-21 ENCOUNTER — INFUSION (OUTPATIENT)
Dept: INFUSION THERAPY | Facility: HOSPITAL | Age: 67
End: 2022-03-21
Attending: INTERNAL MEDICINE
Payer: OTHER GOVERNMENT

## 2022-03-21 VITALS
HEART RATE: 111 BPM | DIASTOLIC BLOOD PRESSURE: 98 MMHG | RESPIRATION RATE: 18 BRPM | WEIGHT: 149.81 LBS | OXYGEN SATURATION: 97 % | TEMPERATURE: 98 F | BODY MASS INDEX: 23.46 KG/M2 | SYSTOLIC BLOOD PRESSURE: 170 MMHG

## 2022-03-21 DIAGNOSIS — C19 COLORECTAL CARCINOMA: Primary | ICD-10-CM

## 2022-03-21 DIAGNOSIS — C78.7 SECONDARY MALIGNANT NEOPLASM OF LIVER: ICD-10-CM

## 2022-03-21 LAB
ALBUMIN SERPL BCP-MCNC: 2.7 G/DL (ref 3.5–5.2)
ALP SERPL-CCNC: 428 U/L (ref 55–135)
ALT SERPL W/O P-5'-P-CCNC: 46 U/L (ref 10–44)
ANION GAP SERPL CALC-SCNC: 13 MMOL/L (ref 8–16)
AST SERPL-CCNC: 62 U/L (ref 10–40)
BASOPHILS # BLD AUTO: 0.01 K/UL (ref 0–0.2)
BASOPHILS NFR BLD: 0.1 % (ref 0–1.9)
BILIRUB SERPL-MCNC: 2 MG/DL (ref 0.1–1)
BUN SERPL-MCNC: 20 MG/DL (ref 8–23)
CALCIUM SERPL-MCNC: 6.6 MG/DL (ref 8.7–10.5)
CHLORIDE SERPL-SCNC: 102 MMOL/L (ref 95–110)
CO2 SERPL-SCNC: 28 MMOL/L (ref 23–29)
CREAT SERPL-MCNC: 0.9 MG/DL (ref 0.5–1.4)
DIFFERENTIAL METHOD: ABNORMAL
EOSINOPHIL # BLD AUTO: 0 K/UL (ref 0–0.5)
EOSINOPHIL NFR BLD: 0.3 % (ref 0–8)
ERYTHROCYTE [DISTWIDTH] IN BLOOD BY AUTOMATED COUNT: 23 % (ref 11.5–14.5)
EST. GFR  (AFRICAN AMERICAN): >60 ML/MIN/1.73 M^2
EST. GFR  (NON AFRICAN AMERICAN): >60 ML/MIN/1.73 M^2
GLUCOSE SERPL-MCNC: 109 MG/DL (ref 70–110)
HCT VFR BLD AUTO: 35.5 % (ref 37–48.5)
HGB BLD-MCNC: 11.1 G/DL (ref 12–16)
IMM GRANULOCYTES # BLD AUTO: 0.09 K/UL (ref 0–0.04)
IMM GRANULOCYTES NFR BLD AUTO: 0.7 % (ref 0–0.5)
LYMPHOCYTES # BLD AUTO: 2.5 K/UL (ref 1–4.8)
LYMPHOCYTES NFR BLD: 18.4 % (ref 18–48)
MAGNESIUM SERPL-MCNC: 1.5 MG/DL (ref 1.6–2.6)
MCH RBC QN AUTO: 31.9 PG (ref 27–31)
MCHC RBC AUTO-ENTMCNC: 31.3 G/DL (ref 32–36)
MCV RBC AUTO: 102 FL (ref 82–98)
MONOCYTES # BLD AUTO: 0.4 K/UL (ref 0.3–1)
MONOCYTES NFR BLD: 2.9 % (ref 4–15)
NEUTROPHILS # BLD AUTO: 10.5 K/UL (ref 1.8–7.7)
NEUTROPHILS NFR BLD: 77.6 % (ref 38–73)
NRBC BLD-RTO: 0 /100 WBC
PLATELET # BLD AUTO: 80 K/UL (ref 150–450)
PMV BLD AUTO: 11.2 FL (ref 9.2–12.9)
POTASSIUM SERPL-SCNC: 3.4 MMOL/L (ref 3.5–5.1)
PROT SERPL-MCNC: 5.5 G/DL (ref 6–8.4)
RBC # BLD AUTO: 3.48 M/UL (ref 4–5.4)
SODIUM SERPL-SCNC: 143 MMOL/L (ref 136–145)
WBC # BLD AUTO: 13.46 K/UL (ref 3.9–12.7)

## 2022-03-21 PROCEDURE — 63600175 PHARM REV CODE 636 W HCPCS: Performed by: INTERNAL MEDICINE

## 2022-03-21 PROCEDURE — 80053 COMPREHEN METABOLIC PANEL: CPT | Performed by: INTERNAL MEDICINE

## 2022-03-21 PROCEDURE — 85025 COMPLETE CBC W/AUTO DIFF WBC: CPT | Performed by: INTERNAL MEDICINE

## 2022-03-21 PROCEDURE — 36591 DRAW BLOOD OFF VENOUS DEVICE: CPT

## 2022-03-21 PROCEDURE — 83735 ASSAY OF MAGNESIUM: CPT | Performed by: INTERNAL MEDICINE

## 2022-03-21 PROCEDURE — 25000003 PHARM REV CODE 250: Performed by: INTERNAL MEDICINE

## 2022-03-21 PROCEDURE — A4216 STERILE WATER/SALINE, 10 ML: HCPCS | Performed by: INTERNAL MEDICINE

## 2022-03-21 RX ORDER — SODIUM CHLORIDE 0.9 % (FLUSH) 0.9 %
10 SYRINGE (ML) INJECTION
Status: CANCELLED | OUTPATIENT
Start: 2022-03-21

## 2022-03-21 RX ORDER — HEPARIN 100 UNIT/ML
500 SYRINGE INTRAVENOUS
Status: CANCELLED | OUTPATIENT
Start: 2022-03-21

## 2022-03-21 RX ORDER — SODIUM CHLORIDE 0.9 % (FLUSH) 0.9 %
10 SYRINGE (ML) INJECTION
Status: DISCONTINUED | OUTPATIENT
Start: 2022-03-21 | End: 2022-03-21 | Stop reason: HOSPADM

## 2022-03-21 RX ORDER — HEPARIN 100 UNIT/ML
500 SYRINGE INTRAVENOUS
Status: DISCONTINUED | OUTPATIENT
Start: 2022-03-21 | End: 2022-03-21 | Stop reason: HOSPADM

## 2022-03-21 RX ADMIN — Medication 500 UNITS: at 08:03

## 2022-03-21 RX ADMIN — SODIUM CHLORIDE, PRESERVATIVE FREE 10 ML: 5 INJECTION INTRAVENOUS at 08:03

## 2022-03-21 NOTE — TELEPHONE ENCOUNTER
Per dr gregory the patient's corrective calcium is 7.64 , he will see the patient at scheduled appt. No further orders or questions at this time.

## 2022-03-21 NOTE — PLAN OF CARE
Patient port flushed per MD orders. Tolerated well. Collected lab work. Unable to collect U/A at this time. Patient to return with sample. Notified Dr. Khoobehi.

## 2022-03-23 ENCOUNTER — TELEPHONE (OUTPATIENT)
Dept: HEMATOLOGY/ONCOLOGY | Facility: CLINIC | Age: 67
End: 2022-03-23
Payer: OTHER GOVERNMENT

## 2022-03-24 ENCOUNTER — OFFICE VISIT (OUTPATIENT)
Dept: HEMATOLOGY/ONCOLOGY | Facility: CLINIC | Age: 67
End: 2022-03-24
Payer: OTHER GOVERNMENT

## 2022-03-24 ENCOUNTER — LAB VISIT (OUTPATIENT)
Dept: LAB | Facility: HOSPITAL | Age: 67
End: 2022-03-24
Attending: INTERNAL MEDICINE
Payer: OTHER GOVERNMENT

## 2022-03-24 VITALS
TEMPERATURE: 97 F | WEIGHT: 144.63 LBS | SYSTOLIC BLOOD PRESSURE: 176 MMHG | OXYGEN SATURATION: 99 % | HEART RATE: 114 BPM | DIASTOLIC BLOOD PRESSURE: 97 MMHG | HEIGHT: 67 IN | RESPIRATION RATE: 16 BRPM | BODY MASS INDEX: 22.7 KG/M2

## 2022-03-24 DIAGNOSIS — G89.3 NEOPLASM RELATED PAIN: Primary | ICD-10-CM

## 2022-03-24 DIAGNOSIS — C19 COLORECTAL CARCINOMA: Primary | ICD-10-CM

## 2022-03-24 DIAGNOSIS — R63.0 LOSS OF APPETITE: ICD-10-CM

## 2022-03-24 DIAGNOSIS — C19 COLORECTAL CARCINOMA: ICD-10-CM

## 2022-03-24 DIAGNOSIS — E46 MALNUTRITION, UNSPECIFIED TYPE: ICD-10-CM

## 2022-03-24 LAB
BILIRUB UR QL STRIP: NEGATIVE
CLARITY UR: CLEAR
COLOR UR: YELLOW
GLUCOSE UR QL STRIP: NEGATIVE
HGB UR QL STRIP: NEGATIVE
KETONES UR QL STRIP: NEGATIVE
LEUKOCYTE ESTERASE UR QL STRIP: NEGATIVE
NITRITE UR QL STRIP: NEGATIVE
PH UR STRIP: 8 [PH] (ref 5–8)
PROT UR QL STRIP: NEGATIVE
SP GR UR STRIP: 1.01 (ref 1–1.03)
URN SPEC COLLECT METH UR: NORMAL
UROBILINOGEN UR STRIP-ACNC: NEGATIVE EU/DL

## 2022-03-24 PROCEDURE — 99999 PR PBB SHADOW E&M-EST. PATIENT-LVL IV: CPT | Mod: PBBFAC,,, | Performed by: NURSE PRACTITIONER

## 2022-03-24 PROCEDURE — 81003 URINALYSIS AUTO W/O SCOPE: CPT | Performed by: INTERNAL MEDICINE

## 2022-03-24 PROCEDURE — 99214 OFFICE O/P EST MOD 30 MIN: CPT | Mod: S$GLB,,, | Performed by: NURSE PRACTITIONER

## 2022-03-24 PROCEDURE — 99999 PR PBB SHADOW E&M-EST. PATIENT-LVL IV: ICD-10-PCS | Mod: PBBFAC,,, | Performed by: NURSE PRACTITIONER

## 2022-03-24 PROCEDURE — 99214 PR OFFICE/OUTPT VISIT, EST, LEVL IV, 30-39 MIN: ICD-10-PCS | Mod: S$GLB,,, | Performed by: NURSE PRACTITIONER

## 2022-03-24 RX ORDER — LANOLIN ALCOHOL/MO/W.PET/CERES
400 CREAM (GRAM) TOPICAL DAILY
Qty: 200 TABLET | Refills: 1 | Status: ON HOLD | OUTPATIENT
Start: 2022-03-24 | End: 2022-07-01 | Stop reason: HOSPADM

## 2022-03-24 NOTE — Clinical Note
Magox 400 mg PO daily sent  Cont current tx regimen  RTC in 1 week with cbc cmp mag to see md Cont calcium supplement

## 2022-03-24 NOTE — PROGRESS NOTES
Service Date:  3/24/22    Chief Complaint: Colon Cancer    Regine Lemus is a 66 y.o. female with colorectal cancer.      Diarrhea continues to improve.     Per records:  Diagnosis of adenocarcinoma 1/13/21 with biopsy of liver lesion.  Patient has been treated with XELOX plus Avastin ever since.  CEA has been trending down.  Last CT scan was on 1/28/22 which showed further response.    Review of Systems   Constitutional: Negative.    HENT: Negative.    Eyes: Negative.    Respiratory: Negative.    Cardiovascular: Negative.    Gastrointestinal: Negative.    Endocrine: Negative.    Genitourinary: Negative.    Neurological: Negative.    Hematological: Negative.    Psychiatric/Behavioral: Negative.         Current Outpatient Medications   Medication Instructions    calcium carbonate (TUMS) 1,000 mg, Oral, 3 times daily before meals    capecitabine (XELODA) 500 MG Tab Take 4 tablets in the morning and 3 tablet at night    dronabinoL (MARINOL) 10 mg, Oral, 2 times daily before meals    ergocalciferol (ERGOCALCIFEROL) 50,000 Units, Oral, Every 7 days    famotidine (PEPCID) 20 mg, Oral, Daily    furosemide (LASIX) 20 mg, Oral, Daily    HYDROcodone-acetaminophen (NORCO) 5-325 mg per tablet 1 tablet, Oral, Every 6 hours PRN    KLOR-CON M20 20 mEq tablet 20 mEq, Oral, 2 times daily    levothyroxine (SYNTHROID) 125 mcg, Oral, Daily    loperamide (IMODIUM) 2 mg, Oral, 4 times daily PRN    magnesium oxide (MAGOX) 400 mg, Oral, Daily    predniSONE (DELTASONE) 20 mg, Oral, Daily, Pt taking 15mg daily        Past Medical History:   Diagnosis Date    Allergy     Colon cancer     Hypertension     Liver cancer     Nontoxic uninodular goiter     Osteoarthrosis, unspecified whether generalized or localized, hand     Other abnormal glucose     Pure hypercholesterolemia     Severe sepsis         Past Surgical History:   Procedure Laterality Date    CYST REMOVAL  1994    cyst removed from neck    infusaport  "placement Right     THYROIDECTOMY      TONSILLECTOMY  age 14        Family History   Problem Relation Age of Onset    Lung cancer Father        Social History     Tobacco Use    Smoking status: Never Smoker    Smokeless tobacco: Never Used   Substance Use Topics    Alcohol use: No    Drug use: Never         Vitals:    03/24/22 0922   BP: (!) 176/97   Pulse: (!) 114   Resp: 16   Temp: 96.7 °F (35.9 °C)        Physical Exam:  BP (!) 176/97 (BP Location: Left arm, Patient Position: Sitting)   Pulse (!) 114   Temp 96.7 °F (35.9 °C) (Temporal)   Resp 16   Ht 5' 7" (1.702 m)   Wt 65.6 kg (144 lb 10 oz)   LMP  (LMP Unknown)   SpO2 99%   BMI 22.65 kg/m²     PHYSICAL EXAM:     Vitals:    03/24/22 0922   BP: (!) 176/97   Pulse: (!) 114   Resp: 16   Temp: 96.7 °F (35.9 °C)       GENERAL: Comfortable looking patient. Patient is in no distress.  Awake, alert and oriented to time, person and place.  No anxiety, or agitation.      HEENT: Normal conjunctivae and eyelids. WNL.  PERRLA 3 to 4 mm. No icterus, no pallor, no congestion, and no discharge noted.     NECK:  Supple. Trachea is central.  No crepitus.  No JVD or masses.    RESPIRATORY:  No intercostal retractions.  No dullness to percussion.  Chest is clear to auscultation.  No rales, rhonchi or wheezes.  No crepitus.  Good air entry bilaterally.    CARDIOVASCULAR:  S1 and S2 are normally heard without murmurs or gallops.  All peripheral pulses are present.    ABDOMEN:  Normal abdomen.  No hepatosplenomegaly.  No free fluid.  Bowel sounds are present.  No hernia noted. No masses.  No rebound or tenderness.  No guarding or rigidity.  Umbilicus is midline.    LYMPHATICS:  No axillary, cervical, supraclavicular, submental, or inguinal lymphadenopathy.    SKIN/MUSCULOSKELETAL:  There is no evidence of excoriation marks or ecchmosis.  No rashes.  No cyanosis.  No clubbing.  No joint or skeletal deformities noted.  Normal range of motion.    NEUROLOGIC:  Higher " functions are appropriate.  No cranial nerve deficits.  Normal abdi.  Normal strength.  Motor and sensory functions are normal.  Deep tendon reflexes are normal.    GENITAL/RECTAL:  Exams are deferred.     Labs:  Lab Results   Component Value Date    WBC 13.46 (H) 03/21/2022    RBC 3.48 (L) 03/21/2022    HGB 11.1 (L) 03/21/2022    HCT 35.5 (L) 03/21/2022     (H) 03/21/2022    MCH 31.9 (H) 03/21/2022    MCHC 31.3 (L) 03/21/2022    RDW 23.0 (H) 03/21/2022    PLT 80 (L) 03/21/2022    MPV 11.2 03/21/2022    GRAN 10.5 (H) 03/21/2022    GRAN 77.6 (H) 03/21/2022    LYMPH 2.5 03/21/2022    LYMPH 18.4 03/21/2022    MONO 0.4 03/21/2022    MONO 2.9 (L) 03/21/2022    EOS 0.0 03/21/2022    BASO 0.01 03/21/2022    EOSINOPHIL 0.3 03/21/2022    BASOPHIL 0.1 03/21/2022     Sodium   Date Value Ref Range Status   03/21/2022 143 136 - 145 mmol/L Final     Potassium   Date Value Ref Range Status   03/21/2022 3.4 (L) 3.5 - 5.1 mmol/L Final     Chloride   Date Value Ref Range Status   03/21/2022 102 95 - 110 mmol/L Final     CO2   Date Value Ref Range Status   03/21/2022 28 23 - 29 mmol/L Final     Glucose   Date Value Ref Range Status   03/21/2022 109 70 - 110 mg/dL Final     BUN   Date Value Ref Range Status   03/21/2022 20 8 - 23 mg/dL Final     Creatinine   Date Value Ref Range Status   03/21/2022 0.9 0.5 - 1.4 mg/dL Final     Calcium   Date Value Ref Range Status   03/21/2022 6.6 (LL) 8.7 - 10.5 mg/dL Final     Comment:     Calcium critical result(s) called and verbal readback obtained from   [place] Nurse by HS3 03/21/2022 13:34       Total Protein   Date Value Ref Range Status   03/21/2022 5.5 (L) 6.0 - 8.4 g/dL Final     Albumin   Date Value Ref Range Status   03/21/2022 2.7 (L) 3.5 - 5.2 g/dL Final     Total Bilirubin   Date Value Ref Range Status   03/21/2022 2.0 (H) 0.1 - 1.0 mg/dL Final     Comment:     For infants and newborns, interpretation of results should be based  on gestational age, weight and in  agreement with clinical  observations.    Premature Infant recommended reference ranges:  Up to 24 hours.............<8.0 mg/dL  Up to 48 hours............<12.0 mg/dL  3-5 days..................<15.0 mg/dL  6-29 days.................<15.0 mg/dL       Alkaline Phosphatase   Date Value Ref Range Status   03/21/2022 428 (H) 55 - 135 U/L Final     AST   Date Value Ref Range Status   03/21/2022 62 (H) 10 - 40 U/L Final     ALT   Date Value Ref Range Status   03/21/2022 46 (H) 10 - 44 U/L Final     Anion Gap   Date Value Ref Range Status   03/21/2022 13 8 - 16 mmol/L Final     eGFR if    Date Value Ref Range Status   03/21/2022 >60.0 >60 mL/min/1.73 m^2 Final     eGFR if non    Date Value Ref Range Status   03/21/2022 >60.0 >60 mL/min/1.73 m^2 Final     Comment:     Calculation used to obtain the estimated glomerular filtration  rate (eGFR) is the CKD-EPI equation.          A/P:    Metastatic colorectal cancer  -patient has been on XELOX with Avastin since around January of 2021.  She has been having a good response to treatment.  Her last CT scan on 1/28/22 continue to show good response.  -Resumed XELOX on Wednesday 3/16/22  -rtc 1 week to see md and possible resume avastin    BLE swelling  -improving with lasix. Will continue to monitor.    Adrenal insufficiency  -currently on prednisone 20 mg daily.    Hypomagnesemia:  Start Mag-Ox 400 mg p.o. daily    Hypocalcemia:  Corrected calcium is 7.6  Continue calcium supplement  Educated on foods rich in calcium    Low albumin:  Recommended increasing protein intake    Malignancy related pain:  Hydrocodone 5/325 mg p.o. every 6 hours as needed    Malnourished:  Marinol 2.5 mg p.o. b.i.d.      Follow-up with PCP for all other comorbidities

## 2022-03-26 ENCOUNTER — HOSPITAL ENCOUNTER (INPATIENT)
Facility: HOSPITAL | Age: 67
LOS: 5 days | Discharge: HOME | DRG: 871 | End: 2022-03-31
Attending: EMERGENCY MEDICINE | Admitting: HOSPITALIST
Payer: OTHER GOVERNMENT

## 2022-03-26 DIAGNOSIS — R41.0 CONFUSION: ICD-10-CM

## 2022-03-26 DIAGNOSIS — C19 COLORECTAL CARCINOMA: ICD-10-CM

## 2022-03-26 DIAGNOSIS — G89.3 NEOPLASM RELATED PAIN: ICD-10-CM

## 2022-03-26 DIAGNOSIS — R19.7 DIARRHEA, UNSPECIFIED TYPE: ICD-10-CM

## 2022-03-26 DIAGNOSIS — K52.9 PROCTOCOLITIS: Primary | ICD-10-CM

## 2022-03-26 DIAGNOSIS — R11.10 VOMITING: ICD-10-CM

## 2022-03-26 DIAGNOSIS — R07.9 CHEST PAIN: ICD-10-CM

## 2022-03-26 PROBLEM — E83.51 HYPOCALCEMIA: Status: ACTIVE | Noted: 2022-03-26

## 2022-03-26 PROBLEM — G93.41 ENCEPHALOPATHY, METABOLIC: Status: ACTIVE | Noted: 2022-03-26

## 2022-03-26 PROBLEM — M85.80 OSTEOPENIA: Status: ACTIVE | Noted: 2022-03-26

## 2022-03-26 PROBLEM — D69.6 THROMBOCYTOPENIA: Status: ACTIVE | Noted: 2022-03-26

## 2022-03-26 LAB
ALBUMIN SERPL BCP-MCNC: 2.7 G/DL (ref 3.5–5.2)
ALP SERPL-CCNC: 434 U/L (ref 55–135)
ALT SERPL W/O P-5'-P-CCNC: 35 U/L (ref 10–44)
AMMONIA PLAS-SCNC: 77 UMOL/L (ref 10–50)
ANION GAP SERPL CALC-SCNC: 15 MMOL/L (ref 8–16)
AST SERPL-CCNC: 37 U/L (ref 10–40)
BASOPHILS # BLD AUTO: 0.01 K/UL (ref 0–0.2)
BASOPHILS NFR BLD: 0.1 % (ref 0–1.9)
BILIRUB SERPL-MCNC: 2.1 MG/DL (ref 0.1–1)
BILIRUB UR QL STRIP: NEGATIVE
BUN SERPL-MCNC: 18 MG/DL (ref 8–23)
C DIFF GDH STL QL: NEGATIVE
C DIFF TOX A+B STL QL IA: NEGATIVE
CALCIUM SERPL-MCNC: 7.1 MG/DL (ref 8.7–10.5)
CHLORIDE SERPL-SCNC: 108 MMOL/L (ref 95–110)
CLARITY UR: CLEAR
CO2 SERPL-SCNC: 21 MMOL/L (ref 23–29)
COLOR UR: YELLOW
CREAT SERPL-MCNC: 0.8 MG/DL (ref 0.5–1.4)
DIFFERENTIAL METHOD: ABNORMAL
EOSINOPHIL # BLD AUTO: 0 K/UL (ref 0–0.5)
EOSINOPHIL NFR BLD: 0.1 % (ref 0–8)
ERYTHROCYTE [DISTWIDTH] IN BLOOD BY AUTOMATED COUNT: 21.6 % (ref 11.5–14.5)
EST. GFR  (AFRICAN AMERICAN): >60 ML/MIN/1.73 M^2
EST. GFR  (NON AFRICAN AMERICAN): >60 ML/MIN/1.73 M^2
GLUCOSE SERPL-MCNC: 147 MG/DL (ref 70–110)
GLUCOSE UR QL STRIP: NEGATIVE
HCT VFR BLD AUTO: 39.3 % (ref 37–48.5)
HGB BLD-MCNC: 12.3 G/DL (ref 12–16)
HGB UR QL STRIP: NEGATIVE
IMM GRANULOCYTES # BLD AUTO: 0.02 K/UL (ref 0–0.04)
IMM GRANULOCYTES NFR BLD AUTO: 0.3 % (ref 0–0.5)
KETONES UR QL STRIP: NEGATIVE
LACTATE SERPL-SCNC: 5 MMOL/L (ref 0.5–2.2)
LACTATE SERPL-SCNC: 5.6 MMOL/L (ref 0.5–2.2)
LACTATE SERPL-SCNC: 6.1 MMOL/L (ref 0.5–2.2)
LEUKOCYTE ESTERASE UR QL STRIP: NEGATIVE
LIPASE SERPL-CCNC: 25 U/L (ref 4–60)
LYMPHOCYTES # BLD AUTO: 1.8 K/UL (ref 1–4.8)
LYMPHOCYTES NFR BLD: 25.5 % (ref 18–48)
MCH RBC QN AUTO: 32.4 PG (ref 27–31)
MCHC RBC AUTO-ENTMCNC: 31.3 G/DL (ref 32–36)
MCV RBC AUTO: 103 FL (ref 82–98)
MONOCYTES # BLD AUTO: 0.1 K/UL (ref 0.3–1)
MONOCYTES NFR BLD: 1.4 % (ref 4–15)
NEUTROPHILS # BLD AUTO: 5.2 K/UL (ref 1.8–7.7)
NEUTROPHILS NFR BLD: 72.6 % (ref 38–73)
NITRITE UR QL STRIP: NEGATIVE
NRBC BLD-RTO: 0 /100 WBC
PH UR STRIP: 7 [PH] (ref 5–8)
PLATELET # BLD AUTO: 96 K/UL (ref 150–450)
PMV BLD AUTO: 11.3 FL (ref 9.2–12.9)
POTASSIUM SERPL-SCNC: 3.6 MMOL/L (ref 3.5–5.1)
PROT SERPL-MCNC: 5.9 G/DL (ref 6–8.4)
PROT UR QL STRIP: NEGATIVE
RBC # BLD AUTO: 3.8 M/UL (ref 4–5.4)
SARS-COV-2 RDRP RESP QL NAA+PROBE: NEGATIVE
SODIUM SERPL-SCNC: 144 MMOL/L (ref 136–145)
SP GR UR STRIP: 1.01 (ref 1–1.03)
URN SPEC COLLECT METH UR: NORMAL
UROBILINOGEN UR STRIP-ACNC: 1 EU/DL
WBC # BLD AUTO: 7.15 K/UL (ref 3.9–12.7)

## 2022-03-26 PROCEDURE — S0030 INJECTION, METRONIDAZOLE: HCPCS | Performed by: NURSE PRACTITIONER

## 2022-03-26 PROCEDURE — 36415 COLL VENOUS BLD VENIPUNCTURE: CPT | Performed by: NURSE PRACTITIONER

## 2022-03-26 PROCEDURE — 85025 COMPLETE CBC W/AUTO DIFF WBC: CPT | Performed by: EMERGENCY MEDICINE

## 2022-03-26 PROCEDURE — 25000003 PHARM REV CODE 250: Performed by: NURSE PRACTITIONER

## 2022-03-26 PROCEDURE — 96361 HYDRATE IV INFUSION ADD-ON: CPT

## 2022-03-26 PROCEDURE — 36415 COLL VENOUS BLD VENIPUNCTURE: CPT | Performed by: EMERGENCY MEDICINE

## 2022-03-26 PROCEDURE — U0002 COVID-19 LAB TEST NON-CDC: HCPCS | Performed by: NURSE PRACTITIONER

## 2022-03-26 PROCEDURE — 25000003 PHARM REV CODE 250: Performed by: EMERGENCY MEDICINE

## 2022-03-26 PROCEDURE — 82140 ASSAY OF AMMONIA: CPT | Performed by: EMERGENCY MEDICINE

## 2022-03-26 PROCEDURE — 83605 ASSAY OF LACTIC ACID: CPT | Performed by: NURSE PRACTITIONER

## 2022-03-26 PROCEDURE — 63600175 PHARM REV CODE 636 W HCPCS: Performed by: EMERGENCY MEDICINE

## 2022-03-26 PROCEDURE — 63600175 PHARM REV CODE 636 W HCPCS: Performed by: NURSE PRACTITIONER

## 2022-03-26 PROCEDURE — 94760 N-INVAS EAR/PLS OXIMETRY 1: CPT

## 2022-03-26 PROCEDURE — 83690 ASSAY OF LIPASE: CPT | Performed by: EMERGENCY MEDICINE

## 2022-03-26 PROCEDURE — 93005 ELECTROCARDIOGRAM TRACING: CPT

## 2022-03-26 PROCEDURE — 80053 COMPREHEN METABOLIC PANEL: CPT | Performed by: EMERGENCY MEDICINE

## 2022-03-26 PROCEDURE — 99285 EMERGENCY DEPT VISIT HI MDM: CPT | Mod: 25

## 2022-03-26 PROCEDURE — 87449 NOS EACH ORGANISM AG IA: CPT | Performed by: EMERGENCY MEDICINE

## 2022-03-26 PROCEDURE — 81003 URINALYSIS AUTO W/O SCOPE: CPT | Performed by: EMERGENCY MEDICINE

## 2022-03-26 PROCEDURE — 27000207 HC ISOLATION

## 2022-03-26 PROCEDURE — 96376 TX/PRO/DX INJ SAME DRUG ADON: CPT

## 2022-03-26 PROCEDURE — 12000002 HC ACUTE/MED SURGE SEMI-PRIVATE ROOM

## 2022-03-26 PROCEDURE — 25500020 PHARM REV CODE 255: Performed by: EMERGENCY MEDICINE

## 2022-03-26 PROCEDURE — 96374 THER/PROPH/DIAG INJ IV PUSH: CPT

## 2022-03-26 PROCEDURE — 99900035 HC TECH TIME PER 15 MIN (STAT)

## 2022-03-26 RX ORDER — NALOXONE HCL 0.4 MG/ML
0.02 VIAL (ML) INJECTION
Status: DISCONTINUED | OUTPATIENT
Start: 2022-03-26 | End: 2022-03-31 | Stop reason: HOSPADM

## 2022-03-26 RX ORDER — LEVOTHYROXINE SODIUM 125 UG/1
125 TABLET ORAL DAILY
Status: DISCONTINUED | OUTPATIENT
Start: 2022-03-27 | End: 2022-03-28

## 2022-03-26 RX ORDER — IBUPROFEN 200 MG
16 TABLET ORAL
Status: DISCONTINUED | OUTPATIENT
Start: 2022-03-26 | End: 2022-03-31 | Stop reason: HOSPADM

## 2022-03-26 RX ORDER — FAMOTIDINE 20 MG/1
20 TABLET, FILM COATED ORAL 2 TIMES DAILY
Status: DISCONTINUED | OUTPATIENT
Start: 2022-03-26 | End: 2022-03-31 | Stop reason: HOSPADM

## 2022-03-26 RX ORDER — SODIUM,POTASSIUM PHOSPHATES 280-250MG
2 POWDER IN PACKET (EA) ORAL
Status: DISCONTINUED | OUTPATIENT
Start: 2022-03-26 | End: 2022-03-31 | Stop reason: HOSPADM

## 2022-03-26 RX ORDER — METRONIDAZOLE 500 MG/100ML
500 INJECTION, SOLUTION INTRAVENOUS
Status: DISCONTINUED | OUTPATIENT
Start: 2022-03-26 | End: 2022-03-29

## 2022-03-26 RX ORDER — IBUPROFEN 200 MG
24 TABLET ORAL
Status: DISCONTINUED | OUTPATIENT
Start: 2022-03-26 | End: 2022-03-31 | Stop reason: HOSPADM

## 2022-03-26 RX ORDER — GLUCAGON 1 MG
1 KIT INJECTION
Status: DISCONTINUED | OUTPATIENT
Start: 2022-03-26 | End: 2022-03-31 | Stop reason: HOSPADM

## 2022-03-26 RX ORDER — CIPROFLOXACIN 2 MG/ML
400 INJECTION, SOLUTION INTRAVENOUS
Status: DISCONTINUED | OUTPATIENT
Start: 2022-03-26 | End: 2022-03-30

## 2022-03-26 RX ORDER — ACETAMINOPHEN 325 MG/1
650 TABLET ORAL EVERY 8 HOURS PRN
Status: DISCONTINUED | OUTPATIENT
Start: 2022-03-26 | End: 2022-03-31 | Stop reason: HOSPADM

## 2022-03-26 RX ORDER — TALC
6 POWDER (GRAM) TOPICAL NIGHTLY PRN
Status: DISCONTINUED | OUTPATIENT
Start: 2022-03-26 | End: 2022-03-31 | Stop reason: HOSPADM

## 2022-03-26 RX ORDER — MORPHINE SULFATE 2 MG/ML
2 INJECTION, SOLUTION INTRAMUSCULAR; INTRAVENOUS EVERY 4 HOURS PRN
Status: DISCONTINUED | OUTPATIENT
Start: 2022-03-26 | End: 2022-03-31 | Stop reason: HOSPADM

## 2022-03-26 RX ORDER — ONDANSETRON 2 MG/ML
4 INJECTION INTRAMUSCULAR; INTRAVENOUS EVERY 8 HOURS PRN
Status: DISCONTINUED | OUTPATIENT
Start: 2022-03-26 | End: 2022-03-31 | Stop reason: HOSPADM

## 2022-03-26 RX ORDER — LOPERAMIDE HYDROCHLORIDE 2 MG/1
2 CAPSULE ORAL 4 TIMES DAILY PRN
Status: DISCONTINUED | OUTPATIENT
Start: 2022-03-26 | End: 2022-03-31 | Stop reason: HOSPADM

## 2022-03-26 RX ORDER — ONDANSETRON 2 MG/ML
4 INJECTION INTRAMUSCULAR; INTRAVENOUS
Status: COMPLETED | OUTPATIENT
Start: 2022-03-26 | End: 2022-03-26

## 2022-03-26 RX ORDER — CALCIUM CARBONATE 200(500)MG
1000 TABLET,CHEWABLE ORAL
Status: DISCONTINUED | OUTPATIENT
Start: 2022-03-27 | End: 2022-03-31 | Stop reason: HOSPADM

## 2022-03-26 RX ORDER — SIMETHICONE 80 MG
1 TABLET,CHEWABLE ORAL 4 TIMES DAILY PRN
Status: DISCONTINUED | OUTPATIENT
Start: 2022-03-26 | End: 2022-03-31 | Stop reason: HOSPADM

## 2022-03-26 RX ORDER — CAPECITABINE 500 MG/1
1500 TABLET, FILM COATED ORAL NIGHTLY
Status: DISCONTINUED | OUTPATIENT
Start: 2022-03-26 | End: 2022-03-27

## 2022-03-26 RX ORDER — SODIUM CHLORIDE 0.9 % (FLUSH) 0.9 %
10 SYRINGE (ML) INJECTION EVERY 12 HOURS PRN
Status: DISCONTINUED | OUTPATIENT
Start: 2022-03-26 | End: 2022-03-31 | Stop reason: HOSPADM

## 2022-03-26 RX ORDER — LANOLIN ALCOHOL/MO/W.PET/CERES
800 CREAM (GRAM) TOPICAL
Status: DISCONTINUED | OUTPATIENT
Start: 2022-03-26 | End: 2022-03-31 | Stop reason: HOSPADM

## 2022-03-26 RX ORDER — SODIUM CHLORIDE 9 MG/ML
INJECTION, SOLUTION INTRAVENOUS CONTINUOUS
Status: DISCONTINUED | OUTPATIENT
Start: 2022-03-26 | End: 2022-03-31 | Stop reason: HOSPADM

## 2022-03-26 RX ORDER — IPRATROPIUM BROMIDE AND ALBUTEROL SULFATE 2.5; .5 MG/3ML; MG/3ML
3 SOLUTION RESPIRATORY (INHALATION) EVERY 4 HOURS PRN
Status: DISCONTINUED | OUTPATIENT
Start: 2022-03-26 | End: 2022-03-31 | Stop reason: HOSPADM

## 2022-03-26 RX ADMIN — SODIUM CHLORIDE 1000 ML: 0.9 INJECTION, SOLUTION INTRAVENOUS at 05:03

## 2022-03-26 RX ADMIN — CIPROFLOXACIN 400 MG: 2 INJECTION, SOLUTION INTRAVENOUS at 04:03

## 2022-03-26 RX ADMIN — SODIUM CHLORIDE 1000 ML: 0.9 INJECTION, SOLUTION INTRAVENOUS at 02:03

## 2022-03-26 RX ADMIN — SODIUM CHLORIDE 1000 ML: 0.9 INJECTION, SOLUTION INTRAVENOUS at 09:03

## 2022-03-26 RX ADMIN — METRONIDAZOLE 500 MG: 500 INJECTION, SOLUTION INTRAVENOUS at 11:03

## 2022-03-26 RX ADMIN — METRONIDAZOLE 500 MG: 500 INJECTION, SOLUTION INTRAVENOUS at 03:03

## 2022-03-26 RX ADMIN — ONDANSETRON 4 MG: 2 INJECTION INTRAMUSCULAR; INTRAVENOUS at 01:03

## 2022-03-26 RX ADMIN — CALCIUM GLUCONATE 1 G: 94 INJECTION, SOLUTION INTRAVENOUS at 04:03

## 2022-03-26 RX ADMIN — FAMOTIDINE 20 MG: 20 TABLET ORAL at 09:03

## 2022-03-26 RX ADMIN — SODIUM CHLORIDE: 0.9 INJECTION, SOLUTION INTRAVENOUS at 06:03

## 2022-03-26 RX ADMIN — ONDANSETRON 4 MG: 2 INJECTION INTRAMUSCULAR; INTRAVENOUS at 10:03

## 2022-03-26 RX ADMIN — IOHEXOL 75 ML: 350 INJECTION, SOLUTION INTRAVENOUS at 01:03

## 2022-03-26 NOTE — ASSESSMENT & PLAN NOTE
This patient does have evidence of infective focus  My overall impression is severe sepsis. Vital signs were reviewed and noted in progress note.  Antibiotics given-   Antibiotics (From admission, onward)            Start     Stop Route Frequency Ordered    03/26/22 1615  ciprofloxacin (CIPRO)400mg/200ml D5W IVPB 400 mg         -- IV Every 12 hours (non-standard times) 03/26/22 1505    03/26/22 1615  metronidazole IVPB 500 mg         -- IV Every 8 hours (non-standard times) 03/26/22 1505        Cultures were taken-   Microbiology Results (last 7 days)     Procedure Component Value Units Date/Time    Clostridium difficile EIA [[id number]] Collected: 03/26/22 1544    Order Status: Sent Specimen: Stool Updated: 03/26/22 1604        Latest lactate reviewed, they are-  Recent Labs   Lab 03/26/22  1521   LACTATE 5.6*       Organ dysfunction indicated by Encephalopathy   Source- GI    Source control Achieved by- IV abx

## 2022-03-26 NOTE — HPI
Regine Lemus is a 66-year-old female with past medical history significant for hypertension, hyperlipidemia, colon cancer with metastatic liver disease on chemotherapy with last infusion on 03/16/2022 who presented to the emergency department for further evaluation of abdominal pain and diarrhea which began this morning.  She was discharged from this facility on 3/5/22 after being treated for similar complaints.  Her  is at bedside and provides the majority the history stating that this happened following her previous chemotherapy infusion.  This morning he noted her to be confused and she was complaining of severe abdominal pain.  She had some diarrhea home, but he states that it has become much worse since they arrived to the ED.  CT abdomen pelvis was significant for proctocolitis with noted hepatic metastatic disease.  Ammonia level was elevated at 77 and she was found to have hypocalcemia and thrombocytopenia.  Her lactic acid is 5.6 and she is tachycardic.  Further history is limited due to the condition of the patient.  She is admitted to the service of hospital medicine for continued monitoring and treatment.

## 2022-03-26 NOTE — ASSESSMENT & PLAN NOTE
Followed by oncology.  Last chemo infusion 3/16/22  Continue chronic capecitabine 2000mg qam, 1500mg qpm  Consult oncology

## 2022-03-26 NOTE — H&P
[facility] Emergency [facility] Medicine  History & Physical    Patient Name: Regine Lemus  MRN: [id number]  Patient Class: IP- Inpatient  Admission Date: 3/26/2022  Attending Physician: Lobito Vigil MD  Primary Care Provider: Primary Doctor No         Patient information was obtained from spouse/SO, past medical records and ER records.     Subjective:     Principal Problem:Proctocolitis    Chief Complaint:   Chief Complaint   Patient presents with    Abdominal Pain     Pt to ER with EMS from home with c/o increased abd pain. Pt with hx of colon cancer with mets to liver.         HPI: Regine Lemus is a 66-year-old female with past medical history significant for hypertension, hyperlipidemia, colon cancer with metastatic liver disease on chemotherapy with last infusion on 03/16/2022 who presented to the emergency department for further evaluation of abdominal pain and diarrhea which began this morning.  She was discharged from this facility on 3/5/22 after being treated for similar complaints.  Her  is at bedside and provides the majority the history stating that this happened following her previous chemotherapy infusion.  This morning he noted her to be confused and she was complaining of severe abdominal pain.  She had some diarrhea home, but he states that it has become much worse since they arrived to the ED.  CT abdomen pelvis was significant for proctocolitis with noted hepatic metastatic disease.  Ammonia level was elevated at 77 and she was found to have hypocalcemia and thrombocytopenia.  Her lactic acid is 5.6 and she is tachycardic.  Further history is limited due to the condition of the patient.  She is admitted to the service of hospital medicine for continued monitoring and treatment.      Past Medical History:   Diagnosis Date    Allergy     Colon cancer     Hypertension     Liver cancer     Nontoxic uninodular goiter     Osteoarthrosis, unspecified whether generalized or  localized, hand     Other abnormal glucose     Pure hypercholesterolemia     Severe sepsis        Past Surgical History:   Procedure Laterality Date    CYST REMOVAL  1994    cyst removed from neck    infusaport placement Right     THYROIDECTOMY      TONSILLECTOMY  age 14       Review of patient's allergies indicates:   Allergen Reactions    Lisinopril Rash       No current facility-administered medications on file prior to encounter.     Current Outpatient Medications on File Prior to Encounter   Medication Sig    calcium carbonate (TUMS) 200 mg calcium (500 mg) chewable tablet Take 2 tablets (1,000 mg total) by mouth 3 (three) times daily before meals.    capecitabine (XELODA) 500 MG Tab Take 4 tablets in the morning and 3 tablet at night.    dronabinoL (MARINOL) 10 MG capsule Take 1 capsule (10 mg total) by mouth 2 (two) times daily before meals.    ergocalciferol (ERGOCALCIFEROL) 50,000 unit Cap Take 50,000 Units by mouth every 7 days.    famotidine (PEPCID) 20 MG tablet Take 1 tablet (20 mg total) by mouth once daily.    furosemide (LASIX) 20 MG tablet Take 1 tablet (20 mg total) by mouth once daily.    HYDROcodone-acetaminophen (NORCO) 5-325 mg per tablet Take 1 tablet by mouth every 6 (six) hours as needed for Pain.    KLOR-CON M20 20 mEq tablet Take 20 mEq by mouth 2 (two) times daily.    levothyroxine (SYNTHROID) 125 MCG tablet Take 125 mcg by mouth once daily.    loperamide (IMODIUM) 2 mg capsule Take 1 capsule (2 mg total) by mouth 4 (four) times daily as needed for Diarrhea.    magnesium oxide (MAGOX) 400 mg (241.3 mg magnesium) tablet Take 1 tablet (400 mg total) by mouth once daily.    predniSONE (DELTASONE) 10 MG tablet Take 20 mg by mouth once daily. Pt taking 15mg daily     Family History       Problem Relation (Age of Onset)    Lung cancer Father          Tobacco Use    Smoking status: Never Smoker    Smokeless tobacco: Never Used   Substance and Sexual Activity    Alcohol  use: No    Drug use: Never    Sexual activity: Not on file     Review of Systems   Constitutional:  Positive for fatigue. Negative for chills and fever.   HENT:  Negative for congestion and postnasal drip.    Respiratory:  Negative for cough and shortness of breath.    Cardiovascular:  Negative for chest pain and palpitations.   Gastrointestinal:  Positive for abdominal pain and diarrhea. Negative for vomiting.   Genitourinary:  Negative for dysuria and hematuria.   Musculoskeletal:  Positive for gait problem. Negative for neck pain.   Skin:  Positive for pallor. Negative for rash.   Neurological:  Positive for weakness. Negative for syncope.   Psychiatric/Behavioral:  Positive for confusion. Negative for agitation.    All other systems reviewed and are negative.  Objective:     Vital Signs (Most Recent):  Temp: 98.4 °F (36.9 °C) (03/26/22 1006)  Pulse: (!) 119 (03/26/22 1529)  Resp: 18 (03/26/22 1529)  BP: 130/65 (03/26/22 1529)  SpO2: 95 % (03/26/22 1529)   Vital Signs (24h Range):  Temp:  [98.4 °F (36.9 °C)] 98.4 °F (36.9 °C)  Pulse:  [] 119  Resp:  [16-20] 18  SpO2:  [95 %-98 %] 95 %  BP: (130-176)/(65-86) 130/65     Weight: 65.3 kg (144 lb)  Body mass index is 22.55 kg/m².    Physical Exam  Vitals and nursing note reviewed.   Constitutional:       Appearance: She is well-developed. She is ill-appearing.   HENT:      Head: Normocephalic and atraumatic.      Mouth/Throat:      Mouth: Mucous membranes are dry.   Eyes:      Pupils: Pupils are equal, round, and reactive to light.   Cardiovascular:      Rate and Rhythm: Regular rhythm. Tachycardia present.      Pulses: Normal pulses.   Pulmonary:      Effort: Pulmonary effort is normal. No respiratory distress.      Breath sounds: Normal breath sounds.   Abdominal:      General: Bowel sounds are normal. There is no distension.      Palpations: Abdomen is soft.      Tenderness: There is abdominal tenderness (worst at LLQ; mild diffuse tenderness).    Musculoskeletal:      Cervical back: Normal range of motion and neck supple.   Skin:     General: Skin is warm and dry.      Findings: No rash.   Neurological:      Mental Status: She is lethargic.      GCS: GCS eye subscore is 3. GCS verbal subscore is 4. GCS motor subscore is 6.   Psychiatric:         Attention and Perception: She is inattentive.         Speech: She is noncommunicative.         Behavior: Behavior is withdrawn.         Cognition and Memory: Cognition is impaired.         CRANIAL NERVES     CN III, IV, VI   Pupils are equal, round, and reactive to light.     Significant Labs: All pertinent labs within the past 24 hours have been reviewed.  Bilirubin:   Recent Labs   Lab 03/01/22  0448 03/02/22  0537 03/15/22  1311 03/21/22  0804 03/26/22  1134   BILITOT 0.6 1.0 1.4* 2.0* 2.1*     CBC:   Recent Labs   Lab 03/26/22  1134   WBC 7.15   HGB 12.3   HCT 39.3   PLT 96*     CMP:   Recent Labs   Lab 03/26/22  1134      K 3.6      CO2 21*   *   BUN 18   CREATININE 0.8   CALCIUM 7.1*   PROT 5.9*   ALBUMIN 2.7*   BILITOT 2.1*   ALKPHOS 434*   AST 37   ALT 35   ANIONGAP 15   EGFRNONAA >60     Lactic Acid:   Recent Labs   Lab 03/26/22  1521   LACTATE 5.6*     Urine Studies:   Recent Labs   Lab 03/26/22  1154   COLORU Yellow   APPEARANCEUA Clear   PHUR 7.0   SPECGRAV 1.010   PROTEINUA Negative   GLUCUA Negative   KETONESU Negative   BILIRUBINUA Negative   OCCULTUA Negative   NITRITE Negative   UROBILINOGEN 1.0   LEUKOCYTESUR Negative       Significant Imaging: I have reviewed all pertinent imaging results/findings within the past 24 hours.  CT head: 1. No intracranial hemorrhage.  No evidence for recent ischemia, noting however that MRI could add sensitivity in an acute setting.  2. Mild white matter disease.    CT abd/pelvis:  1. Proctocolitis presumably from an infectious or inflammatory etiology.  2. Progression of hepatic metastatic disease.  3. Development of small volume ascites.  4.  Nonspecific urinary bladder distention.  5. Multiple compression fractures are age indeterminate but new since prior exam.    Assessment/Plan:     * Proctocolitis  IV cipro; IV flagyl  IVF hydration  IV narcotics and IV antiemetics as needed  Stool for c diff      Encephalopathy, metabolic  Patient has acute metabolic encephalopathy that is secondary to Electrolyte abnormalities- hypocalcemia and Sepsis/Infective. Patient's current mental status is Lethargic. Patient's baseline mental status is. awake and alert; oriented to person, place, and time Evaluation and for underlying cause(s) is underway and inclusive of Blood Chemistries and Neuro-Imaging (MRI/CT). Will monitor neuro checks carefully, avoid narcotics and benzos that will exacerbate agitation, and use PRN medications for controls of behavior for self harm.      Hypocalcemia  Corrected calcium 8.1.  IV calcium gluconate ordered; continue daily tums.  Check ionized calcium in am.  Maintain delirium precautions      Osteopenia  Compression fx of vertebrae seen today not previously noted-lower thoracic levels and lumbar spine.  Maintain fall precautions.  Consider neurosurgery follow up.      Thrombocytopenia  Chronic; stable; trend      Severe malnutrition  Consult nutrition      Severe sepsis  This patient does have evidence of infective focus  My overall impression is severe sepsis. Vital signs were reviewed and noted in progress note.  Antibiotics given-   Antibiotics (From admission, onward)            Start     Stop Route Frequency Ordered    03/26/22 1615  ciprofloxacin (CIPRO)400mg/200ml D5W IVPB 400 mg         -- IV Every 12 hours (non-standard times) 03/26/22 1505    03/26/22 1615  metronidazole IVPB 500 mg         -- IV Every 8 hours (non-standard times) 03/26/22 1505        Cultures were taken-   Microbiology Results (last 7 days)     Procedure Component Value Units Date/Time    Clostridium difficile EIA [[id number]] Collected: 03/26/22 1115     Order Status: Sent Specimen: Stool Updated: 03/26/22 1604        Latest lactate reviewed, they are-  Recent Labs   Lab 03/26/22  1521   LACTATE 5.6*       Organ dysfunction indicated by Encephalopathy   Source- GI    Source control Achieved by- IV abx      Secondary malignant neoplasm of liver  Noted; followed by oncology      Colorectal carcinoma  Followed by oncology.  Last chemo infusion 3/16/22  Continue chronic capecitabine 2000mg qam, 1500mg qpm  Consult oncology      Postoperative hypothyroidism  Patient has chronic hypothyroidism. TFTs reviewed-   Lab Results   Component Value Date    TSH 18.792 (H) 02/27/2022   . Will continue chronic levothyroxine and adjust for and clinical changes.        Essential (primary) hypertension  Chronic, controlled.  Latest blood pressure and vitals reviewed-   Temp:  [98.4 °F (36.9 °C)]   Pulse:  []   Resp:  [16-20]   BP: (130-176)/(65-86)   SpO2:  [95 %-98 %] .   Home meds for hypertension were reviewed and noted below.   Hypertension Medications             furosemide (LASIX) 20 MG tablet Take 1 tablet (20 mg total) by mouth once daily.          While in the hospital, will manage blood pressure as follows; Adjust home antihypertensive regimen as follows- hold chronic lasix 2/2 severe sepsis    Will utilize p.r.n. blood pressure medication only if patient's blood pressure greater than  180/110 and she develops symptoms such as worsening chest pain or shortness of breath.          VTE Risk Mitigation (From admission, onward)    NISA Maldonado  Department of Hospital Medicine   [facility] - Emergency Dept

## 2022-03-26 NOTE — SUBJECTIVE & OBJECTIVE
Past Medical History:   Diagnosis Date    Allergy     Colon cancer     Hypertension     Liver cancer     Nontoxic uninodular goiter     Osteoarthrosis, unspecified whether generalized or localized, hand     Other abnormal glucose     Pure hypercholesterolemia     Severe sepsis        Past Surgical History:   Procedure Laterality Date    CYST REMOVAL  1994    cyst removed from neck    infusaport placement Right     THYROIDECTOMY      TONSILLECTOMY  age 14       Review of patient's allergies indicates:   Allergen Reactions    Lisinopril Rash       No current facility-administered medications on file prior to encounter.     Current Outpatient Medications on File Prior to Encounter   Medication Sig    calcium carbonate (TUMS) 200 mg calcium (500 mg) chewable tablet Take 2 tablets (1,000 mg total) by mouth 3 (three) times daily before meals.    capecitabine (XELODA) 500 MG Tab Take 4 tablets in the morning and 3 tablet at night.    dronabinoL (MARINOL) 10 MG capsule Take 1 capsule (10 mg total) by mouth 2 (two) times daily before meals.    ergocalciferol (ERGOCALCIFEROL) 50,000 unit Cap Take 50,000 Units by mouth every 7 days.    famotidine (PEPCID) 20 MG tablet Take 1 tablet (20 mg total) by mouth once daily.    furosemide (LASIX) 20 MG tablet Take 1 tablet (20 mg total) by mouth once daily.    HYDROcodone-acetaminophen (NORCO) 5-325 mg per tablet Take 1 tablet by mouth every 6 (six) hours as needed for Pain.    KLOR-CON M20 20 mEq tablet Take 20 mEq by mouth 2 (two) times daily.    levothyroxine (SYNTHROID) 125 MCG tablet Take 125 mcg by mouth once daily.    loperamide (IMODIUM) 2 mg capsule Take 1 capsule (2 mg total) by mouth 4 (four) times daily as needed for Diarrhea.    magnesium oxide (MAGOX) 400 mg (241.3 mg magnesium) tablet Take 1 tablet (400 mg total) by mouth once daily.    predniSONE (DELTASONE) 10 MG tablet Take 20 mg by mouth once daily. Pt taking 15mg daily     Family History       Problem Relation (Age  of Onset)    Lung cancer Father          Tobacco Use    Smoking status: Never Smoker    Smokeless tobacco: Never Used   Substance and Sexual Activity    Alcohol use: No    Drug use: Never    Sexual activity: Not on file     Review of Systems   Constitutional:  Positive for fatigue. Negative for chills and fever.   HENT:  Negative for congestion and postnasal drip.    Respiratory:  Negative for cough and shortness of breath.    Cardiovascular:  Negative for chest pain and palpitations.   Gastrointestinal:  Positive for abdominal pain and diarrhea. Negative for vomiting.   Genitourinary:  Negative for dysuria and hematuria.   Musculoskeletal:  Positive for gait problem. Negative for neck pain.   Skin:  Positive for pallor. Negative for rash.   Neurological:  Positive for weakness. Negative for syncope.   Psychiatric/Behavioral:  Positive for confusion. Negative for agitation.    All other systems reviewed and are negative.  Objective:     Vital Signs (Most Recent):  Temp: 98.4 °F (36.9 °C) (03/26/22 1006)  Pulse: (!) 119 (03/26/22 1529)  Resp: 18 (03/26/22 1529)  BP: 130/65 (03/26/22 1529)  SpO2: 95 % (03/26/22 1529)   Vital Signs (24h Range):  Temp:  [98.4 °F (36.9 °C)] 98.4 °F (36.9 °C)  Pulse:  [] 119  Resp:  [16-20] 18  SpO2:  [95 %-98 %] 95 %  BP: (130-176)/(65-86) 130/65     Weight: 65.3 kg (144 lb)  Body mass index is 22.55 kg/m².    Physical Exam  Vitals and nursing note reviewed.   Constitutional:       Appearance: She is well-developed. She is ill-appearing.   HENT:      Head: Normocephalic and atraumatic.      Mouth/Throat:      Mouth: Mucous membranes are dry.   Eyes:      Pupils: Pupils are equal, round, and reactive to light.   Cardiovascular:      Rate and Rhythm: Regular rhythm. Tachycardia present.      Pulses: Normal pulses.   Pulmonary:      Effort: Pulmonary effort is normal. No respiratory distress.      Breath sounds: Normal breath sounds.   Abdominal:      General: Bowel sounds are normal.  There is no distension.      Palpations: Abdomen is soft.      Tenderness: There is abdominal tenderness (worst at LLQ; mild diffuse tenderness).   Musculoskeletal:      Cervical back: Normal range of motion and neck supple.   Skin:     General: Skin is warm and dry.      Findings: No rash.   Neurological:      Mental Status: She is lethargic.      GCS: GCS eye subscore is 3. GCS verbal subscore is 4. GCS motor subscore is 6.   Psychiatric:         Attention and Perception: She is inattentive.         Speech: She is noncommunicative.         Behavior: Behavior is withdrawn.         Cognition and Memory: Cognition is impaired.         CRANIAL NERVES     CN III, IV, VI   Pupils are equal, round, and reactive to light.     Significant Labs: All pertinent labs within the past 24 hours have been reviewed.  Bilirubin:   Recent Labs   Lab 03/01/22  0448 03/02/22  0537 03/15/22  1311 03/21/22  0804 03/26/22  1134   BILITOT 0.6 1.0 1.4* 2.0* 2.1*     CBC:   Recent Labs   Lab 03/26/22  1134   WBC 7.15   HGB 12.3   HCT 39.3   PLT 96*     CMP:   Recent Labs   Lab 03/26/22  1134      K 3.6      CO2 21*   *   BUN 18   CREATININE 0.8   CALCIUM 7.1*   PROT 5.9*   ALBUMIN 2.7*   BILITOT 2.1*   ALKPHOS 434*   AST 37   ALT 35   ANIONGAP 15   EGFRNONAA >60     Lactic Acid:   Recent Labs   Lab 03/26/22  1521   LACTATE 5.6*     Urine Studies:   Recent Labs   Lab 03/26/22  1154   COLORU Yellow   APPEARANCEUA Clear   PHUR 7.0   SPECGRAV 1.010   PROTEINUA Negative   GLUCUA Negative   KETONESU Negative   BILIRUBINUA Negative   OCCULTUA Negative   NITRITE Negative   UROBILINOGEN 1.0   LEUKOCYTESUR Negative       Significant Imaging: I have reviewed all pertinent imaging results/findings within the past 24 hours.  CT head: 1. No intracranial hemorrhage.  No evidence for recent ischemia, noting however that MRI could add sensitivity in an acute setting.  2. Mild white matter disease.    CT abd/pelvis:  1. Proctocolitis  presumably from an infectious or inflammatory etiology.  2. Progression of hepatic metastatic disease.  3. Development of small volume ascites.  4. Nonspecific urinary bladder distention.  5. Multiple compression fractures are age indeterminate but new since prior exam.

## 2022-03-26 NOTE — ASSESSMENT & PLAN NOTE
Patient has acute metabolic encephalopathy that is secondary to Electrolyte abnormalities- hypocalcemia and Sepsis/Infective. Patient's current mental status is Lethargic. Patient's baseline mental status is. awake and alert; oriented to person, place, and time Evaluation and for underlying cause(s) is underway and inclusive of Blood Chemistries and Neuro-Imaging (MRI/CT). Will monitor neuro checks carefully, avoid narcotics and benzos that will exacerbate agitation, and use PRN medications for controls of behavior for self harm.

## 2022-03-26 NOTE — ASSESSMENT & PLAN NOTE
Chronic, controlled.  Latest blood pressure and vitals reviewed-   Temp:  [98.4 °F (36.9 °C)]   Pulse:  []   Resp:  [16-20]   BP: (130-176)/(65-86)   SpO2:  [95 %-98 %] .   Home meds for hypertension were reviewed and noted below.   Hypertension Medications             furosemide (LASIX) 20 MG tablet Take 1 tablet (20 mg total) by mouth once daily.          While in the hospital, will manage blood pressure as follows; Adjust home antihypertensive regimen as follows- hold chronic lasix 2/2 severe sepsis    Will utilize p.r.n. blood pressure medication only if patient's blood pressure greater than  180/110 and she develops symptoms such as worsening chest pain or shortness of breath.

## 2022-03-26 NOTE — Clinical Note
Is this patient a high probability for COVID-19?: No   Diagnosis: Proctocolitis [[id number]]   Future Attending Provider: NORRIS BRAUN [[id number]]   Admitting Provider:: NORRIS BRAUN [[id number]]

## 2022-03-26 NOTE — ASSESSMENT & PLAN NOTE
Corrected calcium 8.1.  IV calcium gluconate ordered; continue daily tums.  Check ionized calcium in am.  Maintain delirium precautions

## 2022-03-26 NOTE — ED PROVIDER NOTES
Encounter Date: 3/26/2022    SCRIBE #1 NOTE: I, Lalitha Simmons am scribing for, and in the presence of, Michael Feliz MD.       History     Chief Complaint   Patient presents with    Abdominal Pain     Pt to ER with EMS from home with c/o increased abd pain. Pt with hx of colon cancer with mets to liver.      Time seen by provider: 10:14 AM on 03/26/2022    Regine Lemus is a 66 y.o. female with a PMHx of metastatic colon CA to the liver on chemotherapy and adrenal insufficiency on steroids presents to the ED with an onset of severe abdominal pain, N/V, and diarrhea that began this morning. Spouse reports progressive confusion since infusion treatment 4 days ago. Spouse notes s/p prior infusion treatment to 4 days ago, the patient had similar Sx of confusion without abdominal pain, and she was admitted to [place] on 02/27/2022. The patient denies hematuria, blood in stool, dysuria, constipation, difficult urinating, or any other symptoms at this time. Spouse also c/o bruising to the pt's pelvic region that began in correlation with CA treatments. Patient is currently taking Capecitabine as prescribed. Spouse denies recent head injury, fall, or liver disease. Patient is on chronic opioid analgesia for chronic pain. PMHx of HTN and severe sepsis. PSHx of thyroidectomy and infusaport placement (R).       The history is provided by medical records, the patient and the spouse.     Review of patient's allergies indicates:   Allergen Reactions    Lisinopril Rash     Past Medical History:   Diagnosis Date    Allergy     Colon cancer     Hypertension     Liver cancer     Nontoxic uninodular goiter     Osteoarthrosis, unspecified whether generalized or localized, hand     Other abnormal glucose     Pure hypercholesterolemia     Severe sepsis      Past Surgical History:   Procedure Laterality Date    CYST REMOVAL  1994    cyst removed from neck    infusaport placement Right     THYROIDECTOMY       TONSILLECTOMY  age 14     Family History   Problem Relation Age of Onset    Lung cancer Father      Social History     Tobacco Use    Smoking status: Never Smoker    Smokeless tobacco: Never Used   Substance Use Topics    Alcohol use: No    Drug use: Never     Review of Systems   Constitutional: Negative for fever.   HENT: Negative for sore throat.    Respiratory: Negative for shortness of breath.    Cardiovascular: Negative for chest pain.   Gastrointestinal: Positive for abdominal pain, diarrhea, nausea and vomiting. Negative for blood in stool and constipation.   Genitourinary: Negative for difficulty urinating, dysuria and hematuria.   Musculoskeletal: Negative for back pain.   Skin: Positive for color change. Negative for rash.   Allergic/Immunologic: Positive for immunocompromised state.   Neurological: Negative for weakness.   Hematological: Does not bruise/bleed easily.   Psychiatric/Behavioral: Positive for confusion.       Physical Exam     Initial Vitals [03/26/22 1006]   BP Pulse Resp Temp SpO2   134/77 97 16 98.4 °F (36.9 °C) 98 %      MAP       --         Physical Exam    Nursing note and vitals reviewed.  Constitutional: She appears well-developed and well-nourished. She is not diaphoretic. No distress.   HENT:   Head: Normocephalic and atraumatic.   Mouth/Throat: Oropharynx is clear and moist.   Eyes: Conjunctivae are normal.   Neck: Neck supple.   Cardiovascular: Normal rate, regular rhythm, normal heart sounds and intact distal pulses. Exam reveals no gallop and no friction rub.    No murmur heard.  Pulmonary/Chest: Breath sounds normal. She has no wheezes. She has no rhonchi. She has no rales.   Abdominal: Abdomen is soft. She exhibits no distension and no mass. There is abdominal tenderness in the left lower quadrant. No hernia.   No right CVA tenderness.  No left CVA tenderness. There is guarding (mild, voluntary ). There is no rebound.   Musculoskeletal:         General: Normal range of  motion.      Cervical back: Neck supple.      Right lower leg: No edema.      Left lower leg: No edema.     Neurological: She is alert.   Patient is confused but alert. Patient has some inattention present on exam. She answers yes/no questions inappropriately. Patient will not cooperate with detailed neurological exam. No gaze preference present on exam. Patient is moving all extremities.     Skin: No rash noted. No erythema.   Psychiatric: Her speech is normal.         ED Course   Procedures  Labs Reviewed   CBC W/ AUTO DIFFERENTIAL - Abnormal; Notable for the following components:       Result Value    RBC 3.80 (*)      (*)     MCH 32.4 (*)     MCHC 31.3 (*)     RDW 21.6 (*)     Platelets 96 (*)     Mono # 0.1 (*)     Mono % 1.4 (*)     All other components within normal limits   COMPREHENSIVE METABOLIC PANEL - Abnormal; Notable for the following components:    CO2 21 (*)     Glucose 147 (*)     Calcium 7.1 (*)     Total Protein 5.9 (*)     Albumin 2.7 (*)     Total Bilirubin 2.1 (*)     Alkaline Phosphatase 434 (*)     All other components within normal limits   AMMONIA - Abnormal; Notable for the following components:    Ammonia 77 (*)     All other components within normal limits   CLOSTRIDIUM DIFFICILE   LIPASE   URINALYSIS, REFLEX TO URINE CULTURE    Narrative:     Specimen Source->Urine   LACTIC ACID, PLASMA   SARS-COV-2 RNA AMPLIFICATION, QUAL          Imaging Results          US Abdomen Limited (In process)                CT Head Without Contrast (Final result)  Result time 03/26/22 14:38:47    Final result by Michael Aguero MD (03/26/22 14:38:47)                 Impression:      1. No intracranial hemorrhage.  No evidence for recent ischemia, noting however that MRI could add sensitivity in an acute setting.  2. Mild white matter disease.      Electronically signed by: Michael Aguero  Date:    03/26/2022  Time:    14:38             Narrative:    EXAMINATION:  CT HEAD WITHOUT  CONTRAST    CLINICAL HISTORY:  Mental status change, unknown cause;    TECHNIQUE:  Low dose axial images were obtained through the head.  Coronal and sagittal reformations were also performed. Contrast was not administered.    COMPARISON:  None.    FINDINGS:  Normal size of the ventricular system. Periventricular white matter hypoattenuation is nonspecific but suggestive of chronic microvascular ischemic change.  No mass, hemorrhage or acute major vascular distribution infarct. No mass effect or midline shift. Included orbits are unremarkable. 5 mm mucous retention cyst or polyp in the left maxillary sinus and right sphenoid sinus.  Mastoid air cells are clear.  Atherosclerosis.  There is maxillary postsurgical change and left periodontal disease.                               CT Abdomen Pelvis With Contrast (Final result)  Result time 03/26/22 14:30:35    Final result by Michael Aguero MD (03/26/22 14:30:35)                 Impression:      1. Proctocolitis presumably from an infectious or inflammatory etiology.  2. Progression of hepatic metastatic disease.  3. Development of small volume ascites.  4. Nonspecific urinary bladder distention.  5. Multiple compression fractures are age indeterminate but new since prior exam.      Electronically signed by: Michael Aguero  Date:    03/26/2022  Time:    14:30             Narrative:    EXAMINATION:  CT ABDOMEN PELVIS WITH CONTRAST    CLINICAL HISTORY:  LLQ abdominal pain;Nausea/vomiting;    TECHNIQUE:  Low dose axial images, sagittal and coronal reformations were obtained from the lung bases to the pubic symphysis following the IV administration of 75 mL of Omnipaque 350 .  Oral contrast was not given.    COMPARISON:  08/10/2021    FINDINGS:  3 mm nodule right middle lobe series 2, image 17, unchanged from prior.  Mild atelectasis dependently in the left lower lobe.  Normal size heart.  Bilateral pleural effusions.    There is heterogeneous attenuation along the  dome of the liver with a conglomerate of multiple adjacent lesions.  Largest lesion measures 9.4 x 4.4 cm series 2, image 29.  Other lesions are scattered throughout both lobes.  Nonspecific gallbladder distension.  Mild pancreas atrophy.  Remaining solid abdominal organs are unremarkable.    There is no enteric contrast which limits bowel assessment.  Small hiatal hernia.  No dilated bowel loops.  There is a moderate degree of stool in the right colon.  There is diffuse wall thickening of the rectum and sigmoid and descending colon segments.  There is scattered mesenteric edema.  There is ascites.  Distended urinary bladder.  Unremarkable uterus.    There are compression fractures at every lumbar level and included lower thoracic level.  Osteopenia.  There are degenerative changes at several lower lumbar levels.                                 Medications   sodium chloride 0.9% bolus 1,000 mL (1,000 mLs Intravenous New Bag 3/26/22 1430)   ciprofloxacin (CIPRO)400mg/200ml D5W IVPB 400 mg (has no administration in time range)   metronidazole IVPB 500 mg (has no administration in time range)   iohexoL (OMNIPAQUE 350) injection 75 mL (75 mLs Intravenous Given 3/26/22 1339)   ondansetron injection 4 mg (4 mg Intravenous Given 3/26/22 1052)   ondansetron injection 4 mg (4 mg Intravenous Given 3/26/22 1303)     Medical Decision Making:   History:   Old Medical Records: I decided to obtain old medical records.  Independently Interpreted Test(s):   I have ordered and independently interpreted EKG Reading(s) - see prior notes  Clinical Tests:   Lab Tests: Ordered and Reviewed  Radiological Study: Ordered and Reviewed  Medical Tests: Ordered and Reviewed          Scribe Attestation:   Scribe #1: I performed the above scribed service and the documentation accurately describes the services I performed. I attest to the accuracy of the note.        ED Course as of 03/26/22 1527   Sat Mar 26, 2022   1125 EKG:  Sinus tachycardia,  rate of 114, normal intervals, R axis.  There are no acute ST or T wave changes suggestive of acute ischemia or infarction.   [MR]      ED Course User Index  [MR] Michael Feliz MD           I, Dr. Michael Feliz, personally performed the services described in this documentation. All medical record entries made by the scribe were at my direction and in my presence.  I have reviewed the chart and agree that the record reflects my personal performance and is accurate and complete. Michael Feliz MD.  3:25 PM 03/26/2022    Regine Lemus is a 66 y.o. female presenting with vomiting and diarrhea in the setting of metastatic colon cancer on chemotherapy.  Patient has confusion of uncertain etiology.  Broad workup is inconclusive.  Elevated ammonia with hepatic encephalopathy considered.  Lactulose is not necessary as she is already having significant diarrhea.  C diff testing ordered with further stool studies at hospital medicine discretion.  I do think she warrants admission given immunocompromised state and diarrhea along with confusion.  There is no sign of GI bleed.  Have low suspicion for meningitis or encephalitis I do not think lumbar puncture is indicated.  Brain imaging reviewed.  CT significant for proctocolitis.  There is no sign of abscess or perforation.  There is no sign of some obstruction.  I do not think surgical consultation is indicated at this point.  I will defer antibiotics to Medicine as viral or atypical infection may be possible.  IV fluids given for resuscitation along with antiemetics here.  I doubt sepsis.  Clinical Impression:   Final diagnoses:  [R11.10] Vomiting  [K52.9] Proctocolitis (Primary)  [R19.7] Diarrhea, unspecified type  [R41.0] Confusion          ED Disposition Condition    Observation               Michael Feliz MD  03/26/22 1544

## 2022-03-26 NOTE — ED NOTES
Report given to JAYCEE Farley.Paulo Petersen  Monitor room notified of telebox [id number] placement.Paulo Petersen

## 2022-03-26 NOTE — ASSESSMENT & PLAN NOTE
Compression fx of vertebrae seen today not previously noted-lower thoracic levels and lumbar spine.  Maintain fall precautions.  Consider neurosurgery follow up.

## 2022-03-27 PROBLEM — E87.6 HYPOKALEMIA: Status: ACTIVE | Noted: 2022-03-27

## 2022-03-27 PROBLEM — E83.42 HYPOMAGNESEMIA: Status: ACTIVE | Noted: 2022-03-27

## 2022-03-27 LAB
ALBUMIN SERPL BCP-MCNC: 2.1 G/DL (ref 3.5–5.2)
ALP SERPL-CCNC: 273 U/L (ref 55–135)
ALT SERPL W/O P-5'-P-CCNC: 27 U/L (ref 10–44)
ANION GAP SERPL CALC-SCNC: 10 MMOL/L (ref 8–16)
ANISOCYTOSIS BLD QL SMEAR: SLIGHT
AST SERPL-CCNC: 31 U/L (ref 10–40)
BASOPHILS NFR BLD: 0 % (ref 0–1.9)
BILIRUB SERPL-MCNC: 1.2 MG/DL (ref 0.1–1)
BUN SERPL-MCNC: 14 MG/DL (ref 8–23)
CA-I BLDV-SCNC: 0.77 MMOL/L (ref 1.06–1.42)
CALCIUM SERPL-MCNC: 6.4 MG/DL (ref 8.7–10.5)
CHLORIDE SERPL-SCNC: 112 MMOL/L (ref 95–110)
CO2 SERPL-SCNC: 20 MMOL/L (ref 23–29)
CREAT SERPL-MCNC: 0.8 MG/DL (ref 0.5–1.4)
DIFFERENTIAL METHOD: ABNORMAL
EOSINOPHIL NFR BLD: 1 % (ref 0–8)
ERYTHROCYTE [DISTWIDTH] IN BLOOD BY AUTOMATED COUNT: 21.9 % (ref 11.5–14.5)
EST. GFR  (AFRICAN AMERICAN): >60 ML/MIN/1.73 M^2
EST. GFR  (NON AFRICAN AMERICAN): >60 ML/MIN/1.73 M^2
GLUCOSE SERPL-MCNC: 107 MG/DL (ref 70–110)
HCT VFR BLD AUTO: 24.9 % (ref 37–48.5)
HGB BLD-MCNC: 8.1 G/DL (ref 12–16)
IMM GRANULOCYTES # BLD AUTO: ABNORMAL K/UL
IMM GRANULOCYTES NFR BLD AUTO: ABNORMAL %
LACTATE SERPL-SCNC: 2.2 MMOL/L (ref 0.5–2.2)
LACTATE SERPL-SCNC: 2.2 MMOL/L (ref 0.5–2.2)
LYMPHOCYTES NFR BLD: 20 % (ref 18–48)
MAGNESIUM SERPL-MCNC: 1.3 MG/DL (ref 1.6–2.6)
MCH RBC QN AUTO: 32.3 PG (ref 27–31)
MCHC RBC AUTO-ENTMCNC: 32.5 G/DL (ref 32–36)
MCV RBC AUTO: 99 FL (ref 82–98)
MONOCYTES NFR BLD: 3 % (ref 4–15)
NEUTROPHILS NFR BLD: 76 % (ref 38–73)
NRBC BLD-RTO: 0 /100 WBC
OVALOCYTES BLD QL SMEAR: ABNORMAL
PLATELET # BLD AUTO: 45 K/UL (ref 150–450)
PLATELET BLD QL SMEAR: ABNORMAL
PMV BLD AUTO: 10.9 FL (ref 9.2–12.9)
POTASSIUM SERPL-SCNC: 2.9 MMOL/L (ref 3.5–5.1)
PROT SERPL-MCNC: 4.7 G/DL (ref 6–8.4)
RBC # BLD AUTO: 2.51 M/UL (ref 4–5.4)
SODIUM SERPL-SCNC: 142 MMOL/L (ref 136–145)
WBC # BLD AUTO: 4.63 K/UL (ref 3.9–12.7)
WBC #/AREA STL HPF: NORMAL /[HPF]

## 2022-03-27 PROCEDURE — 85027 COMPLETE CBC AUTOMATED: CPT | Performed by: NURSE PRACTITIONER

## 2022-03-27 PROCEDURE — 99900035 HC TECH TIME PER 15 MIN (STAT)

## 2022-03-27 PROCEDURE — 63600175 PHARM REV CODE 636 W HCPCS: Performed by: NURSE PRACTITIONER

## 2022-03-27 PROCEDURE — S0030 INJECTION, METRONIDAZOLE: HCPCS | Performed by: NURSE PRACTITIONER

## 2022-03-27 PROCEDURE — 85007 BL SMEAR W/DIFF WBC COUNT: CPT | Performed by: NURSE PRACTITIONER

## 2022-03-27 PROCEDURE — 89055 LEUKOCYTE ASSESSMENT FECAL: CPT | Performed by: NURSE PRACTITIONER

## 2022-03-27 PROCEDURE — 94761 N-INVAS EAR/PLS OXIMETRY MLT: CPT

## 2022-03-27 PROCEDURE — 83735 ASSAY OF MAGNESIUM: CPT | Performed by: NURSE PRACTITIONER

## 2022-03-27 PROCEDURE — 80053 COMPREHEN METABOLIC PANEL: CPT | Performed by: NURSE PRACTITIONER

## 2022-03-27 PROCEDURE — 87177 OVA AND PARASITES SMEARS: CPT | Performed by: NURSE PRACTITIONER

## 2022-03-27 PROCEDURE — 87427 SHIGA-LIKE TOXIN AG IA: CPT | Performed by: NURSE PRACTITIONER

## 2022-03-27 PROCEDURE — 25000003 PHARM REV CODE 250: Performed by: NURSE PRACTITIONER

## 2022-03-27 PROCEDURE — 82330 ASSAY OF CALCIUM: CPT | Performed by: NURSE PRACTITIONER

## 2022-03-27 PROCEDURE — 87046 STOOL CULTR AEROBIC BACT EA: CPT | Performed by: NURSE PRACTITIONER

## 2022-03-27 PROCEDURE — 99223 PR INITIAL HOSPITAL CARE,LEVL III: ICD-10-PCS | Mod: ,,, | Performed by: INTERNAL MEDICINE

## 2022-03-27 PROCEDURE — 83605 ASSAY OF LACTIC ACID: CPT | Performed by: NURSE PRACTITIONER

## 2022-03-27 PROCEDURE — 87209 SMEAR COMPLEX STAIN: CPT | Performed by: NURSE PRACTITIONER

## 2022-03-27 PROCEDURE — 36410 VNPNXR 3YR/> PHY/QHP DX/THER: CPT

## 2022-03-27 PROCEDURE — 36415 COLL VENOUS BLD VENIPUNCTURE: CPT | Performed by: NURSE PRACTITIONER

## 2022-03-27 PROCEDURE — 87045 FECES CULTURE AEROBIC BACT: CPT | Performed by: NURSE PRACTITIONER

## 2022-03-27 PROCEDURE — 12000002 HC ACUTE/MED SURGE SEMI-PRIVATE ROOM

## 2022-03-27 PROCEDURE — 99223 1ST HOSP IP/OBS HIGH 75: CPT | Mod: ,,, | Performed by: INTERNAL MEDICINE

## 2022-03-27 PROCEDURE — 25000003 PHARM REV CODE 250

## 2022-03-27 PROCEDURE — A4216 STERILE WATER/SALINE, 10 ML: HCPCS | Performed by: NURSE PRACTITIONER

## 2022-03-27 RX ORDER — SODIUM CHLORIDE 0.9 % (FLUSH) 0.9 %
10 SYRINGE (ML) INJECTION
Status: DISCONTINUED | OUTPATIENT
Start: 2022-03-27 | End: 2022-03-31 | Stop reason: HOSPADM

## 2022-03-27 RX ORDER — MAGNESIUM SULFATE HEPTAHYDRATE 40 MG/ML
2 INJECTION, SOLUTION INTRAVENOUS ONCE
Status: COMPLETED | OUTPATIENT
Start: 2022-03-27 | End: 2022-03-27

## 2022-03-27 RX ORDER — SODIUM CHLORIDE 0.9 % (FLUSH) 0.9 %
10 SYRINGE (ML) INJECTION EVERY 6 HOURS
Status: DISCONTINUED | OUTPATIENT
Start: 2022-03-27 | End: 2022-03-31 | Stop reason: HOSPADM

## 2022-03-27 RX ORDER — POTASSIUM CHLORIDE 20 MEQ/1
40 TABLET, EXTENDED RELEASE ORAL
Status: DISCONTINUED | OUTPATIENT
Start: 2022-03-27 | End: 2022-03-27

## 2022-03-27 RX ADMIN — METRONIDAZOLE 500 MG: 500 INJECTION, SOLUTION INTRAVENOUS at 07:03

## 2022-03-27 RX ADMIN — CIPROFLOXACIN 400 MG: 2 INJECTION, SOLUTION INTRAVENOUS at 04:03

## 2022-03-27 RX ADMIN — SODIUM CHLORIDE: 0.9 INJECTION, SOLUTION INTRAVENOUS at 04:03

## 2022-03-27 RX ADMIN — POTASSIUM BICARBONATE 60 MEQ: 391 TABLET, EFFERVESCENT ORAL at 11:03

## 2022-03-27 RX ADMIN — POTASSIUM CHLORIDE 40 MEQ: 1500 TABLET, EXTENDED RELEASE ORAL at 10:03

## 2022-03-27 RX ADMIN — MELATONIN TAB 3 MG 6 MG: 3 TAB at 08:03

## 2022-03-27 RX ADMIN — CALCIUM CARBONATE (ANTACID) CHEW TAB 500 MG 1000 MG: 500 CHEW TAB at 10:03

## 2022-03-27 RX ADMIN — FAMOTIDINE 20 MG: 20 TABLET ORAL at 10:03

## 2022-03-27 RX ADMIN — CIPROFLOXACIN 400 MG: 2 INJECTION, SOLUTION INTRAVENOUS at 06:03

## 2022-03-27 RX ADMIN — ACETAMINOPHEN 650 MG: 325 TABLET ORAL at 06:03

## 2022-03-27 RX ADMIN — CALCIUM CARBONATE (ANTACID) CHEW TAB 500 MG 1000 MG: 500 CHEW TAB at 06:03

## 2022-03-27 RX ADMIN — CALCIUM CARBONATE (ANTACID) CHEW TAB 500 MG 1000 MG: 500 CHEW TAB at 05:03

## 2022-03-27 RX ADMIN — POTASSIUM BICARBONATE 60 MEQ: 391 TABLET, EFFERVESCENT ORAL at 01:03

## 2022-03-27 RX ADMIN — FAMOTIDINE 20 MG: 20 TABLET ORAL at 08:03

## 2022-03-27 RX ADMIN — MAGNESIUM SULFATE 2 G: 2 INJECTION INTRAVENOUS at 10:03

## 2022-03-27 RX ADMIN — SODIUM CHLORIDE, PRESERVATIVE FREE 10 ML: 5 INJECTION INTRAVENOUS at 06:03

## 2022-03-27 RX ADMIN — ONDANSETRON 4 MG: 2 INJECTION INTRAMUSCULAR; INTRAVENOUS at 09:03

## 2022-03-27 RX ADMIN — CALCIUM GLUCONATE 1 G: 98 INJECTION, SOLUTION INTRAVENOUS at 01:03

## 2022-03-27 RX ADMIN — CALCIUM GLUCONATE 1 G: 98 INJECTION, SOLUTION INTRAVENOUS at 03:03

## 2022-03-27 RX ADMIN — METRONIDAZOLE 500 MG: 500 INJECTION, SOLUTION INTRAVENOUS at 05:03

## 2022-03-27 RX ADMIN — LOPERAMIDE HYDROCHLORIDE 2 MG: 2 CAPSULE ORAL at 06:03

## 2022-03-27 RX ADMIN — LOPERAMIDE HYDROCHLORIDE 2 MG: 2 CAPSULE ORAL at 08:03

## 2022-03-27 RX ADMIN — LEVOTHYROXINE SODIUM 125 MCG: 125 TABLET ORAL at 10:03

## 2022-03-27 NOTE — ASSESSMENT & PLAN NOTE
Chronic, controlled.  Latest blood pressure and vitals reviewed-   Temp:  [97.2 °F (36.2 °C)-100 °F (37.8 °C)]   Pulse:  []   Resp:  [16-20]   BP: (118-185)/(59-86)   SpO2:  [93 %-98 %] .   Home meds for hypertension were reviewed and noted below.   Hypertension Medications             furosemide (LASIX) 20 MG tablet Take 1 tablet (20 mg total) by mouth once daily.          While in the hospital, will manage blood pressure as follows; Adjust home antihypertensive regimen as follows- hold chronic lasix 2/2 severe sepsis    Will utilize p.r.n. blood pressure medication only if patient's blood pressure greater than  180/110 and she develops symptoms such as worsening chest pain or shortness of breath.

## 2022-03-27 NOTE — CONSULTS
Chief Complaint   Patient presents with    Abdominal Pain       Pt to ER with EMS from home with c/o increased abd pain. Pt with hx of colon cancer with mets to liver.       HPI: Regine Lemus is a 66-year-old female with past medical history significant for hypertension, hyperlipidemia, colon cancer with metastatic liver disease on chemotherapy with last infusion on 03/16/2022 who presented to the emergency department for further evaluation of abdominal pain and diarrhea which began this morning.  She was discharged from this facility on 3/5/22 after being treated for similar complaints.  Her  is at bedside and provides the majority the history stating that this happened following her previous chemotherapy infusion.  This morning he noted her to be confused and she was complaining of severe abdominal pain.  She had some diarrhea home, but he states that it has become much worse since they arrived to the ED.  CT abdomen pelvis was significant for proctocolitis with noted hepatic metastatic disease.  Ammonia level was elevated at 77 and she was found to have hypocalcemia and thrombocytopenia.  Her lactic acid is 5.6 and she is tachycardic.  Further history is limited due to the condition of the patient.  She is admitted to the service of hospital medicine for continued monitoring and treatment.    GI consulted for abnormal CT scan. History metastatic colon cancer to liver, undergoing chemotherapy, last dose 3/16/2022, admitted with recurrent abd pain and diarrhea. Recent hospitalization for same. Colon cancer diagnosed per liver biopsy per chart in 1/2022. No C-scope. Positive generalized abd pain and nausea. No fever or jaundice. No gross GI bleeding. Positive diarrhea. Positive weight loss. Of note, pt on steroids for adrenal insufficiency. CT scan notable for wall thickening rectum, sigmoid, and desc colon c/w procto-colitis. Positive liver mets (progressive), small amount ascites. Ammonia elevated at  77. Pt states she is exhausted, multiple crying episodes during interview.    Past Medical History:   Diagnosis Date    Allergy     Colon cancer     Hypertension     Liver cancer     Nontoxic uninodular goiter     Osteoarthrosis, unspecified whether generalized or localized, hand     Other abnormal glucose     Pure hypercholesterolemia     Severe sepsis      Past Surgical History:   Procedure Laterality Date    CYST REMOVAL  1994    cyst removed from neck    infusaport placement Right     THYROIDECTOMY      TONSILLECTOMY  age 14     Scheduled Meds:   calcium carbonate  1,000 mg Oral TID AC    ciprofloxacin  400 mg Intravenous Q12H    famotidine  20 mg Oral BID    levothyroxine  125 mcg Oral Daily    metronidazole  500 mg Intravenous Q8H    sodium chloride 0.9%  10 mL Intravenous Q6H     Continuous Infusions:   sodium chloride 0.9% 125 mL/hr at 03/27/22 0649     PRN Meds:.acetaminophen, albuterol-ipratropium, dextrose 10%, dextrose 10%, glucagon (human recombinant), glucose, glucose, loperamide, magnesium oxide, magnesium oxide, melatonin, morphine, naloxone, ondansetron, potassium bicarbonate, potassium bicarbonate, potassium bicarbonate, potassium, sodium phosphates, potassium, sodium phosphates, potassium, sodium phosphates, simethicone, sodium chloride 0.9%, Flushing PICC Protocol **AND** sodium chloride 0.9% **AND** sodium chloride 0.9%  Review of patient's allergies indicates:   Allergen Reactions    Lisinopril Rash     Social History     Socioeconomic History    Marital status:    Tobacco Use    Smoking status: Never Smoker    Smokeless tobacco: Never Used   Substance and Sexual Activity    Alcohol use: No    Drug use: Never     Family History   Problem Relation Age of Onset    Lung cancer Father      REVIEW OF SYSTEMS:   Constitutional: Negative for fever, Positive decreased appetite and weight.  HENT: Negative for sore throat and trouble swallowing.  Eyes: Negative for  visual disturbance.  Respiratory: Negative for chest tightness, shortness of breath and wheezing.  Cardiovascular: Negative for chest pain.  Gastrointestinal:  as per HPI  Genitourinary: Negative for dysuria, frequency and hematuria.  Musculoskeletal: Negative for myalgias, joint swelling and arthralgias.  Skin: Negative for color change and rash.   Neurological: Negative for syncope; Positive weakness and headaches.  Psychiatric/Behavioral: Positive for confusion.    PHYSICAL EXAMINATION:                                                        GENERAL:  No acute distress.      SKIN: Non-jaundiced.                             HEENT EXAM:  Nonicteric.  No adenopathy.  Oropharynx is clear.               NECK:  Supple.                                                               LUNGS:  Clear.                                                               CARDIAC:  Regular rate and rhythm.  S1, S2.  No murmur.                      ABDOMEN:  Soft, positive bowel sounds, nontender.  No hepatosplenomegaly or masses.  No rebound or guarding.                                             EXTREMITIES:  No edema.     NEURO: CN II-XII intact; motor/sensory non-focal.    LABS: Reviewed (WBC 4.6, H/H 8.1/25, plts 45k, bili 1.2, , AST 31, ALT 27, ammonia 77, )    RADIOLOGY: Reviewed (CT scan as described above)    IMP: 1. Metastatic colon cancer to liver          2. Abd pain (generalized)          3. Diarrhea          4. Abnormal CT (colon and liver) suggestive of procto-colitis          5. Elevated ammonia          6. Pancytopenia - s/p chemotherapy.    REC: 1. IVF hydration            2. Pain control            3. Empiric protonix            4. Stool C diff            5. Lactulose            6. Daily labs            7. I discussed C-scope with pt, who does not feel she is able to pursue at this time.

## 2022-03-27 NOTE — ASSESSMENT & PLAN NOTE
IV cipro; IV flagyl  IVF hydration  IV narcotics and IV antiemetics as needed  Stool for c diff-negative  She seems to be improving; they would like input from GI and oncology since recurrent issue.

## 2022-03-27 NOTE — PROGRESS NOTES
[facility] Hospital Medicine  Progress Note    Patient Name: Regine Lemus  MRN: [id number]  Patient Class: IP- Inpatient   Admission Date: 3/26/2022  Length of Stay: 1 days  Attending Physician: Lobito Vigil MD  Primary Care Provider: Primary Doctor No        Subjective:     Principal Problem:Proctocolitis        HPI:  Regine Lemus is a 66-year-old female with past medical history significant for hypertension, hyperlipidemia, colon cancer with metastatic liver disease on chemotherapy with last infusion on 03/16/2022 who presented to the emergency department for further evaluation of abdominal pain and diarrhea which began this morning.  She was discharged from this facility on 3/5/22 after being treated for similar complaints.  Her  is at bedside and provides the majority the history stating that this happened following her previous chemotherapy infusion.  This morning he noted her to be confused and she was complaining of severe abdominal pain.  She had some diarrhea home, but he states that it has become much worse since they arrived to the ED.  CT abdomen pelvis was significant for proctocolitis with noted hepatic metastatic disease.  Ammonia level was elevated at 77 and she was found to have hypocalcemia and thrombocytopenia.  Her lactic acid is 5.6 and she is tachycardic.  Further history is limited due to the condition of the patient.  She is admitted to the service of hospital medicine for continued monitoring and treatment.      Overview/Hospital Course:  No notes on file    Interval History: no acute events overnight.  She is feeling better this morning and wants to eat.  Cdiff was negative.  Plan of care discussed with the patient and her  who is at bedside.  Questions addressed.    Review of Systems   Constitutional:  Positive for fatigue. Negative for chills and fever.   HENT:  Negative for congestion and postnasal drip.    Respiratory:  Negative for cough and  shortness of breath.    Cardiovascular:  Negative for chest pain and palpitations.   Gastrointestinal:  Positive for abdominal pain and diarrhea (improving per patient). Negative for vomiting.   Genitourinary:  Negative for dysuria and hematuria.   Musculoskeletal:  Positive for gait problem. Negative for neck pain.   Skin:  Positive for pallor. Negative for rash.   Neurological:  Positive for weakness. Negative for syncope.   Psychiatric/Behavioral:  Positive for confusion (improved). Negative for agitation.    All other systems reviewed and are negative.  Objective:     Vital Signs (Most Recent):  Temp: 98.4 °F (36.9 °C) (03/27/22 0746)  Pulse: 89 (03/27/22 0812)  Resp: 17 (03/27/22 0812)  BP: (!) 129/59 (03/27/22 0746)  SpO2: 98 % (03/27/22 0812)   Vital Signs (24h Range):  Temp:  [97.2 °F (36.2 °C)-100 °F (37.8 °C)] 98.4 °F (36.9 °C)  Pulse:  [] 89  Resp:  [16-20] 17  SpO2:  [93 %-98 %] 98 %  BP: (118-185)/(59-86) 129/59     Weight: 65.3 kg (144 lb)  Body mass index is 22.55 kg/m².    Intake/Output Summary (Last 24 hours) at 3/27/2022 1110  Last data filed at 3/27/2022 0649  Gross per 24 hour   Intake 3120.65 ml   Output 451 ml   Net 2669.65 ml      Physical Exam  Vitals and nursing note reviewed.   Constitutional:       Appearance: She is well-developed. She is ill-appearing.   HENT:      Head: Normocephalic and atraumatic.      Mouth/Throat:      Mouth: Mucous membranes are dry.   Eyes:      Pupils: Pupils are equal, round, and reactive to light.   Cardiovascular:      Rate and Rhythm: Regular rhythm. Tachycardia present.      Pulses: Normal pulses.   Pulmonary:      Effort: Pulmonary effort is normal. No respiratory distress.      Breath sounds: Normal breath sounds.   Abdominal:      General: Bowel sounds are normal. There is no distension.      Palpations: Abdomen is soft.      Tenderness: There is abdominal tenderness (worst at LLQ; mild diffuse tenderness).   Musculoskeletal:      Cervical back:  Normal range of motion and neck supple.   Skin:     General: Skin is warm and dry.      Coloration: Skin is pale.      Findings: No rash.   Neurological:      General: No focal deficit present.      Mental Status: She is alert and oriented to person, place, and time.      GCS: GCS eye subscore is 3. GCS verbal subscore is 4. GCS motor subscore is 6.   Psychiatric:         Attention and Perception: Attention normal.         Speech: Speech normal.         Cognition and Memory: Cognition normal.       Significant Labs: All pertinent labs within the past 24 hours have been reviewed.  Bilirubin:   Recent Labs   Lab 03/02/22  0537 03/15/22  1311 03/21/22  0804 03/26/22  1134 03/27/22  0646   BILITOT 1.0 1.4* 2.0* 2.1* 1.2*     CBC:   Recent Labs   Lab 03/26/22  1134 03/27/22  0722   WBC 7.15 4.63   HGB 12.3 8.1*   HCT 39.3 24.9*   PLT 96* 45*     CMP:   Recent Labs   Lab 03/26/22  1134 03/27/22  0646    142   K 3.6 2.9*    112*   CO2 21* 20*   * 107   BUN 18 14   CREATININE 0.8 0.8   CALCIUM 7.1* 6.4*   PROT 5.9* 4.7*   ALBUMIN 2.7* 2.1*   BILITOT 2.1* 1.2*   ALKPHOS 434* 273*   AST 37 31   ALT 35 27   ANIONGAP 15 10   EGFRNONAA >60 >60     Lactic Acid:   Recent Labs   Lab 03/26/22  1957 03/26/22  2325 03/27/22  1012   LACTATE 6.1* 5.0* 2.2  2.2     Lipase:   Recent Labs   Lab 03/26/22  1134   LIPASE 25     Magnesium:   Recent Labs   Lab 03/27/22  0646   MG 1.3*       Significant Imaging: I have reviewed all pertinent imaging results/findings within the past 24 hours.      Assessment/Plan:      * Proctocolitis  IV cipro; IV flagyl  IVF hydration  IV narcotics and IV antiemetics as needed  Stool for c diff-negative  She seems to be improving; they would like input from GI and oncology since recurrent issue.      Hypomagnesemia  Repleted; monitor      Hypokalemia  Nursing to replete per prn orders.        Encephalopathy, metabolic  Patient has acute metabolic encephalopathy that is secondary to  Electrolyte abnormalities- hypocalcemia and Sepsis/Infective. Patient's current mental status is Lethargic. Patient's baseline mental status is. awake and alert; oriented to person, place, and time Evaluation and for underlying cause(s) is underway and inclusive of Blood Chemistries and Neuro-Imaging (MRI/CT). Will monitor neuro checks carefully, avoid narcotics and benzos that will exacerbate agitation, and use PRN medications for controls of behavior for self harm.  3/27-mental status back to baseline              Hypocalcemia  IV replacement; continue tums; monitor        Osteopenia  Compression fx of vertebrae seen today not previously noted-lower thoracic levels and lumbar spine.  Maintain fall precautions.  Consider neurosurgery follow up.      Thrombocytopenia  Chronic; stable; trend      Severe malnutrition  Consult nutrition      Severe sepsis  This patient does have evidence of infective focus  My overall impression is severe sepsis. Vital signs were reviewed and noted in progress note.  Antibiotics given-   Antibiotics (From admission, onward)            Start     Stop Route Frequency Ordered    03/26/22 1615  ciprofloxacin (CIPRO)400mg/200ml D5W IVPB 400 mg         -- IV Every 12 hours (non-standard times) 03/26/22 1505    03/26/22 1615  metronidazole IVPB 500 mg         -- IV Every 8 hours (non-standard times) 03/26/22 1505        Cultures were taken-   Microbiology Results (last 7 days)     Procedure Component Value Units Date/Time    Stool culture [[id number]]     Order Status: No result Specimen: Stool     Stool culture [[id number]]     Order Status: Canceled Specimen: Stool     Clostridium difficile EIA [[id number]] Collected: 03/26/22 1544    Order Status: Completed Specimen: Stool Updated: 03/26/22 2008     C. diff Antigen Negative     C difficile Toxins A+B, EIA Negative     Comment: Testing not recommended for children <24 months old.           Latest lactate reviewed, they are-  Recent Labs   Lab  03/26/22 1957 03/26/22 2325 03/27/22  1012   LACTATE 6.1* 5.0* 2.2  2.2       Organ dysfunction indicated by Encephalopathy   Source- GI    Source control Achieved by- IV abx      Secondary malignant neoplasm of liver  Noted; followed by oncology  LFT's improved from yesterday.        Colorectal carcinoma  Followed by oncology.  Last chemo infusion 3/16/22  Continue chronic capecitabine 2000mg qam, 1500mg qpm  Consult oncology      Postoperative hypothyroidism  Patient has chronic hypothyroidism. TFTs reviewed-   Lab Results   Component Value Date    TSH 18.792 (H) 02/27/2022   . Will continue chronic levothyroxine and adjust for and clinical changes.  Check TSH level with am lab.          Essential (primary) hypertension  Chronic, controlled.  Latest blood pressure and vitals reviewed-   Temp:  [97.2 °F (36.2 °C)-100 °F (37.8 °C)]   Pulse:  []   Resp:  [16-20]   BP: (118-185)/(59-86)   SpO2:  [93 %-98 %] .   Home meds for hypertension were reviewed and noted below.   Hypertension Medications             furosemide (LASIX) 20 MG tablet Take 1 tablet (20 mg total) by mouth once daily.          While in the hospital, will manage blood pressure as follows; Adjust home antihypertensive regimen as follows- hold chronic lasix 2/2 severe sepsis    Will utilize p.r.n. blood pressure medication only if patient's blood pressure greater than  180/110 and she develops symptoms such as worsening chest pain or shortness of breath.        Anemia   Patient's anemia is currently uncontrolled. S/p 0 units of PRBCs.  Current CBC reviewed-   Lab Results   Component Value Date    HGB 8.1 (L) 03/27/2022    HCT 24.9 (L) 03/27/2022    MCV 99 (H) 03/27/2022    PLT 45 (L) 03/27/2022     Monitor serial CBC and transfuse if patient becomes hemodynamically unstable, symptomatic or H/H drops below 7/21.       VTE Risk Mitigation (From admission, onward)         Ordered     IP VTE HIGH RISK PATIENT  Once         03/26/22 1830     Place  sequential compression device  Until discontinued         03/26/22 1830     Reason for No Pharmacological VTE Prophylaxis  Once        Question:  Reasons:  Answer:  Thrombocytopenia    03/26/22 1830                Discharge Planning   CATHY:      Code Status: DNR   Is the patient medically ready for discharge?:     Reason for patient still in hospital (select all that apply): Patient trending condition, Laboratory test, Treatment, Consult recommendations and Pending disposition  Discharge Plan A: Home with family                  NISA Clark  Department of Hospital Medicine   [facility]

## 2022-03-27 NOTE — ASSESSMENT & PLAN NOTE
This patient does have evidence of infective focus  My overall impression is severe sepsis. Vital signs were reviewed and noted in progress note.  Antibiotics given-   Antibiotics (From admission, onward)            Start     Stop Route Frequency Ordered    03/26/22 1615  ciprofloxacin (CIPRO)400mg/200ml D5W IVPB 400 mg         -- IV Every 12 hours (non-standard times) 03/26/22 1505    03/26/22 1615  metronidazole IVPB 500 mg         -- IV Every 8 hours (non-standard times) 03/26/22 1505        Cultures were taken-   Microbiology Results (last 7 days)     Procedure Component Value Units Date/Time    Stool culture [[id number]]     Order Status: No result Specimen: Stool     Stool culture [[id number]]     Order Status: Canceled Specimen: Stool     Clostridium difficile EIA [[id number]] Collected: 03/26/22 1544    Order Status: Completed Specimen: Stool Updated: 03/26/22 2008     C. diff Antigen Negative     C difficile Toxins A+B, EIA Negative     Comment: Testing not recommended for children <24 months old.           Latest lactate reviewed, they are-  Recent Labs   Lab 03/26/22 1957 03/26/22 2325 03/27/22  1012   LACTATE 6.1* 5.0* 2.2  2.2       Organ dysfunction indicated by Encephalopathy   Source- GI    Source control Achieved by- IV abx

## 2022-03-27 NOTE — SUBJECTIVE & OBJECTIVE
Interval History: no acute events overnight.  She is feeling better this morning and wants to eat.  Cdiff was negative.  Plan of care discussed with the patient and her  who is at bedside.  Questions addressed.    Review of Systems   Constitutional:  Positive for fatigue. Negative for chills and fever.   HENT:  Negative for congestion and postnasal drip.    Respiratory:  Negative for cough and shortness of breath.    Cardiovascular:  Negative for chest pain and palpitations.   Gastrointestinal:  Positive for abdominal pain and diarrhea (improving per patient). Negative for vomiting.   Genitourinary:  Negative for dysuria and hematuria.   Musculoskeletal:  Positive for gait problem. Negative for neck pain.   Skin:  Positive for pallor. Negative for rash.   Neurological:  Positive for weakness. Negative for syncope.   Psychiatric/Behavioral:  Positive for confusion (improved). Negative for agitation.    All other systems reviewed and are negative.  Objective:     Vital Signs (Most Recent):  Temp: 98.4 °F (36.9 °C) (03/27/22 0746)  Pulse: 89 (03/27/22 0812)  Resp: 17 (03/27/22 0812)  BP: (!) 129/59 (03/27/22 0746)  SpO2: 98 % (03/27/22 0812)   Vital Signs (24h Range):  Temp:  [97.2 °F (36.2 °C)-100 °F (37.8 °C)] 98.4 °F (36.9 °C)  Pulse:  [] 89  Resp:  [16-20] 17  SpO2:  [93 %-98 %] 98 %  BP: (118-185)/(59-86) 129/59     Weight: 65.3 kg (144 lb)  Body mass index is 22.55 kg/m².    Intake/Output Summary (Last 24 hours) at 3/27/2022 1110  Last data filed at 3/27/2022 0649  Gross per 24 hour   Intake 3120.65 ml   Output 451 ml   Net 2669.65 ml      Physical Exam  Vitals and nursing note reviewed.   Constitutional:       Appearance: She is well-developed. She is ill-appearing.   HENT:      Head: Normocephalic and atraumatic.      Mouth/Throat:      Mouth: Mucous membranes are dry.   Eyes:      Pupils: Pupils are equal, round, and reactive to light.   Cardiovascular:      Rate and Rhythm: Regular rhythm.  Tachycardia present.      Pulses: Normal pulses.   Pulmonary:      Effort: Pulmonary effort is normal. No respiratory distress.      Breath sounds: Normal breath sounds.   Abdominal:      General: Bowel sounds are normal. There is no distension.      Palpations: Abdomen is soft.      Tenderness: There is abdominal tenderness (worst at LLQ; mild diffuse tenderness).   Musculoskeletal:      Cervical back: Normal range of motion and neck supple.   Skin:     General: Skin is warm and dry.      Coloration: Skin is pale.      Findings: No rash.   Neurological:      General: No focal deficit present.      Mental Status: She is alert and oriented to person, place, and time.      GCS: GCS eye subscore is 3. GCS verbal subscore is 4. GCS motor subscore is 6.   Psychiatric:         Attention and Perception: Attention normal.         Speech: Speech normal.         Cognition and Memory: Cognition normal.       Significant Labs: All pertinent labs within the past 24 hours have been reviewed.  Bilirubin:   Recent Labs   Lab 03/02/22  0537 03/15/22  1311 03/21/22  0804 03/26/22  1134 03/27/22  0646   BILITOT 1.0 1.4* 2.0* 2.1* 1.2*     CBC:   Recent Labs   Lab 03/26/22  1134 03/27/22  0722   WBC 7.15 4.63   HGB 12.3 8.1*   HCT 39.3 24.9*   PLT 96* 45*     CMP:   Recent Labs   Lab 03/26/22  1134 03/27/22  0646    142   K 3.6 2.9*    112*   CO2 21* 20*   * 107   BUN 18 14   CREATININE 0.8 0.8   CALCIUM 7.1* 6.4*   PROT 5.9* 4.7*   ALBUMIN 2.7* 2.1*   BILITOT 2.1* 1.2*   ALKPHOS 434* 273*   AST 37 31   ALT 35 27   ANIONGAP 15 10   EGFRNONAA >60 >60     Lactic Acid:   Recent Labs   Lab 03/26/22  1957 03/26/22  2325 03/27/22  1012   LACTATE 6.1* 5.0* 2.2  2.2     Lipase:   Recent Labs   Lab 03/26/22  1134   LIPASE 25     Magnesium:   Recent Labs   Lab 03/27/22  0646   MG 1.3*       Significant Imaging: I have reviewed all pertinent imaging results/findings within the past 24 hours.

## 2022-03-27 NOTE — ASSESSMENT & PLAN NOTE
Patient has chronic hypothyroidism. TFTs reviewed-   Lab Results   Component Value Date    TSH 18.792 (H) 02/27/2022   . Will continue chronic levothyroxine and adjust for and clinical changes.  Check TSH level with am lab.

## 2022-03-27 NOTE — ASSESSMENT & PLAN NOTE
Patient has acute metabolic encephalopathy that is secondary to Electrolyte abnormalities- hypocalcemia and Sepsis/Infective. Patient's current mental status is Lethargic. Patient's baseline mental status is. awake and alert; oriented to person, place, and time Evaluation and for underlying cause(s) is underway and inclusive of Blood Chemistries and Neuro-Imaging (MRI/CT). Will monitor neuro checks carefully, avoid narcotics and benzos that will exacerbate agitation, and use PRN medications for controls of behavior for self harm.  3/27-mental status back to baseline

## 2022-03-27 NOTE — ASSESSMENT & PLAN NOTE
Patient's anemia is currently uncontrolled. S/p 0 units of PRBCs.  Current CBC reviewed-   Lab Results   Component Value Date    HGB 7.7 (L) 03/28/2022    HCT 24.3 (L) 03/28/2022    MCV 99 (H) 03/28/2022    PLT 53 (L) 03/28/2022     Monitor serial CBC and transfuse if patient becomes hemodynamically unstable, symptomatic or H/H drops below 7/21.

## 2022-03-27 NOTE — CARE UPDATE
03/27/22 0812   Patient Assessment/Suction   Level of Consciousness (AVPU) alert   All Lung Fields Breath Sounds diminished   PRE-TX-O2   O2 Device (Oxygen Therapy) room air   SpO2 98 %   Pulse Oximetry Type Intermittent   $ Pulse Oximetry - Multiple Charge Pulse Oximetry - Multiple   Pulse 89   Resp 17   Aerosol Therapy   $ Aerosol Therapy Charges PRN treatment not required   Respiratory Treatment Status (SVN) PRN treatment not required

## 2022-03-27 NOTE — NURSING
Patient alert and oriented X4. Patient on room air. Her vitals are stable. Patient now has a left midline with IV fluids infusing. The patient has had a minimum of 10 loose stools today. A stool was collected for an O & P along with a culture. No complaints of pain voiced. The patient is on telemetry running NSR.  at the bedside, will continue to monitor.

## 2022-03-27 NOTE — PLAN OF CARE
[facility]  Initial Discharge Assessment       Primary Care Provider: Primary Doctor No    Admission Diagnosis: Proctocolitis [K52.9]  Confusion [R41.0]  Vomiting [R11.10]  Diarrhea, unspecified type [R19.7]    Admission Date: 3/26/2022  Expected Discharge Date:       Pt recently admitted 3/27/22-3/5/22 and discharged home with no needs. Pt lives with spouse and denies HH. Pt has a home walker and cane. Pts PCP is Dr. Hong and insurance is [employer] and Medicare A. Pts discharge plan is home . CM following for additional needs.   Discharge Barriers Identified: None    Payor: [employer] / Plan: [employer] / Product Type: PPO /     Extended Emergency Contact Information  Primary Emergency Contact: Edouard Lemus   [place]  Work Phone: [phone]  Mobile Phone: [phone]  Relation: Spouse    Discharge Plan A: Home with family  Discharge Plan B: Home with family, Home Health      [id number] [place] - [address]  [address] [place] [address]  Phone: [phone] Fax: [phone]    [facility]  [address] [facility] [address]  Phone: [phone] Fax: [phone]    [facility] - [place], [place] - [address]  [address]  [address]  [facility] [address]  Phone: [phone] Fax: [phone]      Initial Assessment (most recent)     Adult Discharge Assessment - 03/27/22 1051        Discharge Assessment    Assessment Type Discharge Planning Assessment     Confirmed/corrected address, phone number and insurance Yes     Confirmed Demographics Correct on Facesheet     Source of Information patient;health record     Lives With spouse     Facility Arrived From: Home     Do you expect to return to your current living situation? Yes     Do you have help at home or someone to help you manage your care at home? Yes     Who are your caregiver(s) and their phone number(s)? Spouse - Edouard Lemus  [phone]     Prior to hospitilization cognitive status: Alert/Oriented     Current cognitive status: Alert/Oriented     Walking or Climbing Stairs Difficulty none     Dressing/Bathing Difficulty none     Home Accessibility not wheelchair accessible     Home Layout Able to live on 1st floor     Equipment Currently Used at Home walker, standard;cane, straight     Readmission within 30 days? Yes     Patient currently being followed by outpatient case management? No     Do you currently have service(s) that help you manage your care at home? No     Do you take prescription medications? Yes     Do you have prescription coverage? Yes     Do you have any problems affording any of your prescribed medications? No     Is the patient taking medications as prescribed? yes     Who is going to help you get home at discharge? Spouse - Edouard Lemus [phone]     How do you get to doctors appointments? family or friend will provide     Are you on dialysis? No     Do you take coumadin? No     Discharge Plan A Home with family     Discharge Plan B Home with family;Home Health     DME Needed Upon Discharge  none     Discharge Plan discussed with: Patient     Discharge Barriers Identified None        Relationship/Environment    Name(s) of Who Lives With Patient Spouse - Edouard Lemus [phone]

## 2022-03-28 LAB
ALBUMIN SERPL BCP-MCNC: 2 G/DL (ref 3.5–5.2)
ALP SERPL-CCNC: 237 U/L (ref 55–135)
ALT SERPL W/O P-5'-P-CCNC: 23 U/L (ref 10–44)
ANION GAP SERPL CALC-SCNC: 8 MMOL/L (ref 8–16)
AST SERPL-CCNC: 25 U/L (ref 10–40)
BASOPHILS # BLD AUTO: 0 K/UL (ref 0–0.2)
BASOPHILS NFR BLD: 0 % (ref 0–1.9)
BILIRUB SERPL-MCNC: 1.5 MG/DL (ref 0.1–1)
BUN SERPL-MCNC: 7 MG/DL (ref 8–23)
CA-I BLDV-SCNC: 0.97 MMOL/L (ref 1.06–1.42)
CALCIUM SERPL-MCNC: 6.7 MG/DL (ref 8.7–10.5)
CHLORIDE SERPL-SCNC: 110 MMOL/L (ref 95–110)
CO2 SERPL-SCNC: 21 MMOL/L (ref 23–29)
CREAT SERPL-MCNC: 0.7 MG/DL (ref 0.5–1.4)
DIFFERENTIAL METHOD: ABNORMAL
EOSINOPHIL # BLD AUTO: 0 K/UL (ref 0–0.5)
EOSINOPHIL NFR BLD: 1.2 % (ref 0–8)
ERYTHROCYTE [DISTWIDTH] IN BLOOD BY AUTOMATED COUNT: 21.2 % (ref 11.5–14.5)
EST. GFR  (AFRICAN AMERICAN): >60 ML/MIN/1.73 M^2
EST. GFR  (NON AFRICAN AMERICAN): >60 ML/MIN/1.73 M^2
GLUCOSE SERPL-MCNC: 97 MG/DL (ref 70–110)
HCT VFR BLD AUTO: 24.3 % (ref 37–48.5)
HGB BLD-MCNC: 7.7 G/DL (ref 12–16)
IMM GRANULOCYTES # BLD AUTO: 0.02 K/UL (ref 0–0.04)
IMM GRANULOCYTES NFR BLD AUTO: 0.6 % (ref 0–0.5)
LYMPHOCYTES # BLD AUTO: 0.9 K/UL (ref 1–4.8)
LYMPHOCYTES NFR BLD: 25.7 % (ref 18–48)
MCH RBC QN AUTO: 31.4 PG (ref 27–31)
MCHC RBC AUTO-ENTMCNC: 31.7 G/DL (ref 32–36)
MCV RBC AUTO: 99 FL (ref 82–98)
MONOCYTES # BLD AUTO: 0.2 K/UL (ref 0.3–1)
MONOCYTES NFR BLD: 6.1 % (ref 4–15)
NEUTROPHILS # BLD AUTO: 2.3 K/UL (ref 1.8–7.7)
NEUTROPHILS NFR BLD: 66.4 % (ref 38–73)
NRBC BLD-RTO: 0 /100 WBC
PLATELET # BLD AUTO: 53 K/UL (ref 150–450)
PMV BLD AUTO: 12.1 FL (ref 9.2–12.9)
POTASSIUM SERPL-SCNC: 2.6 MMOL/L (ref 3.5–5.1)
PROT SERPL-MCNC: 4.3 G/DL (ref 6–8.4)
RBC # BLD AUTO: 2.45 M/UL (ref 4–5.4)
SODIUM SERPL-SCNC: 139 MMOL/L (ref 136–145)
T4 FREE SERPL-MCNC: 0.58 NG/DL (ref 0.71–1.51)
TSH SERPL DL<=0.005 MIU/L-ACNC: 84.95 UIU/ML (ref 0.4–4)
WBC # BLD AUTO: 3.42 K/UL (ref 3.9–12.7)

## 2022-03-28 PROCEDURE — 99232 SBSQ HOSP IP/OBS MODERATE 35: CPT | Mod: ,,, | Performed by: INTERNAL MEDICINE

## 2022-03-28 PROCEDURE — 84439 ASSAY OF FREE THYROXINE: CPT | Performed by: NURSE PRACTITIONER

## 2022-03-28 PROCEDURE — 63600175 PHARM REV CODE 636 W HCPCS: Performed by: NURSE PRACTITIONER

## 2022-03-28 PROCEDURE — 63600175 PHARM REV CODE 636 W HCPCS: Performed by: HOSPITALIST

## 2022-03-28 PROCEDURE — S0030 INJECTION, METRONIDAZOLE: HCPCS | Performed by: NURSE PRACTITIONER

## 2022-03-28 PROCEDURE — 99223 PR INITIAL HOSPITAL CARE,LEVL III: ICD-10-PCS | Mod: ,,, | Performed by: INTERNAL MEDICINE

## 2022-03-28 PROCEDURE — 25000003 PHARM REV CODE 250

## 2022-03-28 PROCEDURE — 25000003 PHARM REV CODE 250: Performed by: HOSPITALIST

## 2022-03-28 PROCEDURE — 36415 COLL VENOUS BLD VENIPUNCTURE: CPT | Performed by: NURSE PRACTITIONER

## 2022-03-28 PROCEDURE — 85025 COMPLETE CBC W/AUTO DIFF WBC: CPT | Performed by: NURSE PRACTITIONER

## 2022-03-28 PROCEDURE — 80053 COMPREHEN METABOLIC PANEL: CPT | Performed by: NURSE PRACTITIONER

## 2022-03-28 PROCEDURE — 82330 ASSAY OF CALCIUM: CPT | Performed by: NURSE PRACTITIONER

## 2022-03-28 PROCEDURE — 84443 ASSAY THYROID STIM HORMONE: CPT | Performed by: NURSE PRACTITIONER

## 2022-03-28 PROCEDURE — A4216 STERILE WATER/SALINE, 10 ML: HCPCS | Performed by: NURSE PRACTITIONER

## 2022-03-28 PROCEDURE — 94761 N-INVAS EAR/PLS OXIMETRY MLT: CPT

## 2022-03-28 PROCEDURE — 12000002 HC ACUTE/MED SURGE SEMI-PRIVATE ROOM

## 2022-03-28 PROCEDURE — 99232 PR SUBSEQUENT HOSPITAL CARE,LEVL II: ICD-10-PCS | Mod: ,,, | Performed by: INTERNAL MEDICINE

## 2022-03-28 PROCEDURE — 25000003 PHARM REV CODE 250: Performed by: NURSE PRACTITIONER

## 2022-03-28 PROCEDURE — 99223 1ST HOSP IP/OBS HIGH 75: CPT | Mod: ,,, | Performed by: INTERNAL MEDICINE

## 2022-03-28 PROCEDURE — 99900035 HC TECH TIME PER 15 MIN (STAT)

## 2022-03-28 RX ORDER — LEVOTHYROXINE SODIUM 125 UG/1
125 TABLET ORAL
Status: DISCONTINUED | OUTPATIENT
Start: 2022-03-28 | End: 2022-03-28

## 2022-03-28 RX ORDER — TRAZODONE HYDROCHLORIDE 50 MG/1
50 TABLET ORAL NIGHTLY PRN
Status: DISCONTINUED | OUTPATIENT
Start: 2022-03-28 | End: 2022-03-31 | Stop reason: HOSPADM

## 2022-03-28 RX ADMIN — METRONIDAZOLE 500 MG: 500 INJECTION, SOLUTION INTRAVENOUS at 04:03

## 2022-03-28 RX ADMIN — SODIUM CHLORIDE, PRESERVATIVE FREE 10 ML: 5 INJECTION INTRAVENOUS at 12:03

## 2022-03-28 RX ADMIN — CALCIUM GLUCONATE 1 G: 98 INJECTION, SOLUTION INTRAVENOUS at 09:03

## 2022-03-28 RX ADMIN — FAMOTIDINE 20 MG: 20 TABLET ORAL at 08:03

## 2022-03-28 RX ADMIN — CALCIUM CARBONATE (ANTACID) CHEW TAB 500 MG 1000 MG: 500 CHEW TAB at 05:03

## 2022-03-28 RX ADMIN — SODIUM CHLORIDE: 0.9 INJECTION, SOLUTION INTRAVENOUS at 11:03

## 2022-03-28 RX ADMIN — CALCIUM CARBONATE (ANTACID) CHEW TAB 500 MG 1000 MG: 500 CHEW TAB at 12:03

## 2022-03-28 RX ADMIN — TRAZODONE HYDROCHLORIDE 50 MG: 50 TABLET ORAL at 08:03

## 2022-03-28 RX ADMIN — METRONIDAZOLE 500 MG: 500 INJECTION, SOLUTION INTRAVENOUS at 11:03

## 2022-03-28 RX ADMIN — METRONIDAZOLE 500 MG: 500 INJECTION, SOLUTION INTRAVENOUS at 08:03

## 2022-03-28 RX ADMIN — CIPROFLOXACIN 400 MG: 2 INJECTION, SOLUTION INTRAVENOUS at 05:03

## 2022-03-28 RX ADMIN — METRONIDAZOLE 500 MG: 500 INJECTION, SOLUTION INTRAVENOUS at 12:03

## 2022-03-28 RX ADMIN — POTASSIUM BICARBONATE 60 MEQ: 391 TABLET, EFFERVESCENT ORAL at 07:03

## 2022-03-28 RX ADMIN — ACETAMINOPHEN 650 MG: 325 TABLET ORAL at 08:03

## 2022-03-28 RX ADMIN — POTASSIUM BICARBONATE 60 MEQ: 391 TABLET, EFFERVESCENT ORAL at 09:03

## 2022-03-28 RX ADMIN — CIPROFLOXACIN 400 MG: 2 INJECTION, SOLUTION INTRAVENOUS at 04:03

## 2022-03-28 RX ADMIN — LOPERAMIDE HYDROCHLORIDE 2 MG: 2 CAPSULE ORAL at 07:03

## 2022-03-28 RX ADMIN — ONDANSETRON 4 MG: 2 INJECTION INTRAMUSCULAR; INTRAVENOUS at 04:03

## 2022-03-28 RX ADMIN — CALCIUM CARBONATE (ANTACID) CHEW TAB 500 MG 1000 MG: 500 CHEW TAB at 04:03

## 2022-03-28 RX ADMIN — LOPERAMIDE HYDROCHLORIDE 2 MG: 2 CAPSULE ORAL at 05:03

## 2022-03-28 NOTE — CHAPLAIN
Visited pt and  bedside; they've been  25 yrs; Appreciated the visit; have a tight [employer] family ([employer]regation); spoke highly of all the care received here at this hospital this visit and the last most recent; they did mention cancer, but no other medical info offered up; pt a little teary, reminded her tears are ok, that she is strong, can be strong and still have tears, we're human. She agreed; provided compassionate presence and listening ear; pt and  welcomed me to pray over/with/for pt; very appreciative. Both pt and  very hopeful.  will continue to follow. Lord, in your mercy.

## 2022-03-28 NOTE — CONSULTS
Consult noted chart reviewed. Pt is a DNR; no ACP docs noted. Pt intermittently confused. Called Spouse Mr. Nunn Allan [phone]. Introduced Palliative Care as an extra layer of support for our patients and families dealing with chronic illnesses. Spouse not receptive and refused PC Team services. Offered to follow-up with them on Wednesday; again refused. Case management notified. PC Team will sign off. Please re-consult with any additional needs.

## 2022-03-28 NOTE — DISCHARGE INSTRUCTIONS
Wound Care/Skin Care:  Shower with mild soap and rinse well and then dry the entire body.  Apply a thin layer of Triad hydrophilic wound dressing ointment to all discoloration and lesion areas and leave open to air.   Okay to apply to lesion on the nose and the left foot as well.   Application of Triad daily.

## 2022-03-28 NOTE — NURSING
Mariah Nicole NP notified of critical potassium and calcium.  Potassium replacement started and ionized calcium ordered.

## 2022-03-28 NOTE — CONSULTS
[facility]  Adult Nutrition  Consult Note    SUMMARY     Recommendations  1) Continue Regular diet   2) Boost Breeze BID trial   3) Premier Protein BID ( providing from home)   4) RD to complete NFPE as able on follow up  5) Consider addition of MVI (poor PO, wound healing)    Goals: Pt to meet > 50% EEN by RD follow up  Nutrition Goal Status: new  Communication of RD Recs: other (comment) (POC)    Assessment and Plan  Nutrition Problem  Inadequate oral intake    Related to (etiology):   Decreased appetite, dislikes food at facility      Signs and Symptoms (as evidenced by):   Pt eating < 50% meals during admit    Interventions(treatment strategy):  Collaboration with other providers  General, Healthful diet  Commercial Beverage  Menu Selection assistance    Nutrition Diagnosis Status:   New    Malnutrition Assessment  Energy Intake: moderate energy intake    Reason for Assessment  Reason For Assessment: consult  Diagnosis: other (see comments), cancer diagnosis/related complications (proctocolitis)  Relevant Medical History: hypertension, hyperlipidemia, colon cancer with metastatic liver disease on chemotherapy with last infusion on 03/16/2022  Interdisciplinary Rounds: did not attend  General Information Comments: Remote assessment for coverage, spoke with pt's wife on room phone. Reports fair appetite PTA, doing small, frequent meals throughout the day with Premier Protein shakes. With diarrhea, occasional nausea - takes zofran. Boost Plus - worsened diarrhea last visit, will avoid. Willing to try Boost Breeze. Pt has no appetite for food at facility. Pt's  agreeable to provide outside food/Premier protein to encourage intake.  reports wt has been stable/denies wt loss. NFPE needed to determine malnutrition status.  Nutrition Discharge Planning: Discharge on General, Healthful diet with ONS of choice    Nutrition Risk Screen  Nutrition Risk Screen: no indicators  "present    Nutrition/Diet History  Patient Reported Diet/Restrictions/Preferences: general  Typical Food/Fluid Intake: Small/frequent meals  Food Preferences: Likes smoothies, premier protein. Dislikes hospital food  Supplemental Drinks or Food Habits: Premier Protein  Factors Affecting Nutritional Intake: decreased appetite, diarrhea    Anthropometrics  Temp: 96.6 °F (35.9 °C)  Height Method: Stated  Height: 5' 7" (170.2 cm)  Height (inches): 67 in  Weight Method: Bed Scale  Weight: 65.3 kg (144 lb)  Weight (lb): 144 lb  Ideal Body Weight (IBW), Female: 135 lb  % Ideal Body Weight, Female (lb): 106.67 %  BMI (Calculated): 22.5     Lab/Procedures/Meds  Pertinent Labs Reviewed: reviewed  Pertinent Labs Comments: H/H 7.7/24.3, MCV 99, K 2.6, Ca 6.7, Alk Phos 237, Alb 2  Pertinent Medications Reviewed: reviewed  Pertinent Medications Comments: calcium carbonate, calcium gluconate, cipro, famotidine, NaCl    Physical Findings/Assessment  Wound to sacral spine, BARNEY quadrant, RU quadrant     Estimated/Assessed Needs  Weight Used For Calorie Calculations: 65.3 kg (144 lb)  Energy Calorie Requirements (kcal): 1959 kcal/day based on 30 kcal/kg for ca  Energy Need Method: [place]- [place]  Protein Requirements: 79 g/day based on 1.2 g/kg for ca  Weight Used For Protein Calculations: 65.3 kg (144 lb)  Fluid Requirements (mL): 1 mL/kcal or per MD  Estimated Fluid Requirement Method: RDA Method  RDA Method (mL): 1959     Nutrition Prescription Ordered  Current Diet Order: Regular    Evaluation of Received Nutrient/Fluid Intake  Energy Calories Required: not meeting needs  Protein Required: not meeting needs  Fluid Required: meeting needs  Comments: LBM 3/28  Tolerance:  (Fair, dislikes hospital food,  bringing in food)  % Intake of Estimated Energy Needs: 0 - 25 %  % Meal Intake: 0 - 25 %    Nutrition Risk  Level of Risk/Frequency of Follow-up:  (1-2x weekly)     Monitor and Evaluation  Food and Nutrient Intake: energy " intake, food and beverage intake  Food and Nutrient Adminstration: diet order  Knowledge/Beliefs/Attitudes: beliefs and attitudes  Physical Activity and Function: nutrition-related ADLs and IADLs  Anthropometric Measurements: weight change  Biochemical Data, Medical Tests and Procedures: lipid profile, electrolyte and renal panel, gastrointestinal profile, glucose/endocrine profile, inflammatory profile  Nutrition-Focused Physical Findings: overall appearance, extremities, muscles and bones, skin     Nutrition Follow-Up  RD Follow-up?: Yes

## 2022-03-28 NOTE — CARE UPDATE
03/28/22 0908   Patient Assessment/Suction   Level of Consciousness (AVPU) alert   All Lung Fields Breath Sounds diminished   Rhythm/Pattern, Respiratory unlabored   PRE-TX-O2   O2 Device (Oxygen Therapy) room air   SpO2 98 %   Pulse Oximetry Type Intermittent   $ Pulse Oximetry - Multiple Charge Pulse Oximetry - Multiple   Pulse 89   Resp 18   Aerosol Therapy   $ Aerosol Therapy Charges PRN treatment not required   Respiratory Treatment Status (SVN) PRN treatment not required

## 2022-03-28 NOTE — CONSULTS
Consulted for wound skin lesions related to diarrhea from chemotherapy medications. Patients skin is discolored with lesions to areas beneath bilateral breasts, bilateral groins, bilateral buttocks, perineal area bilateral posterior thighs and left foot. Patient also noted with lesions to the tip of her nose with skin loss present. Applied a thin layer of Triad to nose and all other areas of skin discoloration and lesions. Plan to continue with Triad daily to all areas and leave open to air. Educated the patient and patients spouse at bedside regarding skin care and both verbalized understanding. Wound care will follow up as needed.

## 2022-03-28 NOTE — CONSULTS
[facility]  Hematology/Oncology  Consult Note    Patient Name: Regine Lemus  MRN: [id number]  Admission Date: 3/26/2022  Hospital Length of Stay: 2 days  Code Status: DNR   Attending Provider: Lobito Vigil MD  Consulting Provider: Aurash Khoobehi, MD  Primary Care Physician: Primary Doctor No  Principal Problem:Proctocolitis    Consults  Subjective:     HPI:  This is a 66-year-old female with metastatic colon cancer well known to me.  She was previously doing her treatment at [facility] but recently transitioned to our care.  She is currently on XELOX, having restarted about 2-3 weeks ago.  She has had about 4 admissions over the past year for diarrhea due to her chemotherapy.  She is again admitted for this.    Oncology Treatment Plan:   OP COLORECTAL CAPECITABINE OXALIPLATIN (CAPEOX) BEVACIZUMAB Q3W    Medications:  Continuous Infusions:   sodium chloride 0.9% 125 mL/hr at 03/28/22 1125     Scheduled Meds:   calcium carbonate  1,000 mg Oral TID AC    ciprofloxacin  400 mg Intravenous Q12H    famotidine  20 mg Oral BID    [START ON 3/29/2022] levothyroxine  150 mcg Oral Before breakfast    metronidazole  500 mg Intravenous Q8H    sodium chloride 0.9%  10 mL Intravenous Q6H     PRN Meds:acetaminophen, albuterol-ipratropium, dextrose 10%, dextrose 10%, glucagon (human recombinant), glucose, glucose, loperamide, magnesium oxide, magnesium oxide, melatonin, morphine, naloxone, ondansetron, potassium bicarbonate, potassium bicarbonate, potassium bicarbonate, potassium, sodium phosphates, potassium, sodium phosphates, potassium, sodium phosphates, simethicone, sodium chloride 0.9%, Flushing PICC Protocol **AND** sodium chloride 0.9% **AND** sodium chloride 0.9%     Review of patient's allergies indicates:   Allergen Reactions    Lisinopril Rash        Past Medical History:   Diagnosis Date    Allergy     Colon cancer     Hypertension     Liver cancer     Nontoxic  uninodular goiter     Osteoarthrosis, unspecified whether generalized or localized, hand     Other abnormal glucose     Pure hypercholesterolemia     Severe sepsis      Past Surgical History:   Procedure Laterality Date    CYST REMOVAL  1994    cyst removed from neck    infusaport placement Right     THYROIDECTOMY      TONSILLECTOMY  age 14     Family History     Problem Relation (Age of Onset)    Lung cancer Father        Tobacco Use    Smoking status: Never Smoker    Smokeless tobacco: Never Used   Substance and Sexual Activity    Alcohol use: No    Drug use: Never    Sexual activity: Not on file       Review of Systems   Constitutional: Negative.    HENT: Negative.    Eyes: Negative.    Respiratory: Negative.    Cardiovascular: Negative.    Gastrointestinal: Negative.    Endocrine: Negative.    Genitourinary: Negative.    Musculoskeletal: Negative.    Integumentary:  Negative.   Neurological: Negative.    Hematological: Negative.    Psychiatric/Behavioral: Negative.      Objective:     Vital Signs (Most Recent):  Temp: 96.3 °F (35.7 °C) (03/28/22 1647)  Pulse: 87 (03/28/22 1647)  Resp: 18 (03/28/22 1647)  BP: (!) 162/85 (03/28/22 1647)  SpO2: 99 % (03/28/22 1647) Vital Signs (24h Range):  Temp:  [96.3 °F (35.7 °C)-98.1 °F (36.7 °C)] 96.3 °F (35.7 °C)  Pulse:  [85-90] 87  Resp:  [16-18] 18  SpO2:  [98 %-99 %] 99 %  BP: (140-162)/(74-85) 162/85     Weight: 65.3 kg (144 lb)  Body mass index is 22.55 kg/m².  Body surface area is 1.76 meters squared.      Intake/Output Summary (Last 24 hours) at 3/28/2022 1801  Last data filed at 3/28/2022 0509  Gross per 24 hour   Intake 1458.51 ml   Output --   Net 1458.51 ml       Physical Exam  Constitutional:       Appearance: Normal appearance.   HENT:      Head: Normocephalic and atraumatic.      Nose: Nose normal.      Mouth/Throat:      Mouth: Mucous membranes are moist.      Pharynx: Oropharynx is clear.   Eyes:      Conjunctiva/sclera: Conjunctivae normal.    Cardiovascular:      Rate and Rhythm: Normal rate and regular rhythm.      Heart sounds: Normal heart sounds.   Pulmonary:      Effort: Pulmonary effort is normal.      Breath sounds: Normal breath sounds.   Abdominal:      General: Abdomen is flat. Bowel sounds are normal.      Palpations: Abdomen is soft.   Musculoskeletal:         General: Normal range of motion.      Cervical back: Normal range of motion and neck supple.   Skin:     General: Skin is warm and dry.   Neurological:      General: No focal deficit present.      Mental Status: She is alert and oriented to person, place, and time. Mental status is at baseline.   Psychiatric:         Mood and Affect: Mood normal.         Significant Labs:   CBC:   Recent Labs   Lab 03/27/22  0722 03/28/22  0510   WBC 4.63 3.42*   HGB 8.1* 7.7*   HCT 24.9* 24.3*   PLT 45* 53*    and CMP:   Recent Labs   Lab 03/27/22  0646 03/28/22  0510    139   K 2.9* 2.6*   * 110   CO2 20* 21*    97   BUN 14 7*   CREATININE 0.8 0.7   CALCIUM 6.4* 6.7*   PROT 4.7* 4.3*   ALBUMIN 2.1* 2.0*   BILITOT 1.2* 1.5*   ALKPHOS 273* 237*   AST 31 25   ALT 27 23   ANIONGAP 10 8   EGFRNONAA >60 >60       Diagnostic Results:  I have reviewed all pertinent imaging results/findings within the past 24 hours.    Assessment/Plan:     Active Diagnoses:    Diagnosis Date Noted POA    PRINCIPAL PROBLEM:  Proctocolitis [K52.9] 03/26/2022 Yes    Hypokalemia [E87.6] 03/27/2022 Yes    Hypomagnesemia [E83.42] 03/27/2022 Yes    Thrombocytopenia [D69.6] 03/26/2022 Yes    Osteopenia [M85.80] 03/26/2022 Yes    Hypocalcemia [E83.51] 03/26/2022 Yes    Encephalopathy, metabolic [G93.41] 03/26/2022 Yes    Severe malnutrition [E43] 03/02/2022 Yes    Severe sepsis [A41.9, R65.20]  Yes    Secondary malignant neoplasm of liver [C78.7] 01/24/2021 Yes    Colorectal carcinoma [C19] 01/23/2021 Yes    Anemia [D64.9] 01/23/2021 Yes    Postoperative hypothyroidism [E89.0] 12/14/2015 Yes     Essential (primary) hypertension [I10] 07/23/2014 Yes      Problems Resolved During this Admission:       Metastatic colon cancer  Diarrhea  - likely due to chemotherapy  -I encouraged her to do a colonoscopy as discussed with GI  - Agree with supportive care  - may need to lower dose of chemotherapy after she has recovered from this    Thank you for your consult. I will follow-up with patient. Please contact us if you have any additional questions.    Aurash Khoobehi, MD  Hematology/Oncology  [facility]

## 2022-03-28 NOTE — PLAN OF CARE
Recommendations  1) Continue Regular diet   2) Boost Breeze BID trial   3) Premier Protein BID ( providing from home)   4) RD to complete NFPE as able on follow up  5) Consider addition of MVI (poor PO, wound healing)    Goals: Pt to meet > 50% EEN by RD follow up  Nutrition Goal Status: new  Communication of RD Recs: other (comment) (POC, sticky note)    Assessment and Plan  Nutrition Problem  Inadequate oral intake    Related to (etiology):   Decreased appetite, dislikes food at facility      Signs and Symptoms (as evidenced by):   Pt eating < 50% meals during admit    Interventions(treatment strategy):  Collaboration with other providers  General, Healthful diet  Commercial Beverage  Menu Selection assistance    Nutrition Diagnosis Status:   New

## 2022-03-28 NOTE — PLAN OF CARE
Safety maintained, call light within reach, bed locked and in low position, and side rails up. Patient remains free from injury.      Monitoring and following care plan.

## 2022-03-29 LAB
ALBUMIN SERPL BCP-MCNC: 2 G/DL (ref 3.5–5.2)
ALP SERPL-CCNC: 257 U/L (ref 55–135)
ALT SERPL W/O P-5'-P-CCNC: 22 U/L (ref 10–44)
ANION GAP SERPL CALC-SCNC: 8 MMOL/L (ref 8–16)
AST SERPL-CCNC: 24 U/L (ref 10–40)
BASOPHILS # BLD AUTO: 0.01 K/UL (ref 0–0.2)
BASOPHILS NFR BLD: 0.3 % (ref 0–1.9)
BILIRUB SERPL-MCNC: 1.2 MG/DL (ref 0.1–1)
BUN SERPL-MCNC: 4 MG/DL (ref 8–23)
CA-I BLDV-SCNC: 0.94 MMOL/L (ref 1.06–1.42)
CALCIUM SERPL-MCNC: 6.6 MG/DL (ref 8.7–10.5)
CHLORIDE SERPL-SCNC: 110 MMOL/L (ref 95–110)
CO2 SERPL-SCNC: 21 MMOL/L (ref 23–29)
CREAT SERPL-MCNC: 0.7 MG/DL (ref 0.5–1.4)
DIFFERENTIAL METHOD: ABNORMAL
EOSINOPHIL # BLD AUTO: 0 K/UL (ref 0–0.5)
EOSINOPHIL NFR BLD: 0.9 % (ref 0–8)
ERYTHROCYTE [DISTWIDTH] IN BLOOD BY AUTOMATED COUNT: 21 % (ref 11.5–14.5)
EST. GFR  (AFRICAN AMERICAN): >60 ML/MIN/1.73 M^2
EST. GFR  (NON AFRICAN AMERICAN): >60 ML/MIN/1.73 M^2
GLUCOSE SERPL-MCNC: 73 MG/DL (ref 70–110)
HCT VFR BLD AUTO: 25.5 % (ref 37–48.5)
HGB BLD-MCNC: 7.9 G/DL (ref 12–16)
IMM GRANULOCYTES # BLD AUTO: 0.02 K/UL (ref 0–0.04)
IMM GRANULOCYTES NFR BLD AUTO: 0.6 % (ref 0–0.5)
LYMPHOCYTES # BLD AUTO: 0.9 K/UL (ref 1–4.8)
LYMPHOCYTES NFR BLD: 27.7 % (ref 18–48)
MAGNESIUM SERPL-MCNC: 1.2 MG/DL (ref 1.6–2.6)
MCH RBC QN AUTO: 31.7 PG (ref 27–31)
MCHC RBC AUTO-ENTMCNC: 31 G/DL (ref 32–36)
MCV RBC AUTO: 102 FL (ref 82–98)
MONOCYTES # BLD AUTO: 0.3 K/UL (ref 0.3–1)
MONOCYTES NFR BLD: 8.2 % (ref 4–15)
NEUTROPHILS # BLD AUTO: 2 K/UL (ref 1.8–7.7)
NEUTROPHILS NFR BLD: 62.3 % (ref 38–73)
NRBC BLD-RTO: 0 /100 WBC
PLATELET # BLD AUTO: 59 K/UL (ref 150–450)
PMV BLD AUTO: 10.9 FL (ref 9.2–12.9)
POTASSIUM SERPL-SCNC: 2.9 MMOL/L (ref 3.5–5.1)
PROT SERPL-MCNC: 4.3 G/DL (ref 6–8.4)
RBC # BLD AUTO: 2.49 M/UL (ref 4–5.4)
SODIUM SERPL-SCNC: 139 MMOL/L (ref 136–145)
WBC # BLD AUTO: 3.28 K/UL (ref 3.9–12.7)

## 2022-03-29 PROCEDURE — 82330 ASSAY OF CALCIUM: CPT

## 2022-03-29 PROCEDURE — S0030 INJECTION, METRONIDAZOLE: HCPCS | Performed by: NURSE PRACTITIONER

## 2022-03-29 PROCEDURE — 80053 COMPREHEN METABOLIC PANEL: CPT | Performed by: NURSE PRACTITIONER

## 2022-03-29 PROCEDURE — 63600175 PHARM REV CODE 636 W HCPCS: Performed by: NURSE PRACTITIONER

## 2022-03-29 PROCEDURE — 99233 PR SUBSEQUENT HOSPITAL CARE,LEVL III: ICD-10-PCS | Mod: ,,, | Performed by: NURSE PRACTITIONER

## 2022-03-29 PROCEDURE — 83735 ASSAY OF MAGNESIUM: CPT | Performed by: NURSE PRACTITIONER

## 2022-03-29 PROCEDURE — 36415 COLL VENOUS BLD VENIPUNCTURE: CPT | Performed by: NURSE PRACTITIONER

## 2022-03-29 PROCEDURE — 25000003 PHARM REV CODE 250: Performed by: NURSE PRACTITIONER

## 2022-03-29 PROCEDURE — 94761 N-INVAS EAR/PLS OXIMETRY MLT: CPT

## 2022-03-29 PROCEDURE — 12000002 HC ACUTE/MED SURGE SEMI-PRIVATE ROOM

## 2022-03-29 PROCEDURE — 99233 SBSQ HOSP IP/OBS HIGH 50: CPT | Mod: ,,, | Performed by: NURSE PRACTITIONER

## 2022-03-29 PROCEDURE — 36415 COLL VENOUS BLD VENIPUNCTURE: CPT

## 2022-03-29 PROCEDURE — 99900035 HC TECH TIME PER 15 MIN (STAT)

## 2022-03-29 PROCEDURE — 99232 PR SUBSEQUENT HOSPITAL CARE,LEVL II: ICD-10-PCS | Mod: ,,, | Performed by: INTERNAL MEDICINE

## 2022-03-29 PROCEDURE — 99232 SBSQ HOSP IP/OBS MODERATE 35: CPT | Mod: ,,, | Performed by: INTERNAL MEDICINE

## 2022-03-29 PROCEDURE — 25000003 PHARM REV CODE 250

## 2022-03-29 PROCEDURE — 85025 COMPLETE CBC W/AUTO DIFF WBC: CPT | Performed by: NURSE PRACTITIONER

## 2022-03-29 RX ORDER — DRONABINOL 10 MG/1
10 CAPSULE ORAL
Qty: 60 CAPSULE | Refills: 0 | Status: SHIPPED | OUTPATIENT
Start: 2022-03-29 | End: 2022-04-28 | Stop reason: SDUPTHER

## 2022-03-29 RX ORDER — METRONIDAZOLE 500 MG/100ML
500 INJECTION, SOLUTION INTRAVENOUS
Status: DISCONTINUED | OUTPATIENT
Start: 2022-03-29 | End: 2022-03-29

## 2022-03-29 RX ORDER — MEGESTROL ACETATE 40 MG/1
40 TABLET ORAL 2 TIMES DAILY
Status: DISCONTINUED | OUTPATIENT
Start: 2022-03-29 | End: 2022-03-31

## 2022-03-29 RX ORDER — MAGNESIUM SULFATE 1 G/100ML
1 INJECTION INTRAVENOUS ONCE
Status: COMPLETED | OUTPATIENT
Start: 2022-03-29 | End: 2022-03-29

## 2022-03-29 RX ORDER — POTASSIUM CHLORIDE 7.45 MG/ML
10 INJECTION INTRAVENOUS
Status: DISCONTINUED | OUTPATIENT
Start: 2022-03-29 | End: 2022-03-29

## 2022-03-29 RX ORDER — METRONIDAZOLE 250 MG/1
500 TABLET ORAL EVERY 8 HOURS
Status: DISCONTINUED | OUTPATIENT
Start: 2022-03-29 | End: 2022-03-31 | Stop reason: HOSPADM

## 2022-03-29 RX ORDER — HYDROCODONE BITARTRATE AND ACETAMINOPHEN 5; 325 MG/1; MG/1
1 TABLET ORAL EVERY 6 HOURS PRN
Qty: 60 TABLET | Refills: 0 | Status: SHIPPED | OUTPATIENT
Start: 2022-03-29 | End: 2022-03-31 | Stop reason: SDUPTHER

## 2022-03-29 RX ADMIN — LOPERAMIDE HYDROCHLORIDE 2 MG: 2 CAPSULE ORAL at 05:03

## 2022-03-29 RX ADMIN — METRONIDAZOLE 500 MG: 250 TABLET ORAL at 03:03

## 2022-03-29 RX ADMIN — LOPERAMIDE HYDROCHLORIDE 2 MG: 2 CAPSULE ORAL at 07:03

## 2022-03-29 RX ADMIN — POTASSIUM BICARBONATE 60 MEQ: 391 TABLET, EFFERVESCENT ORAL at 08:03

## 2022-03-29 RX ADMIN — METRONIDAZOLE 500 MG: 250 TABLET ORAL at 09:03

## 2022-03-29 RX ADMIN — CALCIUM CARBONATE (ANTACID) CHEW TAB 500 MG 1000 MG: 500 CHEW TAB at 10:03

## 2022-03-29 RX ADMIN — CALCIUM CARBONATE (ANTACID) CHEW TAB 500 MG 1000 MG: 500 CHEW TAB at 05:03

## 2022-03-29 RX ADMIN — MELATONIN TAB 3 MG 6 MG: 3 TAB at 09:03

## 2022-03-29 RX ADMIN — CALCIUM CARBONATE (ANTACID) CHEW TAB 500 MG 1000 MG: 500 CHEW TAB at 03:03

## 2022-03-29 RX ADMIN — METRONIDAZOLE 500 MG: 500 INJECTION, SOLUTION INTRAVENOUS at 08:03

## 2022-03-29 RX ADMIN — LEVOTHYROXINE SODIUM 150 MCG: 0.12 TABLET ORAL at 05:03

## 2022-03-29 RX ADMIN — CALCIUM GLUCONATE 1 G: 98 INJECTION, SOLUTION INTRAVENOUS at 10:03

## 2022-03-29 RX ADMIN — MEGESTROL ACETATE 40 MG: 40 TABLET ORAL at 11:03

## 2022-03-29 RX ADMIN — CIPROFLOXACIN 400 MG: 2 INJECTION, SOLUTION INTRAVENOUS at 05:03

## 2022-03-29 RX ADMIN — MEGESTROL ACETATE 40 MG: 40 TABLET ORAL at 09:03

## 2022-03-29 RX ADMIN — CIPROFLOXACIN 400 MG: 2 INJECTION, SOLUTION INTRAVENOUS at 03:03

## 2022-03-29 RX ADMIN — POTASSIUM BICARBONATE 60 MEQ: 391 TABLET, EFFERVESCENT ORAL at 06:03

## 2022-03-29 RX ADMIN — MAGNESIUM SULFATE 1 G: 1 INJECTION INTRAVENOUS at 11:03

## 2022-03-29 NOTE — PROGRESS NOTES
[facility] Hospital Medicine  Progress Note    Patient Name: Regine Lemus  MRN: [id number]  Patient Class: IP- Inpatient   Admission Date: 3/26/2022  Length of Stay: 2 days  Attending Physician: Lobito Vigil MD  Primary Care Provider: Primary Doctor No        Subjective:     Principal Problem:Proctocolitis        HPI:  Regine Lemus is a 66-year-old female with past medical history significant for hypertension, hyperlipidemia, colon cancer with metastatic liver disease on chemotherapy with last infusion on 03/16/2022 who presented to the emergency department for further evaluation of abdominal pain and diarrhea which began this morning.  She was discharged from this facility on 3/5/22 after being treated for similar complaints.  Her  is at bedside and provides the majority the history stating that this happened following her previous chemotherapy infusion.  This morning he noted her to be confused and she was complaining of severe abdominal pain.  She had some diarrhea home, but he states that it has become much worse since they arrived to the ED.  CT abdomen pelvis was significant for proctocolitis with noted hepatic metastatic disease.  Ammonia level was elevated at 77 and she was found to have hypocalcemia and thrombocytopenia.  Her lactic acid is 5.6 and she is tachycardic.  Further history is limited due to the condition of the patient.  She is admitted to the service of hospital medicine for continued monitoring and treatment.      Overview/Hospital Course:  Regine Lemus was monitored closely during her hospital stay.  She was placed on IVFH, IV flagyl and IV cipro for diagnosis of proctocolitis.  GI and oncology specialities were consulted due to her history of metastatic colon cancer with recent chemotherapy treatment.  She was checked for c. Diff and this was negative.  Her electrolytes were monitored closely and repleted as needed.        Interval History: no acute  events overnight.  She is tired this morning, stating she did not sleep well.  Will order trazodone for tonight as melatonin was ineffective.    Review of Systems   Constitutional:  Positive for fatigue. Negative for chills and fever.   HENT:  Negative for congestion and postnasal drip.    Respiratory:  Negative for cough and shortness of breath.    Cardiovascular:  Negative for chest pain and palpitations.   Gastrointestinal:  Positive for abdominal pain and diarrhea (improving per patient). Negative for vomiting.   Genitourinary:  Negative for dysuria and hematuria.   Musculoskeletal:  Positive for gait problem. Negative for neck pain.   Skin:  Positive for pallor. Negative for rash.   Neurological:  Positive for weakness. Negative for syncope.   Psychiatric/Behavioral:  Positive for confusion (improved). Negative for agitation.    All other systems reviewed and are negative.  Objective:     Vital Signs (Most Recent):  Temp: 97.7 °F (36.5 °C) (03/28/22 1922)  Pulse: 83 (03/28/22 1922)  Resp: 17 (03/28/22 1922)  BP: (!) 168/84 (03/28/22 1922)  SpO2: 98 % (03/28/22 1922)   Vital Signs (24h Range):  Temp:  [96.3 °F (35.7 °C)-98.1 °F (36.7 °C)] 97.7 °F (36.5 °C)  Pulse:  [83-90] 83  Resp:  [17-18] 17  SpO2:  [98 %-99 %] 98 %  BP: (140-168)/(74-85) 168/84     Weight: 65.3 kg (144 lb)  Body mass index is 22.55 kg/m².    Intake/Output Summary (Last 24 hours) at 3/28/2022 2208  Last data filed at 3/28/2022 0509  Gross per 24 hour   Intake 1458.51 ml   Output --   Net 1458.51 ml      Physical Exam  Vitals and nursing note reviewed.   Constitutional:       Appearance: She is well-developed. She is ill-appearing.   HENT:      Head: Normocephalic and atraumatic.      Mouth/Throat:      Mouth: Mucous membranes are dry.   Eyes:      Pupils: Pupils are equal, round, and reactive to light.   Cardiovascular:      Rate and Rhythm: Normal rate and regular rhythm.      Pulses: Normal pulses.   Pulmonary:      Effort: Pulmonary effort  is normal. No respiratory distress.      Breath sounds: Normal breath sounds.   Abdominal:      General: Bowel sounds are normal. There is no distension.      Palpations: Abdomen is soft.      Tenderness: There is abdominal tenderness (worst at LLQ; mild diffuse tenderness).   Musculoskeletal:      Cervical back: Normal range of motion and neck supple.   Skin:     General: Skin is warm and dry.      Coloration: Skin is pale.      Findings: No rash.   Neurological:      General: No focal deficit present.      Mental Status: She is alert and oriented to person, place, and time.      GCS: GCS eye subscore is 3. GCS verbal subscore is 4. GCS motor subscore is 6.   Psychiatric:         Attention and Perception: Attention normal.         Speech: Speech normal.         Cognition and Memory: Cognition normal.       Significant Labs: All pertinent labs within the past 24 hours have been reviewed.  CBC:   Recent Labs   Lab 03/27/22  0722 03/28/22  0510   WBC 4.63 3.42*   HGB 8.1* 7.7*   HCT 24.9* 24.3*   PLT 45* 53*     CMP:   Recent Labs   Lab 03/27/22  0646 03/28/22  0510    139   K 2.9* 2.6*   * 110   CO2 20* 21*    97   BUN 14 7*   CREATININE 0.8 0.7   CALCIUM 6.4* 6.7*   PROT 4.7* 4.3*   ALBUMIN 2.1* 2.0*   BILITOT 1.2* 1.5*   ALKPHOS 273* 237*   AST 31 25   ALT 27 23   ANIONGAP 10 8   EGFRNONAA >60 >60       Significant Imaging: I have reviewed all pertinent imaging results/findings within the past 24 hours.      Assessment/Plan:      * Proctocolitis  IV cipro; IV flagyl  IVF hydration  IV narcotics and IV antiemetics as needed  Stool for c diff-negative  She seems to be improving; they would like input from GI and oncology since recurrent issue.  Imodium prn      Hypomagnesemia  Repleted; monitor      Hypokalemia  Nursing to replete per prn orders.        Encephalopathy, metabolic  Patient has acute metabolic encephalopathy that is secondary to Electrolyte abnormalities- hypocalcemia and  Sepsis/Infective. Patient's current mental status is Lethargic. Patient's baseline mental status is. awake and alert; oriented to person, place, and time Evaluation and for underlying cause(s) is underway and inclusive of Blood Chemistries and Neuro-Imaging (MRI/CT). Will monitor neuro checks carefully, avoid narcotics and benzos that will exacerbate agitation, and use PRN medications for controls of behavior for self harm.  3/27-mental status back to baseline              Hypocalcemia  IV replacement; continue tums; monitor        Osteopenia  Compression fx of vertebrae seen today not previously noted-lower thoracic levels and lumbar spine.  Maintain fall precautions.  Consider neurosurgery follow up.      Thrombocytopenia  Chronic; stable; trend      Severe malnutrition  Consult nutrition      Severe sepsis  This patient does have evidence of infective focus  My overall impression is severe sepsis. Vital signs were reviewed and noted in progress note.  Antibiotics given-   Antibiotics (From admission, onward)            Start     Stop Route Frequency Ordered    03/26/22 1615  ciprofloxacin (CIPRO)400mg/200ml D5W IVPB 400 mg         -- IV Every 12 hours (non-standard times) 03/26/22 1505    03/26/22 1615  metronidazole IVPB 500 mg         -- IV Every 8 hours (non-standard times) 03/26/22 1505        Cultures were taken-   Microbiology Results (last 7 days)     Procedure Component Value Units Date/Time    Stool culture [[id number]] Collected: 03/27/22 1623    Order Status: Sent Specimen: Stool Updated: 03/27/22 2211    E. coli 0157 antigen [[id number]] Collected: 03/27/22 1623    Order Status: No result Specimen: Stool Updated: 03/27/22 2211    Stool culture [[id number]]     Order Status: Canceled Specimen: Stool     Clostridium difficile EIA [[id number]] Collected: 03/26/22 1544    Order Status: Completed Specimen: Stool Updated: 03/26/22 2008     C. diff Antigen Negative     C difficile Toxins A+B, EIA Negative      Comment: Testing not recommended for children <24 months old.           Latest lactate reviewed, they are-  Recent Labs   Lab 03/26/22 1957 03/26/22  2325 03/27/22  1012   LACTATE 6.1* 5.0* 2.2  2.2       Organ dysfunction indicated by Encephalopathy   Source- GI    Source control Achieved by- IV abx      Secondary malignant neoplasm of liver  Noted; followed by oncology  LFT's improved from yesterday.        Anemia  Patient's anemia is currently uncontrolled. S/p 0 units of PRBCs.  Current CBC reviewed-   Lab Results   Component Value Date    HGB 7.7 (L) 03/28/2022    HCT 24.3 (L) 03/28/2022    MCV 99 (H) 03/28/2022    PLT 53 (L) 03/28/2022     Monitor serial CBC and transfuse if patient becomes hemodynamically unstable, symptomatic or H/H drops below 7/21.         Colorectal carcinoma  Followed by oncology.  Last chemo infusion 3/16/22  Continue chronic capecitabine 2000mg qam, 1500mg qpm  Consult oncology      Postoperative hypothyroidism  Patient has chronic hypothyroidism. TFTs reviewed-   Lab Results   Component Value Date    TSH 84.954 (H) 03/28/2022   . Will continue chronic levothyroxine and adjust for and clinical changes.  Increased levothyroxine from 125mcg daily to 150mcg daily.  F/u OP.  Will need repeat TSH in 6 weeks.          Essential (primary) hypertension  Chronic, controlled.  Latest blood pressure and vitals reviewed-   Temp:  [96.3 °F (35.7 °C)-98.1 °F (36.7 °C)]   Pulse:  [83-90]   Resp:  [17-18]   BP: (140-168)/(74-85)   SpO2:  [98 %-99 %] .   Home meds for hypertension were reviewed and noted below.   Hypertension Medications             furosemide (LASIX) 20 MG tablet Take 1 tablet (20 mg total) by mouth once daily.          While in the hospital, will manage blood pressure as follows; Adjust home antihypertensive regimen as follows- hold chronic lasix 2/2 severe sepsis    Will utilize p.r.n. blood pressure medication only if patient's blood pressure greater than  180/110 and she  develops symptoms such as worsening chest pain or shortness of breath.          VTE Risk Mitigation (From admission, onward)         Ordered     IP VTE HIGH RISK PATIENT  Once         03/26/22 1830     Place sequential compression device  Until discontinued         03/26/22 1830     Reason for No Pharmacological VTE Prophylaxis  Once        Question:  Reasons:  Answer:  Thrombocytopenia    03/26/22 1830                Discharge Planning   CATHY: 3/30/2022     Code Status: DNR   Is the patient medically ready for discharge?:     Reason for patient still in hospital (select all that apply): Patient trending condition, Treatment, Consult recommendations and Pending disposition  Discharge Plan A: Home with family                  NISA Clark  Department of Hospital Medicine   [facility]

## 2022-03-29 NOTE — PROGRESS NOTES
"[facility]    CC: Colon cancer, abdominal pain    HPI 66 y.o. female with history metastatic colon cancer to liver, undergoing chemotherapy, last dose 3/16/2022, admitted with recurrent abd pain and diarrhea. Recent hospitalization for same. Colon cancer diagnosed per liver biopsy per chart in 1/2022. No C-scope. Positive generalized abd pain and nausea. No fever or jaundice. No gross GI bleeding. Positive diarrhea. Positive weight loss. Of note, pt on steroids for adrenal insufficiency. CT scan notable for wall thickening rectum, sigmoid, and desc colon c/w procto-colitis. Positive liver mets (progressive), small amount ascites. Ammonia elevated at 77. Pt states she is exhausted, multiple crying episodes during interview.    Interval History:  Patient and her  present, she denies abdominal pain today and has had 3 episodes of diarrhea.  They do not wish for colonoscopy and plan for discharge home tomorrow.        Past Medical History:   Diagnosis Date    Allergy     Colon cancer     Hypertension     Liver cancer     Nontoxic uninodular goiter     Osteoarthrosis, unspecified whether generalized or localized, hand     Other abnormal glucose     Pure hypercholesterolemia     Severe sepsis        Allergies and Medications reviewed     Review of Systems  General ROS: negative for - chills, fever; + weight loss  Cardiovascular ROS: no chest pain or dyspnea on exertion  Gastrointestinal ROS:     Physical Examination  BP (!) 150/69 (BP Location: Right arm, Patient Position: Lying)   Pulse 85   Temp 98.1 °F (36.7 °C) (Oral)   Resp 16   Ht 5' 7" (1.702 m)   Wt 65.3 kg (144 lb)   LMP  (LMP Unknown)   SpO2 97%   BMI 22.55 kg/m²   General appearance: alert, cooperative, no distress   HENT: Normocephalic, atraumatic, neck symmetrical, no nasal discharge, sclera anicteric   Lungs: clear to auscultation bilaterally, symmetric chest wall expansion bilaterally  Heart: regular rate and " rhythm without rub; no displacement of the PMI   Abdomen: soft, no signficant tenderness, BS active  Extremities: extremities symmetric; no clubbing, cyanosis, or edema        Labs:  Lab Results   Component Value Date    WBC 3.28 (L) 03/29/2022    HGB 7.9 (L) 03/29/2022    HCT 25.5 (L) 03/29/2022     (H) 03/29/2022    PLT 59 (L) 03/29/2022       CMP  Sodium   Date Value Ref Range Status   03/29/2022 139 136 - 145 mmol/L Final     Potassium   Date Value Ref Range Status   03/29/2022 2.9 (L) 3.5 - 5.1 mmol/L Final     Chloride   Date Value Ref Range Status   03/29/2022 110 95 - 110 mmol/L Final     CO2   Date Value Ref Range Status   03/29/2022 21 (L) 23 - 29 mmol/L Final     Glucose   Date Value Ref Range Status   03/29/2022 73 70 - 110 mg/dL Final     BUN   Date Value Ref Range Status   03/29/2022 4 (L) 8 - 23 mg/dL Final     Creatinine   Date Value Ref Range Status   03/29/2022 0.7 0.5 - 1.4 mg/dL Final     Calcium   Date Value Ref Range Status   03/29/2022 6.6 (LL) 8.7 - 10.5 mg/dL Final     Comment:     CA  critical result(s) called and verbal readback obtained from   Prema Jones by LW8 03/29/2022 06:24       Total Protein   Date Value Ref Range Status   03/29/2022 4.3 (L) 6.0 - 8.4 g/dL Final     Albumin   Date Value Ref Range Status   03/29/2022 2.0 (L) 3.5 - 5.2 g/dL Final     Total Bilirubin   Date Value Ref Range Status   03/29/2022 1.2 (H) 0.1 - 1.0 mg/dL Final     Comment:     For infants and newborns, interpretation of results should be based  on gestational age, weight and in agreement with clinical  observations.    Premature Infant recommended reference ranges:  Up to 24 hours.............<8.0 mg/dL  Up to 48 hours............<12.0 mg/dL  3-5 days..................<15.0 mg/dL  6-29 days.................<15.0 mg/dL       Alkaline Phosphatase   Date Value Ref Range Status   03/29/2022 257 (H) 55 - 135 U/L Final     AST   Date Value Ref Range Status   03/29/2022 24 10 - 40 U/L Final     ALT    Date Value Ref Range Status   03/29/2022 22 10 - 44 U/L Final     Anion Gap   Date Value Ref Range Status   03/29/2022 8 8 - 16 mmol/L Final     eGFR if    Date Value Ref Range Status   03/29/2022 >60 >60 mL/min/1.73 m^2 Final     eGFR if non    Date Value Ref Range Status   03/29/2022 >60 >60 mL/min/1.73 m^2 Final     Comment:     Calculation used to obtain the estimated glomerular filtration  rate (eGFR) is the CKD-EPI equation.        -C.diff- negative    Imaging:  CT abdomen/pelvis was independently visualized and reviewed by me and showed 1. Proctocolitis presumably from an infectious or inflammatory etiology.  2. Progression of hepatic metastatic disease.  3. Development of small volume ascites.  4. Nonspecific urinary bladder distention.  5. Multiple compression fractures are age indeterminate but new since prior exam.    Assessment:   66 y.o. female with Stage IV colon cancer and extensive liver metastasis, who is admitted with abdominal pain and diarrhea which have improved. Her and her  do not wish for colonoscopy at this time and plan for discharge home tomorrow.    Plan:  -Agree with patient wishes for discharge home, prognosis poor  -Imodium PRN  -Please call with questions  -Diet as tolerated     Bisi Domingo MD  [facility]  [address], [address]  [place] [address]  Office: [phone]  Fax: [phone]

## 2022-03-29 NOTE — ASSESSMENT & PLAN NOTE
Patient has chronic hypothyroidism. TFTs reviewed-   Lab Results   Component Value Date    TSH 84.954 (H) 03/28/2022   . Will continue chronic levothyroxine and adjust for and clinical changes.  Increased levothyroxine from 125mcg daily to 150mcg daily.  F/u OP.  Will need repeat TSH in 6 weeks.

## 2022-03-29 NOTE — PROGRESS NOTES
[facility] [place] [facility]    CC: Diarrhea, abnormal CT scan    HPI 66 y.o. female with past medical history significant for hypertension, hyperlipidemia, colon cancer with metastatic liver disease on chemotherapy with last infusion on 03/16/2022 who presented to the emergency department for further evaluation of abdominal pain and diarrhea which began this morning.  She was discharged from this facility on 3/5/22 after being treated for similar complaints.  Her  is at bedside and provides the majority the history stating that this happened following her previous chemotherapy infusion.  This morning he noted her to be confused and she was complaining of severe abdominal pain.  She had some diarrhea home, but he states that it has become much worse since they arrived to the ED.  CT abdomen pelvis was significant for proctocolitis with noted hepatic metastatic disease.  Ammonia level was elevated at 77 and she was found to have hypocalcemia and thrombocytopenia.  Her lactic acid is 5.6 and she is tachycardic.  Further history is limited due to the condition of the patient.  She is admitted to the service of hospital medicine for continued monitoring and treatment.     GI consulted for abnormal CT scan. History metastatic colon cancer to liver, undergoing chemotherapy, last dose 3/16/2022, admitted with recurrent abd pain and diarrhea. Recent hospitalization for same. Colon cancer diagnosed per liver biopsy per chart in 1/2022. No C-scope. Positive generalized abd pain and nausea. No fever or jaundice. No gross GI bleeding. Positive diarrhea. Positive weight loss. Of note, pt on steroids for adrenal insufficiency. CT scan notable for wall thickening rectum, sigmoid, and desc colon c/w procto-colitis. Positive liver mets (progressive), small amount ascites. Ammonia elevated at 77. Pt states she is exhausted, multiple crying episodes during interview.    INTERVAL HISTORY:  Patient seen with  at bedside.   "Awaiting oncology recommendations.  States that she feels a little better today but they feel she cannot tolerate bowel prep/colonoscopy.  She has positive metastatic colon cancer by liver biopsy but has never had colonoscopy to locate primary tumor.  She is on chemotherapy and has had multiple admissions for chemotherapy related diarrhea in the past with resultant hypovolemia. C. Difficile negative.    Past Medical History:   Diagnosis Date    Allergy     Colon cancer     Hypertension     Liver cancer     Nontoxic uninodular goiter     Osteoarthrosis, unspecified whether generalized or localized, hand     Other abnormal glucose     Pure hypercholesterolemia     Severe sepsis          Review of Systems  General ROS: negative for - chills, fever or weight loss  Cardiovascular ROS: no chest pain or dyspnea on exertion  Gastrointestinal ROS: + diarrhea    Physical Examination  BP (!) 168/84 (BP Location: Right arm, Patient Position: Lying)   Pulse 83   Temp 97.7 °F (36.5 °C)   Resp 17   Ht 5' 7" (1.702 m)   Wt 65.3 kg (144 lb)   LMP  (LMP Unknown)   SpO2 98%   BMI 22.55 kg/m²   General appearance: alert, cooperative, no distress, ill appearing  HENT: Normocephalic, atraumatic, neck symmetrical, no nasal discharge, sclera anicteric   Lungs: clear to auscultation bilaterally, symmetric chest wall expansion bilaterally  Heart: regular rate and rhythm without rub; no displacement of the PMI   Abdomen: soft, NT   Extremities: extremities symmetric; no clubbing, cyanosis, or edema  Neurologic: Alert and oriented X 3, no sensory or motor neurologic deficits      Labs:  Lab Results   Component Value Date    WBC 3.42 (L) 03/28/2022    HGB 7.7 (L) 03/28/2022    HCT 24.3 (L) 03/28/2022    MCV 99 (H) 03/28/2022    PLT 53 (L) 03/28/2022         CMP  Sodium   Date Value Ref Range Status   03/28/2022 139 136 - 145 mmol/L Final     Potassium   Date Value Ref Range Status   03/28/2022 2.6 (LL) 3.5 - 5.1 mmol/L Final    "  Comment:     K, Ca  critical result(s) called and verbal readback obtained from   Prema Jones by [facility] 03/28/2022 06:46       Chloride   Date Value Ref Range Status   03/28/2022 110 95 - 110 mmol/L Final     CO2   Date Value Ref Range Status   03/28/2022 21 (L) 23 - 29 mmol/L Final     Glucose   Date Value Ref Range Status   03/28/2022 97 70 - 110 mg/dL Final     BUN   Date Value Ref Range Status   03/28/2022 7 (L) 8 - 23 mg/dL Final     Creatinine   Date Value Ref Range Status   03/28/2022 0.7 0.5 - 1.4 mg/dL Final     Calcium   Date Value Ref Range Status   03/28/2022 6.7 (LL) 8.7 - 10.5 mg/dL Final     Comment:     K, Ca  critical result(s) called and verbal readback obtained from   Prema Jones by [facility] 03/28/2022 06:46       Total Protein   Date Value Ref Range Status   03/28/2022 4.3 (L) 6.0 - 8.4 g/dL Final     Albumin   Date Value Ref Range Status   03/28/2022 2.0 (L) 3.5 - 5.2 g/dL Final     Total Bilirubin   Date Value Ref Range Status   03/28/2022 1.5 (H) 0.1 - 1.0 mg/dL Final     Comment:     For infants and newborns, interpretation of results should be based  on gestational age, weight and in agreement with clinical  observations.    Premature Infant recommended reference ranges:  Up to 24 hours.............<8.0 mg/dL  Up to 48 hours............<12.0 mg/dL  3-5 days..................<15.0 mg/dL  6-29 days.................<15.0 mg/dL       Alkaline Phosphatase   Date Value Ref Range Status   03/28/2022 237 (H) 55 - 135 U/L Final     AST   Date Value Ref Range Status   03/28/2022 25 10 - 40 U/L Final     ALT   Date Value Ref Range Status   03/28/2022 23 10 - 44 U/L Final     Anion Gap   Date Value Ref Range Status   03/28/2022 8 8 - 16 mmol/L Final     eGFR if    Date Value Ref Range Status   03/28/2022 >60 >60 mL/min/1.73 m^2 Final     eGFR if non    Date Value Ref Range Status   03/28/2022 >60 >60 mL/min/1.73 m^2 Final     Comment:     Calculation used to obtain the  estimated glomerular filtration  rate (eGFR) is the CKD-EPI equation.          Interim oncology notes reviewed.      Assessment:   1.  Abdominal pain  2.  Diarrhea - chemotherapy related  3.  Metastatic colon cancer    Plan:  1.  Continue supportive care  2.  Follow up rest of stool results  3.  Colonoscopy at some point would be reasonable, however patient and spouse are unsure she can tolerate.  Dr. Domingo to return to service tomorrow.  Can discuss at that time.      Yovani Shaw MD  [facility]  [address], [address]  [place] [address]  Office: [phone]  Fax: [phone]

## 2022-03-29 NOTE — PHYSICIAN QUERY
PT Name: Regine DENTON Allan  MR #: [id number]     DOCUMENTATION CLARIFICATION     CDS: Brandy Capley, RN  Email: [email or web address]    This form is a permanent document in the medical record.     Query Date: March 29, 2022    By submitting this query, we are merely seeking further clarification of documentation.  Please utilize your independent clinical judgment when addressing the question(s) below.  The Medical Record contains the following:  Indicators Supporting Clinical Findings Location in Medical Record   X HR         RR          BP        Temp Vital Signs (24h Range):  Temp:  [98.4 °F (36.9 °C)] 98.4 °F (36.9 °C)  Pulse:  [] 119  Resp:  [16-20] 18  SpO2:  [95 %-98 %] 95 %  BP: (130-176)/(65-86) 130/65    Vital Signs (24h Range):  Temp:  [97.2 °F (36.2 °C)-100 °F (37.8 °C)] 98.4 °F (36.9 °C)  Pulse:  [] 89  Resp:  [16-20] 17  SpO2:  [93 %-98 %] 98 %  BP: (118-185)/(59-86) 129/59    Vital Signs (24h Range):  Temp:  [96.3 °F (35.7 °C)-98.1 °F (36.7 °C)] 97.7 °F (36.5 °C)  Pulse:  [83-90] 83  Resp:  [17-18] 17  SpO2:  [98 %-99 %] 98 %  BP: (140-168)/(74-85) 168/84   H&P 3/26              Hospital medicine progress notes 3/27              Hospital medicine progress notes 3/28   X Lactic Acid          Procalcitonin  03/26/22 15:21 03/26/22 19:57 03/26/22 23:25 03/27/22 10:12   Lactate, Mode 5.6 (HH)  6.1 (HH)  5.0 (HH) 2.2         Labs 3/26-3/27   X WBC           Bands          CRP     03/26/22 11:34 03/27/22 07:22 03/28/22 05:10 03/29/22 04:50   WBC 7.15 4.63 3.42 (L) 3.28 (L)      Labs 3/26-3/29    Culture(s)      AMS, Confusion, LOC, etc.     X Organ Dysfunction/Failure Organ dysfunction indicated by Encephalopathy     Patient has acute metabolic encephalopathy that is secondary to Electrolyte abnormalities- hypocalcemia and Sepsis/Infective. Patient's current mental status is Lethargic. Patient's baseline mental status is. awake and alert; oriented to person, place, and time Evaluation and for underlying  cause(s) is underway and inclusive of Blood Chemistries and Neuro-Imaging (MRI/CT).    H&P 3/26   X Bacteremia or Sepsis / Septic  I doubt sepsis    Severe sepsis  This patient does have evidence of infective focus   ED provider notes 3/26    H&P 3/26   X Known or Suspected Source of Infection documented  * Proctocolitis  IV cipro; IV flagyl  IVF hydration  IV narcotics and IV antiemetics as needed  Stool for c diff    Source- GI   H&P 3/26    (Failed) Outpatient Treatment      Medication      Treatment     X Other  Cdiff was negative.  Plan of care discussed with the patient and her  who is at bedside.  Questions addressed.    Pancytopenia - s/p chemotherapy.    Diarrhea  - likely due to chemotherapy  -I encouraged her to do a colonoscopy as discussed with GI     States that she feels a little better today but they feel she cannot tolerate bowel prep/colonoscopy.  She has positive metastatic colon cancer by liver biopsy but has never had colonoscopy to locate primary tumor.  She is on chemotherapy and has had multiple admissions for chemotherapy related diarrhea in the past with resultant hypovolemia. C. Difficile negative.   Hospital medicine progress notes 3/27      Gi consult 3/27    Hematology and oncology consult 3/28        GI progress notes 3/28        Provider, please clarify infectious process:    [  x ] Sepsis due to unknown organism   [   ] Severe Sepsis with metabolic encephalopathy   [   ] SIRS without infectious etiology   [   ] SIRS with infection but without Sepsis   [   ] Other Infectious Disease (please specify): __________   [   ] Sepsis Ruled Out   [  ] Clinically Undetermined         Please document in your progress notes daily for the duration of treatment until resolved and include in your discharge summary.

## 2022-03-29 NOTE — ASSESSMENT & PLAN NOTE
Chronic, controlled.  Latest blood pressure and vitals reviewed-   Temp:  [96.3 °F (35.7 °C)-98.1 °F (36.7 °C)]   Pulse:  [83-90]   Resp:  [17-18]   BP: (140-168)/(74-85)   SpO2:  [98 %-99 %] .   Home meds for hypertension were reviewed and noted below.   Hypertension Medications             furosemide (LASIX) 20 MG tablet Take 1 tablet (20 mg total) by mouth once daily.          While in the hospital, will manage blood pressure as follows; Adjust home antihypertensive regimen as follows- hold chronic lasix 2/2 severe sepsis    Will utilize p.r.n. blood pressure medication only if patient's blood pressure greater than  180/110 and she develops symptoms such as worsening chest pain or shortness of breath.

## 2022-03-29 NOTE — NURSING
Patient alert and oriented X4. Patient on room air. Her vitals are stable with a slight increase in her BP this afternoon. Patient now has a left midline with IV fluids infusing. She still has IV antibiotics. No complaints of pain voiced with the exception of a headache earlier. She was medicated for nausea today. The patient is on telemetry running NSR.  at the bedside, will continue to monitor.

## 2022-03-29 NOTE — SUBJECTIVE & OBJECTIVE
Interval History: no acute events overnight.  Still with significant electrolyte imbalance with HypoK, hypoCa, and hypoMag-- + anorexia    Awaiting for hematology outpatient recs. Patients  concerned with side effects of chemotherapy.    Review of Systems   Constitutional:  Positive for fatigue. Negative for chills and fever.   HENT:  Negative for congestion and postnasal drip.    Respiratory:  Negative for cough and shortness of breath.    Cardiovascular:  Negative for chest pain and palpitations.   Gastrointestinal:  Positive for abdominal pain and diarrhea (improving per patient). Negative for vomiting.   Genitourinary:  Negative for hematuria.   Musculoskeletal:  Positive for gait problem. Negative for neck pain.   Skin:  Positive for pallor. Negative for rash.   Neurological:  Positive for weakness. Negative for syncope.   Psychiatric/Behavioral:  Negative for agitation and confusion.    All other systems reviewed and are negative.  Objective:     Vital Signs (Most Recent):  Temp: 98.2 °F (36.8 °C) (03/29/22 0804)  Pulse: 90 (03/29/22 0849)  Resp: 18 (03/29/22 0849)  BP: (!) 155/76 (03/29/22 0804)  SpO2: 97 % (03/29/22 0849)   Vital Signs (24h Range):  Temp:  [96.3 °F (35.7 °C)-98.3 °F (36.8 °C)] 98.2 °F (36.8 °C)  Pulse:  [83-93] 90  Resp:  [17-18] 18  SpO2:  [97 %-99 %] 97 %  BP: (137-168)/(65-85) 155/76     Weight: 65.3 kg (144 lb)  Body mass index is 22.55 kg/m².    Intake/Output Summary (Last 24 hours) at 3/29/2022 1037  Last data filed at 3/29/2022 0539  Gross per 24 hour   Intake 2021.17 ml   Output --   Net 2021.17 ml        Physical Exam  Vitals and nursing note reviewed.   Constitutional:       Appearance: She is well-developed. She is ill-appearing.   HENT:      Head: Normocephalic and atraumatic.      Mouth/Throat:      Mouth: Mucous membranes are dry.   Pulmonary:      Effort: Pulmonary effort is normal. No respiratory distress.      Breath sounds: Normal breath sounds.   Abdominal:       General: Abdomen is flat. There is no distension.      Tenderness: There is no abdominal tenderness.   Skin:     General: Skin is warm and dry.      Coloration: Skin is pale.      Findings: No rash.   Neurological:      General: No focal deficit present.      Mental Status: She is alert and oriented to person, place, and time.      GCS: GCS eye subscore is 3. GCS verbal subscore is 4. GCS motor subscore is 6.   Psychiatric:         Attention and Perception: Attention normal.         Speech: Speech normal.         Cognition and Memory: Cognition normal.       Significant Labs: All pertinent labs within the past 24 hours have been reviewed.  CBC:   Recent Labs   Lab 03/28/22  0510 03/29/22  0450   WBC 3.42* 3.28*   HGB 7.7* 7.9*   HCT 24.3* 25.5*   PLT 53* 59*       CMP:   Recent Labs   Lab 03/28/22  0510 03/29/22  0450    139   K 2.6* 2.9*    110   CO2 21* 21*   GLU 97 73   BUN 7* 4*   CREATININE 0.7 0.7   CALCIUM 6.7* 6.6*   PROT 4.3* 4.3*   ALBUMIN 2.0* 2.0*   BILITOT 1.5* 1.2*   ALKPHOS 237* 257*   AST 25 24   ALT 23 22   ANIONGAP 8 8   EGFRNONAA >60 >60         Significant Imaging: I have reviewed all pertinent imaging results/findings within the past 24 hours.

## 2022-03-29 NOTE — ASSESSMENT & PLAN NOTE
IV cipro; IV flagyl  IVF hydration  IV narcotics and IV antiemetics as needed  Stool for c diff-negative  She seems to be improving; they would like input from GI and oncology since recurrent issue.  Imodium prn  GI recs outpatient colonoscopy.  Transition IV abx to po

## 2022-03-29 NOTE — ASSESSMENT & PLAN NOTE
This patient does have evidence of infective focus  My overall impression is severe sepsis. Vital signs were reviewed and noted in progress note.  Antibiotics given-   Antibiotics (From admission, onward)            Start     Stop Route Frequency Ordered    03/26/22 1615  ciprofloxacin (CIPRO)400mg/200ml D5W IVPB 400 mg         -- IV Every 12 hours (non-standard times) 03/26/22 1505    03/26/22 1615  metronidazole IVPB 500 mg         -- IV Every 8 hours (non-standard times) 03/26/22 1505        Cultures were taken-   Microbiology Results (last 7 days)     Procedure Component Value Units Date/Time    Stool culture [[id number]] Collected: 03/27/22 1623    Order Status: Sent Specimen: Stool Updated: 03/27/22 2211    E. coli 0157 antigen [[id number]] Collected: 03/27/22 1623    Order Status: No result Specimen: Stool Updated: 03/27/22 2211    Stool culture [[id number]]     Order Status: Canceled Specimen: Stool     Clostridium difficile EIA [[id number]] Collected: 03/26/22 1544    Order Status: Completed Specimen: Stool Updated: 03/26/22 2008     C. diff Antigen Negative     C difficile Toxins A+B, EIA Negative     Comment: Testing not recommended for children <24 months old.           Latest lactate reviewed, they are-  Recent Labs   Lab 03/26/22 1957 03/26/22 2325 03/27/22  1012   LACTATE 6.1* 5.0* 2.2  2.2       Organ dysfunction indicated by Encephalopathy   Source- GI    Source control Achieved by- IV abx

## 2022-03-29 NOTE — HOSPITAL COURSE
Regine Lemus was monitored closely during her hospital stay.  She was placed on IVFH, IV flagyl and IV cipro for diagnosis of proctocolitis.  GI and oncology specialities were consulted due to her history of metastatic colon cancer with recent chemotherapy treatment.  She was checked for c. Diff and this was negative.  Her electrolytes were monitored closely and repleted as needed.

## 2022-03-29 NOTE — PROGRESS NOTES
[facility] Hospital Medicine  Telemedicine Progress Note    Patient Name: Regine Lemus  MRN: [id number]  Patient Class: IP- Inpatient   Admission Date: 3/26/2022  Length of Stay: 3 days  Attending Physician: Josefa Cooper MD  Primary Care Provider: Primary Doctor No          Subjective:     Principal Problem:Proctocolitis        HPI:  Regine Lemus is a 66-year-old female with past medical history significant for hypertension, hyperlipidemia, colon cancer with metastatic liver disease on chemotherapy with last infusion on 03/16/2022 who presented to the emergency department for further evaluation of abdominal pain and diarrhea which began this morning.  She was discharged from this facility on 3/5/22 after being treated for similar complaints.  Her  is at bedside and provides the majority the history stating that this happened following her previous chemotherapy infusion.  This morning he noted her to be confused and she was complaining of severe abdominal pain.  She had some diarrhea home, but he states that it has become much worse since they arrived to the ED.  CT abdomen pelvis was significant for proctocolitis with noted hepatic metastatic disease.  Ammonia level was elevated at 77 and she was found to have hypocalcemia and thrombocytopenia.  Her lactic acid is 5.6 and she is tachycardic.  Further history is limited due to the condition of the patient.  She is admitted to the service of hospital medicine for continued monitoring and treatment.      Overview/Hospital Course:  Regine Lemus was monitored closely during her hospital stay.  She was placed on IVFH, IV flagyl and IV cipro for diagnosis of proctocolitis.  GI and oncology specialities were consulted due to her history of metastatic colon cancer with recent chemotherapy treatment.  She was checked for c. Diff and this was negative.  Her electrolytes were monitored closely and repleted as needed.        Interval  History: no acute events overnight.  Still with significant electrolyte imbalance with HypoK, hypoCa, and hypoMag-- + anorexia    Awaiting for hematology outpatient recs. Patients  concerned with side effects of chemotherapy.    Review of Systems   Constitutional:  Positive for fatigue. Negative for chills and fever.   HENT:  Negative for congestion and postnasal drip.    Respiratory:  Negative for cough and shortness of breath.    Cardiovascular:  Negative for chest pain and palpitations.   Gastrointestinal:  Positive for abdominal pain and diarrhea (improving per patient). Negative for vomiting.   Genitourinary:  Negative for hematuria.   Musculoskeletal:  Positive for gait problem. Negative for neck pain.   Skin:  Positive for pallor. Negative for rash.   Neurological:  Positive for weakness. Negative for syncope.   Psychiatric/Behavioral:  Negative for agitation and confusion.    All other systems reviewed and are negative.  Objective:     Vital Signs (Most Recent):  Temp: 98.2 °F (36.8 °C) (03/29/22 0804)  Pulse: 90 (03/29/22 0849)  Resp: 18 (03/29/22 0849)  BP: (!) 155/76 (03/29/22 0804)  SpO2: 97 % (03/29/22 0849)   Vital Signs (24h Range):  Temp:  [96.3 °F (35.7 °C)-98.3 °F (36.8 °C)] 98.2 °F (36.8 °C)  Pulse:  [83-93] 90  Resp:  [17-18] 18  SpO2:  [97 %-99 %] 97 %  BP: (137-168)/(65-85) 155/76     Weight: 65.3 kg (144 lb)  Body mass index is 22.55 kg/m².    Intake/Output Summary (Last 24 hours) at 3/29/2022 1037  Last data filed at 3/29/2022 0539  Gross per 24 hour   Intake 2021.17 ml   Output --   Net 2021.17 ml        Physical Exam  Vitals and nursing note reviewed.   Constitutional:       Appearance: She is well-developed. She is ill-appearing.   HENT:      Head: Normocephalic and atraumatic.      Mouth/Throat:      Mouth: Mucous membranes are dry.   Pulmonary:      Effort: Pulmonary effort is normal. No respiratory distress.      Breath sounds: Normal breath sounds.   Abdominal:      General:  Abdomen is flat. There is no distension.      Tenderness: There is no abdominal tenderness.   Skin:     General: Skin is warm and dry.      Coloration: Skin is pale.      Findings: No rash.   Neurological:      General: No focal deficit present.      Mental Status: She is alert and oriented to person, place, and time.      GCS: GCS eye subscore is 3. GCS verbal subscore is 4. GCS motor subscore is 6.   Psychiatric:         Attention and Perception: Attention normal.         Speech: Speech normal.         Cognition and Memory: Cognition normal.       Significant Labs: All pertinent labs within the past 24 hours have been reviewed.  CBC:   Recent Labs   Lab 03/28/22  0510 03/29/22  0450   WBC 3.42* 3.28*   HGB 7.7* 7.9*   HCT 24.3* 25.5*   PLT 53* 59*       CMP:   Recent Labs   Lab 03/28/22  0510 03/29/22  0450    139   K 2.6* 2.9*    110   CO2 21* 21*   GLU 97 73   BUN 7* 4*   CREATININE 0.7 0.7   CALCIUM 6.7* 6.6*   PROT 4.3* 4.3*   ALBUMIN 2.0* 2.0*   BILITOT 1.5* 1.2*   ALKPHOS 237* 257*   AST 25 24   ALT 23 22   ANIONGAP 8 8   EGFRNONAA >60 >60         Significant Imaging: I have reviewed all pertinent imaging results/findings within the past 24 hours.      Assessment/Plan:      * Proctocolitis  IV cipro; IV flagyl  IVF hydration  IV narcotics and IV antiemetics as needed  Stool for c diff-negative  She seems to be improving; they would like input from GI and oncology since recurrent issue.  Imodium prn  GI recs outpatient colonoscopy.  Transition IV abx to po       Hypomagnesemia  Repleted; monitor      Hypokalemia  Nursing to replete per prn orders.        Encephalopathy, metabolic  Patient has acute metabolic encephalopathy that is secondary to Electrolyte abnormalities- hypocalcemia and Sepsis/Infective. Patient's current mental status is Lethargic. Patient's baseline mental status is. awake and alert; oriented to person, place, and time Evaluation and for underlying cause(s) is underway and  inclusive of Blood Chemistries and Neuro-Imaging (MRI/CT). Will monitor neuro checks carefully, avoid narcotics and benzos that will exacerbate agitation, and use PRN medications for controls of behavior for self harm.  3/27-mental status back to baseline              Hypocalcemia  IV replacement; continue tums; monitor        Osteopenia  Compression fx of vertebrae seen today not previously noted-lower thoracic levels and lumbar spine.  Maintain fall precautions.  Consider neurosurgery follow up.      Thrombocytopenia  Chronic; stable; trend      Severe malnutrition  Consult nutrition      Severe sepsis  This patient does have evidence of infective focus  My overall impression is severe sepsis. Vital signs were reviewed and noted in progress note.  Antibiotics given-   Antibiotics (From admission, onward)            Start     Stop Route Frequency Ordered    03/26/22 1615  ciprofloxacin (CIPRO)400mg/200ml D5W IVPB 400 mg         -- IV Every 12 hours (non-standard times) 03/26/22 1505    03/26/22 1615  metronidazole IVPB 500 mg         -- IV Every 8 hours (non-standard times) 03/26/22 1505        Cultures were taken-   Microbiology Results (last 7 days)     Procedure Component Value Units Date/Time    Stool culture [[id number]] Collected: 03/27/22 1623    Order Status: Sent Specimen: Stool Updated: 03/27/22 2211    E. coli 0157 antigen [[id number]] Collected: 03/27/22 1623    Order Status: No result Specimen: Stool Updated: 03/27/22 2211    Stool culture [[id number]]     Order Status: Canceled Specimen: Stool     Clostridium difficile EIA [[id number]] Collected: 03/26/22 1544    Order Status: Completed Specimen: Stool Updated: 03/26/22 2008     C. diff Antigen Negative     C difficile Toxins A+B, EIA Negative     Comment: Testing not recommended for children <24 months old.           Latest lactate reviewed, they are-  Recent Labs   Lab 03/26/22 1957 03/26/22 2325 03/27/22  1012   LACTATE 6.1* 5.0* 2.2  2.2        Organ dysfunction indicated by Encephalopathy   Source- GI    Source control Achieved by- IV abx      Secondary malignant neoplasm of liver  Noted; followed by oncology  LFT's improved from yesterday.        Anemia  Patient's anemia is currently uncontrolled. S/p 0 units of PRBCs.  Current CBC reviewed-   Lab Results   Component Value Date    HGB 7.7 (L) 03/28/2022    HCT 24.3 (L) 03/28/2022    MCV 99 (H) 03/28/2022    PLT 53 (L) 03/28/2022     Monitor serial CBC and transfuse if patient becomes hemodynamically unstable, symptomatic or H/H drops below 7/21.         Colorectal carcinoma  Followed by oncology.  Last chemo infusion 3/16/22  Continue chronic capecitabine 2000mg qam, 1500mg qpm  Consult oncology      Postoperative hypothyroidism  Patient has chronic hypothyroidism. TFTs reviewed-   Lab Results   Component Value Date    TSH 84.954 (H) 03/28/2022   . Will continue chronic levothyroxine and adjust for and clinical changes.  Increased levothyroxine from 125mcg daily to 150mcg daily.  F/u OP.  Will need repeat TSH in 6 weeks.          Essential (primary) hypertension  Chronic, controlled.  Latest blood pressure and vitals reviewed-   Temp:  [96.3 °F (35.7 °C)-98.1 °F (36.7 °C)]   Pulse:  [83-90]   Resp:  [17-18]   BP: (140-168)/(74-85)   SpO2:  [98 %-99 %] .   Home meds for hypertension were reviewed and noted below.   Hypertension Medications             furosemide (LASIX) 20 MG tablet Take 1 tablet (20 mg total) by mouth once daily.          While in the hospital, will manage blood pressure as follows; Adjust home antihypertensive regimen as follows- hold chronic lasix 2/2 severe sepsis    Will utilize p.r.n. blood pressure medication only if patient's blood pressure greater than  180/110 and she develops symptoms such as worsening chest pain or shortness of breath.          VTE Risk Mitigation (From admission, onward)         Ordered     IP VTE HIGH RISK PATIENT  Once         03/26/22 1830     Place  sequential compression device  Until discontinued         03/26/22 1830     Reason for No Pharmacological VTE Prophylaxis  Once        Question:  Reasons:  Answer:  Thrombocytopenia    03/26/22 1830                      I have assessed these finding virtually using telemed platform and with assistance of bedside nurse                 The attending portion of this evaluation, treatment, and documentation was performed per Tara Quiñonez NP via Telemedicine AudioVisual using the secure Antidot software platform with 2 way audio/video. The provider was located off-site and the patient is located in the hospital. The aforementioned video software was utilized to document the relevant history and physical exam    Tara Quiñonez NP  Department of Hospital Medicine   [facility]

## 2022-03-29 NOTE — SUBJECTIVE & OBJECTIVE
Interval History: no acute events overnight.  She is tired this morning, stating she did not sleep well.  Will order trazodone for tonight as melatonin was ineffective.    Review of Systems   Constitutional:  Positive for fatigue. Negative for chills and fever.   HENT:  Negative for congestion and postnasal drip.    Respiratory:  Negative for cough and shortness of breath.    Cardiovascular:  Negative for chest pain and palpitations.   Gastrointestinal:  Positive for abdominal pain and diarrhea (improving per patient). Negative for vomiting.   Genitourinary:  Negative for dysuria and hematuria.   Musculoskeletal:  Positive for gait problem. Negative for neck pain.   Skin:  Positive for pallor. Negative for rash.   Neurological:  Positive for weakness. Negative for syncope.   Psychiatric/Behavioral:  Positive for confusion (improved). Negative for agitation.    All other systems reviewed and are negative.  Objective:     Vital Signs (Most Recent):  Temp: 97.7 °F (36.5 °C) (03/28/22 1922)  Pulse: 83 (03/28/22 1922)  Resp: 17 (03/28/22 1922)  BP: (!) 168/84 (03/28/22 1922)  SpO2: 98 % (03/28/22 1922)   Vital Signs (24h Range):  Temp:  [96.3 °F (35.7 °C)-98.1 °F (36.7 °C)] 97.7 °F (36.5 °C)  Pulse:  [83-90] 83  Resp:  [17-18] 17  SpO2:  [98 %-99 %] 98 %  BP: (140-168)/(74-85) 168/84     Weight: 65.3 kg (144 lb)  Body mass index is 22.55 kg/m².    Intake/Output Summary (Last 24 hours) at 3/28/2022 2208  Last data filed at 3/28/2022 0509  Gross per 24 hour   Intake 1458.51 ml   Output --   Net 1458.51 ml      Physical Exam  Vitals and nursing note reviewed.   Constitutional:       Appearance: She is well-developed. She is ill-appearing.   HENT:      Head: Normocephalic and atraumatic.      Mouth/Throat:      Mouth: Mucous membranes are dry.   Eyes:      Pupils: Pupils are equal, round, and reactive to light.   Cardiovascular:      Rate and Rhythm: Normal rate and regular rhythm.      Pulses: Normal pulses.   Pulmonary:       Effort: Pulmonary effort is normal. No respiratory distress.      Breath sounds: Normal breath sounds.   Abdominal:      General: Bowel sounds are normal. There is no distension.      Palpations: Abdomen is soft.      Tenderness: There is abdominal tenderness (worst at LLQ; mild diffuse tenderness).   Musculoskeletal:      Cervical back: Normal range of motion and neck supple.   Skin:     General: Skin is warm and dry.      Coloration: Skin is pale.      Findings: No rash.   Neurological:      General: No focal deficit present.      Mental Status: She is alert and oriented to person, place, and time.      GCS: GCS eye subscore is 3. GCS verbal subscore is 4. GCS motor subscore is 6.   Psychiatric:         Attention and Perception: Attention normal.         Speech: Speech normal.         Cognition and Memory: Cognition normal.       Significant Labs: All pertinent labs within the past 24 hours have been reviewed.  CBC:   Recent Labs   Lab 03/27/22  0722 03/28/22  0510   WBC 4.63 3.42*   HGB 8.1* 7.7*   HCT 24.9* 24.3*   PLT 45* 53*     CMP:   Recent Labs   Lab 03/27/22  0646 03/28/22  0510    139   K 2.9* 2.6*   * 110   CO2 20* 21*    97   BUN 14 7*   CREATININE 0.8 0.7   CALCIUM 6.4* 6.7*   PROT 4.7* 4.3*   ALBUMIN 2.1* 2.0*   BILITOT 1.2* 1.5*   ALKPHOS 273* 237*   AST 31 25   ALT 27 23   ANIONGAP 10 8   EGFRNONAA >60 >60       Significant Imaging: I have reviewed all pertinent imaging results/findings within the past 24 hours.

## 2022-03-29 NOTE — ASSESSMENT & PLAN NOTE
IV cipro; IV flagyl  IVF hydration  IV narcotics and IV antiemetics as needed  Stool for c diff-negative  She seems to be improving; they would like input from GI and oncology since recurrent issue.  Imodium prn

## 2022-03-29 NOTE — PLAN OF CARE
Lomotil given for loose stools.  Resting quietly.    Safety maintained, call light within reach, bed locked and in low position, and side rails up. Patient remains free from injury.      Monitoring and following care plan.

## 2022-03-29 NOTE — PROGRESS NOTES
[facility]  Hematology/Oncology  Consult Note    Patient Name: Regine Lemus  MRN: [id number]  Admission Date: 3/26/2022  Hospital Length of Stay: 3 days  Code Status: DNR   Attending Provider: Josefa Cooper MD  Consulting Provider: Almita De La O NP  Primary Care Physician: Primary Doctor No  Principal Problem:Proctocolitis    Consults  Subjective:     HPI:  This is a 66-year-old female with metastatic colon cancer well known to me.  She was previously doing her treatment at [facility] but recently transitioned to our care.  She is currently on XELOX, having restarted about 2-3 weeks ago.  She has had about 4 admissions over the past year for diarrhea due to her chemotherapy.  She is again admitted for this.    Oncology Treatment Plan:   OP COLORECTAL CAPECITABINE OXALIPLATIN (CAPEOX) BEVACIZUMAB Q3W    Medications:  Continuous Infusions:   sodium chloride 0.9% Stopped (03/29/22 0502)     Scheduled Meds:   calcium carbonate  1,000 mg Oral TID AC    ciprofloxacin  400 mg Intravenous Q12H    famotidine  20 mg Oral BID    levothyroxine  150 mcg Oral Before breakfast    megestroL  40 mg Oral BID    metroNIDAZOLE  500 mg Oral Q8H    sodium chloride 0.9%  10 mL Intravenous Q6H     PRN Meds:acetaminophen, albuterol-ipratropium, dextrose 10%, dextrose 10%, glucagon (human recombinant), glucose, glucose, influenza, loperamide, magnesium oxide, magnesium oxide, melatonin, morphine, naloxone, ondansetron, potassium bicarbonate, potassium bicarbonate, potassium bicarbonate, potassium, sodium phosphates, potassium, sodium phosphates, potassium, sodium phosphates, simethicone, sodium chloride 0.9%, Flushing PICC Protocol **AND** sodium chloride 0.9% **AND** sodium chloride 0.9%, traZODone     Review of patient's allergies indicates:   Allergen Reactions    Lisinopril Rash        Past Medical History:   Diagnosis Date    Allergy     Colon cancer     Hypertension     Liver cancer      Nontoxic uninodular goiter     Osteoarthrosis, unspecified whether generalized or localized, hand     Other abnormal glucose     Pure hypercholesterolemia     Severe sepsis      Past Surgical History:   Procedure Laterality Date    CYST REMOVAL  1994    cyst removed from neck    infusaport placement Right     THYROIDECTOMY      TONSILLECTOMY  age 14     Family History     Problem Relation (Age of Onset)    Lung cancer Father        Tobacco Use    Smoking status: Never Smoker    Smokeless tobacco: Never Used   Substance and Sexual Activity    Alcohol use: No    Drug use: Never    Sexual activity: Not on file       Review of Systems   Constitutional: Negative.    HENT: Negative.    Eyes: Negative.    Respiratory: Negative.    Cardiovascular: Negative.    Gastrointestinal: Negative.    Endocrine: Negative.    Genitourinary: Negative.    Musculoskeletal: Negative.    Integumentary:  Negative.   Neurological: Negative.    Hematological: Negative.    Psychiatric/Behavioral: Negative.      Objective:     Vital Signs (Most Recent):  Temp: 98.2 °F (36.8 °C) (03/29/22 0804)  Pulse: 90 (03/29/22 0849)  Resp: 18 (03/29/22 0849)  BP: (!) 155/76 (03/29/22 0804)  SpO2: 97 % (03/29/22 0849) Vital Signs (24h Range):  Temp:  [96.3 °F (35.7 °C)-98.3 °F (36.8 °C)] 98.2 °F (36.8 °C)  Pulse:  [83-93] 90  Resp:  [17-18] 18  SpO2:  [97 %-99 %] 97 %  BP: (137-168)/(65-85) 155/76     Weight: 65.3 kg (144 lb)  Body mass index is 22.55 kg/m².  Body surface area is 1.76 meters squared.      Intake/Output Summary (Last 24 hours) at 3/29/2022 1254  Last data filed at 3/29/2022 0539  Gross per 24 hour   Intake 2021.17 ml   Output --   Net 2021.17 ml       Physical Exam  Constitutional:       Appearance: Normal appearance.   HENT:      Head: Normocephalic and atraumatic.      Nose: Nose normal.      Mouth/Throat:      Mouth: Mucous membranes are moist.      Pharynx: Oropharynx is clear.   Eyes:      Conjunctiva/sclera: Conjunctivae  normal.   Cardiovascular:      Rate and Rhythm: Normal rate and regular rhythm.      Heart sounds: Normal heart sounds.   Pulmonary:      Effort: Pulmonary effort is normal.      Breath sounds: Normal breath sounds.   Abdominal:      General: Abdomen is flat. Bowel sounds are normal.      Palpations: Abdomen is soft.   Musculoskeletal:         General: Normal range of motion.      Cervical back: Normal range of motion and neck supple.   Skin:     General: Skin is warm and dry.   Neurological:      General: No focal deficit present.      Mental Status: She is alert and oriented to person, place, and time. Mental status is at baseline.   Psychiatric:         Mood and Affect: Mood normal.         Significant Labs:   CBC:   Recent Labs   Lab 03/28/22  0510 03/29/22  0450   WBC 3.42* 3.28*   HGB 7.7* 7.9*   HCT 24.3* 25.5*   PLT 53* 59*    and CMP:   Recent Labs   Lab 03/28/22  0510 03/29/22  0450    139   K 2.6* 2.9*    110   CO2 21* 21*   GLU 97 73   BUN 7* 4*   CREATININE 0.7 0.7   CALCIUM 6.7* 6.6*   PROT 4.3* 4.3*   ALBUMIN 2.0* 2.0*   BILITOT 1.5* 1.2*   ALKPHOS 237* 257*   AST 25 24   ALT 23 22   ANIONGAP 8 8   EGFRNONAA >60 >60       Diagnostic Results:  I have reviewed all pertinent imaging results/findings within the past 24 hours.    Assessment/Plan:     Active Diagnoses:    Diagnosis Date Noted POA    PRINCIPAL PROBLEM:  Proctocolitis [K52.9] 03/26/2022 Yes    Hypokalemia [E87.6] 03/27/2022 Yes    Hypomagnesemia [E83.42] 03/27/2022 Yes    Thrombocytopenia [D69.6] 03/26/2022 Yes    Osteopenia [M85.80] 03/26/2022 Yes    Hypocalcemia [E83.51] 03/26/2022 Yes    Encephalopathy, metabolic [G93.41] 03/26/2022 Yes    Severe malnutrition [E43] 03/02/2022 Yes    Severe sepsis [A41.9, R65.20]  Yes    Secondary malignant neoplasm of liver [C78.7] 01/24/2021 Yes    Colorectal carcinoma [C19] 01/23/2021 Yes    Anemia [D64.9] 01/23/2021 Yes    Postoperative hypothyroidism [E89.0] 12/14/2015 Yes     Essential (primary) hypertension [I10] 07/23/2014 Yes      Problems Resolved During this Admission:       Metastatic colon cancer  Diarrhea  - likely due to chemotherapy  -I encouraged her to do a colonoscopy as discussed with GI  - Agree with supportive care  - may need to lower dose of chemotherapy after she has recovered from this  -we will arrange fu with Dr Banks in [facility]     Thank you for your consult. Oncology will fu intermittently    Almita De La O NP  Hematology/Oncology  [facility]

## 2022-03-29 NOTE — NURSING
Patient alert and oriented X4. Patient on room air. Her vitals are stable. Patient now is on oral Flagyl but she still has IV Cipro. No complaints of pain voiced. The patient is on telemetry running NSR. Her potassium, magnesium, and calcium was replaced today.  at the bedside, will continue to monitor.

## 2022-03-30 ENCOUNTER — TELEPHONE (OUTPATIENT)
Dept: HEMATOLOGY/ONCOLOGY | Facility: CLINIC | Age: 67
End: 2022-03-30
Payer: OTHER GOVERNMENT

## 2022-03-30 LAB
ANION GAP SERPL CALC-SCNC: 11 MMOL/L (ref 8–16)
BUN SERPL-MCNC: 4 MG/DL (ref 8–23)
CA-I BLDV-SCNC: 0.87 MMOL/L (ref 1.06–1.42)
CALCIUM SERPL-MCNC: 6.7 MG/DL (ref 8.7–10.5)
CHLORIDE SERPL-SCNC: 106 MMOL/L (ref 95–110)
CO2 SERPL-SCNC: 21 MMOL/L (ref 23–29)
CREAT SERPL-MCNC: 0.7 MG/DL (ref 0.5–1.4)
E COLI SXT1 STL QL IA: NEGATIVE
E COLI SXT2 STL QL IA: NEGATIVE
EST. GFR  (AFRICAN AMERICAN): >60 ML/MIN/1.73 M^2
EST. GFR  (NON AFRICAN AMERICAN): >60 ML/MIN/1.73 M^2
GLUCOSE SERPL-MCNC: 84 MG/DL (ref 70–110)
MAGNESIUM SERPL-MCNC: 1.4 MG/DL (ref 1.6–2.6)
POTASSIUM SERPL-SCNC: 2.8 MMOL/L (ref 3.5–5.1)
SODIUM SERPL-SCNC: 138 MMOL/L (ref 136–145)

## 2022-03-30 PROCEDURE — 25000003 PHARM REV CODE 250

## 2022-03-30 PROCEDURE — 12000002 HC ACUTE/MED SURGE SEMI-PRIVATE ROOM

## 2022-03-30 PROCEDURE — A4216 STERILE WATER/SALINE, 10 ML: HCPCS | Performed by: NURSE PRACTITIONER

## 2022-03-30 PROCEDURE — 83735 ASSAY OF MAGNESIUM: CPT | Performed by: NURSE PRACTITIONER

## 2022-03-30 PROCEDURE — 94761 N-INVAS EAR/PLS OXIMETRY MLT: CPT

## 2022-03-30 PROCEDURE — 82330 ASSAY OF CALCIUM: CPT | Performed by: NURSE PRACTITIONER

## 2022-03-30 PROCEDURE — 99900035 HC TECH TIME PER 15 MIN (STAT)

## 2022-03-30 PROCEDURE — 63600175 PHARM REV CODE 636 W HCPCS: Performed by: NURSE PRACTITIONER

## 2022-03-30 PROCEDURE — 80048 BASIC METABOLIC PNL TOTAL CA: CPT | Performed by: NURSE PRACTITIONER

## 2022-03-30 PROCEDURE — 25000003 PHARM REV CODE 250: Performed by: NURSE PRACTITIONER

## 2022-03-30 RX ORDER — CIPROFLOXACIN 500 MG/1
500 TABLET ORAL EVERY 12 HOURS
Status: DISCONTINUED | OUTPATIENT
Start: 2022-03-30 | End: 2022-03-31 | Stop reason: HOSPADM

## 2022-03-30 RX ORDER — POTASSIUM CHLORIDE 20 MEQ/1
40 TABLET, EXTENDED RELEASE ORAL ONCE
Status: DISCONTINUED | OUTPATIENT
Start: 2022-03-30 | End: 2022-03-31 | Stop reason: HOSPADM

## 2022-03-30 RX ORDER — POTASSIUM CHLORIDE 7.45 MG/ML
10 INJECTION INTRAVENOUS
Status: COMPLETED | OUTPATIENT
Start: 2022-03-30 | End: 2022-03-30

## 2022-03-30 RX ORDER — MAGNESIUM SULFATE HEPTAHYDRATE 40 MG/ML
2 INJECTION, SOLUTION INTRAVENOUS ONCE
Status: COMPLETED | OUTPATIENT
Start: 2022-03-30 | End: 2022-03-30

## 2022-03-30 RX ADMIN — CIPROFLOXACIN 500 MG: 500 TABLET, FILM COATED ORAL at 09:03

## 2022-03-30 RX ADMIN — SODIUM CHLORIDE: 0.9 INJECTION, SOLUTION INTRAVENOUS at 10:03

## 2022-03-30 RX ADMIN — POTASSIUM CHLORIDE 10 MEQ: 7.46 INJECTION, SOLUTION INTRAVENOUS at 01:03

## 2022-03-30 RX ADMIN — METRONIDAZOLE 500 MG: 250 TABLET ORAL at 09:03

## 2022-03-30 RX ADMIN — MEGESTROL ACETATE 40 MG: 40 TABLET ORAL at 09:03

## 2022-03-30 RX ADMIN — METRONIDAZOLE 500 MG: 250 TABLET ORAL at 01:03

## 2022-03-30 RX ADMIN — LEVOTHYROXINE SODIUM 150 MCG: 0.12 TABLET ORAL at 05:03

## 2022-03-30 RX ADMIN — SODIUM CHLORIDE, PRESERVATIVE FREE 10 ML: 5 INJECTION INTRAVENOUS at 12:03

## 2022-03-30 RX ADMIN — CALCIUM GLUCONATE 1 G: 98 INJECTION, SOLUTION INTRAVENOUS at 11:03

## 2022-03-30 RX ADMIN — POTASSIUM CHLORIDE 10 MEQ: 7.46 INJECTION, SOLUTION INTRAVENOUS at 03:03

## 2022-03-30 RX ADMIN — CALCIUM CARBONATE (ANTACID) CHEW TAB 500 MG 1000 MG: 500 CHEW TAB at 03:03

## 2022-03-30 RX ADMIN — CALCIUM CARBONATE (ANTACID) CHEW TAB 500 MG 1000 MG: 500 CHEW TAB at 11:03

## 2022-03-30 RX ADMIN — SODIUM CHLORIDE: 0.9 INJECTION, SOLUTION INTRAVENOUS at 03:03

## 2022-03-30 RX ADMIN — ONDANSETRON 4 MG: 2 INJECTION INTRAMUSCULAR; INTRAVENOUS at 03:03

## 2022-03-30 RX ADMIN — CIPROFLOXACIN 500 MG: 500 TABLET, FILM COATED ORAL at 11:03

## 2022-03-30 RX ADMIN — POTASSIUM BICARBONATE 60 MEQ: 391 TABLET, EFFERVESCENT ORAL at 10:03

## 2022-03-30 RX ADMIN — CALCIUM CARBONATE (ANTACID) CHEW TAB 500 MG 1000 MG: 500 CHEW TAB at 05:03

## 2022-03-30 RX ADMIN — METRONIDAZOLE 500 MG: 250 TABLET ORAL at 05:03

## 2022-03-30 RX ADMIN — MAGNESIUM SULFATE 2 G: 2 INJECTION INTRAVENOUS at 09:03

## 2022-03-30 RX ADMIN — MELATONIN TAB 3 MG 6 MG: 3 TAB at 11:03

## 2022-03-30 RX ADMIN — CIPROFLOXACIN 400 MG: 2 INJECTION, SOLUTION INTRAVENOUS at 03:03

## 2022-03-30 RX ADMIN — LOPERAMIDE HYDROCHLORIDE 2 MG: 2 CAPSULE ORAL at 03:03

## 2022-03-30 NOTE — PLAN OF CARE
POC reviewed with patient. Had several loosed stools at beginning of shift and was medicated with anti-diarrheal. Midline in place and infusing. No complaint of pain or discomfort. Tele in place. VSS, afebrile. Safety maintained. Instructed to call for assistance. Will continue to monitor.

## 2022-03-30 NOTE — TELEPHONE ENCOUNTER
Outgoing call to patient's .  expressed that patient would like to see the provider and do her infusions in [place]. Rescheduled patient to see Dr. Banks in [place] on next Tuesday. Explained to  that we would need to apply for auth again since the location is changed.  verbalized understanding.      ----- Message from Michelle Shaw sent at 3/30/2022 11:32 AM CDT -----  .Type: Patient Call Back    Who called:  Allan ()     What is the request in detail: pt states they've been waiting to hear back from Ms. De La O in regards to appt in [place] and has not heard back from anybody     Can the clinic reply by [facility]?    Would the patient rather a call back or a response via [facility]? Call back     Best call back number: [phone]    Thank you

## 2022-03-30 NOTE — CONSULTS
[facility]  Adult Nutrition  Progress Note    SUMMARY     Recommendations  1) Continue Regular diet with Boost Breeze TID   2) Encourage intake   3) Add MVI   4) Replete electrolytes    Goals: Pt to meet > 50% EEN by RD follow up  Nutrition Goal Status: progressing towards goal  Communication of RD Recs:  (POC, sticky note)    Assessment and Plan  Nutrition Problem  Inadequate oral intake     Related to (etiology):   Decreased appetite, dislikes food at facility                    Signs and Symptoms (as evidenced by):   Pt eating < 50% meals during admit     Interventions(treatment strategy):  Collaboration with other providers  General, Healthful diet  Commercial Beverage  Menu Selection assistance     Nutrition Diagnosis Status:   Continues    Malnutrition Assessment  Energy Intake: moderate energy intake    DIMAS NFPE due to remote assessment    Reason for Assessment  Reason For Assessment: RD follow-up, consult (Consult PCM)  Diagnosis: other (see comments), cancer diagnosis/related complications (proctocolitis)  Relevant Medical History: hypertension, hyperlipidemia, colon cancer with metastatic liver disease on chemotherapy with last infusion on 03/16/2022  Interdisciplinary Rounds: did not attend  General Information Comments: Remote assessment for coverage. Unable to reach pt on room phone.  Spoke with RN via secure chat, pt with poor intake, not eating much of anything.  bringing in foods to encourage intake. Pt continues with diarrhea, plan for possible discharge today per MD note.    Addendum 10:36am: RN messaged RD - pt thinks she can tolerate cheerios. helped place meal order for lunch (lactose free milk + cheerios).   Nutrition Discharge Planning: Discharge on General, Healthful diet with ONS of choice    Nutrition Risk Screen  Nutrition Risk Screen: no indicators present    Nutrition/Diet History  Patient Reported Diet/Restrictions/Preferences: general  Typical Food/Fluid  "Intake: Small/frequent meals  Food Preferences: Likes smoothies, premier protein. Dislikes hospital food  Supplemental Drinks or Food Habits: Premier Protein  Factors Affecting Nutritional Intake: decreased appetite, diarrhea    Anthropometrics  Temp: 97.2 °F (36.2 °C)  Height Method: Stated  Height: 5' 7" (170.2 cm)  Height (inches): 67 in  Weight Method: Bed Scale  Weight: 65.3 kg (144 lb)  Weight (lb): 144 lb  Ideal Body Weight (IBW), Female: 135 lb  % Ideal Body Weight, Female (lb): 106.67 %  BMI (Calculated): 22.5     Lab/Procedures/Meds  Pertinent Labs Reviewed: reviewed  Pertinent Labs Comments: Hgb 7.9, Hct 25.5, , K 2.9, Ca 6.6, Mg 1.2, Alk Phos 257, Alb 2  Pertinent Medications Reviewed: reviewed  Pertinent Medications Comments: calcium carbonate, cipro, famotidine, levothyroxine, magnesium sulfate, megestrol, NaCl    Physical Findings/Assessment  Wound to sacral spine, left and right upper quadrant     Estimated/Assessed Needs  Weight Used For Calorie Calculations: 65.3 kg (144 lb)  Energy Calorie Requirements (kcal): 1959 kcal/day based on 30 kcal/kg for ca  Energy Need Method: [facility]  Protein Requirements: 79 g/day based on 1.2 g/kg for ca  Weight Used For Protein Calculations: 65.3 kg (144 lb)  Fluid Requirements (mL): 1 mL/kcal or per MD  Estimated Fluid Requirement Method: RDA Method  RDA Method (mL): 1959     Nutrition Prescription Ordered  Current Diet Order: Regular  Oral Nutrition Supplement: Boost Breeze TID    Evaluation of Received Nutrient/Fluid Intake  I/O: +6 L since admit  Energy Calories Required: not meeting needs  Protein Required: not meeting needs  Fluid Required: meeting needs  Comments: LBM 3/29  Tolerance: tolerating  % Intake of Estimated Energy Needs: 25  %  % Meal Intake: 25 %    Nutrition Risk  Level of Risk/Frequency of Follow-up:  (2x weekly)     Monitor and Evaluation  Food and Nutrient Intake: energy intake, food and beverage intake  Food and Nutrient " Adminstration: diet order  Knowledge/Beliefs/Attitudes: beliefs and attitudes  Physical Activity and Function: nutrition-related ADLs and IADLs  Anthropometric Measurements: weight change  Biochemical Data, Medical Tests and Procedures: lipid profile, electrolyte and renal panel, gastrointestinal profile, glucose/endocrine profile, inflammatory profile  Nutrition-Focused Physical Findings: overall appearance, extremities, muscles and bones, skin     Nutrition Follow-Up  RD Follow-up?: Yes

## 2022-03-30 NOTE — CARE UPDATE
03/29/22 1921   Patient Assessment/Suction   Level of Consciousness (AVPU) alert   Respiratory Effort Unlabored;Normal   Expansion/Accessory Muscles/Retractions no use of accessory muscles;no retractions;expansion symmetric   All Lung Fields Breath Sounds Anterior:;Lateral:;diminished   Rhythm/Pattern, Respiratory unlabored;pattern regular;depth regular   PRE-TX-O2   O2 Device (Oxygen Therapy) room air   SpO2 98 %   Pulse Oximetry Type Intermittent   $ Pulse Oximetry - Multiple Charge Pulse Oximetry - Multiple   Pulse 90   Resp 18   Aerosol Therapy   $ Aerosol Therapy Charges PRN treatment not required   Respiratory Treatment Status (SVN) PRN treatment not required

## 2022-03-30 NOTE — PLAN OF CARE
Recommendations  1) Continue Regular diet with Boost Breeze TID   2) Encourage intake   3) Add MVI   4) Replete electrolytes    Goals: Pt to meet > 50% EEN by RD follow up  Nutrition Goal Status: progressing towards goal  Communication of RD Recs:  (POC, sticky note)    Assessment and Plan  Nutrition Problem  Inadequate oral intake     Related to (etiology):   Decreased appetite, dislikes food at facility                    Signs and Symptoms (as evidenced by):   Pt eating < 50% meals during admit     Interventions(treatment strategy):  Collaboration with other providers  General, Healthful diet  Commercial Beverage  Menu Selection assistance     Nutrition Diagnosis Status:   Continues

## 2022-03-31 VITALS
WEIGHT: 144 LBS | HEART RATE: 94 BPM | OXYGEN SATURATION: 95 % | BODY MASS INDEX: 22.6 KG/M2 | RESPIRATION RATE: 18 BRPM | SYSTOLIC BLOOD PRESSURE: 121 MMHG | DIASTOLIC BLOOD PRESSURE: 61 MMHG | TEMPERATURE: 99 F | HEIGHT: 67 IN

## 2022-03-31 LAB
ANION GAP SERPL CALC-SCNC: 8 MMOL/L (ref 8–16)
BACTERIA STL CULT: NORMAL
BUN SERPL-MCNC: 4 MG/DL (ref 8–23)
CALCIUM SERPL-MCNC: 6.8 MG/DL (ref 8.7–10.5)
CHLORIDE SERPL-SCNC: 106 MMOL/L (ref 95–110)
CO2 SERPL-SCNC: 21 MMOL/L (ref 23–29)
CREAT SERPL-MCNC: 0.7 MG/DL (ref 0.5–1.4)
EST. GFR  (AFRICAN AMERICAN): >60 ML/MIN/1.73 M^2
EST. GFR  (NON AFRICAN AMERICAN): >60 ML/MIN/1.73 M^2
GLUCOSE SERPL-MCNC: 85 MG/DL (ref 70–110)
MAGNESIUM SERPL-MCNC: 1.5 MG/DL (ref 1.6–2.6)
O+P STL MICRO: NORMAL
POTASSIUM SERPL-SCNC: 3.5 MMOL/L (ref 3.5–5.1)
SODIUM SERPL-SCNC: 135 MMOL/L (ref 136–145)

## 2022-03-31 PROCEDURE — 25000003 PHARM REV CODE 250: Performed by: NURSE PRACTITIONER

## 2022-03-31 PROCEDURE — 99233 SBSQ HOSP IP/OBS HIGH 50: CPT | Mod: ,,, | Performed by: INTERNAL MEDICINE

## 2022-03-31 PROCEDURE — 94761 N-INVAS EAR/PLS OXIMETRY MLT: CPT

## 2022-03-31 PROCEDURE — 99900035 HC TECH TIME PER 15 MIN (STAT)

## 2022-03-31 PROCEDURE — 63600175 PHARM REV CODE 636 W HCPCS: Performed by: NURSE PRACTITIONER

## 2022-03-31 PROCEDURE — 25000003 PHARM REV CODE 250: Performed by: HOSPITALIST

## 2022-03-31 PROCEDURE — 36415 COLL VENOUS BLD VENIPUNCTURE: CPT | Performed by: NURSE PRACTITIONER

## 2022-03-31 PROCEDURE — A4216 STERILE WATER/SALINE, 10 ML: HCPCS | Performed by: NURSE PRACTITIONER

## 2022-03-31 PROCEDURE — S0179 MEGESTROL 20 MG: HCPCS | Performed by: HOSPITALIST

## 2022-03-31 PROCEDURE — 80048 BASIC METABOLIC PNL TOTAL CA: CPT | Performed by: NURSE PRACTITIONER

## 2022-03-31 PROCEDURE — 99233 PR SUBSEQUENT HOSPITAL CARE,LEVL III: ICD-10-PCS | Mod: ,,, | Performed by: INTERNAL MEDICINE

## 2022-03-31 PROCEDURE — 83735 ASSAY OF MAGNESIUM: CPT | Performed by: NURSE PRACTITIONER

## 2022-03-31 RX ORDER — ESCITALOPRAM OXALATE 10 MG/1
10 TABLET ORAL DAILY
Qty: 30 TABLET | Refills: 11 | Status: SHIPPED | OUTPATIENT
Start: 2022-04-01 | End: 2023-04-01

## 2022-03-31 RX ORDER — TRAZODONE HYDROCHLORIDE 50 MG/1
50 TABLET ORAL NIGHTLY PRN
Qty: 30 TABLET | Refills: 1 | Status: SHIPPED | OUTPATIENT
Start: 2022-03-31 | End: 2022-06-21 | Stop reason: SDUPTHER

## 2022-03-31 RX ORDER — ESCITALOPRAM OXALATE 10 MG/1
10 TABLET ORAL DAILY
Status: DISCONTINUED | OUTPATIENT
Start: 2022-03-31 | End: 2022-03-31 | Stop reason: HOSPADM

## 2022-03-31 RX ORDER — LEVOTHYROXINE SODIUM 150 UG/1
150 TABLET ORAL
Qty: 30 TABLET | Refills: 11 | Status: SHIPPED | OUTPATIENT
Start: 2022-04-01 | End: 2022-04-28

## 2022-03-31 RX ORDER — METRONIDAZOLE 500 MG/1
500 TABLET ORAL EVERY 8 HOURS
Qty: 30 TABLET | Refills: 0 | Status: SHIPPED | OUTPATIENT
Start: 2022-03-31 | End: 2022-04-10

## 2022-03-31 RX ORDER — CIPROFLOXACIN 500 MG/1
500 TABLET ORAL EVERY 12 HOURS
Qty: 20 TABLET | Refills: 0 | Status: SHIPPED | OUTPATIENT
Start: 2022-03-31 | End: 2022-04-10

## 2022-03-31 RX ORDER — MEGESTROL ACETATE 40 MG/ML
400 SUSPENSION ORAL DAILY
Status: DISCONTINUED | OUTPATIENT
Start: 2022-03-31 | End: 2022-03-31 | Stop reason: HOSPADM

## 2022-03-31 RX ORDER — HYDROCODONE BITARTRATE AND ACETAMINOPHEN 5; 325 MG/1; MG/1
1 TABLET ORAL EVERY 4 HOURS PRN
Qty: 28 TABLET | Refills: 0 | Status: SHIPPED | OUTPATIENT
Start: 2022-03-31 | End: 2022-04-07

## 2022-03-31 RX ADMIN — CALCIUM CARBONATE (ANTACID) CHEW TAB 500 MG 1000 MG: 500 CHEW TAB at 12:03

## 2022-03-31 RX ADMIN — ONDANSETRON 4 MG: 2 INJECTION INTRAMUSCULAR; INTRAVENOUS at 12:03

## 2022-03-31 RX ADMIN — CIPROFLOXACIN 500 MG: 500 TABLET, FILM COATED ORAL at 09:03

## 2022-03-31 RX ADMIN — MEGESTROL ACETATE 400 MG: 40 SUSPENSION ORAL at 01:03

## 2022-03-31 RX ADMIN — METRONIDAZOLE 500 MG: 250 TABLET ORAL at 01:03

## 2022-03-31 RX ADMIN — MEGESTROL ACETATE 40 MG: 40 TABLET ORAL at 09:03

## 2022-03-31 RX ADMIN — SODIUM CHLORIDE, PRESERVATIVE FREE 10 ML: 5 INJECTION INTRAVENOUS at 12:03

## 2022-03-31 RX ADMIN — METRONIDAZOLE 500 MG: 250 TABLET ORAL at 05:03

## 2022-03-31 RX ADMIN — ONDANSETRON 4 MG: 2 INJECTION INTRAMUSCULAR; INTRAVENOUS at 02:03

## 2022-03-31 RX ADMIN — ESCITALOPRAM OXALATE 10 MG: 10 TABLET ORAL at 12:03

## 2022-03-31 RX ADMIN — CALCIUM CARBONATE (ANTACID) CHEW TAB 500 MG 1000 MG: 500 CHEW TAB at 05:03

## 2022-03-31 RX ADMIN — LEVOTHYROXINE SODIUM 150 MCG: 0.12 TABLET ORAL at 05:03

## 2022-03-31 NOTE — PT/OT/SLP PROGRESS
"Physical Therapy      Patient Name:  Regine DENTON Allan   MRN:  [id number]    Patient not seen today secondary to Patient unwilling to participate. She states "the doctor said I should be going home in the next 2 hours". She reports having all needed DME. Will follow-up 04/01/22 if D/C falls through.        "

## 2022-03-31 NOTE — PROGRESS NOTES
[facility]  Hematology/Oncology  Progress Note    Patient Name: Regine Edwardsn  Admission Date: 3/26/2022  Hospital Length of Stay: 5 days  Code Status: DNR     Subjective:   I met with pt and her  3/30/22 and this document is created 3/31/22 for that visit bedside  Interval History:     has multiple questions regarding future treatments and follow up  66 yr old WF known to my parner Dr Khoobehi, was receiving chemo through [facility] for met colon ca XELOX, past 3 treatments with failure to thrive and cytopenia and hospitalization   she also has multiple electrolyte issues  Oncology Treatment Plan:   OP COLORECTAL CAPECITABINE OXALIPLATIN (CAPEOX) BEVACIZUMAB Q3W    Medications:  Continuous Infusions:   sodium chloride 0.9% 51 mL/hr at 03/31/22 0508     Scheduled Meds:   calcium carbonate  1,000 mg Oral TID AC    ciprofloxacin HCl  500 mg Oral Q12H    EScitalopram oxalate  10 mg Oral Daily    famotidine  20 mg Oral BID    levothyroxine  150 mcg Oral Before breakfast    megestroL  400 mg Oral Daily    metroNIDAZOLE  500 mg Oral Q8H    potassium chloride  40 mEq Oral Once    sodium chloride 0.9%  10 mL Intravenous Q6H     PRN Meds:acetaminophen, albuterol-ipratropium, dextrose 10%, dextrose 10%, glucagon (human recombinant), glucose, glucose, influenza, loperamide, magnesium oxide, magnesium oxide, melatonin, morphine, naloxone, ondansetron, potassium bicarbonate, potassium bicarbonate, potassium bicarbonate, potassium, sodium phosphates, potassium, sodium phosphates, potassium, sodium phosphates, simethicone, sodium chloride 0.9%, Flushing PICC Protocol **AND** sodium chloride 0.9% **AND** sodium chloride 0.9%, traZODone     Review of Systems    pt is tearful, doesn't want to talk about her diarrhea, tired of being asked to eat and all the fuss, feels hopeless   doesn't have the energy to get up and do stuff   denies, cough, sputum production , fever, chills, CP,  palpitations,  Denies urinary symptoms  Denies visual issues, sore throat ,  Denies abd pain, nausea, even ren that she has any GI issues  Objective:     Vital Signs (Most Recent):  Temp: 99.5 °F (37.5 °C) (03/31/22 0716)  Pulse: 91 (03/31/22 0830)  Resp: 16 (03/31/22 0830)  BP: 119/64 (03/31/22 0716)  SpO2: 95 % (03/31/22 0830) Vital Signs (24h Range):  Temp:  [96.7 °F (35.9 °C)-99.5 °F (37.5 °C)] 99.5 °F (37.5 °C)  Pulse:  [91-97] 91  Resp:  [16-19] 16  SpO2:  [95 %-98 %] 95 %  BP: (119-146)/(64-72) 119/64     Weight: 65.3 kg (144 lb)  Body mass index is 22.55 kg/m².  Body surface area is 1.76 meters squared.      Intake/Output Summary (Last 24 hours) at 3/31/2022 1120  Last data filed at 3/31/2022 0508  Gross per 24 hour   Intake 1490.41 ml   Output --   Net 1490.41 ml       Physical Exam   VITAL SIGNS:  as above   GENERAL: appears  frail, and tired  No anxiety, no agitation,   Some emotional distress.+  Patient is awake, alert, oriented and cooperative.  HEENT:  Showed no congestion. Trachea is central no obvious icterus or pallor noted no hoarseness. no obvious JVD   NECK:  Supple.  No JVD. No obvious cervical submental or supraclavicular adenopathy.  RS:the visualized portion of  Chest expands well. chest appears symmetric, no audible wheezes.  No dyspnea recognized  ABDOMEN:  abdomen appears undistended.  EXTREMITIES:  Without edema.  NEUROLOGICAL:  The patient is appropriate, higher functions are normal.  No  obvious neurological deficits.  normal judgement normal thought content  No confusion, no speech impediment. Cranial nerves are intact and show no deficit. No gross motor deficits noted   SKIN MUSCULOSKELETAL: no joint or skeletal deformity, no clubbing of nails.  No visible rash ecchymosis or petechiae    Significant Labs:   Lab Results   Component Value Date    WBC 3.28 (L) 03/29/2022    HGB 7.9 (L) 03/29/2022    HCT 25.5 (L) 03/29/2022     (H) 03/29/2022    PLT 59 (L) 03/29/2022      CMP  Sodium   Date Value Ref Range Status   03/31/2022 135 (L) 136 - 145 mmol/L Final     Potassium   Date Value Ref Range Status   03/31/2022 3.5 3.5 - 5.1 mmol/L Final     Chloride   Date Value Ref Range Status   03/31/2022 106 95 - 110 mmol/L Final     CO2   Date Value Ref Range Status   03/31/2022 21 (L) 23 - 29 mmol/L Final     Glucose   Date Value Ref Range Status   03/31/2022 85 70 - 110 mg/dL Final     BUN   Date Value Ref Range Status   03/31/2022 4 (L) 8 - 23 mg/dL Final     Creatinine   Date Value Ref Range Status   03/31/2022 0.7 0.5 - 1.4 mg/dL Final     Calcium   Date Value Ref Range Status   03/31/2022 6.8 (LL) 8.7 - 10.5 mg/dL Final     Comment:     CA  critical result(s) called and verbal readback obtained from Sangeeta Grey by LW8 03/31/2022 09:51       Total Protein   Date Value Ref Range Status   03/29/2022 4.3 (L) 6.0 - 8.4 g/dL Final     Albumin   Date Value Ref Range Status   03/29/2022 2.0 (L) 3.5 - 5.2 g/dL Final     Total Bilirubin   Date Value Ref Range Status   03/29/2022 1.2 (H) 0.1 - 1.0 mg/dL Final     Comment:     For infants and newborns, interpretation of results should be based  on gestational age, weight and in agreement with clinical  observations.    Premature Infant recommended reference ranges:  Up to 24 hours.............<8.0 mg/dL  Up to 48 hours............<12.0 mg/dL  3-5 days..................<15.0 mg/dL  6-29 days.................<15.0 mg/dL       Alkaline Phosphatase   Date Value Ref Range Status   03/29/2022 257 (H) 55 - 135 U/L Final     AST   Date Value Ref Range Status   03/29/2022 24 10 - 40 U/L Final     ALT   Date Value Ref Range Status   03/29/2022 22 10 - 44 U/L Final     Anion Gap   Date Value Ref Range Status   03/31/2022 8 8 - 16 mmol/L Final     eGFR if    Date Value Ref Range Status   03/31/2022 >60 >60 mL/min/1.73 m^2 Final     eGFR if non    Date Value Ref Range Status   03/31/2022 >60 >60 mL/min/1.73 m^2 Final      Comment:     Calculation used to obtain the estimated glomerular filtration  rate (eGFR) is the CKD-EPI equation.              Assessment/Plan:     Active Diagnoses:    Diagnosis Date Noted POA    PRINCIPAL PROBLEM:  Proctocolitis [K52.9] 03/26/2022 Yes    Hypokalemia [E87.6] 03/27/2022 Yes    Hypomagnesemia [E83.42] 03/27/2022 Yes    Thrombocytopenia [D69.6] 03/26/2022 Yes    Osteopenia [M85.80] 03/26/2022 Yes    Hypocalcemia [E83.51] 03/26/2022 Yes    Encephalopathy, metabolic [G93.41] 03/26/2022 Yes    Severe malnutrition [E43] 03/02/2022 Yes    Severe sepsis [A41.9, R65.20]  Yes    Secondary malignant neoplasm of liver [C78.7] 01/24/2021 Yes    Colorectal carcinoma [C19] 01/23/2021 Yes    Anemia [D64.9] 01/23/2021 Yes    Postoperative hypothyroidism [E89.0] 12/14/2015 Yes    Essential (primary) hypertension [I10] 07/23/2014 Yes      Problems Resolved During this Admission:   metastatic colorectal ca: pt transferred her care from [facility] to Dr Khoobehi, to make it further convenient we have arranged for her to follow with DR Banks at [facility].   she does have to improve her PS    has appetite stimulant that she will use, after our conversation pt seems motivated to attempt and get better, eat better  We can change her xeloda ( to whixch she seesm to have had hand foot and now sig diarrhea which has resolved past two days) all this to be done when pt presents in OP clinic [facility] [place]   [facility], need recent labs and correction. and pt are satisfied with the plans at the end of our long visit        Vicky Freire MD  Hematology/Oncology  [facility]       no

## 2022-03-31 NOTE — CONSULTS
[facility] Hospital Medicine  Telemedicine Consult Note      Thank you for your consult to [facility]. We have reviewed the patient chart. This patient does meet criteria for [facility] service at this time. Will assume care on 3/31/22      Andrea Dowell MD  Department of Hospital Medicine   [facility]

## 2022-03-31 NOTE — NURSING
Patient alert and oriented X4. Patient on room air. Her vitals are stable. Patient now is on oral Flagyl and Cipro. No complaints of pain voiced. The patient is on telemetry running NSR. Her potassium, magnesium, and calcium was replaced today. Her potassium was replaced orally and IV. She was medicated with Zofran for nausea.  at the bedside, will continue to monitor.

## 2022-03-31 NOTE — SUBJECTIVE & OBJECTIVE
Interval History: no acute events overnight.  Still with significant electrolyte imbalance with HypoK, hypoCa, and hypoMag-- + anorexia requiring IV replacement.   Requesting Ailyn    Discussed POC with Dr. Freire, onc, ok for DC from onco standpoint.  Follow up outpatient.   Patient with poor functional status and overall poor prognosis.       Review of Systems   Constitutional:  Positive for fatigue. Negative for chills and fever.   HENT:  Negative for congestion and postnasal drip.    Respiratory:  Negative for cough and shortness of breath.    Cardiovascular:  Negative for chest pain and palpitations.   Gastrointestinal:  Positive for abdominal pain and diarrhea (improving per patient). Negative for vomiting.   Genitourinary:  Negative for hematuria.   Musculoskeletal:  Positive for gait problem. Negative for neck pain.   Skin:  Positive for pallor. Negative for rash.   Neurological:  Positive for weakness. Negative for syncope.   Psychiatric/Behavioral:  Negative for agitation and confusion.    All other systems reviewed and are negative.  Objective:     Vital Signs (Most Recent):  Temp: 99.4 °F (37.4 °C) (03/31/22 1238)  Pulse: 94 (03/31/22 1238)  Resp: 18 (03/31/22 1238)  BP: 121/61 (03/31/22 1238)  SpO2: 95 % (03/31/22 1238)   Vital Signs (24h Range):  Temp:  [96.8 °F (36 °C)-99.5 °F (37.5 °C)] 99.4 °F (37.4 °C)  Pulse:  [91-97] 94  Resp:  [16-19] 18  SpO2:  [95 %-98 %] 95 %  BP: (119-146)/(61-72) 121/61     Weight: 65.3 kg (144 lb)  Body mass index is 22.55 kg/m².    Intake/Output Summary (Last 24 hours) at 3/31/2022 1248  Last data filed at 3/31/2022 0508  Gross per 24 hour   Intake 1490.41 ml   Output --   Net 1490.41 ml        Physical Exam  Vitals and nursing note reviewed.   Constitutional:       Appearance: She is well-developed. She is ill-appearing.   HENT:      Head: Normocephalic and atraumatic.      Mouth/Throat:      Mouth: Mucous membranes are dry.   Pulmonary:      Effort: Pulmonary effort  is normal. No respiratory distress.      Breath sounds: Normal breath sounds.   Abdominal:      General: Abdomen is flat. There is no distension.      Tenderness: There is no abdominal tenderness.   Skin:     General: Skin is warm and dry.      Coloration: Skin is pale.      Findings: No rash.   Neurological:      General: No focal deficit present.      Mental Status: She is alert and oriented to person, place, and time.      GCS: GCS eye subscore is 3. GCS verbal subscore is 4. GCS motor subscore is 6.   Psychiatric:         Attention and Perception: Attention normal.         Speech: Speech normal.         Cognition and Memory: Cognition normal.       Significant Labs: All pertinent labs within the past 24 hours have been reviewed.  CBC:   No results for input(s): WBC, HGB, HCT, PLT in the last 48 hours.    CMP:   Recent Labs   Lab 03/30/22  0924 03/31/22  0824    135*   K 2.8* 3.5    106   CO2 21* 21*   GLU 84 85   BUN 4* 4*   CREATININE 0.7 0.7   CALCIUM 6.7* 6.8*   ANIONGAP 11 8   EGFRNONAA >60 >60         Significant Imaging: I have reviewed all pertinent imaging results/findings within the past 24 hours.

## 2022-03-31 NOTE — PROGRESS NOTES
[facility] Hospital Medicine  Telemedicine Progress Note    Patient Name: Regine Lemus  MRN: [id number]  Patient Class: IP- Inpatient   Admission Date: 3/26/2022  Length of Stay: 5 days  Attending Physician: Andrea Dowell MD  Primary Care Provider: Primary Doctor No          Subjective:     Principal Problem:Proctocolitis        HPI:  Regine Lemus is a 66-year-old female with past medical history significant for hypertension, hyperlipidemia, colon cancer with metastatic liver disease on chemotherapy with last infusion on 03/16/2022 who presented to the emergency department for further evaluation of abdominal pain and diarrhea which began this morning.  She was discharged from this facility on 3/5/22 after being treated for similar complaints.  Her  is at bedside and provides the majority the history stating that this happened following her previous chemotherapy infusion.  This morning he noted her to be confused and she was complaining of severe abdominal pain.  She had some diarrhea home, but he states that it has become much worse since they arrived to the ED.  CT abdomen pelvis was significant for proctocolitis with noted hepatic metastatic disease.  Ammonia level was elevated at 77 and she was found to have hypocalcemia and thrombocytopenia.  Her lactic acid is 5.6 and she is tachycardic.  Further history is limited due to the condition of the patient.  She is admitted to the service of hospital medicine for continued monitoring and treatment.      Overview/Hospital Course:  Regine Lemus was monitored closely during her hospital stay.  She was placed on IVFH, IV flagyl and IV cipro for diagnosis of proctocolitis.  GI and oncology specialities were consulted due to her history of metastatic colon cancer with recent chemotherapy treatment.  She was checked for c. Diff and this was negative.  Her electrolytes were monitored closely and repleted as needed.        Interval  History: no acute events overnight.  Still with significant electrolyte imbalance with HypoK, hypoCa, and hypoMag-- + anorexia requiring IV replacement.   Requesting Ailyn    Discussed POC with Dr. Freire, onc, ok for DC from onco standpoint.  Follow up outpatient.   Patient with poor functional status and overall poor prognosis.       Review of Systems   Constitutional:  Positive for fatigue. Negative for chills and fever.   HENT:  Negative for congestion and postnasal drip.    Respiratory:  Negative for cough and shortness of breath.    Cardiovascular:  Negative for chest pain and palpitations.   Gastrointestinal:  Positive for abdominal pain and diarrhea (improving per patient). Negative for vomiting.   Genitourinary:  Negative for hematuria.   Musculoskeletal:  Positive for gait problem. Negative for neck pain.   Skin:  Positive for pallor. Negative for rash.   Neurological:  Positive for weakness. Negative for syncope.   Psychiatric/Behavioral:  Negative for agitation and confusion.    All other systems reviewed and are negative.  Objective:     Vital Signs (Most Recent):  Temp: 99.4 °F (37.4 °C) (03/31/22 1238)  Pulse: 94 (03/31/22 1238)  Resp: 18 (03/31/22 1238)  BP: 121/61 (03/31/22 1238)  SpO2: 95 % (03/31/22 1238)   Vital Signs (24h Range):  Temp:  [96.8 °F (36 °C)-99.5 °F (37.5 °C)] 99.4 °F (37.4 °C)  Pulse:  [91-97] 94  Resp:  [16-19] 18  SpO2:  [95 %-98 %] 95 %  BP: (119-146)/(61-72) 121/61     Weight: 65.3 kg (144 lb)  Body mass index is 22.55 kg/m².    Intake/Output Summary (Last 24 hours) at 3/31/2022 1248  Last data filed at 3/31/2022 0508  Gross per 24 hour   Intake 1490.41 ml   Output --   Net 1490.41 ml        Physical Exam  Vitals and nursing note reviewed.   Constitutional:       Appearance: She is well-developed. She is ill-appearing.   HENT:      Head: Normocephalic and atraumatic.      Mouth/Throat:      Mouth: Mucous membranes are dry.   Pulmonary:      Effort: Pulmonary effort is normal.  No respiratory distress.      Breath sounds: Normal breath sounds.   Abdominal:      General: Abdomen is flat. There is no distension.      Tenderness: There is no abdominal tenderness.   Skin:     General: Skin is warm and dry.      Coloration: Skin is pale.      Findings: No rash.   Neurological:      General: No focal deficit present.      Mental Status: She is alert and oriented to person, place, and time.      GCS: GCS eye subscore is 3. GCS verbal subscore is 4. GCS motor subscore is 6.   Psychiatric:         Attention and Perception: Attention normal.         Speech: Speech normal.         Cognition and Memory: Cognition normal.       Significant Labs: All pertinent labs within the past 24 hours have been reviewed.  CBC:   No results for input(s): WBC, HGB, HCT, PLT in the last 48 hours.    CMP:   Recent Labs   Lab 03/30/22  0924 03/31/22  0824    135*   K 2.8* 3.5    106   CO2 21* 21*   GLU 84 85   BUN 4* 4*   CREATININE 0.7 0.7   CALCIUM 6.7* 6.8*   ANIONGAP 11 8   EGFRNONAA >60 >60         Significant Imaging: I have reviewed all pertinent imaging results/findings within the past 24 hours.      Assessment/Plan:      * Proctocolitis  IV cipro; IV flagyl  IVF hydration  IV narcotics and IV antiemetics as needed  Stool for c diff-negative  She seems to be improving; they would like input from GI and oncology since recurrent issue.  Imodium prn  GI recs outpatient colonoscopy.  Transition IV abx to po       Hypomagnesemia  Repleted; monitor      Hypokalemia  Nursing to replete per prn orders.        Encephalopathy, metabolic  Patient has acute metabolic encephalopathy that is secondary to Electrolyte abnormalities- hypocalcemia and Sepsis/Infective. Patient's current mental status is Lethargic. Patient's baseline mental status is. awake and alert; oriented to person, place, and time Evaluation and for underlying cause(s) is underway and inclusive of Blood Chemistries and Neuro-Imaging (MRI/CT).  Will monitor neuro checks carefully, avoid narcotics and benzos that will exacerbate agitation, and use PRN medications for controls of behavior for self harm.  3/27-mental status back to baseline              Hypocalcemia  IV replacement; continue tums; monitor        Osteopenia  Compression fx of vertebrae seen today not previously noted-lower thoracic levels and lumbar spine.  Maintain fall precautions.  Consider neurosurgery follow up.      Thrombocytopenia  Chronic; stable; trend      Severe malnutrition  Consult nutrition      Severe sepsis  This patient does have evidence of infective focus  My overall impression is severe sepsis. Vital signs were reviewed and noted in progress note.  Antibiotics given-   Antibiotics (From admission, onward)            Start     Stop Route Frequency Ordered    03/26/22 1615  ciprofloxacin (CIPRO)400mg/200ml D5W IVPB 400 mg         -- IV Every 12 hours (non-standard times) 03/26/22 1505    03/26/22 1615  metronidazole IVPB 500 mg         -- IV Every 8 hours (non-standard times) 03/26/22 1505        Cultures were taken-   Microbiology Results (last 7 days)     Procedure Component Value Units Date/Time    Stool culture [[id number]] Collected: 03/27/22 1623    Order Status: Sent Specimen: Stool Updated: 03/27/22 2211    E. coli 0157 antigen [[id number]] Collected: 03/27/22 1623    Order Status: No result Specimen: Stool Updated: 03/27/22 2211    Stool culture [[id number]]     Order Status: Canceled Specimen: Stool     Clostridium difficile EIA [[id number]] Collected: 03/26/22 1544    Order Status: Completed Specimen: Stool Updated: 03/26/22 2008     C. diff Antigen Negative     C difficile Toxins A+B, EIA Negative     Comment: Testing not recommended for children <24 months old.           Latest lactate reviewed, they are-  Recent Labs   Lab 03/26/22 1957 03/26/22 2325 03/27/22  1012   LACTATE 6.1* 5.0* 2.2  2.2       Organ dysfunction indicated by Encephalopathy   Source-  GI    Source control Achieved by- IV abx      Secondary malignant neoplasm of liver  Noted; followed by oncology  LFT's improved from yesterday.        Anemia  Patient's anemia is currently uncontrolled. S/p 0 units of PRBCs.  Current CBC reviewed-   Lab Results   Component Value Date    HGB 7.7 (L) 03/28/2022    HCT 24.3 (L) 03/28/2022    MCV 99 (H) 03/28/2022    PLT 53 (L) 03/28/2022     Monitor serial CBC and transfuse if patient becomes hemodynamically unstable, symptomatic or H/H drops below 7/21.         Colorectal carcinoma  Followed by oncology.  Last chemo infusion 3/16/22  Continue chronic capecitabine 2000mg qam, 1500mg qpm  Consult oncology      Postoperative hypothyroidism  Patient has chronic hypothyroidism. TFTs reviewed-   Lab Results   Component Value Date    TSH 84.954 (H) 03/28/2022   . Will continue chronic levothyroxine and adjust for and clinical changes.  Increased levothyroxine from 125mcg daily to 150mcg daily.  F/u OP.  Will need repeat TSH in 6 weeks.          Essential (primary) hypertension  Chronic, controlled.  Latest blood pressure and vitals reviewed-   Temp:  [96.3 °F (35.7 °C)-98.1 °F (36.7 °C)]   Pulse:  [83-90]   Resp:  [17-18]   BP: (140-168)/(74-85)   SpO2:  [98 %-99 %] .   Home meds for hypertension were reviewed and noted below.   Hypertension Medications             furosemide (LASIX) 20 MG tablet Take 1 tablet (20 mg total) by mouth once daily.          While in the hospital, will manage blood pressure as follows; Adjust home antihypertensive regimen as follows- hold chronic lasix 2/2 severe sepsis    Will utilize p.r.n. blood pressure medication only if patient's blood pressure greater than  180/110 and she develops symptoms such as worsening chest pain or shortness of breath.          VTE Risk Mitigation (From admission, onward)         Ordered     IP VTE HIGH RISK PATIENT  Once         03/26/22 1830     Place sequential compression device  Until discontinued          03/26/22 1830     Reason for No Pharmacological VTE Prophylaxis  Once        Question:  Reasons:  Answer:  Thrombocytopenia    03/26/22 1830                      I have assessed these finding virtually using telemed platform and with assistance of bedside nurse                 The attending portion of this evaluation, treatment, and documentation was performed per Tara Quiñonez NP via Telemedicine AudioVisual using the secure MyPron[employer] software platform with 2 way audio/video. The provider was located off-site and the patient is located in the hospital. The aforementioned video software was utilized to document the relevant history and physical exam    Tara Quiñonez NP  Department of Hospital Medicine   [facility]

## 2022-03-31 NOTE — PLAN OF CARE
Pt is cleared from  for discharge.  Spouse  and Patient refused hh.  Appointments are in avs  Ambulatory referral sent Out Pt CM.     03/31/22 9524   Final Note   Assessment Type Final Discharge Note   Anticipated Discharge Disposition Home   Hospital Resources/Appts/Education Provided Appointments scheduled by Navigator/Coordinator

## 2022-03-31 NOTE — PLAN OF CARE
POC reviewed with patient. Verbalized understanding. Midline intact with fluids infusing. Tele in place. AAO, VSS, afebrile. No complaint of pain but was medicated once for nausea. Safety maintained. Call light in reach and instructed to call for assistance. Will continue to monitor.

## 2022-03-31 NOTE — NURSING
Patient alert and oriented X4. Left midline removed. AVS completed. Patient to d/c with  without HH.

## 2022-03-31 NOTE — CARE UPDATE
03/30/22 2010   Patient Assessment/Suction   Level of Consciousness (AVPU) alert   Respiratory Effort Unlabored;Normal   Expansion/Accessory Muscles/Retractions no use of accessory muscles;no retractions;expansion symmetric   All Lung Fields Breath Sounds Anterior:;Lateral:;diminished   Rhythm/Pattern, Respiratory unlabored;pattern regular;depth regular   PRE-TX-O2   O2 Device (Oxygen Therapy) room air   SpO2 96 %   Pulse Oximetry Type Intermittent   $ Pulse Oximetry - Multiple Charge Pulse Oximetry - Multiple   Pulse 96   Resp 18   Aerosol Therapy   $ Aerosol Therapy Charges PRN treatment not required   Respiratory Treatment Status (SVN) PRN treatment not required

## 2022-04-01 ENCOUNTER — PATIENT OUTREACH (OUTPATIENT)
Dept: ADMINISTRATIVE | Facility: CLINIC | Age: 67
End: 2022-04-01
Payer: OTHER GOVERNMENT

## 2022-04-05 ENCOUNTER — TELEPHONE (OUTPATIENT)
Dept: HEMATOLOGY/ONCOLOGY | Facility: CLINIC | Age: 67
End: 2022-04-05
Payer: OTHER GOVERNMENT

## 2022-04-05 ENCOUNTER — OFFICE VISIT (OUTPATIENT)
Dept: HEMATOLOGY/ONCOLOGY | Facility: CLINIC | Age: 67
End: 2022-04-05
Payer: OTHER GOVERNMENT

## 2022-04-05 ENCOUNTER — TELEPHONE (OUTPATIENT)
Dept: HEMATOLOGY/ONCOLOGY | Facility: CLINIC | Age: 67
End: 2022-04-05

## 2022-04-05 ENCOUNTER — INFUSION (OUTPATIENT)
Dept: INFUSION THERAPY | Facility: HOSPITAL | Age: 67
End: 2022-04-05
Attending: INTERNAL MEDICINE
Payer: OTHER GOVERNMENT

## 2022-04-05 VITALS
SYSTOLIC BLOOD PRESSURE: 165 MMHG | HEART RATE: 101 BPM | OXYGEN SATURATION: 99 % | BODY MASS INDEX: 23.7 KG/M2 | WEIGHT: 151 LBS | HEIGHT: 67 IN | DIASTOLIC BLOOD PRESSURE: 89 MMHG

## 2022-04-05 VITALS
TEMPERATURE: 98 F | HEART RATE: 101 BPM | DIASTOLIC BLOOD PRESSURE: 89 MMHG | SYSTOLIC BLOOD PRESSURE: 165 MMHG | RESPIRATION RATE: 18 BRPM | OXYGEN SATURATION: 99 %

## 2022-04-05 DIAGNOSIS — D61.818 PANCYTOPENIA: ICD-10-CM

## 2022-04-05 DIAGNOSIS — C78.7 SECONDARY MALIGNANT NEOPLASM OF LIVER: Primary | ICD-10-CM

## 2022-04-05 DIAGNOSIS — C78.7 SECONDARY MALIGNANT NEOPLASM OF LIVER: ICD-10-CM

## 2022-04-05 DIAGNOSIS — C19 COLORECTAL CARCINOMA: ICD-10-CM

## 2022-04-05 DIAGNOSIS — C19 COLORECTAL CARCINOMA: Primary | ICD-10-CM

## 2022-04-05 DIAGNOSIS — L27.1 HAND FOOT SYNDROME: ICD-10-CM

## 2022-04-05 DIAGNOSIS — R53.0 NEOPLASTIC MALIGNANT RELATED FATIGUE: ICD-10-CM

## 2022-04-05 DIAGNOSIS — E83.51 HYPOCALCEMIA: Primary | ICD-10-CM

## 2022-04-05 LAB
ALBUMIN SERPL BCP-MCNC: 2.4 G/DL (ref 3.5–5.2)
ALP SERPL-CCNC: 241 U/L (ref 55–135)
ALT SERPL W/O P-5'-P-CCNC: 16 U/L (ref 10–44)
ANION GAP SERPL CALC-SCNC: 9 MMOL/L (ref 8–16)
AST SERPL-CCNC: 28 U/L (ref 10–40)
BASOPHILS # BLD AUTO: 0.02 K/UL (ref 0–0.2)
BASOPHILS NFR BLD: 0.3 % (ref 0–1.9)
BILIRUB SERPL-MCNC: 0.8 MG/DL (ref 0.1–1)
BUN SERPL-MCNC: 19 MG/DL (ref 8–23)
CALCIUM SERPL-MCNC: 6.3 MG/DL (ref 8.7–10.5)
CEA SERPL-MCNC: 172.2 NG/ML (ref 0–5)
CHLORIDE SERPL-SCNC: 113 MMOL/L (ref 95–110)
CO2 SERPL-SCNC: 19 MMOL/L (ref 23–29)
CREAT SERPL-MCNC: 0.8 MG/DL (ref 0.5–1.4)
DIFFERENTIAL METHOD: ABNORMAL
EOSINOPHIL # BLD AUTO: 0 K/UL (ref 0–0.5)
EOSINOPHIL NFR BLD: 0 % (ref 0–8)
ERYTHROCYTE [DISTWIDTH] IN BLOOD BY AUTOMATED COUNT: 23.7 % (ref 11.5–14.5)
EST. GFR  (AFRICAN AMERICAN): >60 ML/MIN/1.73 M^2
EST. GFR  (NON AFRICAN AMERICAN): >60 ML/MIN/1.73 M^2
FERRITIN SERPL-MCNC: 169 NG/ML (ref 20–300)
GLUCOSE SERPL-MCNC: 149 MG/DL (ref 70–110)
HCT VFR BLD AUTO: 28.7 % (ref 37–48.5)
HGB BLD-MCNC: 8.8 G/DL (ref 12–16)
IMM GRANULOCYTES # BLD AUTO: 0.07 K/UL (ref 0–0.04)
IMM GRANULOCYTES NFR BLD AUTO: 0.9 % (ref 0–0.5)
IRON SERPL-MCNC: 59 UG/DL (ref 30–160)
LDH SERPL L TO P-CCNC: 477 U/L (ref 110–260)
LYMPHOCYTES # BLD AUTO: 1.2 K/UL (ref 1–4.8)
LYMPHOCYTES NFR BLD: 16.7 % (ref 18–48)
MAGNESIUM SERPL-MCNC: 1.3 MG/DL (ref 1.6–2.6)
MCH RBC QN AUTO: 32.5 PG (ref 27–31)
MCHC RBC AUTO-ENTMCNC: 30.7 G/DL (ref 32–36)
MCV RBC AUTO: 106 FL (ref 82–98)
MONOCYTES # BLD AUTO: 0.6 K/UL (ref 0.3–1)
MONOCYTES NFR BLD: 8 % (ref 4–15)
NEUTROPHILS # BLD AUTO: 5.5 K/UL (ref 1.8–7.7)
NEUTROPHILS NFR BLD: 74.1 % (ref 38–73)
NRBC BLD-RTO: 0 /100 WBC
PLATELET # BLD AUTO: 101 K/UL (ref 150–450)
PMV BLD AUTO: 10.4 FL (ref 9.2–12.9)
POTASSIUM SERPL-SCNC: 4 MMOL/L (ref 3.5–5.1)
PROT SERPL-MCNC: 5.1 G/DL (ref 6–8.4)
RBC # BLD AUTO: 2.71 M/UL (ref 4–5.4)
SATURATED IRON: 19 % (ref 20–50)
SODIUM SERPL-SCNC: 141 MMOL/L (ref 136–145)
TOTAL IRON BINDING CAPACITY: 318 UG/DL (ref 250–450)
TRANSFERRIN SERPL-MCNC: 215 MG/DL (ref 200–375)
WBC # BLD AUTO: 7.38 K/UL (ref 3.9–12.7)

## 2022-04-05 PROCEDURE — 82378 CARCINOEMBRYONIC ANTIGEN: CPT | Performed by: INTERNAL MEDICINE

## 2022-04-05 PROCEDURE — 99999 PR PBB SHADOW E&M-EST. PATIENT-LVL IV: ICD-10-PCS | Mod: PBBFAC,,, | Performed by: INTERNAL MEDICINE

## 2022-04-05 PROCEDURE — 83735 ASSAY OF MAGNESIUM: CPT | Performed by: INTERNAL MEDICINE

## 2022-04-05 PROCEDURE — 82728 ASSAY OF FERRITIN: CPT | Performed by: INTERNAL MEDICINE

## 2022-04-05 PROCEDURE — 99215 OFFICE O/P EST HI 40 MIN: CPT | Mod: S$GLB,,, | Performed by: INTERNAL MEDICINE

## 2022-04-05 PROCEDURE — 84466 ASSAY OF TRANSFERRIN: CPT | Performed by: INTERNAL MEDICINE

## 2022-04-05 PROCEDURE — 96523 IRRIG DRUG DELIVERY DEVICE: CPT

## 2022-04-05 PROCEDURE — 63600175 PHARM REV CODE 636 W HCPCS: Performed by: INTERNAL MEDICINE

## 2022-04-05 PROCEDURE — 99999 PR PBB SHADOW E&M-EST. PATIENT-LVL IV: CPT | Mod: PBBFAC,,, | Performed by: INTERNAL MEDICINE

## 2022-04-05 PROCEDURE — 99215 PR OFFICE/OUTPT VISIT, EST, LEVL V, 40-54 MIN: ICD-10-PCS | Mod: S$GLB,,, | Performed by: INTERNAL MEDICINE

## 2022-04-05 PROCEDURE — 85025 COMPLETE CBC W/AUTO DIFF WBC: CPT | Performed by: INTERNAL MEDICINE

## 2022-04-05 PROCEDURE — 83615 LACTATE (LD) (LDH) ENZYME: CPT | Performed by: INTERNAL MEDICINE

## 2022-04-05 PROCEDURE — 80053 COMPREHEN METABOLIC PANEL: CPT | Performed by: INTERNAL MEDICINE

## 2022-04-05 RX ORDER — HEPARIN 100 UNIT/ML
500 SYRINGE INTRAVENOUS
Status: CANCELLED | OUTPATIENT
Start: 2022-04-05

## 2022-04-05 RX ORDER — FERROUS SULFATE, DRIED 160(50) MG
1 TABLET, EXTENDED RELEASE ORAL 2 TIMES DAILY
Qty: 180 TABLET | Refills: 3 | Status: ON HOLD | OUTPATIENT
Start: 2022-04-05 | End: 2022-07-01 | Stop reason: HOSPADM

## 2022-04-05 RX ORDER — SODIUM CHLORIDE 0.9 % (FLUSH) 0.9 %
10 SYRINGE (ML) INJECTION
Status: CANCELLED | OUTPATIENT
Start: 2022-04-05

## 2022-04-05 RX ORDER — SODIUM CHLORIDE 0.9 % (FLUSH) 0.9 %
10 SYRINGE (ML) INJECTION
Status: DISCONTINUED | OUTPATIENT
Start: 2022-04-05 | End: 2022-04-05 | Stop reason: HOSPADM

## 2022-04-05 RX ORDER — HEPARIN 100 UNIT/ML
500 SYRINGE INTRAVENOUS
Status: DISCONTINUED | OUTPATIENT
Start: 2022-04-05 | End: 2022-04-05 | Stop reason: HOSPADM

## 2022-04-05 RX ADMIN — Medication 500 UNITS: at 10:04

## 2022-04-05 NOTE — TELEPHONE ENCOUNTER
This nurse explained to pt and pt's spouse Guardant 360 testing process. Both v/u. Consent signed. Pt escorted to OPT by this nurse. Blood samples collected from pt's port per OP Infusion nurse. Blood samples and req'd documentation secured in [employer] shipping bag. Package to be dropped off at [employer] drop box location in [facility] by this nurse. Tracking # [id number].

## 2022-04-05 NOTE — PROGRESS NOTES
History of present illness:  The patient is a 66-year-old white female with metastatic colon carcinoma involving the liver who is treated with Xelox/Avastin and returns clinic for evaluation prior to next cycle of therapy.  Patient's recent course has been complicated by protein/calorie malnutrition, diarrhea, hand-foot syndrome, and electrolyte disturbances.    Since returning home following an 8 day hospitalization in [place], patient remains extremely weak and fatigued.  Oral intake for solids and liquids has improved.  She denies pain.  Diarrhea appears to have resolved.  Patient spends about 70% of her day in bed and about 30% in the chair.    Physical examination:  The patient is a well-developed, frail, elderly, white female, in no acute distress, who has a weight of 151 lb  VITAL SIGNS: Documented  and reviewed this visit.  HEENT: Normocephalic, atraumatic. Oral mucosa pink and moist. Lips without lesions. Tongue midline. Oropharynx clear. Nonicteric sclerae.   NECK: Supple, no adenopathy. No carotid bruits, thyromegaly or thyroid nodule.   HEART: Regular rate and rhythm without murmur, gallop or rub.   LUNGS: Clear to auscultation bilaterally. Normal respiratory effort.   ABDOMEN: Soft, nontender, nondistended with positive normoactive bowel sounds, no hepatosplenomegaly.   EXTREMITIES: No cyanosis, clubbing or edema. Distal pulses are intact.   AXILLAE AND GROIN: No palpable pathologic lymphadenopathy is appreciated.   SKIN: Intact/turgor normal   NEUROLOGIC: Cranial nerves II-XII grossly intact. Motor:  Generalized loss of muscle bulk and tone. Strength/sensory 5/5 throughout. Gait stable.     Laboratory:  CEA from 03/15/2022 was elevated at 155.9.  Most recent CBC was obtained on 03/29/22.  White count 3.3, hemoglobin 7.9, hematocrit 25.5, , platelets 59.  Most recent chemistry was obtained on 03/31/2022.  Sodium 135, potassium 3.5, chloride 106, CO2 21, BUN 4, creatinine 0.7, glucose 85, calcium  6.8, magnesium 1.5, GFR greater than 60.     CT scanning of the abdomen/pelvis:  Study performed on 03/26/2022.  1. Proctocolitis presumably from an infectious or inflammatory etiology.  2. Progression of hepatic metastatic disease.  3. Development of small volume ascites.  4. Nonspecific urinary bladder distention.  5. Multiple compression fractures are age indeterminate but new since prior exam.    Impression:  1. Metastatic colon carcinoma involving liver with disease progression per most recent imaging.    2. Protein/calorie malnutrition.  3. Hand-foot syndrome.  4. Multiple thoracic/lumbar spinal compression fractures of undetermined age.  5. Treatment associated pancytopenia.  6. Fatigue and malaise.    Plan:  1.  Obtain laboratory to include CBC, CMP, LDH, magnesium, iron studies, TSH, free T4, and CEA.  2. CT scanning of the chest with contrast.  3. Obtain Guardant 360 panel.  4. Return to clinic in earliest convenience to review results.        This note was created using voice recognition software and may contain grammatical errors.

## 2022-04-05 NOTE — TELEPHONE ENCOUNTER
Called pt and spoke with Edouard norris. This nurse explained to him the new medication orders. He v/u and stated pt would start med today.

## 2022-04-05 NOTE — Clinical Note
Stat CBC, CMP, LDH, magnesium, iron studies, TSH, free T4, CEA. CT of the chest with contrast as soon as possible. Guardant 360 panel Return to review results.

## 2022-04-05 NOTE — PLAN OF CARE
Pleasant alert and oriented patient ambulated to clinic with rollator and accompanied by her spouse - pt received port flush with labs drawn - taken to lab and genetic testing drawn and taken to Erica Carter RN for send out.  Pt tolerated wll well - good blood return and heparin locked - discharged home with no concerns.

## 2022-04-05 NOTE — TELEPHONE ENCOUNTER
----- Message from Scooter Banks MD sent at 4/5/2022  1:25 PM CDT -----  Start oscal-d two tablets daily.

## 2022-04-07 LAB — STFR SERPL-MCNC: 6.8 MG/L (ref 1.8–4.6)

## 2022-04-08 ENCOUNTER — HOSPITAL ENCOUNTER (OUTPATIENT)
Dept: RADIOLOGY | Facility: HOSPITAL | Age: 67
Discharge: HOME | End: 2022-04-08
Attending: INTERNAL MEDICINE
Payer: OTHER GOVERNMENT

## 2022-04-08 DIAGNOSIS — C19 COLORECTAL CARCINOMA: ICD-10-CM

## 2022-04-08 PROCEDURE — 71260 CT THORAX DX C+: CPT | Mod: TC

## 2022-04-08 PROCEDURE — 71260 CT CHEST WITH CONTRAST: ICD-10-PCS | Mod: 26,,, | Performed by: RADIOLOGY

## 2022-04-08 PROCEDURE — 25500020 PHARM REV CODE 255: Performed by: INTERNAL MEDICINE

## 2022-04-08 PROCEDURE — 71260 CT THORAX DX C+: CPT | Mod: 26,,, | Performed by: RADIOLOGY

## 2022-04-08 RX ADMIN — IOHEXOL 60 ML: 350 INJECTION, SOLUTION INTRAVENOUS at 01:04

## 2022-04-11 ENCOUNTER — TELEPHONE (OUTPATIENT)
Dept: HEMATOLOGY/ONCOLOGY | Facility: CLINIC | Age: 67
End: 2022-04-11
Payer: OTHER GOVERNMENT

## 2022-04-11 NOTE — TELEPHONE ENCOUNTER
----- Message from Gemini Tejada sent at 4/11/2022  1:33 PM CDT -----  Type: Needs Medical Advice  Who Called:  Edouard (spouse)  Symptoms (please be specific):    How long has patient had these symptoms:    Pharmacy name and phone #:    Best Call Back Number:   Additional Information: Mr. Lemus is requesting a call back from the nurse.

## 2022-04-12 ENCOUNTER — TELEPHONE (OUTPATIENT)
Dept: HEMATOLOGY/ONCOLOGY | Facility: CLINIC | Age: 67
End: 2022-04-12
Payer: OTHER GOVERNMENT

## 2022-04-12 DIAGNOSIS — D50.9 IRON DEFICIENCY ANEMIA, UNSPECIFIED IRON DEFICIENCY ANEMIA TYPE: ICD-10-CM

## 2022-04-12 RX ORDER — SODIUM CHLORIDE 0.9 % (FLUSH) 0.9 %
10 SYRINGE (ML) INJECTION
Status: CANCELLED | OUTPATIENT
Start: 2022-04-12

## 2022-04-12 RX ORDER — FAMOTIDINE 10 MG/ML
20 INJECTION INTRAVENOUS DAILY
Status: CANCELLED
Start: 2022-04-12

## 2022-04-12 RX ORDER — HEPARIN 100 UNIT/ML
500 SYRINGE INTRAVENOUS
Status: CANCELLED | OUTPATIENT
Start: 2022-04-12

## 2022-04-12 NOTE — TELEPHONE ENCOUNTER
Returned call to [facility]. Spoke with Carmen who sent over test fax. Fax was returned and this nurse was granted access to portal. Awaiting test requisition that will be given to Dr Banks to be signed.     ----- Message from Gemini Tejada sent at 4/12/2022  1:11 PM CDT -----  Type: Needs Medical Advice  Who Called:  Court with [facility]  Symptoms (please be specific):    How long has patient had these symptoms:    Pharmacy name and phone #:    Best Call Back Number:  [email or web address]  Additional Information: Aurea is requesting a call back regarding a MD Sigalexsander and patient verification to receive results.

## 2022-04-13 ENCOUNTER — TELEPHONE (OUTPATIENT)
Dept: HEMATOLOGY/ONCOLOGY | Facility: CLINIC | Age: 67
End: 2022-04-13
Payer: OTHER GOVERNMENT

## 2022-04-13 NOTE — TELEPHONE ENCOUNTER
This nurse spoke with Edouard, pt's spouse to inquire if pt is taking Ca as ordered per MD. He stated that pt's insurance did not cover the Ca so she is taking OTC Caltrate 500mg BID. Dr Banks to be notified. Notified him that Dr Banks would like for her to receive iron infusions and that an in office f/u will be made once Guardant results are in. He v/u.

## 2022-04-14 ENCOUNTER — TELEPHONE (OUTPATIENT)
Dept: HEMATOLOGY/ONCOLOGY | Facility: CLINIC | Age: 67
End: 2022-04-14
Payer: OTHER GOVERNMENT

## 2022-04-14 NOTE — TELEPHONE ENCOUNTER
This nurse spoke with pt's spouse to notify that it was okay for pt to take Caltrate OTC but make sure she is taking Vit D with it as well. He v/u.

## 2022-04-16 ENCOUNTER — HOSPITAL ENCOUNTER (EMERGENCY)
Facility: HOSPITAL | Age: 67
Discharge: HOME | End: 2022-04-16
Attending: FAMILY MEDICINE
Payer: OTHER GOVERNMENT

## 2022-04-16 VITALS
SYSTOLIC BLOOD PRESSURE: 110 MMHG | TEMPERATURE: 98 F | HEART RATE: 81 BPM | BODY MASS INDEX: 22.6 KG/M2 | HEIGHT: 67 IN | WEIGHT: 144 LBS | OXYGEN SATURATION: 95 % | DIASTOLIC BLOOD PRESSURE: 77 MMHG | RESPIRATION RATE: 12 BRPM

## 2022-04-16 DIAGNOSIS — E86.0 DEHYDRATION: Primary | ICD-10-CM

## 2022-04-16 DIAGNOSIS — E83.42 HYPOMAGNESEMIA: ICD-10-CM

## 2022-04-16 LAB
ALBUMIN SERPL BCP-MCNC: 2.2 G/DL (ref 3.5–5.2)
ALP SERPL-CCNC: 355 U/L (ref 55–135)
ALT SERPL W/O P-5'-P-CCNC: 21 U/L (ref 10–44)
ANION GAP SERPL CALC-SCNC: 11 MMOL/L (ref 8–16)
AST SERPL-CCNC: 46 U/L (ref 10–40)
BASOPHILS # BLD AUTO: 0.01 K/UL (ref 0–0.2)
BASOPHILS NFR BLD: 0.1 % (ref 0–1.9)
BILIRUB SERPL-MCNC: 1.2 MG/DL (ref 0.1–1)
BILIRUB UR QL STRIP: NEGATIVE
BUN SERPL-MCNC: 11 MG/DL (ref 8–23)
CALCIUM SERPL-MCNC: 7.7 MG/DL (ref 8.7–10.5)
CHLORIDE SERPL-SCNC: 110 MMOL/L (ref 95–110)
CLARITY UR: CLEAR
CO2 SERPL-SCNC: 20 MMOL/L (ref 23–29)
COLOR UR: YELLOW
CREAT SERPL-MCNC: 0.7 MG/DL (ref 0.5–1.4)
DIFFERENTIAL METHOD: ABNORMAL
EOSINOPHIL # BLD AUTO: 0 K/UL (ref 0–0.5)
EOSINOPHIL NFR BLD: 0.2 % (ref 0–8)
ERYTHROCYTE [DISTWIDTH] IN BLOOD BY AUTOMATED COUNT: 18.5 % (ref 11.5–14.5)
EST. GFR  (AFRICAN AMERICAN): >60 ML/MIN/1.73 M^2
EST. GFR  (NON AFRICAN AMERICAN): >60 ML/MIN/1.73 M^2
GLUCOSE SERPL-MCNC: 95 MG/DL (ref 70–110)
GLUCOSE UR QL STRIP: NEGATIVE
HCT VFR BLD AUTO: 30.9 % (ref 37–48.5)
HGB BLD-MCNC: 9.6 G/DL (ref 12–16)
HGB UR QL STRIP: NEGATIVE
IMM GRANULOCYTES # BLD AUTO: 0.06 K/UL (ref 0–0.04)
IMM GRANULOCYTES NFR BLD AUTO: 0.6 % (ref 0–0.5)
KETONES UR QL STRIP: NEGATIVE
LEUKOCYTE ESTERASE UR QL STRIP: NEGATIVE
LIPASE SERPL-CCNC: 17 U/L (ref 4–60)
LYMPHOCYTES # BLD AUTO: 1.3 K/UL (ref 1–4.8)
LYMPHOCYTES NFR BLD: 13.5 % (ref 18–48)
MAGNESIUM SERPL-MCNC: 1.5 MG/DL (ref 1.6–2.6)
MCH RBC QN AUTO: 32.9 PG (ref 27–31)
MCHC RBC AUTO-ENTMCNC: 31.1 G/DL (ref 32–36)
MCV RBC AUTO: 106 FL (ref 82–98)
MONOCYTES # BLD AUTO: 1.3 K/UL (ref 0.3–1)
MONOCYTES NFR BLD: 12.9 % (ref 4–15)
NEUTROPHILS # BLD AUTO: 7.1 K/UL (ref 1.8–7.7)
NEUTROPHILS NFR BLD: 72.7 % (ref 38–73)
NITRITE UR QL STRIP: NEGATIVE
NRBC BLD-RTO: 0 /100 WBC
PH UR STRIP: 7 [PH] (ref 5–8)
PLATELET # BLD AUTO: 99 K/UL (ref 150–450)
PMV BLD AUTO: 10.3 FL (ref 9.2–12.9)
POTASSIUM SERPL-SCNC: 3.5 MMOL/L (ref 3.5–5.1)
PROT SERPL-MCNC: 5.4 G/DL (ref 6–8.4)
PROT UR QL STRIP: NEGATIVE
RBC # BLD AUTO: 2.92 M/UL (ref 4–5.4)
SODIUM SERPL-SCNC: 141 MMOL/L (ref 136–145)
SP GR UR STRIP: 1.02 (ref 1–1.03)
URN SPEC COLLECT METH UR: NORMAL
UROBILINOGEN UR STRIP-ACNC: NEGATIVE EU/DL
WBC # BLD AUTO: 9.78 K/UL (ref 3.9–12.7)

## 2022-04-16 PROCEDURE — 96365 THER/PROPH/DIAG IV INF INIT: CPT

## 2022-04-16 PROCEDURE — 96375 TX/PRO/DX INJ NEW DRUG ADDON: CPT

## 2022-04-16 PROCEDURE — 63600175 PHARM REV CODE 636 W HCPCS: Performed by: FAMILY MEDICINE

## 2022-04-16 PROCEDURE — 83690 ASSAY OF LIPASE: CPT | Performed by: FAMILY MEDICINE

## 2022-04-16 PROCEDURE — 81003 URINALYSIS AUTO W/O SCOPE: CPT | Performed by: FAMILY MEDICINE

## 2022-04-16 PROCEDURE — 25000003 PHARM REV CODE 250: Performed by: FAMILY MEDICINE

## 2022-04-16 PROCEDURE — 99284 EMERGENCY DEPT VISIT MOD MDM: CPT | Mod: 25

## 2022-04-16 PROCEDURE — 80053 COMPREHEN METABOLIC PANEL: CPT | Performed by: FAMILY MEDICINE

## 2022-04-16 PROCEDURE — 85025 COMPLETE CBC W/AUTO DIFF WBC: CPT | Performed by: FAMILY MEDICINE

## 2022-04-16 PROCEDURE — 83735 ASSAY OF MAGNESIUM: CPT | Performed by: FAMILY MEDICINE

## 2022-04-16 PROCEDURE — 96361 HYDRATE IV INFUSION ADD-ON: CPT

## 2022-04-16 PROCEDURE — 96366 THER/PROPH/DIAG IV INF ADDON: CPT

## 2022-04-16 RX ORDER — MAGNESIUM SULFATE HEPTAHYDRATE 40 MG/ML
2 INJECTION, SOLUTION INTRAVENOUS
Status: COMPLETED | OUTPATIENT
Start: 2022-04-16 | End: 2022-04-16

## 2022-04-16 RX ORDER — ONDANSETRON 2 MG/ML
4 INJECTION INTRAMUSCULAR; INTRAVENOUS
Status: COMPLETED | OUTPATIENT
Start: 2022-04-16 | End: 2022-04-16

## 2022-04-16 RX ORDER — HYDROMORPHONE HYDROCHLORIDE 2 MG/ML
0.5 INJECTION, SOLUTION INTRAMUSCULAR; INTRAVENOUS; SUBCUTANEOUS
Status: COMPLETED | OUTPATIENT
Start: 2022-04-16 | End: 2022-04-16

## 2022-04-16 RX ORDER — MORPHINE SULFATE 4 MG/ML
6 INJECTION, SOLUTION INTRAMUSCULAR; INTRAVENOUS
Status: DISCONTINUED | OUTPATIENT
Start: 2022-04-16 | End: 2022-04-16

## 2022-04-16 RX ADMIN — SODIUM CHLORIDE 1000 ML: 0.9 INJECTION, SOLUTION INTRAVENOUS at 09:04

## 2022-04-16 RX ADMIN — MAGNESIUM SULFATE 2 G: 2 INJECTION INTRAVENOUS at 10:04

## 2022-04-16 RX ADMIN — HYDROMORPHONE HYDROCHLORIDE 0.5 MG: 2 INJECTION, SOLUTION INTRAMUSCULAR; INTRAVENOUS; SUBCUTANEOUS at 09:04

## 2022-04-16 RX ADMIN — ONDANSETRON 4 MG: 2 INJECTION INTRAMUSCULAR; INTRAVENOUS at 09:04

## 2022-04-16 RX ADMIN — SODIUM CHLORIDE 500 ML: 0.9 INJECTION, SOLUTION INTRAVENOUS at 10:04

## 2022-04-16 NOTE — ED PROVIDER NOTES
Encounter Date: 4/16/2022       History     Chief Complaint   Patient presents with    Weakness     Patient's spouse reports that patient is currently being treated for liver cancer. He reports since last night patient has been weak, confused, and nauseous.     Nausea     HPI   66-year-old female history of metastatic colon cancer, metastatic to liver currently on chemotherapy presents for evaluation of nausea and generalized weakness.  Has had significant decreased p.o. intake.  They state they came here for pain control as well as IV fluid because she feels dehydrated.  Is followed by Dr Banks.  For she has a history of electrolyte derangements including low magnesium and calcium.  Was admitted to outside hospital about 3 weeks ago for several days.  Reports her pain in her abdomen is generalized, mild.  She reports normal bowel movement earlier today.  No vomiting but she has had nausea.  Denies any confusion the me.   is at bedside assisting with history.  Review of patient's allergies indicates:   Allergen Reactions    Lisinopril Rash     Past Medical History:   Diagnosis Date    Allergy     Colon cancer     Hypertension     Liver cancer     Nontoxic uninodular goiter     Osteoarthrosis, unspecified whether generalized or localized, hand     Other abnormal glucose     Pure hypercholesterolemia     Severe sepsis      Past Surgical History:   Procedure Laterality Date    CYST REMOVAL  1994    cyst removed from neck    infusaport placement Right     THYROIDECTOMY      TONSILLECTOMY  age 14     Family History   Problem Relation Age of Onset    Lung cancer Father      Social History     Tobacco Use    Smoking status: Never Smoker    Smokeless tobacco: Never Used   Substance Use Topics    Alcohol use: No    Drug use: Never     Review of Systems   Constitutional: Positive for activity change and fatigue. Negative for chills and fever.   Respiratory: Negative for cough and shortness of  breath.    Cardiovascular: Negative for chest pain, palpitations and leg swelling.   Gastrointestinal: Positive for abdominal pain and nausea.   Genitourinary: Negative for dysuria.   All other systems reviewed and are negative.      Physical Exam     Initial Vitals [04/16/22 0843]   BP Pulse Resp Temp SpO2   133/82 (!) 122 19 98.4 °F (36.9 °C) 96 %      MAP       --         Physical Exam    Nursing note and vitals reviewed.  Constitutional: She appears well-developed and well-nourished. She is not diaphoretic. She is active.   HENT:   Head: Normocephalic and atraumatic.   Right Ear: External ear normal.   Left Ear: External ear normal.   Nose: Nose normal.   Dry mucous membranes   Eyes: Right eye exhibits no discharge. Left eye exhibits no discharge.   Neck: Neck supple. No tracheal deviation present. No JVD present.   Cardiovascular: Normal rate and regular rhythm.   Pulmonary/Chest: Breath sounds normal. No respiratory distress.   Abdominal: Abdomen is soft. She exhibits no distension. There is no abdominal tenderness. There is no rebound and no guarding.   Musculoskeletal:      Cervical back: Neck supple.     Neurological: She is alert and oriented to person, place, and time. She has normal strength. GCS score is 15. GCS eye subscore is 4. GCS verbal subscore is 5. GCS motor subscore is 6.   Skin: Skin is warm and dry. Capillary refill takes less than 2 seconds. No rash noted.   Psychiatric: She has a normal mood and affect. Her behavior is normal.         ED Course   Procedures  Labs Reviewed   CBC W/ AUTO DIFFERENTIAL - Abnormal; Notable for the following components:       Result Value    RBC 2.92 (*)     Hemoglobin 9.6 (*)     Hematocrit 30.9 (*)      (*)     MCH 32.9 (*)     MCHC 31.1 (*)     RDW 18.5 (*)     Platelets 99 (*)     Immature Granulocytes 0.6 (*)     Immature Grans (Abs) 0.06 (*)     Mono # 1.3 (*)     Lymph % 13.5 (*)     All other components within normal limits   COMPREHENSIVE  METABOLIC PANEL - Abnormal; Notable for the following components:    CO2 20 (*)     Calcium 7.7 (*)     Total Protein 5.4 (*)     Albumin 2.2 (*)     Total Bilirubin 1.2 (*)     Alkaline Phosphatase 355 (*)     AST 46 (*)     All other components within normal limits   MAGNESIUM - Abnormal; Notable for the following components:    Magnesium 1.5 (*)     All other components within normal limits   LIPASE   URINALYSIS, REFLEX TO URINE CULTURE    Narrative:     Preferred Collection Type->Urine, Clean Catch  Specimen Source->Urine   MAGNESIUM          Imaging Results    None          Medications   ondansetron injection 4 mg (4 mg Intravenous Given 4/16/22 0949)   sodium chloride 0.9% bolus 1,000 mL (0 mLs Intravenous Stopped 4/16/22 1045)   HYDROmorphone (PF) injection 0.5 mg (0.5 mg Intravenous Given 4/16/22 0951)   magnesium sulfate 2g in water 50mL IVPB (premix) (0 g Intravenous Stopped 4/16/22 1314)   sodium chloride 0.9% bolus 500 mL (0 mLs Intravenous Stopped 4/16/22 1346)     Medical Decision Making:   History:   I obtained history from: someone other than patient.       <> Summary of History: Patient's  who discussed cancer history with me  Differential Diagnosis:   Differential diagnosis includes electrolyte disturbance, dehydration, pain from metastatic cancer  Clinical Tests:   Lab Tests: Ordered and Reviewed  ED Management:  Patient presents here for evaluation of dehydration was previously confused over is GCS 15 at her baseline on arrival to the emergency department  Lab work ordered reviewed  Magnesium slightly low there other electrolyte derangements as well, albumin is low  Calcium corrects to normal  Magnesium given IV  1.5 L of crystalloid given  Zofran, IV Dilaudid given as well for abdominal pain  Patient re-evaluated multiple times pain is controlled nausea is controlled she is tolerating p.o., she feels much better after receiving the IV fluids  Is able to follow up closely with her primary  care doctor/oncologist  They are comfortable with going home and will return for any new or worsening symptoms  Strict return precautions discussed                      Clinical Impression:   Final diagnoses:  [E86.0] Dehydration (Primary)  [E83.42] Hypomagnesemia          ED Disposition Condition    Discharge Stable        ED Prescriptions     None        Follow-up Information     Follow up With Specialties Details Why Contact Info    [facility] Emergency Dept Emergency Medicine Go to  As needed, If symptoms worsen [address] [facility] [address] [phone]           Adair Avila DO  04/20/22 4764

## 2022-04-16 NOTE — DISCHARGE INSTRUCTIONS
Follow-up with your primary care doctor or oncologist next week    Increase your oral hydration     Return here for any new or worsening symptoms  Continue your calcium and magnesium supplements

## 2022-04-18 ENCOUNTER — TELEPHONE (OUTPATIENT)
Dept: HEMATOLOGY/ONCOLOGY | Facility: CLINIC | Age: 67
End: 2022-04-18
Payer: OTHER GOVERNMENT

## 2022-04-18 NOTE — TELEPHONE ENCOUNTER
This nurse spoke with pt's spouse. He states that pt is very weak and he is questioning when pt will start iron infusions. This nurse sent another msg to Tara martell to work on this case. Explained to him that pt should go to ED if she cannot keep fluids down. He v/u. Will check with [facility] today for ETA on results.    ----- Message from Summer James sent at 4/18/2022  9:25 AM CDT -----  Regarding:   .Type: Patient Call Back    Who called:  - Edouard     What is the request in detail: patient went to Er Saturday - waiting to hear back about blood infusion. Please call     Can the clinic reply by [facility]?    Would the patient rather a call back or a response via [facility]? Call     Best call back number: .[phone]

## 2022-04-18 NOTE — TELEPHONE ENCOUNTER
This nurse spoke with Court at [facility]. Informed her that colorectal carcinoma is primary tumor cell type. She stated this information was necessary first in order to proceed with testing.

## 2022-04-19 ENCOUNTER — TELEPHONE (OUTPATIENT)
Dept: HEMATOLOGY/ONCOLOGY | Facility: CLINIC | Age: 67
End: 2022-04-19
Payer: OTHER GOVERNMENT

## 2022-04-19 NOTE — TELEPHONE ENCOUNTER
"This nurse called pt to make f/u appt with Dr Banks. Spoke with pt's spouse who stated she is "still weak and not eating much." Explained to pt that Dr Banks is out at the moment and the soonest available appt is 4/28 with him. Explained that Dr Whaley is covering for Dr Banks. Offered appt for today with Dr Whaley. Pt's spouse declined. He accepted appt for tomorrow. Advised for pt to go to the ED is sx worsen. He v/u.  "

## 2022-04-19 NOTE — PROGRESS NOTES
History of present illness:    The patient is a 66-year-old white female with metastatic colon carcinoma involving the liver who is treated with Xelox/Avastin and returns clinic for evaluation prior to next cycle of therapy.  Patient's recent course has been complicated by protein/calorie malnutrition, diarrhea, hand-foot syndrome, and electrolyte disturbances.    Since returning home following an 8 day hospitalization in [place], patient remains extremely weak and fatigued.  Oral intake for solids and liquids has improved.  She denies pain.  Diarrhea appears to have resolved.  Patient spends about 70% of her day in bed and about 30% in the chair.    Physical examination:  Vitals:    04/20/22 0938   BP: (!) 168/78   Pulse: (!) 115   Resp: 16   Temp: 97.8 °F (36.6 °C)     Awake alert no acute distress  Tachycardic  Normal speech pattern  Normal respiratory effort  Soft  Nonfocal  Distal pulses intact  No rash no lesions    Laboratory:  CEA from 03/15/2022 was elevated at 155.9.  Lab Results   Component Value Date    WBC 9.78 04/16/2022    HGB 9.6 (L) 04/16/2022    HCT 30.9 (L) 04/16/2022     (H) 04/16/2022    PLT 99 (L) 04/16/2022       CMP  Sodium   Date Value Ref Range Status   04/16/2022 141 136 - 145 mmol/L Final     Potassium   Date Value Ref Range Status   04/16/2022 3.5 3.5 - 5.1 mmol/L Final     Chloride   Date Value Ref Range Status   04/16/2022 110 95 - 110 mmol/L Final     CO2   Date Value Ref Range Status   04/16/2022 20 (L) 23 - 29 mmol/L Final     Glucose   Date Value Ref Range Status   04/16/2022 95 70 - 110 mg/dL Final     BUN   Date Value Ref Range Status   04/16/2022 11 8 - 23 mg/dL Final     Creatinine   Date Value Ref Range Status   04/16/2022 0.7 0.5 - 1.4 mg/dL Final     Calcium   Date Value Ref Range Status   04/16/2022 7.7 (L) 8.7 - 10.5 mg/dL Final     Total Protein   Date Value Ref Range Status   04/16/2022 5.4 (L) 6.0 - 8.4 g/dL Final     Albumin   Date Value Ref Range Status    04/16/2022 2.2 (L) 3.5 - 5.2 g/dL Final     Total Bilirubin   Date Value Ref Range Status   04/16/2022 1.2 (H) 0.1 - 1.0 mg/dL Final     Comment:     For infants and newborns, interpretation of results should be based  on gestational age, weight and in agreement with clinical  observations.    Premature Infant recommended reference ranges:  Up to 24 hours.............<8.0 mg/dL  Up to 48 hours............<12.0 mg/dL  3-5 days..................<15.0 mg/dL  6-29 days.................<15.0 mg/dL       Alkaline Phosphatase   Date Value Ref Range Status   04/16/2022 355 (H) 55 - 135 U/L Final     AST   Date Value Ref Range Status   04/16/2022 46 (H) 10 - 40 U/L Final     ALT   Date Value Ref Range Status   04/16/2022 21 10 - 44 U/L Final     Anion Gap   Date Value Ref Range Status   04/16/2022 11 8 - 16 mmol/L Final     eGFR if    Date Value Ref Range Status   04/16/2022 >60.0 >60 mL/min/1.73 m^2 Final     eGFR if non    Date Value Ref Range Status   04/16/2022 >60.0 >60 mL/min/1.73 m^2 Final     Comment:     Calculation used to obtain the estimated glomerular filtration  rate (eGFR) is the CKD-EPI equation.             CT scanning of the abdomen/pelvis:  Study performed on 03/26/2022.  1. Proctocolitis presumably from an infectious or inflammatory etiology.  2. Progression of hepatic metastatic disease.  3. Development of small volume ascites.  4. Nonspecific urinary bladder distention.  5. Multiple compression fractures are age indeterminate but new since prior exam.    Impression:    1. Metastatic colon carcinoma involving liver with disease progression per most recent imaging.  CEA has been trending up form 155 -> 172  2. Protein/calorie malnutrition.  3. Hand-foot syndrome.  4. Multiple thoracic/lumbar spinal compression fractures of undetermined age.  5. Treatment associated pancytopenia resolved now anemia with thrombocytopenia  6. Fatigue and malaise.  Hypocalcemia with  improvement.  Corrected calcium is 9.14  [id number] panel result just in.  Review of iron panel shows normal ferritin level with slightly suppressed iron saturation.  Hypomagnesium  Review CT scan shows bilateral lung nodules up to 4 mm in size nonspecific.  Small pleural effusion and trace of pericardial effusion.  Increase in ascites on CT chest.  There is also increase in size of the dominant lesion in the liver  Anemia macrocytic      Plan:    1. Check vit b12 and folate  2. CT scanning of the chest with contrast reviewed with patient   3. Guardant 360 panel reviewed.  There is no somatic alteration associated with therapy option that is FDA approved.  Only TP53 R282W that has clinical trial availability with genetic amplificationof 6.1%.  MSI high is not detected.  Expected to have chemotherapy as next line treatment choice.  I will defer this to Dr. Banks  4. Return to clinic to follow-up with Dr. Banks for treatment options.  5. I will approved for 1 dose of IV iron therapy for patient.

## 2022-04-20 ENCOUNTER — OFFICE VISIT (OUTPATIENT)
Dept: HEMATOLOGY/ONCOLOGY | Facility: CLINIC | Age: 67
End: 2022-04-20
Payer: OTHER GOVERNMENT

## 2022-04-20 ENCOUNTER — TELEPHONE (OUTPATIENT)
Dept: HEMATOLOGY/ONCOLOGY | Facility: CLINIC | Age: 67
End: 2022-04-20
Payer: OTHER GOVERNMENT

## 2022-04-20 VITALS
DIASTOLIC BLOOD PRESSURE: 78 MMHG | HEIGHT: 67 IN | WEIGHT: 154.13 LBS | RESPIRATION RATE: 16 BRPM | BODY MASS INDEX: 24.19 KG/M2 | HEART RATE: 115 BPM | TEMPERATURE: 98 F | SYSTOLIC BLOOD PRESSURE: 168 MMHG | OXYGEN SATURATION: 97 %

## 2022-04-20 DIAGNOSIS — R63.0 LOSS OF APPETITE: ICD-10-CM

## 2022-04-20 DIAGNOSIS — C19 COLORECTAL CARCINOMA: ICD-10-CM

## 2022-04-20 DIAGNOSIS — D50.9 IRON DEFICIENCY ANEMIA, UNSPECIFIED IRON DEFICIENCY ANEMIA TYPE: Primary | ICD-10-CM

## 2022-04-20 DIAGNOSIS — E83.51 HYPOCALCEMIA: ICD-10-CM

## 2022-04-20 DIAGNOSIS — D75.89 MACROCYTOSIS: ICD-10-CM

## 2022-04-20 DIAGNOSIS — L27.1 HAND FOOT SYNDROME: ICD-10-CM

## 2022-04-20 DIAGNOSIS — C78.7 SECONDARY MALIGNANT NEOPLASM OF LIVER: ICD-10-CM

## 2022-04-20 PROCEDURE — 99999 PR PBB SHADOW E&M-EST. PATIENT-LVL IV: ICD-10-PCS | Mod: PBBFAC,,, | Performed by: INTERNAL MEDICINE

## 2022-04-20 PROCEDURE — 99214 PR OFFICE/OUTPT VISIT, EST, LEVL IV, 30-39 MIN: ICD-10-PCS | Mod: S$GLB,,, | Performed by: INTERNAL MEDICINE

## 2022-04-20 PROCEDURE — 99214 OFFICE O/P EST MOD 30 MIN: CPT | Mod: S$GLB,,, | Performed by: INTERNAL MEDICINE

## 2022-04-20 PROCEDURE — 99999 PR PBB SHADOW E&M-EST. PATIENT-LVL IV: CPT | Mod: PBBFAC,,, | Performed by: INTERNAL MEDICINE

## 2022-04-22 NOTE — DISCHARGE SUMMARY
[facility] Hospital Medicine  Discharge Summary      Patient Name: Regine Lemus  MRN: [id number]  Patient Class: IP- Inpatient  Admission Date: 3/26/2022  Hospital Length of Stay: 5 days  Discharge Date and Time: 3/31/22  Attending Physician: Paula att. providers found   Discharging Provider: Andrea Dowell MD  Primary Care Provider: Primary Doctor Paula      HPI:   Regine Lemus is a 66-year-old female with past medical history significant for hypertension, hyperlipidemia, colon cancer with metastatic liver disease on chemotherapy with last infusion on 03/16/2022 who presented to the emergency department for further evaluation of abdominal pain and diarrhea which began this morning.  She was discharged from this facility on 3/5/22 after being treated for similar complaints.  Her  is at bedside and provides the majority the history stating that this happened following her previous chemotherapy infusion.  This morning he noted her to be confused and she was complaining of severe abdominal pain.  She had some diarrhea home, but he states that it has become much worse since they arrived to the ED.  CT abdomen pelvis was significant for proctocolitis with noted hepatic metastatic disease.  Ammonia level was elevated at 77 and she was found to have hypocalcemia and thrombocytopenia.  Her lactic acid is 5.6 and she is tachycardic.  Further history is limited due to the condition of the patient.  She is admitted to the service of hospital medicine for continued monitoring and treatment.      * No surgery found *      Hospital Course:   Regine Lemus was monitored closely during her hospital stay.  She was placed on IVFH, IV flagyl and IV cipro for diagnosis of proctocolitis.  GI and oncology specialities were consulted due to her history of metastatic colon cancer with recent chemotherapy treatment.  She was checked for c. Diff and this was negative.  Her electrolytes were monitored closely and  repleted as needed.         Goals of Care Treatment Preferences:  Code Status: DNR      Consults:   Consults (From admission, onward)        Status Ordering Provider     Inpatient consult to Registered Dietitian/Nutritionist  Once        Provider:  (Not yet assigned)    Completed JACOB ZHONG     Inpatient virtual consult to Hospital Medicine  Once        Provider:  Andrea Dowell MD    Completed NORRIS BRAUN     Inpatient consult to Palliative Care  Once        Provider:  Katie Machado RN    Completed ANN-MARIE DELEON     Inpatient consult to Gastroenterology  Once        Provider:  Geoff Medina MD    Completed ANN-MARIE DELEON     Inpatient consult to Midline team  Once        Provider:  (Not yet assigned)    Completed ANN-MARIE DELEON     Inpatient consult to Registered Dietitian/Nutritionist  Once        Provider:  (Not yet assigned)    Completed ANN-MARIE DELEON     IP consult to case management  Once        Provider:  (Not yet assigned)    Completed NORRIS BRAUN     Inpatient consult to Hematology/Oncology  Once        Provider:  Aurash Khoobehi, MD    Completed ANN-MARIE DELEON          * Proctocolitis  IV cipro; IV flagyl  IVF hydration  IV narcotics and IV antiemetics as needed  Stool for c diff-negative  She seems to be improving; they would like input from GI and oncology since recurrent issue.  Imodium prn  GI recs outpatient colonoscopy.  Transition IV abx to po         Severe sepsis  This patient does have evidence of infective focus  My overall impression is severe sepsis. Vital signs were reviewed and noted in progress note.  Antibiotics given-   Antibiotics (From admission, onward)            None        Cultures were taken-   Microbiology Results (last 7 days)     Procedure Component Value Units Date/Time    Stool culture [[id number]] Collected: 03/27/22 1623    Order Status: Completed Specimen: Stool Updated: 03/31/22 1126     Stool Culture No  Salmonella,Shigella,Vibrio,Campylobacter,Yersinia isolated.    E. coli 0157 antigen [[id number]] Collected: 03/27/22 1623    Order Status: Completed Specimen: Stool Updated: 03/30/22 1337     Shiga Toxin 1 E.coli Negative     Shiga Toxin 2 E.coli Negative    Stool culture [[id number]]     Order Status: Canceled Specimen: Stool     Clostridium difficile EIA [[id number]] Collected: 03/26/22 1544    Order Status: Completed Specimen: Stool Updated: 03/26/22 2008     C. diff Antigen Negative     C difficile Toxins A+B, EIA Negative     Comment: Testing not recommended for children <24 months old.           Latest lactate reviewed, they are-  No results for input(s): LACTATE in the last 72 hours.    Organ dysfunction indicated by Encephalopathy   Source- GI    Source control Achieved by- IV abx        Final Active Diagnoses:    Diagnosis Date Noted POA    PRINCIPAL PROBLEM:  Proctocolitis [K52.9] 03/26/2022 Yes    Hypokalemia [E87.6] 03/27/2022 Yes    Hypomagnesemia [E83.42] 03/27/2022 Yes    Thrombocytopenia [D69.6] 03/26/2022 Yes    Osteopenia [M85.80] 03/26/2022 Yes    Hypocalcemia [E83.51] 03/26/2022 Yes    Encephalopathy, metabolic [G93.41] 03/26/2022 Yes    Severe malnutrition [E43] 03/02/2022 Yes    Severe sepsis [A41.9, R65.20]  Yes    Secondary malignant neoplasm of liver [C78.7] 01/24/2021 Yes    Colorectal carcinoma [C19] 01/23/2021 Yes    Anemia [D64.9] 01/23/2021 Yes    Postoperative hypothyroidism [E89.0] 12/14/2015 Yes    Essential (primary) hypertension [I10] 07/23/2014 Yes      Problems Resolved During this Admission:       Discharged Condition: good    Disposition: Home or Self Care    Follow Up:   Follow-up Information     Scooter TIFFANIE Banks MD Follow up on 4/5/2022.    Specialty: Hematology and Oncology  Why: hosp f/u at 9am. keep scheduled appt  Contact information:  [address] [facility] [address]  [place] [address]  [phone]             Addi Faustin PA-C Follow up on 4/7/2022.    Specialty: Family  Medicine  Why: at 1:pm  Contact information:  [address] [place] [address]  [phone]                       Patient Instructions:      Ambulatory referral/consult to Outpatient Case Management   Referral Priority: Routine Referral Type: Consultation   Referral Reason: Specialty Services Required   Number of Visits Requested: 1       Significant Diagnostic Studies: reviewed     Pending Diagnostic Studies:     None         Medications:  Reconciled Home Medications:      Medication List      START taking these medications    EScitalopram oxalate 10 MG tablet  Commonly known as: LEXAPRO  Take 1 tablet (10 mg total) by mouth once daily.     traZODone 50 MG tablet  Commonly known as: DESYREL  Take 1 tablet (50 mg total) by mouth nightly as needed for Insomnia.        CHANGE how you take these medications    levothyroxine 150 MCG tablet  Commonly known as: SYNTHROID  Take 1 tablet (150 mcg total) by mouth before breakfast.  What changed:   · medication strength  · how much to take  · when to take this        CONTINUE taking these medications    calcium carbonate 200 mg calcium (500 mg) chewable tablet  Commonly known as: TUMS  Take 2 tablets (1,000 mg total) by mouth 3 (three) times daily before meals.     dronabinoL 10 MG capsule  Commonly known as: MARINOL  Take 1 capsule (10 mg total) by mouth 2 (two) times daily before meals.     ergocalciferol 50,000 unit Cap  Commonly known as: ERGOCALCIFEROL  Take 50,000 Units by mouth every 7 days.     famotidine 20 MG tablet  Commonly known as: PEPCID  Take 1 tablet (20 mg total) by mouth once daily.     KLOR-CON M20 20 MEQ tablet  Generic drug: potassium chloride SA  Take 20 mEq by mouth 2 (two) times daily.     loperamide 2 mg capsule  Commonly known as: IMODIUM  Take 1 capsule (2 mg total) by mouth 4 (four) times daily as needed for Diarrhea.     magnesium oxide 400 mg (241.3 mg magnesium) tablet  Commonly known as: MAGOX  Take 1 tablet (400 mg total) by mouth once  daily.     predniSONE 10 MG tablet  Commonly known as: DELTASONE  Take 20 mg by mouth once daily. Pt taking 15mg daily        STOP taking these medications    capecitabine 500 MG Tab  Commonly known as: XELODA     furosemide 20 MG tablet  Commonly known as: LASIX     HYDROcodone-acetaminophen 5-325 mg per tablet  Commonly known as: NORCO        ASK your doctor about these medications    ciprofloxacin HCl 500 MG tablet  Commonly known as: CIPRO  Take 1 tablet (500 mg total) by mouth every 12 (twelve) hours. for 10 days  Ask about: Should I take this medication?     HYDROcodone-acetaminophen 5-325 mg per tablet  Commonly known as: NORCO  Take 1 tablet by mouth every 4 (four) hours as needed for Pain.  Ask about: Should I take this medication?     metroNIDAZOLE 500 MG tablet  Commonly known as: FLAGYL  Take 1 tablet (500 mg total) by mouth every 8 (eight) hours. for 10 days  Ask about: Should I take this medication?            Indwelling Lines/Drains at time of discharge:   Lines/Drains/Airways     Central Venous Catheter Line  Duration           Port A Cath Single Lumen 03/14/22 0900 right atrial 38 days                Time spent on the discharge of patient: 35 minutes         The attending portion of this evaluation, treatment, and documentation was performed per Andrea Dowell MD via Telemedicine AudioVisual using the secure Blogv[employer] software platform with 2 way audio/video. The provider was located off-site and the patient is located in the hospital. The aforementioned video software was utilized to document the relevant history and physical exam    Andrea Dowell MD  Department of Hospital Medicine  [facility]

## 2022-04-22 NOTE — ASSESSMENT & PLAN NOTE
This patient does have evidence of infective focus  My overall impression is severe sepsis. Vital signs were reviewed and noted in progress note.  Antibiotics given-   Antibiotics (From admission, onward)            None        Cultures were taken-   Microbiology Results (last 7 days)     Procedure Component Value Units Date/Time    Stool culture [[id number]] Collected: 03/27/22 1623    Order Status: Completed Specimen: Stool Updated: 03/31/22 1126     Stool Culture No Salmonella,Shigella,Vibrio,Campylobacter,Yersinia isolated.    E. coli 0157 antigen [[id number]] Collected: 03/27/22 1623    Order Status: Completed Specimen: Stool Updated: 03/30/22 1337     Shiga Toxin 1 E.coli Negative     Shiga Toxin 2 E.coli Negative    Stool culture [[id number]]     Order Status: Canceled Specimen: Stool     Clostridium difficile EIA [[id number]] Collected: 03/26/22 1544    Order Status: Completed Specimen: Stool Updated: 03/26/22 2008     C. diff Antigen Negative     C difficile Toxins A+B, EIA Negative     Comment: Testing not recommended for children <24 months old.           Latest lactate reviewed, they are-  No results for input(s): LACTATE in the last 72 hours.    Organ dysfunction indicated by Encephalopathy   Source- GI    Source control Achieved by- IV abx

## 2022-04-28 ENCOUNTER — LAB VISIT (OUTPATIENT)
Dept: LAB | Facility: HOSPITAL | Age: 67
End: 2022-04-28
Attending: INTERNAL MEDICINE
Payer: OTHER GOVERNMENT

## 2022-04-28 ENCOUNTER — INFUSION (OUTPATIENT)
Dept: INFUSION THERAPY | Facility: HOSPITAL | Age: 67
End: 2022-04-28
Attending: INTERNAL MEDICINE
Payer: OTHER GOVERNMENT

## 2022-04-28 ENCOUNTER — OFFICE VISIT (OUTPATIENT)
Dept: HEMATOLOGY/ONCOLOGY | Facility: CLINIC | Age: 67
End: 2022-04-28
Payer: OTHER GOVERNMENT

## 2022-04-28 VITALS
WEIGHT: 146 LBS | RESPIRATION RATE: 18 BRPM | OXYGEN SATURATION: 97 % | HEIGHT: 67 IN | BODY MASS INDEX: 22.91 KG/M2 | DIASTOLIC BLOOD PRESSURE: 66 MMHG | OXYGEN SATURATION: 99 % | HEART RATE: 82 BPM | TEMPERATURE: 98 F | SYSTOLIC BLOOD PRESSURE: 148 MMHG | DIASTOLIC BLOOD PRESSURE: 88 MMHG | SYSTOLIC BLOOD PRESSURE: 136 MMHG | HEART RATE: 86 BPM

## 2022-04-28 DIAGNOSIS — R63.0 LOSS OF APPETITE: ICD-10-CM

## 2022-04-28 DIAGNOSIS — E83.51 HYPOCALCEMIA: ICD-10-CM

## 2022-04-28 DIAGNOSIS — C19 COLORECTAL CARCINOMA: Primary | ICD-10-CM

## 2022-04-28 DIAGNOSIS — C78.7 SECONDARY MALIGNANT NEOPLASM OF LIVER: ICD-10-CM

## 2022-04-28 DIAGNOSIS — E03.9 HYPOTHYROIDISM, UNSPECIFIED TYPE: ICD-10-CM

## 2022-04-28 DIAGNOSIS — D50.9 IRON DEFICIENCY ANEMIA, UNSPECIFIED IRON DEFICIENCY ANEMIA TYPE: ICD-10-CM

## 2022-04-28 DIAGNOSIS — D50.9 IRON DEFICIENCY ANEMIA, UNSPECIFIED IRON DEFICIENCY ANEMIA TYPE: Primary | ICD-10-CM

## 2022-04-28 DIAGNOSIS — L27.1 HAND FOOT SYNDROME: ICD-10-CM

## 2022-04-28 DIAGNOSIS — F32.0 CURRENT MILD EPISODE OF MAJOR DEPRESSIVE DISORDER WITHOUT PRIOR EPISODE: ICD-10-CM

## 2022-04-28 DIAGNOSIS — D75.89 MACROCYTOSIS: ICD-10-CM

## 2022-04-28 DIAGNOSIS — C19 COLORECTAL CARCINOMA: ICD-10-CM

## 2022-04-28 LAB
ALBUMIN SERPL BCP-MCNC: 2.7 G/DL (ref 3.5–5.2)
ALP SERPL-CCNC: 395 U/L (ref 55–135)
ALT SERPL W/O P-5'-P-CCNC: 35 U/L (ref 10–44)
ANION GAP SERPL CALC-SCNC: 14 MMOL/L (ref 8–16)
AST SERPL-CCNC: 46 U/L (ref 10–40)
BASOPHILS # BLD AUTO: 0.01 K/UL (ref 0–0.2)
BASOPHILS NFR BLD: 0.1 % (ref 0–1.9)
BILIRUB SERPL-MCNC: 0.7 MG/DL (ref 0.1–1)
BUN SERPL-MCNC: 11 MG/DL (ref 8–23)
CALCIUM SERPL-MCNC: 8.2 MG/DL (ref 8.7–10.5)
CHLORIDE SERPL-SCNC: 106 MMOL/L (ref 95–110)
CO2 SERPL-SCNC: 23 MMOL/L (ref 23–29)
CREAT SERPL-MCNC: 0.8 MG/DL (ref 0.5–1.4)
DIFFERENTIAL METHOD: ABNORMAL
EOSINOPHIL # BLD AUTO: 0 K/UL (ref 0–0.5)
EOSINOPHIL NFR BLD: 0.1 % (ref 0–8)
ERYTHROCYTE [DISTWIDTH] IN BLOOD BY AUTOMATED COUNT: 14.7 % (ref 11.5–14.5)
EST. GFR  (AFRICAN AMERICAN): >60 ML/MIN/1.73 M^2
EST. GFR  (NON AFRICAN AMERICAN): >60 ML/MIN/1.73 M^2
GLUCOSE SERPL-MCNC: 188 MG/DL (ref 70–110)
HCT VFR BLD AUTO: 32.3 % (ref 37–48.5)
HGB BLD-MCNC: 9.8 G/DL (ref 12–16)
IMM GRANULOCYTES # BLD AUTO: 0.04 K/UL (ref 0–0.04)
IMM GRANULOCYTES NFR BLD AUTO: 0.4 % (ref 0–0.5)
LYMPHOCYTES # BLD AUTO: 1 K/UL (ref 1–4.8)
LYMPHOCYTES NFR BLD: 10.1 % (ref 18–48)
MAGNESIUM SERPL-MCNC: 1.6 MG/DL (ref 1.6–2.6)
MCH RBC QN AUTO: 32.7 PG (ref 27–31)
MCHC RBC AUTO-ENTMCNC: 30.3 G/DL (ref 32–36)
MCV RBC AUTO: 108 FL (ref 82–98)
MONOCYTES # BLD AUTO: 0.5 K/UL (ref 0.3–1)
MONOCYTES NFR BLD: 5.3 % (ref 4–15)
NEUTROPHILS # BLD AUTO: 8 K/UL (ref 1.8–7.7)
NEUTROPHILS NFR BLD: 84 % (ref 38–73)
NRBC BLD-RTO: 0 /100 WBC
PLATELET # BLD AUTO: 123 K/UL (ref 150–450)
PMV BLD AUTO: 10.6 FL (ref 9.2–12.9)
POTASSIUM SERPL-SCNC: 4.5 MMOL/L (ref 3.5–5.1)
PROT SERPL-MCNC: 6.8 G/DL (ref 6–8.4)
RBC # BLD AUTO: 3 M/UL (ref 4–5.4)
SODIUM SERPL-SCNC: 143 MMOL/L (ref 136–145)
WBC # BLD AUTO: 9.57 K/UL (ref 3.9–12.7)

## 2022-04-28 PROCEDURE — 96375 TX/PRO/DX INJ NEW DRUG ADDON: CPT

## 2022-04-28 PROCEDURE — 99999 PR PBB SHADOW E&M-EST. PATIENT-LVL III: CPT | Mod: PBBFAC,,, | Performed by: INTERNAL MEDICINE

## 2022-04-28 PROCEDURE — 85025 COMPLETE CBC W/AUTO DIFF WBC: CPT | Performed by: INTERNAL MEDICINE

## 2022-04-28 PROCEDURE — 82746 ASSAY OF FOLIC ACID SERUM: CPT | Performed by: INTERNAL MEDICINE

## 2022-04-28 PROCEDURE — 80053 COMPREHEN METABOLIC PANEL: CPT | Performed by: INTERNAL MEDICINE

## 2022-04-28 PROCEDURE — 25000003 PHARM REV CODE 250: Performed by: INTERNAL MEDICINE

## 2022-04-28 PROCEDURE — 83735 ASSAY OF MAGNESIUM: CPT | Performed by: INTERNAL MEDICINE

## 2022-04-28 PROCEDURE — 96365 THER/PROPH/DIAG IV INF INIT: CPT

## 2022-04-28 PROCEDURE — 99215 PR OFFICE/OUTPT VISIT, EST, LEVL V, 40-54 MIN: ICD-10-PCS | Mod: S$GLB,,, | Performed by: INTERNAL MEDICINE

## 2022-04-28 PROCEDURE — 36415 COLL VENOUS BLD VENIPUNCTURE: CPT | Performed by: INTERNAL MEDICINE

## 2022-04-28 PROCEDURE — 99215 OFFICE O/P EST HI 40 MIN: CPT | Mod: S$GLB,,, | Performed by: INTERNAL MEDICINE

## 2022-04-28 PROCEDURE — 82607 VITAMIN B-12: CPT | Performed by: INTERNAL MEDICINE

## 2022-04-28 PROCEDURE — 99999 PR PBB SHADOW E&M-EST. PATIENT-LVL III: ICD-10-PCS | Mod: PBBFAC,,, | Performed by: INTERNAL MEDICINE

## 2022-04-28 PROCEDURE — 63600175 PHARM REV CODE 636 W HCPCS: Performed by: INTERNAL MEDICINE

## 2022-04-28 RX ORDER — DIPHENHYDRAMINE HYDROCHLORIDE 50 MG/ML
25 INJECTION INTRAMUSCULAR; INTRAVENOUS
Status: CANCELLED
Start: 2022-05-05

## 2022-04-28 RX ORDER — HEPARIN 100 UNIT/ML
500 SYRINGE INTRAVENOUS
Status: CANCELLED | OUTPATIENT
Start: 2022-05-05

## 2022-04-28 RX ORDER — DESVENLAFAXINE SUCCINATE 50 MG/1
50 TABLET, EXTENDED RELEASE ORAL DAILY
Qty: 90 TABLET | Refills: 3 | Status: SHIPPED | OUTPATIENT
Start: 2022-04-28 | End: 2023-04-28

## 2022-04-28 RX ORDER — DRONABINOL 10 MG/1
10 CAPSULE ORAL
Qty: 60 CAPSULE | Refills: 0 | Status: SHIPPED | OUTPATIENT
Start: 2022-04-28 | End: 2022-06-05 | Stop reason: SDUPTHER

## 2022-04-28 RX ORDER — FAMOTIDINE 10 MG/ML
20 INJECTION INTRAVENOUS
Status: COMPLETED | OUTPATIENT
Start: 2022-04-28 | End: 2022-04-28

## 2022-04-28 RX ORDER — FAMOTIDINE 10 MG/ML
20 INJECTION INTRAVENOUS
Status: CANCELLED
Start: 2022-05-05

## 2022-04-28 RX ORDER — LEVOTHYROXINE SODIUM 200 UG/1
200 TABLET ORAL DAILY
Qty: 90 TABLET | Refills: 3 | Status: SHIPPED | OUTPATIENT
Start: 2022-04-28 | End: 2022-06-23 | Stop reason: SDUPTHER

## 2022-04-28 RX ORDER — DEXAMETHASONE SODIUM PHOSPHATE 4 MG/ML
4 INJECTION, SOLUTION INTRA-ARTICULAR; INTRALESIONAL; INTRAMUSCULAR; INTRAVENOUS; SOFT TISSUE
Status: CANCELLED
Start: 2022-05-05

## 2022-04-28 RX ORDER — DIPHENHYDRAMINE HYDROCHLORIDE 50 MG/ML
25 INJECTION INTRAMUSCULAR; INTRAVENOUS
Status: COMPLETED | OUTPATIENT
Start: 2022-04-28 | End: 2022-04-28

## 2022-04-28 RX ORDER — SODIUM CHLORIDE 0.9 % (FLUSH) 0.9 %
10 SYRINGE (ML) INJECTION
Status: CANCELLED | OUTPATIENT
Start: 2022-05-05

## 2022-04-28 RX ORDER — DEXAMETHASONE SODIUM PHOSPHATE 4 MG/ML
4 INJECTION, SOLUTION INTRA-ARTICULAR; INTRALESIONAL; INTRAMUSCULAR; INTRAVENOUS; SOFT TISSUE
Status: COMPLETED | OUTPATIENT
Start: 2022-04-28 | End: 2022-04-28

## 2022-04-28 RX ORDER — ALPRAZOLAM 0.25 MG/1
0.25 TABLET ORAL 3 TIMES DAILY PRN
Qty: 60 TABLET | Refills: 0 | Status: SHIPPED | OUTPATIENT
Start: 2022-04-28 | End: 2022-06-05 | Stop reason: SDUPTHER

## 2022-04-28 RX ORDER — ONDANSETRON 4 MG/1
4 TABLET, FILM COATED ORAL
COMMUNITY
End: 2022-05-11 | Stop reason: SDUPTHER

## 2022-04-28 RX ORDER — SODIUM CHLORIDE 0.9 % (FLUSH) 0.9 %
10 SYRINGE (ML) INJECTION
Status: DISCONTINUED | OUTPATIENT
Start: 2022-04-28 | End: 2022-04-28 | Stop reason: HOSPADM

## 2022-04-28 RX ADMIN — IRON SUCROSE 200 MG: 20 INJECTION, SOLUTION INTRAVENOUS at 01:04

## 2022-04-28 RX ADMIN — FAMOTIDINE 20 MG: 10 INJECTION INTRAVENOUS at 01:04

## 2022-04-28 RX ADMIN — DIPHENHYDRAMINE HYDROCHLORIDE 25 MG: 50 INJECTION INTRAMUSCULAR; INTRAVENOUS at 01:04

## 2022-04-28 RX ADMIN — DEXAMETHASONE SODIUM PHOSPHATE 4 MG: 4 INJECTION INTRA-ARTICULAR; INTRALESIONAL; INTRAMUSCULAR; INTRAVENOUS; SOFT TISSUE at 01:04

## 2022-04-28 RX ADMIN — SODIUM CHLORIDE: 9 INJECTION, SOLUTION INTRAVENOUS at 01:04

## 2022-04-28 NOTE — PROGRESS NOTES
History of present illness:  The patient is a 66-year-old white female with metastatic colon carcinoma involving the liver who is treated with Xelox/Avastin and returns clinic for evaluation prior to next cycle of therapy.  Patient's recent course has been complicated by protein/calorie malnutrition, diarrhea, hand-foot syndrome, and electrolyte disturbances.    Since returning home following an 8 day hospitalization in [place], patient remains extremely weak and fatigued.  Oral intake for solids and liquids has improved.  She denies pain.  Diarrhea appears to have resolved.  Patient spends about 70% of her day in bed and about 30% in the chair.    Patient returns to clinic to review results of interval study and to further discuss plan of care.  Patient's appetite remains poor.  She requests refill on Marinol.  Patient continues to lose weight.  Abdominal bloating and distension appears to improve with ambulation.  She is making efforts at doing this.  Patient is experiencing night sweats.  She has not taken her temperature during these episodes.  Patient is reporting crying spells and is currently on no medications for control of mood or anxiety.  She reports continued compliance with Synthroid 150 mcg p.o. daily.  Patient has had another ER visit for dehydration and electrolyte disturbance since her last office evaluation.    Physical examination:  The patient is a well-developed, frail, elderly, white female, in no acute distress, who has a weight of 146 lb  (decreased by 8 lb).  VITAL SIGNS: Documented  and reviewed this visit.  HEENT: Normocephalic, atraumatic. Oral mucosa pink and moist. Lips without lesions. Tongue midline. Oropharynx clear. Nonicteric sclerae.   NECK: Supple, no adenopathy. No carotid bruits, thyromegaly or thyroid nodule.   HEART: Regular rate and rhythm without murmur, gallop or rub.   LUNGS: Clear to auscultation bilaterally. Normal respiratory effort.   ABDOMEN: Soft, nontender,  nondistended with positive normoactive bowel sounds, no hepatosplenomegaly.   EXTREMITIES: No cyanosis, clubbing or edema. Distal pulses are intact.   AXILLAE AND GROIN: No palpable pathologic lymphadenopathy is appreciated.   SKIN: Intact/turgor normal   NEUROLOGIC: Cranial nerves II-XII grossly intact. Motor:  Generalized loss of muscle bulk and tone. Strength/sensory 5/5 throughout. Gait stable.     Laboratory:  White count 9.8, hemoglobin 9.6, hematocrit 30.9, , RDW 18.5, platelets 99, absolute neutrophil count is 7100.    Serum iron 59, TIBC 318, saturated iron 19%, ferritin 169, soluble transferrin receptor 6.8.  Sodium 141, potassium 3.5, chloride 110, CO2 20, BUN 11, creatinine 0.7, glucose 95, calcium 7.7, magnesium 1.5, alkaline phosphatase 355, albumin 2.2, total bilirubin 1.2, AST 46, ALT 21, GFR is greater than 60.  TSH 84.954, free T4  0.58  .2.    Guardant 360 panel:  No actionable mutations noted.    CT scanning of the abdomen/pelvis:  Study performed on 03/26/2022.  1. Proctocolitis presumably from an infectious or inflammatory etiology.  2. Progression of hepatic metastatic disease.  3. Development of small volume ascites.  4. Nonspecific urinary bladder distention.  5. Multiple compression fractures are age indeterminate but new since prior exam.    CT of the chest:   Study performed 04/08/2022.    Tiny bilateral lung nodules measuring up to 4 mm in size.  These are nonspecific, and follow-up to document long-term stability is needed.  Small pleural effusions and trace pericardial effusion.   Increasing ascites.  Interval increase in size of the dominant lesion in the liver is questioned but difficult to determine with certainty secondary to the differences in contrast bolus timing between this examination and the patient's prior CTs of the abdomen and pelvis.    Impression:  1. Metastatic colon carcinoma involving liver with disease progression per most recent imaging.    2.  Protein/calorie malnutrition.  3. Hand-foot syndrome.  4. Multiple thoracic/lumbar spinal compression fractures of undetermined age.  5.  Iron deficiency anemia.  6.  Hypothyroidism on inadequate replacement.  7. Treatment associated thrombocytopenia.  8.  Depression/anxiety  9.  Night sweats-suspect tumor fever.    Plan:  1.  Increase Synthroid dosing in 200 mcg p.o. daily.  2.  Refill Marinol per patient request.  3.  Start patient on Pristiq 50 mg q.a.m./Xanax 0.25 mg p.o. t.i.d. as needed for anxiety   4. I had a 40 minute discussion which she and her  regarding her poor performance status and the fact that she is not an appropriate candidate for receipt of systemic therapy with a regimen such as Camptosar/Erbitux at the present time.  We also discussed the hospice benefit in detail.    5.  At the present time, the patient is not prepared to accept hospice referral.  We have agreed that she will complete intravenous iron replacement and the other adjustments to her medical regimen as outlined above.  Following this, we will arrange reassessment in the office 3 weeks from now with interval study to include CBC, CMP, LDH, magnesium, and CEA.  Final decision on long-term plan of treatment will be rendered at that visit.    This note was created using voice recognition software and may contain grammatical errors.   This note was created using voice recognition software and may contain grammatical errors.

## 2022-04-28 NOTE — PLAN OF CARE
Pleasant alert and oriented patient ambulated with rollator to clinic accompanied by spouse for venofer infusion =- pt premedicated - tolerated well - pt to RTC next week.

## 2022-04-29 LAB
FOLATE SERPL-MCNC: 7.2 NG/ML (ref 4–24)
VIT B12 SERPL-MCNC: 780 PG/ML (ref 210–950)

## 2022-05-05 ENCOUNTER — INFUSION (OUTPATIENT)
Dept: INFUSION THERAPY | Facility: HOSPITAL | Age: 67
End: 2022-05-05
Payer: OTHER GOVERNMENT

## 2022-05-05 VITALS
OXYGEN SATURATION: 99 % | HEIGHT: 67 IN | RESPIRATION RATE: 20 BRPM | SYSTOLIC BLOOD PRESSURE: 120 MMHG | WEIGHT: 145.5 LBS | BODY MASS INDEX: 22.84 KG/M2 | HEART RATE: 91 BPM | DIASTOLIC BLOOD PRESSURE: 62 MMHG | TEMPERATURE: 98 F

## 2022-05-05 DIAGNOSIS — D50.9 IRON DEFICIENCY ANEMIA, UNSPECIFIED IRON DEFICIENCY ANEMIA TYPE: Primary | ICD-10-CM

## 2022-05-05 PROCEDURE — A4216 STERILE WATER/SALINE, 10 ML: HCPCS | Performed by: INTERNAL MEDICINE

## 2022-05-05 PROCEDURE — 96375 TX/PRO/DX INJ NEW DRUG ADDON: CPT

## 2022-05-05 PROCEDURE — 63600175 PHARM REV CODE 636 W HCPCS: Performed by: INTERNAL MEDICINE

## 2022-05-05 PROCEDURE — 96365 THER/PROPH/DIAG IV INF INIT: CPT

## 2022-05-05 PROCEDURE — 25000003 PHARM REV CODE 250: Performed by: INTERNAL MEDICINE

## 2022-05-05 RX ORDER — SODIUM CHLORIDE 0.9 % (FLUSH) 0.9 %
10 SYRINGE (ML) INJECTION
Status: DISCONTINUED | OUTPATIENT
Start: 2022-05-05 | End: 2022-05-05 | Stop reason: HOSPADM

## 2022-05-05 RX ORDER — HEPARIN 100 UNIT/ML
500 SYRINGE INTRAVENOUS
Status: CANCELLED | OUTPATIENT
Start: 2022-05-12

## 2022-05-05 RX ORDER — DIPHENHYDRAMINE HYDROCHLORIDE 50 MG/ML
25 INJECTION INTRAMUSCULAR; INTRAVENOUS
Status: CANCELLED
Start: 2022-05-12

## 2022-05-05 RX ORDER — FAMOTIDINE 10 MG/ML
20 INJECTION INTRAVENOUS
Status: COMPLETED | OUTPATIENT
Start: 2022-05-05 | End: 2022-05-05

## 2022-05-05 RX ORDER — DEXAMETHASONE SODIUM PHOSPHATE 4 MG/ML
4 INJECTION, SOLUTION INTRA-ARTICULAR; INTRALESIONAL; INTRAMUSCULAR; INTRAVENOUS; SOFT TISSUE
Status: CANCELLED
Start: 2022-05-12

## 2022-05-05 RX ORDER — SODIUM CHLORIDE 0.9 % (FLUSH) 0.9 %
10 SYRINGE (ML) INJECTION
Status: CANCELLED | OUTPATIENT
Start: 2022-05-12

## 2022-05-05 RX ORDER — DEXAMETHASONE SODIUM PHOSPHATE 4 MG/ML
4 INJECTION, SOLUTION INTRA-ARTICULAR; INTRALESIONAL; INTRAMUSCULAR; INTRAVENOUS; SOFT TISSUE
Status: COMPLETED | OUTPATIENT
Start: 2022-05-05 | End: 2022-05-05

## 2022-05-05 RX ORDER — HEPARIN 100 UNIT/ML
500 SYRINGE INTRAVENOUS
Status: DISCONTINUED | OUTPATIENT
Start: 2022-05-05 | End: 2022-05-05 | Stop reason: HOSPADM

## 2022-05-05 RX ORDER — FAMOTIDINE 10 MG/ML
20 INJECTION INTRAVENOUS
Status: CANCELLED
Start: 2022-05-12

## 2022-05-05 RX ORDER — DIPHENHYDRAMINE HYDROCHLORIDE 50 MG/ML
25 INJECTION INTRAMUSCULAR; INTRAVENOUS
Status: COMPLETED | OUTPATIENT
Start: 2022-05-05 | End: 2022-05-05

## 2022-05-05 RX ADMIN — SODIUM CHLORIDE: 9 INJECTION, SOLUTION INTRAVENOUS at 01:05

## 2022-05-05 RX ADMIN — DIPHENHYDRAMINE HYDROCHLORIDE 25 MG: 50 INJECTION INTRAMUSCULAR; INTRAVENOUS at 01:05

## 2022-05-05 RX ADMIN — FAMOTIDINE 20 MG: 10 INJECTION INTRAVENOUS at 01:05

## 2022-05-05 RX ADMIN — DEXAMETHASONE SODIUM PHOSPHATE 4 MG: 4 INJECTION INTRA-ARTICULAR; INTRALESIONAL; INTRAMUSCULAR; INTRAVENOUS; SOFT TISSUE at 01:05

## 2022-05-05 RX ADMIN — Medication 500 UNITS: at 03:05

## 2022-05-05 RX ADMIN — IRON SUCROSE 200 MG: 20 INJECTION, SOLUTION INTRAVENOUS at 02:05

## 2022-05-05 RX ADMIN — Medication 10 ML: at 03:05

## 2022-05-05 NOTE — PLAN OF CARE
"Problem: Adult Inpatient Plan of Care  Goal: Plan of Care Review  Outcome: Ongoing, Progressing  Flowsheets (Taken 5/5/2022 1353)  Plan of Care Reviewed With: patient  /70 (Patient Position: Sitting)   Pulse 100   Temp 97.9 °F (36.6 °C)   Resp 18   Ht 5' 7" (1.702 m)   Wt 66 kg (145 lb 8.1 oz)   LMP  (LMP Unknown)   SpO2 99%   BMI 22.79 kg/m² Pleasant alert and oriented patient ambulated with rollator to clinic accompanied by spouse for venofer infusion #2 - pt premedicated - tolerated well - pt to RTC next week.       "

## 2022-05-06 ENCOUNTER — PATIENT MESSAGE (OUTPATIENT)
Dept: HEMATOLOGY/ONCOLOGY | Facility: CLINIC | Age: 67
End: 2022-05-06
Payer: OTHER GOVERNMENT

## 2022-05-06 DIAGNOSIS — G89.3 NEOPLASM RELATED PAIN: Primary | ICD-10-CM

## 2022-05-06 RX ORDER — HYDROCODONE BITARTRATE AND ACETAMINOPHEN 5; 325 MG/1; MG/1
1 TABLET ORAL EVERY 6 HOURS PRN
Qty: 60 TABLET | Refills: 0 | Status: SHIPPED | OUTPATIENT
Start: 2022-05-06 | End: 2022-06-17 | Stop reason: SDUPTHER

## 2022-05-11 ENCOUNTER — INFUSION (OUTPATIENT)
Dept: INFUSION THERAPY | Facility: HOSPITAL | Age: 67
End: 2022-05-11
Payer: OTHER GOVERNMENT

## 2022-05-11 ENCOUNTER — PATIENT MESSAGE (OUTPATIENT)
Dept: HEMATOLOGY/ONCOLOGY | Facility: CLINIC | Age: 67
End: 2022-05-11
Payer: OTHER GOVERNMENT

## 2022-05-11 ENCOUNTER — TELEPHONE (OUTPATIENT)
Dept: FAMILY MEDICINE | Facility: CLINIC | Age: 67
End: 2022-05-11
Payer: OTHER GOVERNMENT

## 2022-05-11 VITALS
HEIGHT: 67 IN | WEIGHT: 145.5 LBS | HEART RATE: 90 BPM | TEMPERATURE: 99 F | OXYGEN SATURATION: 99 % | RESPIRATION RATE: 18 BRPM | SYSTOLIC BLOOD PRESSURE: 139 MMHG | BODY MASS INDEX: 22.84 KG/M2 | DIASTOLIC BLOOD PRESSURE: 66 MMHG

## 2022-05-11 DIAGNOSIS — D50.9 IRON DEFICIENCY ANEMIA, UNSPECIFIED IRON DEFICIENCY ANEMIA TYPE: Primary | ICD-10-CM

## 2022-05-11 DIAGNOSIS — C78.7 SECONDARY MALIGNANT NEOPLASM OF LIVER: Primary | ICD-10-CM

## 2022-05-11 PROCEDURE — 63600175 PHARM REV CODE 636 W HCPCS: Performed by: INTERNAL MEDICINE

## 2022-05-11 PROCEDURE — 25000003 PHARM REV CODE 250: Performed by: INTERNAL MEDICINE

## 2022-05-11 PROCEDURE — 96375 TX/PRO/DX INJ NEW DRUG ADDON: CPT

## 2022-05-11 PROCEDURE — 96365 THER/PROPH/DIAG IV INF INIT: CPT

## 2022-05-11 RX ORDER — SODIUM CHLORIDE 0.9 % (FLUSH) 0.9 %
10 SYRINGE (ML) INJECTION
Status: DISCONTINUED | OUTPATIENT
Start: 2022-05-11 | End: 2022-05-11 | Stop reason: HOSPADM

## 2022-05-11 RX ORDER — DEXAMETHASONE SODIUM PHOSPHATE 4 MG/ML
4 INJECTION, SOLUTION INTRA-ARTICULAR; INTRALESIONAL; INTRAMUSCULAR; INTRAVENOUS; SOFT TISSUE
Status: CANCELLED
Start: 2022-05-12

## 2022-05-11 RX ORDER — DIPHENHYDRAMINE HYDROCHLORIDE 50 MG/ML
25 INJECTION INTRAMUSCULAR; INTRAVENOUS
Status: COMPLETED | OUTPATIENT
Start: 2022-05-11 | End: 2022-05-11

## 2022-05-11 RX ORDER — FAMOTIDINE 10 MG/ML
20 INJECTION INTRAVENOUS
Status: CANCELLED
Start: 2022-05-12

## 2022-05-11 RX ORDER — ONDANSETRON 4 MG/1
4 TABLET, FILM COATED ORAL
Qty: 30 TABLET | Refills: 3 | Status: ON HOLD | OUTPATIENT
Start: 2022-05-11 | End: 2022-07-01 | Stop reason: HOSPADM

## 2022-05-11 RX ORDER — FAMOTIDINE 10 MG/ML
20 INJECTION INTRAVENOUS
Status: COMPLETED | OUTPATIENT
Start: 2022-05-11 | End: 2022-05-11

## 2022-05-11 RX ORDER — HEPARIN 100 UNIT/ML
500 SYRINGE INTRAVENOUS
Status: CANCELLED | OUTPATIENT
Start: 2022-05-12

## 2022-05-11 RX ORDER — DIPHENHYDRAMINE HYDROCHLORIDE 50 MG/ML
25 INJECTION INTRAMUSCULAR; INTRAVENOUS
Status: CANCELLED
Start: 2022-05-12

## 2022-05-11 RX ORDER — HEPARIN 100 UNIT/ML
500 SYRINGE INTRAVENOUS
Status: DISCONTINUED | OUTPATIENT
Start: 2022-05-11 | End: 2022-05-11 | Stop reason: HOSPADM

## 2022-05-11 RX ORDER — SODIUM CHLORIDE 0.9 % (FLUSH) 0.9 %
10 SYRINGE (ML) INJECTION
Status: CANCELLED | OUTPATIENT
Start: 2022-05-12

## 2022-05-11 RX ORDER — DEXAMETHASONE SODIUM PHOSPHATE 4 MG/ML
4 INJECTION, SOLUTION INTRA-ARTICULAR; INTRALESIONAL; INTRAMUSCULAR; INTRAVENOUS; SOFT TISSUE
Status: COMPLETED | OUTPATIENT
Start: 2022-05-11 | End: 2022-05-11

## 2022-05-11 RX ADMIN — DEXAMETHASONE SODIUM PHOSPHATE 4 MG: 4 INJECTION INTRA-ARTICULAR; INTRALESIONAL; INTRAMUSCULAR; INTRAVENOUS; SOFT TISSUE at 02:05

## 2022-05-11 RX ADMIN — DIPHENHYDRAMINE HYDROCHLORIDE 25 MG: 50 INJECTION INTRAMUSCULAR; INTRAVENOUS at 02:05

## 2022-05-11 RX ADMIN — Medication 500 UNITS: at 03:05

## 2022-05-11 RX ADMIN — SODIUM CHLORIDE: 9 INJECTION, SOLUTION INTRAVENOUS at 02:05

## 2022-05-11 RX ADMIN — FAMOTIDINE 20 MG: 10 INJECTION INTRAVENOUS at 02:05

## 2022-05-11 RX ADMIN — IRON SUCROSE 200 MG: 20 INJECTION, SOLUTION INTRAVENOUS at 02:05

## 2022-05-11 NOTE — PLAN OF CARE
"Problem: Adult Inpatient Plan of Care  Goal: Plan of Care Review  Outcome: Ongoing, Progressing  Flowsheets (Taken 5/11/2022 1416)  Plan of Care Reviewed With: patient  /74 (Patient Position: Sitting)   Pulse (!) 113   Temp 98.7 °F (37.1 °C)   Resp 18   Ht 5' 7" (1.702 m)   Wt 66 kg (145 lb 8.1 oz)   LMP  (LMP Unknown)   SpO2 99%   BMI 22.79 kg/m² Pleasant alert and oriented patient ambulated with rollator to clinic accompanied by spouse for venofer infusion #3 - pt premedicated - tolerated well - pt to RTC next week.          "

## 2022-05-11 NOTE — TELEPHONE ENCOUNTER
----- Message from Viral Lau sent at 5/11/2022  3:28 PM CDT -----  Contact: Deven Leon  Type:  Sooner Appointment Request    Caller is requesting a sooner appointment.  Caller declined first available appointment listed below.  Caller will not accept being placed on the waitlist and is requesting a message be sent to doctor.    Name of Caller:  Spouse/Edward  When is the first available appointment?  N/a  Symptoms:  Est care  Best Call Back Number:  [phone]   Additional Information:

## 2022-05-11 NOTE — PLAN OF CARE
Pleasant patient ambulated to clinic with walker - accompanied by spouse - received venofer infusion with premeds - tolerated well - discharged home with no concerns - pt to RTC next week.

## 2022-05-18 ENCOUNTER — INFUSION (OUTPATIENT)
Dept: INFUSION THERAPY | Facility: HOSPITAL | Age: 67
End: 2022-05-18
Payer: OTHER GOVERNMENT

## 2022-05-18 ENCOUNTER — LAB VISIT (OUTPATIENT)
Dept: LAB | Facility: HOSPITAL | Age: 67
End: 2022-05-18
Attending: INTERNAL MEDICINE
Payer: OTHER GOVERNMENT

## 2022-05-18 VITALS
BODY MASS INDEX: 22.84 KG/M2 | OXYGEN SATURATION: 99 % | DIASTOLIC BLOOD PRESSURE: 64 MMHG | TEMPERATURE: 98 F | SYSTOLIC BLOOD PRESSURE: 118 MMHG | HEART RATE: 82 BPM | HEIGHT: 67 IN | RESPIRATION RATE: 18 BRPM | WEIGHT: 145.5 LBS

## 2022-05-18 DIAGNOSIS — C19 COLORECTAL CARCINOMA: ICD-10-CM

## 2022-05-18 DIAGNOSIS — C19 COLORECTAL CARCINOMA: Primary | ICD-10-CM

## 2022-05-18 DIAGNOSIS — D50.9 IRON DEFICIENCY ANEMIA, UNSPECIFIED IRON DEFICIENCY ANEMIA TYPE: ICD-10-CM

## 2022-05-18 LAB
ALBUMIN SERPL BCP-MCNC: 2.5 G/DL (ref 3.5–5.2)
ALP SERPL-CCNC: 492 U/L (ref 55–135)
ALT SERPL W/O P-5'-P-CCNC: 59 U/L (ref 10–44)
ANION GAP SERPL CALC-SCNC: 9 MMOL/L (ref 8–16)
AST SERPL-CCNC: 55 U/L (ref 10–40)
BASOPHILS # BLD AUTO: 0.01 K/UL (ref 0–0.2)
BASOPHILS NFR BLD: 0.1 % (ref 0–1.9)
BILIRUB SERPL-MCNC: 1.1 MG/DL (ref 0.1–1)
BILIRUB UR QL STRIP: NEGATIVE
BUN SERPL-MCNC: 11 MG/DL (ref 8–23)
CALCIUM SERPL-MCNC: 8.7 MG/DL (ref 8.7–10.5)
CHLORIDE SERPL-SCNC: 102 MMOL/L (ref 95–110)
CLARITY UR: CLEAR
CO2 SERPL-SCNC: 28 MMOL/L (ref 23–29)
COLOR UR: YELLOW
CREAT SERPL-MCNC: 0.7 MG/DL (ref 0.5–1.4)
DIFFERENTIAL METHOD: ABNORMAL
EOSINOPHIL # BLD AUTO: 0 K/UL (ref 0–0.5)
EOSINOPHIL NFR BLD: 0 % (ref 0–8)
ERYTHROCYTE [DISTWIDTH] IN BLOOD BY AUTOMATED COUNT: 15.1 % (ref 11.5–14.5)
EST. GFR  (AFRICAN AMERICAN): >60 ML/MIN/1.73 M^2
EST. GFR  (NON AFRICAN AMERICAN): >60 ML/MIN/1.73 M^2
GLUCOSE SERPL-MCNC: 163 MG/DL (ref 70–110)
GLUCOSE UR QL STRIP: NEGATIVE
HCT VFR BLD AUTO: 31.9 % (ref 37–48.5)
HGB BLD-MCNC: 9.6 G/DL (ref 12–16)
HGB UR QL STRIP: ABNORMAL
IMM GRANULOCYTES # BLD AUTO: 0.06 K/UL (ref 0–0.04)
IMM GRANULOCYTES NFR BLD AUTO: 0.6 % (ref 0–0.5)
KETONES UR QL STRIP: NEGATIVE
LDH SERPL L TO P-CCNC: 530 U/L (ref 110–260)
LEUKOCYTE ESTERASE UR QL STRIP: NEGATIVE
LYMPHOCYTES # BLD AUTO: 0.7 K/UL (ref 1–4.8)
LYMPHOCYTES NFR BLD: 7.1 % (ref 18–48)
MAGNESIUM SERPL-MCNC: 1.6 MG/DL (ref 1.6–2.6)
MCH RBC QN AUTO: 31.7 PG (ref 27–31)
MCHC RBC AUTO-ENTMCNC: 30.1 G/DL (ref 32–36)
MCV RBC AUTO: 105 FL (ref 82–98)
MONOCYTES # BLD AUTO: 0.5 K/UL (ref 0.3–1)
MONOCYTES NFR BLD: 5.5 % (ref 4–15)
NEUTROPHILS # BLD AUTO: 8.2 K/UL (ref 1.8–7.7)
NEUTROPHILS NFR BLD: 86.7 % (ref 38–73)
NITRITE UR QL STRIP: NEGATIVE
NRBC BLD-RTO: 0 /100 WBC
PH UR STRIP: 7 [PH] (ref 5–8)
PLATELET # BLD AUTO: 148 K/UL (ref 150–450)
PMV BLD AUTO: 10.1 FL (ref 9.2–12.9)
POTASSIUM SERPL-SCNC: 4.4 MMOL/L (ref 3.5–5.1)
PROT SERPL-MCNC: 6.4 G/DL (ref 6–8.4)
PROT UR QL STRIP: NEGATIVE
RBC # BLD AUTO: 3.03 M/UL (ref 4–5.4)
SODIUM SERPL-SCNC: 139 MMOL/L (ref 136–145)
SP GR UR STRIP: 1.01 (ref 1–1.03)
URN SPEC COLLECT METH UR: ABNORMAL
UROBILINOGEN UR STRIP-ACNC: 1 EU/DL
WBC # BLD AUTO: 9.45 K/UL (ref 3.9–12.7)

## 2022-05-18 PROCEDURE — 80053 COMPREHEN METABOLIC PANEL: CPT | Performed by: INTERNAL MEDICINE

## 2022-05-18 PROCEDURE — 83615 LACTATE (LD) (LDH) ENZYME: CPT | Performed by: INTERNAL MEDICINE

## 2022-05-18 PROCEDURE — 83735 ASSAY OF MAGNESIUM: CPT | Performed by: INTERNAL MEDICINE

## 2022-05-18 PROCEDURE — 96365 THER/PROPH/DIAG IV INF INIT: CPT

## 2022-05-18 PROCEDURE — 85025 COMPLETE CBC W/AUTO DIFF WBC: CPT | Performed by: INTERNAL MEDICINE

## 2022-05-18 PROCEDURE — 96375 TX/PRO/DX INJ NEW DRUG ADDON: CPT

## 2022-05-18 PROCEDURE — 81003 URINALYSIS AUTO W/O SCOPE: CPT | Performed by: INTERNAL MEDICINE

## 2022-05-18 PROCEDURE — 25000003 PHARM REV CODE 250: Performed by: INTERNAL MEDICINE

## 2022-05-18 PROCEDURE — 82378 CARCINOEMBRYONIC ANTIGEN: CPT | Performed by: INTERNAL MEDICINE

## 2022-05-18 PROCEDURE — 63600175 PHARM REV CODE 636 W HCPCS: Performed by: INTERNAL MEDICINE

## 2022-05-18 RX ORDER — HEPARIN 100 UNIT/ML
500 SYRINGE INTRAVENOUS
Status: DISCONTINUED | OUTPATIENT
Start: 2022-05-18 | End: 2022-05-18 | Stop reason: HOSPADM

## 2022-05-18 RX ORDER — DEXAMETHASONE SODIUM PHOSPHATE 4 MG/ML
4 INJECTION, SOLUTION INTRA-ARTICULAR; INTRALESIONAL; INTRAMUSCULAR; INTRAVENOUS; SOFT TISSUE
Status: CANCELLED
Start: 2022-05-25

## 2022-05-18 RX ORDER — DEXAMETHASONE SODIUM PHOSPHATE 4 MG/ML
4 INJECTION, SOLUTION INTRA-ARTICULAR; INTRALESIONAL; INTRAMUSCULAR; INTRAVENOUS; SOFT TISSUE
Status: COMPLETED | OUTPATIENT
Start: 2022-05-18 | End: 2022-05-18

## 2022-05-18 RX ORDER — SODIUM CHLORIDE 0.9 % (FLUSH) 0.9 %
10 SYRINGE (ML) INJECTION
Status: CANCELLED | OUTPATIENT
Start: 2022-05-25

## 2022-05-18 RX ORDER — FAMOTIDINE 10 MG/ML
20 INJECTION INTRAVENOUS
Status: CANCELLED
Start: 2022-05-25

## 2022-05-18 RX ORDER — DIPHENHYDRAMINE HYDROCHLORIDE 50 MG/ML
25 INJECTION INTRAMUSCULAR; INTRAVENOUS
Status: COMPLETED | OUTPATIENT
Start: 2022-05-18 | End: 2022-05-18

## 2022-05-18 RX ORDER — HEPARIN 100 UNIT/ML
500 SYRINGE INTRAVENOUS
Status: CANCELLED | OUTPATIENT
Start: 2022-05-25

## 2022-05-18 RX ORDER — DIPHENHYDRAMINE HYDROCHLORIDE 50 MG/ML
25 INJECTION INTRAMUSCULAR; INTRAVENOUS
Status: CANCELLED
Start: 2022-05-25

## 2022-05-18 RX ORDER — FAMOTIDINE 10 MG/ML
20 INJECTION INTRAVENOUS
Status: COMPLETED | OUTPATIENT
Start: 2022-05-18 | End: 2022-05-18

## 2022-05-18 RX ADMIN — SODIUM CHLORIDE: 9 INJECTION, SOLUTION INTRAVENOUS at 02:05

## 2022-05-18 RX ADMIN — IRON SUCROSE 200 MG: 20 INJECTION, SOLUTION INTRAVENOUS at 02:05

## 2022-05-18 RX ADMIN — Medication 500 UNITS: at 03:05

## 2022-05-18 RX ADMIN — DEXAMETHASONE SODIUM PHOSPHATE 4 MG: 4 INJECTION INTRA-ARTICULAR; INTRALESIONAL; INTRAMUSCULAR; INTRAVENOUS; SOFT TISSUE at 02:05

## 2022-05-18 RX ADMIN — DIPHENHYDRAMINE HYDROCHLORIDE 25 MG: 50 INJECTION INTRAMUSCULAR; INTRAVENOUS at 02:05

## 2022-05-18 RX ADMIN — FAMOTIDINE 20 MG: 10 INJECTION INTRAVENOUS at 02:05

## 2022-05-18 NOTE — PLAN OF CARE
Pleasant alert and oriented patient ambulated with rollator to clinic for venofer infusion - pt premedicated - tolerated well - discharged home with no concerns - pt to RTC next week

## 2022-05-18 NOTE — PLAN OF CARE
"Problem: Adult Inpatient Plan of Care  Goal: Plan of Care Review  Outcome: Ongoing, Progressing  Flowsheets (Taken 5/18/2022 1431)  Plan of Care Reviewed With: patient  /67 (BP Location: Left arm, Patient Position: Sitting)   Pulse 95   Temp 98 °F (36.7 °C) (Oral)   Resp 18   Ht 5' 7" (1.702 m)   Wt 66 kg (145 lb 8.1 oz)   LMP  (LMP Unknown)   SpO2 99%   BMI 22.79 kg/m²  Pleasant alert and oriented patient ambulated with rollator to clinic accompanied by spouse for venofer infusion #4 - port accessed and labs collected per Dr. Banks's order and patient tolerated procedure well - pt premedicated - tolerated well Venofer infusion without medication reactions - pt to RTC next week.          "

## 2022-05-19 ENCOUNTER — OFFICE VISIT (OUTPATIENT)
Dept: HEMATOLOGY/ONCOLOGY | Facility: CLINIC | Age: 67
End: 2022-05-19
Payer: OTHER GOVERNMENT

## 2022-05-19 VITALS
SYSTOLIC BLOOD PRESSURE: 157 MMHG | BODY MASS INDEX: 21.97 KG/M2 | WEIGHT: 140 LBS | DIASTOLIC BLOOD PRESSURE: 89 MMHG | HEIGHT: 67 IN | OXYGEN SATURATION: 99 % | HEART RATE: 98 BPM

## 2022-05-19 DIAGNOSIS — C19 METASTATIC COLORECTAL CANCER: Primary | ICD-10-CM

## 2022-05-19 DIAGNOSIS — R63.4 WEIGHT LOSS: ICD-10-CM

## 2022-05-19 LAB — CEA SERPL-MCNC: 587.4 NG/ML (ref 0–5)

## 2022-05-19 PROCEDURE — 99215 PR OFFICE/OUTPT VISIT, EST, LEVL V, 40-54 MIN: ICD-10-PCS | Mod: S$GLB,,, | Performed by: INTERNAL MEDICINE

## 2022-05-19 PROCEDURE — 99215 OFFICE O/P EST HI 40 MIN: CPT | Mod: S$GLB,,, | Performed by: INTERNAL MEDICINE

## 2022-05-19 PROCEDURE — 99999 PR PBB SHADOW E&M-EST. PATIENT-LVL IV: CPT | Mod: PBBFAC,,, | Performed by: INTERNAL MEDICINE

## 2022-05-19 PROCEDURE — 99999 PR PBB SHADOW E&M-EST. PATIENT-LVL IV: ICD-10-PCS | Mod: PBBFAC,,, | Performed by: INTERNAL MEDICINE

## 2022-05-19 RX ORDER — MEGESTROL ACETATE 125 MG/ML
625 SUSPENSION ORAL DAILY
Qty: 150 ML | Refills: 11 | Status: SHIPPED | OUTPATIENT
Start: 2022-05-19 | End: 2023-05-19

## 2022-05-19 RX ORDER — SODIUM CHLORIDE 0.9 % (FLUSH) 0.9 %
10 SYRINGE (ML) INJECTION
Status: CANCELLED | OUTPATIENT
Start: 2022-06-23

## 2022-05-19 RX ORDER — PREDNISONE 10 MG/1
10 TABLET ORAL DAILY
Qty: 30 TABLET | Refills: 3 | Status: ON HOLD
Start: 2022-05-19 | End: 2022-07-01 | Stop reason: HOSPADM

## 2022-05-19 RX ORDER — HEPARIN 100 UNIT/ML
500 SYRINGE INTRAVENOUS
Status: CANCELLED | OUTPATIENT
Start: 2022-06-23

## 2022-05-19 RX ORDER — ATROPINE SULFATE 0.4 MG/ML
0.4 INJECTION, SOLUTION ENDOTRACHEAL; INTRAMEDULLARY; INTRAMUSCULAR; INTRAVENOUS; SUBCUTANEOUS ONCE
Status: CANCELLED | OUTPATIENT
Start: 2022-06-23 | End: 2022-05-26

## 2022-05-19 NOTE — PROGRESS NOTES
History of present illness:  The patient is a 66-year-old white female with metastatic colon carcinoma involving the liver who is treated with Xelox/Avastin and returns clinic for evaluation prior to next cycle of therapy.  Patient's recent course has been complicated by protein/calorie malnutrition, diarrhea, hand-foot syndrome, and electrolyte disturbances.    Since returning home following an 8 day hospitalization in [place], patient remains extremely weak and fatigued.  Oral intake for solids and liquids has improved.  She denies pain.  Diarrhea appears to have resolved.  Patient spends about 70% of her day in bed and about 30% in the chair.    Patient returns to clinic to review results of interval study and to further discuss plan of care.  Patient's appetite remains poor.  She requests refill on Marinol.  Patient continues to lose weight.  Abdominal bloating and distension appears to improve with ambulation.  She is making efforts at doing this.  Patient is experiencing night sweats.  She has not taken her temperature during these episodes.  Patient is reporting crying spells and is currently on no medications for control of mood or anxiety.  She reports continued compliance with Synthroid 150 mcg p.o. daily.  Patient has had another ER visit for dehydration and electrolyte disturbance since her last office evaluation.    Patient returns to clinic for interval reassessment and to consider whether not she can resume palliative chemotherapy.  Patient's energy has improved since initiation of intravenous iron replacement and Synthroid.  Patient has had no additional ER visits for dehydration/electrolyte disturbances.  She just returned from a vacation to visit family in [facility].  Patient now spends more than 50% of her day out of bed.  She is motivated toward trying additional palliative therapy.    Physical examination:  The patient is a well-developed, frail, elderly, white female, in no acute distress,  who has a weight of 140 lb  (decreased by 6 lb).  VITAL SIGNS: Documented  and reviewed this visit.  HEENT: Normocephalic, atraumatic. Oral mucosa pink and moist. Lips without lesions. Tongue midline. Oropharynx clear. Nonicteric sclerae.   NECK: Supple, no adenopathy. No carotid bruits, thyromegaly or thyroid nodule.   HEART: Regular rate and rhythm without murmur, gallop or rub.   LUNGS: Clear to auscultation bilaterally. Normal respiratory effort.   ABDOMEN: Soft, nontender, nondistended with positive normoactive bowel sounds, no hepatosplenomegaly.   EXTREMITIES: No cyanosis, clubbing or edema. Distal pulses are intact.   AXILLAE AND GROIN: No palpable pathologic lymphadenopathy is appreciated.   SKIN: Intact/turgor normal   NEUROLOGIC: Cranial nerves II-XII grossly intact. Motor:  Generalized loss of muscle bulk and tone. Strength/sensory 5/5 throughout. Gait stable.     Laboratory:  White count 9.5, hemoglobin 9.6, hematocrit 31.9, platelets 148, absolute neutrophil count 8200.  Sodium 139, potassium 4.4, chloride 102, CO2 28, BUN 11, creatinine 0.7, glucose 163, calcium 8.7, magnesium 1.6, alkaline phosphatase 492 albumin 2.5, total bilirubin 1.1, AST 55, ALT 59, , GFR greater than 60.  .4 (172.2)     Guardant 360 panel:  No actionable mutations noted.    Impression:  1. Metastatic colon carcinoma involving liver with disease progression per most recent imaging.    2. Protein/calorie malnutrition.  3. Hand-foot syndrome.  4. Multiple thoracic/lumbar spinal compression fractures of undetermined age.  5.  Iron deficiency anemia.  6.  Hypothyroidism on inadequate replacement.  7. Treatment associated thrombocytopenia.  8.  Depression/anxiety  9.  Night sweats-suspect tumor fever.    Plan:  1. Continue Synthroid  200 mcg p.o. daily.  2. Continue Marinol per patient.  3. Continue patient on Pristiq 50 mg q.a.m./Xanax 0.25 mg p.o. t.i.d. as needed for anxiety   4. Megace syrup for stimulation of  appetite/weight gain  5. Start patient on second-line therapy to consist of Camptosar at a dose of 150 mg per m2 every 14 days.  6. Patient will receive DP 10/0.25 and as needed Compazine for control of acute and delayed emesis.  7. Patient will receive typical atropine premedication for French and a secretory diarrhea in association with Camptosar infusion.  8. In light of patient's advanced age, pretreated state, and relative difficulties with nutrition/weight loss, she will be provided prophylactic Neulasta 6 mg subQ via on body injector for prevention luis associated sepsis and/or treatment delay.  9. 40 minutes contact time was spent counseling the patient regarding plan of care as detailed above.  She voices understanding wishes to proceed.    10. Patient is to return to clinic in earliest convenience for cycle 1 of therapy and will be seen by me prior to cycle 2 with interval CBC, CMP, LDH magnesium prior to appointment.    This note was created using voice recognition software and may contain grammatical errors.

## 2022-05-19 NOTE — Clinical Note
Return to clinic in earliest convenience for cycle 1 of Camptosar See me prior to cycle 2 with interval CBC, CMP, LDH and magnesium prior to appointment.

## 2022-05-23 ENCOUNTER — TELEPHONE (OUTPATIENT)
Dept: FAMILY MEDICINE | Facility: CLINIC | Age: 67
End: 2022-05-23
Payer: OTHER GOVERNMENT

## 2022-05-23 ENCOUNTER — TELEPHONE (OUTPATIENT)
Dept: HEMATOLOGY/ONCOLOGY | Facility: CLINIC | Age: 67
End: 2022-05-23
Payer: OTHER GOVERNMENT

## 2022-05-23 NOTE — TELEPHONE ENCOUNTER
----- Message from Scooter Banks MD sent at 5/19/2022  5:48 PM CDT -----  Return to clinic in earliest convenience for cycle 1 of Camptosar  See me prior to cycle 2 with interval CBC, CMP, LDH and magnesium prior to appointment.

## 2022-05-23 NOTE — TELEPHONE ENCOUNTER
----- Message from Flores Perea sent at 5/23/2022 10:00 AM CDT -----  Type:  Patient Returning Call    Who Called: /Mr Allan    Who Left Message for Patient:  Court    Does the patient know what this is regarding?: yes    Would the patient rather a call back or a response via [facility]? call    Best Call Back Number [phone]

## 2022-05-24 ENCOUNTER — TELEPHONE (OUTPATIENT)
Dept: HEMATOLOGY/ONCOLOGY | Facility: CLINIC | Age: 67
End: 2022-05-24
Payer: OTHER GOVERNMENT

## 2022-05-24 ENCOUNTER — PATIENT MESSAGE (OUTPATIENT)
Dept: FAMILY MEDICINE | Facility: CLINIC | Age: 67
End: 2022-05-24
Payer: OTHER GOVERNMENT

## 2022-05-25 ENCOUNTER — TELEPHONE (OUTPATIENT)
Dept: HEMATOLOGY/ONCOLOGY | Facility: CLINIC | Age: 67
End: 2022-05-25
Payer: OTHER GOVERNMENT

## 2022-05-25 ENCOUNTER — PATIENT MESSAGE (OUTPATIENT)
Dept: HEMATOLOGY/ONCOLOGY | Facility: CLINIC | Age: 67
End: 2022-05-25
Payer: OTHER GOVERNMENT

## 2022-05-25 ENCOUNTER — HOSPITAL ENCOUNTER (INPATIENT)
Facility: HOSPITAL | Age: 67
LOS: 2 days | Discharge: HOME | DRG: 864 | End: 2022-05-27
Attending: EMERGENCY MEDICINE | Admitting: FAMILY MEDICINE
Payer: OTHER GOVERNMENT

## 2022-05-25 DIAGNOSIS — R50.9 FEVER: ICD-10-CM

## 2022-05-25 DIAGNOSIS — R07.9 CHEST PAIN: ICD-10-CM

## 2022-05-25 DIAGNOSIS — R41.82 ALTERED MENTAL STATUS: ICD-10-CM

## 2022-05-25 DIAGNOSIS — A41.9 SEPSIS, DUE TO UNSPECIFIED ORGANISM, UNSPECIFIED WHETHER ACUTE ORGAN DYSFUNCTION PRESENT: Primary | ICD-10-CM

## 2022-05-25 PROBLEM — K52.9 PROCTOCOLITIS: Status: RESOLVED | Noted: 2022-03-26 | Resolved: 2022-05-25

## 2022-05-25 PROBLEM — F32.A DEPRESSION: Status: ACTIVE | Noted: 2022-05-25

## 2022-05-25 PROBLEM — R51.9 SEVERE HEADACHE: Status: ACTIVE | Noted: 2022-05-25

## 2022-05-25 LAB
ALBUMIN SERPL BCP-MCNC: 2.7 G/DL (ref 3.5–5.2)
ALP SERPL-CCNC: 729 U/L (ref 55–135)
ALT SERPL W/O P-5'-P-CCNC: 63 U/L (ref 10–44)
ANION GAP SERPL CALC-SCNC: 11 MMOL/L (ref 8–16)
AST SERPL-CCNC: 103 U/L (ref 10–40)
BASOPHILS # BLD AUTO: 0.02 K/UL (ref 0–0.2)
BASOPHILS NFR BLD: 0.1 % (ref 0–1.9)
BILIRUB SERPL-MCNC: 1.4 MG/DL (ref 0.1–1)
BILIRUB UR QL STRIP: NEGATIVE
BUN SERPL-MCNC: 14 MG/DL (ref 8–23)
CALCIUM SERPL-MCNC: 9.1 MG/DL (ref 8.7–10.5)
CHLORIDE SERPL-SCNC: 102 MMOL/L (ref 95–110)
CLARITY UR: CLEAR
CO2 SERPL-SCNC: 25 MMOL/L (ref 23–29)
COLOR UR: YELLOW
CREAT SERPL-MCNC: 0.6 MG/DL (ref 0.5–1.4)
CRP SERPL-MCNC: 99.8 MG/L (ref 0–8.2)
DIFFERENTIAL METHOD: ABNORMAL
EOSINOPHIL # BLD AUTO: 0 K/UL (ref 0–0.5)
EOSINOPHIL NFR BLD: 0.1 % (ref 0–8)
ERYTHROCYTE [DISTWIDTH] IN BLOOD BY AUTOMATED COUNT: 15 % (ref 11.5–14.5)
EST. GFR  (AFRICAN AMERICAN): >60 ML/MIN/1.73 M^2
EST. GFR  (NON AFRICAN AMERICAN): >60 ML/MIN/1.73 M^2
GLUCOSE SERPL-MCNC: 79 MG/DL (ref 70–110)
GLUCOSE UR QL STRIP: NEGATIVE
HCT VFR BLD AUTO: 35.7 % (ref 37–48.5)
HGB BLD-MCNC: 11 G/DL (ref 12–16)
HGB UR QL STRIP: NEGATIVE
IMM GRANULOCYTES # BLD AUTO: 0.1 K/UL (ref 0–0.04)
IMM GRANULOCYTES NFR BLD AUTO: 0.7 % (ref 0–0.5)
INFLUENZA A, MOLECULAR: NEGATIVE
INFLUENZA B, MOLECULAR: NEGATIVE
KETONES UR QL STRIP: NEGATIVE
LACTATE SERPL-SCNC: 1.8 MMOL/L (ref 0.5–2.2)
LACTATE SERPL-SCNC: 1.9 MMOL/L (ref 0.5–2.2)
LDH SERPL L TO P-CCNC: 830 U/L (ref 110–260)
LEUKOCYTE ESTERASE UR QL STRIP: NEGATIVE
LYMPHOCYTES # BLD AUTO: 1.9 K/UL (ref 1–4.8)
LYMPHOCYTES NFR BLD: 12.3 % (ref 18–48)
MCH RBC QN AUTO: 31 PG (ref 27–31)
MCHC RBC AUTO-ENTMCNC: 30.8 G/DL (ref 32–36)
MCV RBC AUTO: 101 FL (ref 82–98)
MONOCYTES # BLD AUTO: 1.4 K/UL (ref 0.3–1)
MONOCYTES NFR BLD: 9.2 % (ref 4–15)
NEUTROPHILS # BLD AUTO: 11.7 K/UL (ref 1.8–7.7)
NEUTROPHILS NFR BLD: 77.6 % (ref 38–73)
NITRITE UR QL STRIP: NEGATIVE
NRBC BLD-RTO: 0 /100 WBC
PH UR STRIP: 7 [PH] (ref 5–8)
PLATELET # BLD AUTO: 128 K/UL (ref 150–450)
PMV BLD AUTO: 10 FL (ref 9.2–12.9)
POTASSIUM SERPL-SCNC: 3.7 MMOL/L (ref 3.5–5.1)
PROT SERPL-MCNC: 6.8 G/DL (ref 6–8.4)
PROT UR QL STRIP: NEGATIVE
RBC # BLD AUTO: 3.55 M/UL (ref 4–5.4)
SARS-COV-2 RDRP RESP QL NAA+PROBE: NEGATIVE
SODIUM SERPL-SCNC: 138 MMOL/L (ref 136–145)
SP GR UR STRIP: 1.02 (ref 1–1.03)
SPECIMEN SOURCE: NORMAL
URATE SERPL-MCNC: 3.6 MG/DL (ref 2.4–5.7)
URN SPEC COLLECT METH UR: NORMAL
UROBILINOGEN UR STRIP-ACNC: 1 EU/DL
WBC # BLD AUTO: 15.07 K/UL (ref 3.9–12.7)

## 2022-05-25 PROCEDURE — 87502 INFLUENZA DNA AMP PROBE: CPT | Performed by: EMERGENCY MEDICINE

## 2022-05-25 PROCEDURE — 70450 CT HEAD WITHOUT CONTRAST: ICD-10-PCS | Mod: 26,,, | Performed by: RADIOLOGY

## 2022-05-25 PROCEDURE — 71045 X-RAY EXAM CHEST 1 VIEW: CPT | Mod: 26,,, | Performed by: RADIOLOGY

## 2022-05-25 PROCEDURE — 83615 LACTATE (LD) (LDH) ENZYME: CPT | Performed by: FAMILY MEDICINE

## 2022-05-25 PROCEDURE — 99223 1ST HOSP IP/OBS HIGH 75: CPT | Mod: ,,, | Performed by: FAMILY MEDICINE

## 2022-05-25 PROCEDURE — 93010 ELECTROCARDIOGRAM REPORT: CPT | Mod: ,,, | Performed by: INTERNAL MEDICINE

## 2022-05-25 PROCEDURE — 99900035 HC TECH TIME PER 15 MIN (STAT)

## 2022-05-25 PROCEDURE — 21400001 HC TELEMETRY ROOM

## 2022-05-25 PROCEDURE — 93005 ELECTROCARDIOGRAM TRACING: CPT

## 2022-05-25 PROCEDURE — 80053 COMPREHEN METABOLIC PANEL: CPT | Performed by: STUDENT IN AN ORGANIZED HEALTH CARE EDUCATION/TRAINING PROGRAM

## 2022-05-25 PROCEDURE — 87040 BLOOD CULTURE FOR BACTERIA: CPT | Performed by: EMERGENCY MEDICINE

## 2022-05-25 PROCEDURE — 84550 ASSAY OF BLOOD/URIC ACID: CPT | Performed by: FAMILY MEDICINE

## 2022-05-25 PROCEDURE — 71045 XR CHEST AP PORTABLE: ICD-10-PCS | Mod: 26,,, | Performed by: RADIOLOGY

## 2022-05-25 PROCEDURE — 70450 CT HEAD/BRAIN W/O DYE: CPT | Mod: 26,,, | Performed by: RADIOLOGY

## 2022-05-25 PROCEDURE — 86140 C-REACTIVE PROTEIN: CPT | Performed by: FAMILY MEDICINE

## 2022-05-25 PROCEDURE — 25000003 PHARM REV CODE 250: Performed by: HOSPITALIST

## 2022-05-25 PROCEDURE — 63600175 PHARM REV CODE 636 W HCPCS: Performed by: FAMILY MEDICINE

## 2022-05-25 PROCEDURE — 81003 URINALYSIS AUTO W/O SCOPE: CPT | Performed by: STUDENT IN AN ORGANIZED HEALTH CARE EDUCATION/TRAINING PROGRAM

## 2022-05-25 PROCEDURE — 96360 HYDRATION IV INFUSION INIT: CPT

## 2022-05-25 PROCEDURE — 63600175 PHARM REV CODE 636 W HCPCS: Performed by: EMERGENCY MEDICINE

## 2022-05-25 PROCEDURE — 83605 ASSAY OF LACTIC ACID: CPT | Performed by: EMERGENCY MEDICINE

## 2022-05-25 PROCEDURE — 83605 ASSAY OF LACTIC ACID: CPT | Mod: 91 | Performed by: EMERGENCY MEDICINE

## 2022-05-25 PROCEDURE — 99291 CRITICAL CARE FIRST HOUR: CPT | Mod: 25

## 2022-05-25 PROCEDURE — 85025 COMPLETE CBC W/AUTO DIFF WBC: CPT | Performed by: STUDENT IN AN ORGANIZED HEALTH CARE EDUCATION/TRAINING PROGRAM

## 2022-05-25 PROCEDURE — 25000003 PHARM REV CODE 250: Performed by: EMERGENCY MEDICINE

## 2022-05-25 PROCEDURE — U0002 COVID-19 LAB TEST NON-CDC: HCPCS | Performed by: EMERGENCY MEDICINE

## 2022-05-25 PROCEDURE — 25000003 PHARM REV CODE 250: Performed by: FAMILY MEDICINE

## 2022-05-25 PROCEDURE — 99223 PR INITIAL HOSPITAL CARE,LEVL III: ICD-10-PCS | Mod: ,,, | Performed by: FAMILY MEDICINE

## 2022-05-25 PROCEDURE — 93010 EKG 12-LEAD: ICD-10-PCS | Mod: ,,, | Performed by: INTERNAL MEDICINE

## 2022-05-25 PROCEDURE — 70450 CT HEAD/BRAIN W/O DYE: CPT | Mod: TC

## 2022-05-25 PROCEDURE — 36415 COLL VENOUS BLD VENIPUNCTURE: CPT | Performed by: FAMILY MEDICINE

## 2022-05-25 PROCEDURE — 96361 HYDRATE IV INFUSION ADD-ON: CPT

## 2022-05-25 PROCEDURE — 71045 X-RAY EXAM CHEST 1 VIEW: CPT | Mod: TC,FY

## 2022-05-25 RX ORDER — POLYETHYLENE GLYCOL 3350 17 G/17G
17 POWDER, FOR SOLUTION ORAL DAILY PRN
Status: DISCONTINUED | OUTPATIENT
Start: 2022-05-25 | End: 2022-05-27 | Stop reason: HOSPADM

## 2022-05-25 RX ORDER — ONDANSETRON 4 MG/1
TABLET, ORALLY DISINTEGRATING ORAL DAILY PRN
Status: ON HOLD | COMMUNITY
Start: 2022-05-23 | End: 2022-07-01 | Stop reason: HOSPADM

## 2022-05-25 RX ORDER — HYDRALAZINE HYDROCHLORIDE 25 MG/1
25 TABLET, FILM COATED ORAL EVERY 8 HOURS PRN
Status: DISCONTINUED | OUTPATIENT
Start: 2022-05-25 | End: 2022-05-27 | Stop reason: HOSPADM

## 2022-05-25 RX ORDER — ONDANSETRON 2 MG/ML
4 INJECTION INTRAMUSCULAR; INTRAVENOUS EVERY 8 HOURS PRN
Status: DISCONTINUED | OUTPATIENT
Start: 2022-05-25 | End: 2022-05-27 | Stop reason: HOSPADM

## 2022-05-25 RX ORDER — ENOXAPARIN SODIUM 100 MG/ML
40 INJECTION SUBCUTANEOUS EVERY 24 HOURS
Status: DISCONTINUED | OUTPATIENT
Start: 2022-05-25 | End: 2022-05-27 | Stop reason: HOSPADM

## 2022-05-25 RX ORDER — FAMOTIDINE 20 MG/1
20 TABLET, FILM COATED ORAL DAILY
Status: DISCONTINUED | OUTPATIENT
Start: 2022-05-25 | End: 2022-05-27 | Stop reason: HOSPADM

## 2022-05-25 RX ORDER — SODIUM,POTASSIUM PHOSPHATES 280-250MG
2 POWDER IN PACKET (EA) ORAL
Status: DISCONTINUED | OUTPATIENT
Start: 2022-05-25 | End: 2022-05-27 | Stop reason: HOSPADM

## 2022-05-25 RX ORDER — LANOLIN ALCOHOL/MO/W.PET/CERES
800 CREAM (GRAM) TOPICAL
Status: DISCONTINUED | OUTPATIENT
Start: 2022-05-25 | End: 2022-05-27 | Stop reason: HOSPADM

## 2022-05-25 RX ORDER — DESVENLAFAXINE SUCCINATE 50 MG/1
50 TABLET, EXTENDED RELEASE ORAL DAILY
Status: DISCONTINUED | OUTPATIENT
Start: 2022-05-25 | End: 2022-05-27 | Stop reason: HOSPADM

## 2022-05-25 RX ORDER — SODIUM CHLORIDE, SODIUM LACTATE, POTASSIUM CHLORIDE, CALCIUM CHLORIDE 600; 310; 30; 20 MG/100ML; MG/100ML; MG/100ML; MG/100ML
INJECTION, SOLUTION INTRAVENOUS CONTINUOUS
Status: DISCONTINUED | OUTPATIENT
Start: 2022-05-25 | End: 2022-05-27 | Stop reason: HOSPADM

## 2022-05-25 RX ORDER — SODIUM CHLORIDE 0.9 % (FLUSH) 0.9 %
10 SYRINGE (ML) INJECTION EVERY 8 HOURS PRN
Status: DISCONTINUED | OUTPATIENT
Start: 2022-05-25 | End: 2022-05-27 | Stop reason: HOSPADM

## 2022-05-25 RX ORDER — ESCITALOPRAM OXALATE 10 MG/1
10 TABLET ORAL DAILY
Status: DISCONTINUED | OUTPATIENT
Start: 2022-05-25 | End: 2022-05-27 | Stop reason: HOSPADM

## 2022-05-25 RX ORDER — ALPRAZOLAM 0.25 MG/1
0.25 TABLET ORAL 3 TIMES DAILY PRN
Status: DISCONTINUED | OUTPATIENT
Start: 2022-05-25 | End: 2022-05-27 | Stop reason: HOSPADM

## 2022-05-25 RX ORDER — FERROUS SULFATE, DRIED 160(50) MG
1 TABLET, EXTENDED RELEASE ORAL 2 TIMES DAILY
Status: DISCONTINUED | OUTPATIENT
Start: 2022-05-25 | End: 2022-05-27 | Stop reason: HOSPADM

## 2022-05-25 RX ORDER — HYDROCODONE BITARTRATE AND ACETAMINOPHEN 10; 325 MG/1; MG/1
1 TABLET ORAL
Status: COMPLETED | OUTPATIENT
Start: 2022-05-25 | End: 2022-05-25

## 2022-05-25 RX ORDER — PREDNISONE 10 MG/1
10 TABLET ORAL DAILY
Status: DISCONTINUED | OUTPATIENT
Start: 2022-05-25 | End: 2022-05-27 | Stop reason: HOSPADM

## 2022-05-25 RX ORDER — IPRATROPIUM BROMIDE AND ALBUTEROL SULFATE 2.5; .5 MG/3ML; MG/3ML
3 SOLUTION RESPIRATORY (INHALATION) EVERY 6 HOURS PRN
Status: DISCONTINUED | OUTPATIENT
Start: 2022-05-25 | End: 2022-05-27 | Stop reason: HOSPADM

## 2022-05-25 RX ORDER — MORPHINE SULFATE 4 MG/ML
2 INJECTION, SOLUTION INTRAMUSCULAR; INTRAVENOUS EVERY 4 HOURS PRN
Status: DISCONTINUED | OUTPATIENT
Start: 2022-05-25 | End: 2022-05-27 | Stop reason: HOSPADM

## 2022-05-25 RX ORDER — MEGESTROL ACETATE 125 MG/ML
625 SUSPENSION ORAL DAILY
Status: DISCONTINUED | OUTPATIENT
Start: 2022-05-25 | End: 2022-05-27 | Stop reason: HOSPADM

## 2022-05-25 RX ORDER — VANCOMYCIN HCL IN 5 % DEXTROSE 1G/250ML
1000 PLASTIC BAG, INJECTION (ML) INTRAVENOUS
Status: DISCONTINUED | OUTPATIENT
Start: 2022-05-26 | End: 2022-05-27 | Stop reason: HOSPADM

## 2022-05-25 RX ORDER — VANCOMYCIN HCL IN 5 % DEXTROSE 1G/250ML
1000 PLASTIC BAG, INJECTION (ML) INTRAVENOUS
Status: COMPLETED | OUTPATIENT
Start: 2022-05-25 | End: 2022-05-25

## 2022-05-25 RX ORDER — PROMETHAZINE HYDROCHLORIDE 12.5 MG/1
25 TABLET ORAL EVERY 6 HOURS PRN
Status: DISCONTINUED | OUTPATIENT
Start: 2022-05-25 | End: 2022-05-27 | Stop reason: HOSPADM

## 2022-05-25 RX ORDER — TRAZODONE HYDROCHLORIDE 50 MG/1
50 TABLET ORAL NIGHTLY PRN
Status: DISCONTINUED | OUTPATIENT
Start: 2022-05-25 | End: 2022-05-27 | Stop reason: HOSPADM

## 2022-05-25 RX ORDER — MEROPENEM AND SODIUM CHLORIDE 1 G/50ML
1 INJECTION, SOLUTION INTRAVENOUS
Status: DISCONTINUED | OUTPATIENT
Start: 2022-05-25 | End: 2022-05-27 | Stop reason: HOSPADM

## 2022-05-25 RX ORDER — SODIUM CHLORIDE 9 MG/ML
1000 INJECTION, SOLUTION INTRAVENOUS
Status: ACTIVE | OUTPATIENT
Start: 2022-05-25 | End: 2022-05-25

## 2022-05-25 RX ORDER — ACETAMINOPHEN 325 MG/1
650 TABLET ORAL EVERY 4 HOURS PRN
Status: DISCONTINUED | OUTPATIENT
Start: 2022-05-25 | End: 2022-05-27 | Stop reason: HOSPADM

## 2022-05-25 RX ORDER — DRONABINOL 10 MG/1
10 CAPSULE ORAL
Status: DISCONTINUED | OUTPATIENT
Start: 2022-05-25 | End: 2022-05-27 | Stop reason: HOSPADM

## 2022-05-25 RX ORDER — GLUCAGON 1 MG
1 KIT INJECTION
Status: DISCONTINUED | OUTPATIENT
Start: 2022-05-25 | End: 2022-05-27 | Stop reason: HOSPADM

## 2022-05-25 RX ORDER — LANOLIN ALCOHOL/MO/W.PET/CERES
400 CREAM (GRAM) TOPICAL DAILY
Status: DISCONTINUED | OUTPATIENT
Start: 2022-05-25 | End: 2022-05-27 | Stop reason: HOSPADM

## 2022-05-25 RX ORDER — IBUPROFEN 200 MG
16 TABLET ORAL
Status: DISCONTINUED | OUTPATIENT
Start: 2022-05-25 | End: 2022-05-27 | Stop reason: HOSPADM

## 2022-05-25 RX ORDER — LOPERAMIDE HYDROCHLORIDE 2 MG/1
2 CAPSULE ORAL 4 TIMES DAILY PRN
Status: DISCONTINUED | OUTPATIENT
Start: 2022-05-25 | End: 2022-05-27 | Stop reason: HOSPADM

## 2022-05-25 RX ORDER — MEROPENEM AND SODIUM CHLORIDE 1 G/50ML
1 INJECTION, SOLUTION INTRAVENOUS ONCE
Status: COMPLETED | OUTPATIENT
Start: 2022-05-25 | End: 2022-05-25

## 2022-05-25 RX ORDER — PROCHLORPERAZINE EDISYLATE 5 MG/ML
5 INJECTION INTRAMUSCULAR; INTRAVENOUS EVERY 6 HOURS PRN
Status: DISCONTINUED | OUTPATIENT
Start: 2022-05-25 | End: 2022-05-27 | Stop reason: HOSPADM

## 2022-05-25 RX ORDER — HYDROCODONE BITARTRATE AND ACETAMINOPHEN 5; 325 MG/1; MG/1
1 TABLET ORAL EVERY 6 HOURS PRN
Status: DISCONTINUED | OUTPATIENT
Start: 2022-05-25 | End: 2022-05-27 | Stop reason: HOSPADM

## 2022-05-25 RX ORDER — IBUPROFEN 200 MG
24 TABLET ORAL
Status: DISCONTINUED | OUTPATIENT
Start: 2022-05-25 | End: 2022-05-27 | Stop reason: HOSPADM

## 2022-05-25 RX ORDER — NALOXONE HCL 0.4 MG/ML
0.02 VIAL (ML) INJECTION
Status: DISCONTINUED | OUTPATIENT
Start: 2022-05-25 | End: 2022-05-27 | Stop reason: HOSPADM

## 2022-05-25 RX ORDER — CALCIUM CARBONATE 200(500)MG
1000 TABLET,CHEWABLE ORAL
Status: DISCONTINUED | OUTPATIENT
Start: 2022-05-25 | End: 2022-05-27 | Stop reason: HOSPADM

## 2022-05-25 RX ADMIN — HYDROCODONE BITARTRATE AND ACETAMINOPHEN 1 TABLET: 10; 325 TABLET ORAL at 10:05

## 2022-05-25 RX ADMIN — MEROPENEM AND SODIUM CHLORIDE 1 G: 1 INJECTION, SOLUTION INTRAVENOUS at 08:05

## 2022-05-25 RX ADMIN — SODIUM CHLORIDE, SODIUM LACTATE, POTASSIUM CHLORIDE, AND CALCIUM CHLORIDE 1905 ML: .6; .31; .03; .02 INJECTION, SOLUTION INTRAVENOUS at 10:05

## 2022-05-25 RX ADMIN — SODIUM CHLORIDE, SODIUM LACTATE, POTASSIUM CHLORIDE, AND CALCIUM CHLORIDE: .6; .31; .03; .02 INJECTION, SOLUTION INTRAVENOUS at 03:05

## 2022-05-25 RX ADMIN — PROCHLORPERAZINE EDISYLATE 5 MG: 5 INJECTION INTRAMUSCULAR; INTRAVENOUS at 03:05

## 2022-05-25 RX ADMIN — VANCOMYCIN HYDROCHLORIDE 1000 MG: 1 INJECTION, POWDER, LYOPHILIZED, FOR SOLUTION INTRAVENOUS at 11:05

## 2022-05-25 RX ADMIN — Medication 1 TABLET: at 08:05

## 2022-05-25 RX ADMIN — VANCOMYCIN HYDROCHLORIDE 1000 MG: 1 INJECTION, POWDER, LYOPHILIZED, FOR SOLUTION INTRAVENOUS at 12:05

## 2022-05-25 RX ADMIN — ONDANSETRON 4 MG: 2 INJECTION INTRAMUSCULAR; INTRAVENOUS at 07:05

## 2022-05-25 RX ADMIN — ALPRAZOLAM 0.25 MG: 0.25 TABLET ORAL at 03:05

## 2022-05-25 RX ADMIN — HYDRALAZINE HYDROCHLORIDE 25 MG: 25 TABLET, FILM COATED ORAL at 08:05

## 2022-05-25 RX ADMIN — MEROPENEM AND SODIUM CHLORIDE 1 G: 1 INJECTION, SOLUTION INTRAVENOUS at 12:05

## 2022-05-25 RX ADMIN — ALPRAZOLAM 0.25 MG: 0.25 TABLET ORAL at 08:05

## 2022-05-25 NOTE — HPI
65 yo female with PMH of colon cancer with metastatic disease to the liver, goiter, hypertension, allergies, hld, h/o sepsis presents to the ER by ambulance with generalized complaints/feeling bad. History obtained from patient and separately from  and are somewhat conflicting. Was previously on chemotherapy but had to stop treatments due to diffuse skin reaction. Has been off of medication for the last 2 months and has felt better than she has in a long time. Skin ulcers are healing. Appetite improving significantly. Per the , however, she woke up this morning and started screaming uncontrollably that she was in pain. Fire department was called and brought her to the ER. Per patient, she was not in pain but was extremely frustrated because she started thinking about her cancer, was trying to complete a task and had difficulty and became overwhelmed and could not stop herself from screaming. She then started to throw up. Her only other symptom was a headache over the last 2-3 days however on exam she states it is the worst headache of her life. There is report of a fever up to 101.9 but not certain where this came from. Denies any cough, congestion, sore throat, shortness of breath, neck pain, back pain, dysuria, stomach pain, blood in stool, change in bowel movements. In the ER, she was hypotensive, lactic normal, WBC 15K, HR 120s. Hospital team called for admission.

## 2022-05-25 NOTE — PLAN OF CARE
Patient in no apparent distress. Sat's 97  % on room air . PRN treatments not needed. Will continue to monitor.

## 2022-05-25 NOTE — ED TRIAGE NOTES
Per [facility] pts  called stating that pt woke delusional, and hollering. Per [facility] pt was confused and did not know where she was.[facility] states that pt is currently diagnosed with colon cancer. [facility] states that pt was complaining of nausea and vomiting and had a temp of 101.9. [facility] administered 600ml normal saline and 4 mg zofran. Pt has an 18guage left wrist.

## 2022-05-25 NOTE — NURSING
Plan of care reviewed with pt. RHIANNON throughout shift. PRN nausea medications provided as ordered. Continuous IV fluids at 100ml/hr maintained. Regular diet. Continue to Monitor Labs. VSS. Continuous telemetry monitor maintained. Safety maintained. Will continue to monitor. No complaints at this time.

## 2022-05-25 NOTE — ED TRIAGE NOTES
"Pt states that she woke up this morning and pt states that she was trying to get out of bed. Pt states that she had a "weird feeling" and began crying pt states that she than began to throw up. Pt states that this has occurred in the past when she was dehydrated. Pt currently denies any pain at this time.pt is awake alert and orientated. Pt states that she does feel better since receiving fluids from [facility]   "

## 2022-05-25 NOTE — Clinical Note
Is this patient a high probability for COVID-19?: No   Diagnosis: Sepsis, due to unspecified organism, unspecified whether acute organ dysfunction present [[id number]]   Admitting Provider:: KALE PAREDES [[id number]]   Future Attending Provider: KALE PAREDES [[id number]]   Reason for IP Medical Treatment  (Clinical interventions that can only be accomplished in the IP setting? ) :: Sepsis   Estimated Length of Stay:: 3-4 midnights   I certify that Inpatient services for greater than or equal to 2 midnights are medically necessary:: Yes   Plans for Post-Acute care--if anticipated (pick the single best option):: A. No post acute care anticipated at this time

## 2022-05-25 NOTE — ASSESSMENT & PLAN NOTE
Nutrition consulted. Most recent weight and BMI monitored-                         Measurements:  Wt Readings from Last 1 Encounters:   05/25/22 63.5 kg (140 lb)   Body mass index is 21.93 kg/m².    Recommendations:   continue home medications for appetite     Patient has been screened and assessed by RD. RD will follow patient.

## 2022-05-25 NOTE — ASSESSMENT & PLAN NOTE
Will start with CT Head  Last imaging only 3 months ago without evidence of mass etc  Associated with nausea/vomiting  New onset in last 2-3 days

## 2022-05-25 NOTE — ASSESSMENT & PLAN NOTE
Concern for sepsis with tachycardia, hypotension, leukocytosis though lactic normal  Repeat lactic  Given appropriate fluid resuscitation  Check uric acid, ldh   Broad spectrum antibiotics for now - IV Vanc/Merrem  No obvious source - CXR, u/a without etiology  Monitor fever curve, avoid antipyretics if possible until fever >101  Daily cbc, cmp, mag  F/u blood cultures

## 2022-05-25 NOTE — PROGRESS NOTES
Pharmacokinetic Initial Assessment: IV Vancomycin    Assessment/Plan:    Initiate intravenous vancomycin with maintenance dose of 1000 mg every 12 hours.  Desired empiric serum trough concentration is 15 to 20 mcg/mL  Draw vancomycin trough level 60 min prior to fourth dose on 5/26 at approximately 2300.  Pharmacy will continue to follow and monitor vancomycin.      Please contact pharmacy at extension [phone] with any questions regarding this assessment.     Thank you for the consult,   Pierre Santiago, NeftaliD       Patient brief summary:  Regine Lemus is a 66 y.o. female initiated on antimicrobial therapy with IV Vancomycin for treatment of suspected sepsis    Drug Allergies:   Review of patient's allergies indicates:   Allergen Reactions    Lisinopril Rash       Actual Body Weight:   63.5 kg    Renal Function:   Estimated Creatinine Clearance: 87.3 mL/min (based on SCr of 0.6 mg/dL).,     Dialysis Method (if applicable):  N/A    CBC (last 72 hours):  Recent Labs   Lab Result Units 05/25/22  0920   WBC K/uL 15.07*   Hemoglobin g/dL 11.0*   Hematocrit % 35.7*   Platelets K/uL 128*   Gran % % 77.6*   Lymph % % 12.3*   Mono % % 9.2   Eosinophil % % 0.1   Basophil % % 0.1   Differential Method  Automated       Metabolic Panel (last 72 hours):  Recent Labs   Lab Result Units 05/25/22  0920 05/25/22  0959   Sodium mmol/L 138  --    Potassium mmol/L 3.7  --    Chloride mmol/L 102  --    CO2 mmol/L 25  --    Glucose mg/dL 79  --    Glucose, UA   --  Negative   BUN mg/dL 14  --    Creatinine mg/dL 0.6  --    Albumin g/dL 2.7*  --    Total Bilirubin mg/dL 1.4*  --    Alkaline Phosphatase U/L 729*  --    AST U/L 103*  --    ALT U/L 63*  --        Drug levels (last 3 results):  No results for input(s): VANCOMYCINRA, VANCORANDOM, VANCOMYCINPE, VANCOPEAK, VANCOMYCINTR, VANCOTROUGH in the last 72 hours.    Microbiologic Results:  Microbiology Results (last 7 days)       Procedure Component Value Units Date/Time    Blood Culture #2  **CANNOT BE ORDERED STAT** [[id number]] Collected: 05/25/22 1039    Order Status: Sent Specimen: Blood Updated: 05/25/22 1606    Blood Culture #1 **CANNOT BE ORDERED STAT** [[id number]] Collected: 05/25/22 1030    Order Status: Sent Specimen: Blood Updated: 05/25/22 1606    Influenza A & B by Molecular [[id number]] Collected: 05/25/22 0959    Order Status: Completed Specimen: Nasopharyngeal Swab Updated: 05/25/22 1033     Influenza A, Molecular Negative     Influenza B, Molecular Negative     Flu A & B Source Nasal Swab

## 2022-05-25 NOTE — ED PROVIDER NOTES
Encounter Date: 5/25/2022       History     Chief Complaint   Patient presents with    Altered Mental Status     Patient presents to the ER for evaluation of fever.  Patient went to bed feeling okay.  She woke up this morning palpably febrile and with altered mental status.  She experienced some nausea and vomiting as well.  Upon paramedics arrival she was feeling better.  She was given fluids and her temperature came down.  She was treated with Zofran the nausea is better.  Her mental status has returned to baseline.  Patient has a history of metastatic colon cancer and is receiving chemotherapy.  She denies any recent shortness of breath, cough, vomiting or diarrhea.  No complaints of difficulty with urination.  Her abdominal pain has been increasing gradually and has caused her to need more of her prescription pain medicine.        Review of patient's allergies indicates:   Allergen Reactions    Lisinopril Rash     Past Medical History:   Diagnosis Date    Allergy     Colon cancer     Hypertension     Liver cancer     Nontoxic uninodular goiter     Osteoarthrosis, unspecified whether generalized or localized, hand     Other abnormal glucose     Pure hypercholesterolemia     Severe sepsis      Past Surgical History:   Procedure Laterality Date    CYST REMOVAL  1994    cyst removed from neck    infusaport placement Right     THYROIDECTOMY      TONSILLECTOMY  age 14     Family History   Problem Relation Age of Onset    Lung cancer Father      Social History     Tobacco Use    Smoking status: Never Smoker    Smokeless tobacco: Never Used   Substance Use Topics    Alcohol use: No    Drug use: Never     Review of Systems   Constitutional: Positive for fever.   HENT: Negative for sore throat.    Respiratory: Negative for shortness of breath.    Cardiovascular: Negative for chest pain.   Gastrointestinal: Positive for nausea and vomiting. Negative for diarrhea.   Skin: Positive for rash.         Patient has a chronic rash on her face, pelvis and extremities that is related to her chemotherapy; it has not changed.   All other systems reviewed and are negative.      Physical Exam     Initial Vitals [05/25/22 0901]   BP Pulse Resp Temp SpO2   120/64 (!) 128 18 98.7 °F (37.1 °C) 95 %      MAP       --         Physical Exam    Nursing note and vitals reviewed.  Constitutional: She appears well-developed and well-nourished. No distress.   HENT:   Head: Normocephalic and atraumatic.   Right Ear: External ear normal.   Left Ear: External ear normal.   Nose: Nose normal.   Mouth/Throat: Oropharynx is clear and moist. No oropharyngeal exudate.   Eyes: Conjunctivae and EOM are normal. Pupils are equal, round, and reactive to light.   Neck: Neck supple.   Normal range of motion.  Cardiovascular: Normal rate, regular rhythm and normal heart sounds.   Pulmonary/Chest: Breath sounds normal.   Abdominal:   Mild right upper quadrant tenderness.   Musculoskeletal:         General: Normal range of motion.      Cervical back: Normal range of motion and neck supple.     Neurological: She is alert and oriented to person, place, and time. No cranial nerve deficit.   Skin:   She has the chronic rash described above; it is a macular papular rash that is somewhat scaly.  She has a few petechiae on her forehead.   Psychiatric: She has a normal mood and affect. Thought content normal.         ED Course   Critical Care    Date/Time: 5/25/2022 11:57 AM  Performed by: Emanuel Real MD  Authorized by: Emanuel Real MD   Direct patient critical care time: 20 minutes  Additional history critical care time: 5 minutes  Ordering / reviewing critical care time: 5 minutes  Documentation critical care time: 5 minutes  Consulting other physicians critical care time: 7 minutes  Total critical care time (exclusive of procedural time) : 42 minutes        Labs Reviewed   CBC W/ AUTO DIFFERENTIAL - Abnormal; Notable for the following  components:       Result Value    WBC 15.07 (*)     RBC 3.55 (*)     Hemoglobin 11.0 (*)     Hematocrit 35.7 (*)      (*)     MCHC 30.8 (*)     RDW 15.0 (*)     Platelets 128 (*)     Immature Granulocytes 0.7 (*)     Gran # (ANC) 11.7 (*)     Immature Grans (Abs) 0.10 (*)     Mono # 1.4 (*)     Gran % 77.6 (*)     Lymph % 12.3 (*)     All other components within normal limits   COMPREHENSIVE METABOLIC PANEL - Abnormal; Notable for the following components:    Albumin 2.7 (*)     Total Bilirubin 1.4 (*)     Alkaline Phosphatase 729 (*)      (*)     ALT 63 (*)     All other components within normal limits   INFLUENZA A & B BY MOLECULAR   CULTURE, BLOOD   CULTURE, BLOOD   URINALYSIS, REFLEX TO URINE CULTURE    Narrative:     Preferred Collection Type->Urine, Clean Catch  Specimen Source->Urine   LACTIC ACID, PLASMA   SARS-COV-2 RNA AMPLIFICATION, QUAL    Narrative:     Is the patient symptomatic?->Yes   LACTIC ACID, PLASMA     EKG Readings: (Independently Interpreted)   Sinus rhythm, rate 124, no ST elevation, normal intervals       Imaging Results          X-Ray Chest AP Portable (Final result)  Result time 05/25/22 10:10:12    Final result by Adalberto Gonzalez MD (05/25/22 10:10:12)                 Impression:      1. No acute chest disease.  2. Right subclavian Port-A-Cath.      Electronically signed by: Adalberto Gonzalez  Date:    05/25/2022  Time:    10:10             Narrative:    EXAMINATION:  XR CHEST AP PORTABLE    CLINICAL HISTORY:  Fever, unspecified    TECHNIQUE:  Single frontal view of the chest was performed.    COMPARISON:  02/26/2022.    FINDINGS:  Mild pulmonary hypoinflation.  Minimal dependent atelectasis at the left lung base.  No focal consolidation.  Heart size top normal.  Mediastinal contours unremarkable.    Bony thorax intact.  Right subclavian Port-A-Cath remains in satisfactory position.                                 Medications   0.9%  NaCl infusion (1,000 mLs Intravenous Not  Given 5/25/22 1035)   lactated ringers bolus 1,905 mL (1,905 mLs Intravenous New Bag 5/25/22 1038)   vancomycin in dextrose 5 % 1 gram/250 mL IVPB 1,000 mg (has no administration in time range)   meropenem-0.9% sodium chloride 1 g/50 mL IVPB (has no administration in time range)   HYDROcodone-acetaminophen  mg per tablet 1 tablet (1 tablet Oral Given 5/25/22 1031)     Medical Decision Making:   Initial Assessment:   Patient presents with an episode of fever and altered mental status with 1 episode of vomiting.  I did a septic workup and her lactic is normal.  She does have a leukocytosis.  She has known liver abnormalities because of metastatic cancer.  I do not find any source of infection on my workup today.  She has been hypotensive despite initiation of fluid resuscitation in on continuing that at this time.  She will be placed on broad-spectrum antibiotics empirically.  Case discussed with Dr. Hirsch.  Patient will be admitted to the ICU for continued treatment.                      Clinical Impression:   Final diagnoses:  [R41.82] Altered mental status  [R50.9] Fever  [A41.9] Sepsis, due to unspecified organism, unspecified whether acute organ dysfunction present (Primary)          ED Disposition Condition    Admit               Emanuel Real MD  05/25/22 6079

## 2022-05-25 NOTE — H&P
[facility] Medicine  History & Physical    Patient Name: Regine Lemus  MRN: [id number]  Patient Class: IP- Inpatient  Admission Date: 5/25/2022  Attending Physician: Court Hirsch MD  Primary Care Provider: Primary Doctor No         Patient information was obtained from patient, spouse/SO and ER records.     Subjective:     Principal Problem:Fever    Chief Complaint:   Chief Complaint   Patient presents with    Altered Mental Status        HPI: 67 yo female with PMH of colon cancer with metastatic disease to the liver, goiter, hypertension, allergies, hld, h/o sepsis presents to the ER by ambulance with generalized complaints/feeling bad. History obtained from patient and separately from  and are somewhat conflicting. Was previously on chemotherapy but had to stop treatments due to diffuse skin reaction. Has been off of medication for the last 2 months and has felt better than she has in a long time. Skin ulcers are healing. Appetite improving significantly. Per the , however, she woke up this morning and started screaming uncontrollably that she was in pain. Fire department was called and brought her to the ER. Per patient, she was not in pain but was extremely frustrated because she started thinking about her cancer, was trying to complete a task and had difficulty and became overwhelmed and could not stop herself from screaming. She then started to throw up. Her only other symptom was a headache over the last 2-3 days however on exam she states it is the worst headache of her life. There is report of a fever up to 101.9 but not certain where this came from. Denies any cough, congestion, sore throat, shortness of breath, neck pain, back pain, dysuria, stomach pain, blood in stool, change in bowel movements. In the ER, she was hypotensive, lactic normal, WBC 15K, HR 120s. Hospital team called for admission.       Past Medical History:   Diagnosis Date    Allergy     Colon  cancer     Hypertension     Liver cancer     Nontoxic uninodular goiter     Osteoarthrosis, unspecified whether generalized or localized, hand     Other abnormal glucose     Pure hypercholesterolemia     Severe sepsis        Past Surgical History:   Procedure Laterality Date    CYST REMOVAL  1994    cyst removed from neck    infusaport placement Right     THYROIDECTOMY      TONSILLECTOMY  age 14       Review of patient's allergies indicates:   Allergen Reactions    Lisinopril Rash       No current facility-administered medications on file prior to encounter.     Current Outpatient Medications on File Prior to Encounter   Medication Sig    ALPRAZolam (XANAX) 0.25 MG tablet Take 1 tablet (0.25 mg total) by mouth 3 (three) times daily as needed for Anxiety.    calcium carbonate (TUMS) 200 mg calcium (500 mg) chewable tablet Take 2 tablets (1,000 mg total) by mouth 3 (three) times daily before meals.    calcium-vitamin D3 (OYSTER SHELL CALCIUM-VIT D3) 500 mg-5 mcg (200 unit) per tablet Take 1 tablet by mouth 2 (two) times daily.    desvenlafaxine succinate (PRISTIQ) 50 MG Tb24 Take 1 tablet (50 mg total) by mouth once daily.    dronabinoL (MARINOL) 10 MG capsule Take 1 capsule (10 mg total) by mouth 2 (two) times daily before meals.    ergocalciferol (ERGOCALCIFEROL) 50,000 unit Cap Take 50,000 Units by mouth every 7 days.    EScitalopram oxalate (LEXAPRO) 10 MG tablet Take 1 tablet (10 mg total) by mouth once daily.    famotidine (PEPCID) 20 MG tablet Take 1 tablet (20 mg total) by mouth once daily.    HYDROcodone-acetaminophen (NORCO) 5-325 mg per tablet Take 1 tablet by mouth every 6 (six) hours as needed for Pain.    KLOR-CON M20 20 mEq tablet Take 20 mEq by mouth 2 (two) times daily.    levothyroxine (SYNTHROID) 200 MCG tablet Take 1 tablet (200 mcg total) by mouth once daily.    loperamide (IMODIUM) 2 mg capsule Take 1 capsule (2 mg total) by mouth 4 (four) times daily as needed for  Diarrhea.    magnesium oxide (MAGOX) 400 mg (241.3 mg magnesium) tablet Take 1 tablet (400 mg total) by mouth once daily.    megestroL (MEGACE ES) 625 mg/5 mL (125 mg/mL) Susp Take 5 mLs (625 mg total) by mouth once daily.    ondansetron (ZOFRAN) 4 MG tablet Take 1 tablet (4 mg total) by mouth as needed for Nausea.    ondansetron (ZOFRAN-ODT) 4 MG TbDL Take by mouth daily as needed.    predniSONE (DELTASONE) 10 MG tablet Take 1 tablet (10 mg total) by mouth once daily. Pt taking 15mg daily    traZODone (DESYREL) 50 MG tablet Take 1 tablet (50 mg total) by mouth nightly as needed for Insomnia.    [DISCONTINUED] furosemide (LASIX) 20 MG tablet Take 1 tablet (20 mg total) by mouth once daily.     Family History       Problem Relation (Age of Onset)    Lung cancer Father          Tobacco Use    Smoking status: Never Smoker    Smokeless tobacco: Never Used   Substance and Sexual Activity    Alcohol use: No    Drug use: Never    Sexual activity: Not on file     Review of Systems   Constitutional:  Positive for fever. Negative for chills and diaphoresis.   HENT:  Negative for congestion and sore throat.    Eyes: Negative.    Respiratory:  Negative for cough, chest tightness and shortness of breath.    Cardiovascular:  Negative for chest pain and leg swelling.   Gastrointestinal:  Positive for nausea and vomiting. Negative for abdominal pain.   Endocrine: Negative.    Genitourinary: Negative.    Musculoskeletal:  Negative for arthralgias, back pain and neck pain.   Allergic/Immunologic: Negative.    Neurological:  Positive for headaches. Negative for seizures, syncope, light-headedness and numbness.   Psychiatric/Behavioral:  Positive for sleep disturbance. Negative for confusion, decreased concentration and suicidal ideas. The patient is nervous/anxious.    Objective:     Vital Signs (Most Recent):  Temp: 97.4 °F (36.3 °C) (05/25/22 1434)  Pulse: 89 (05/25/22 1445)  Resp: 20 (05/25/22 1445)  BP: (!) 120/59  (05/25/22 1434)  SpO2: 97 % (05/25/22 1445)   Vital Signs (24h Range):  Temp:  [97.4 °F (36.3 °C)-98.7 °F (37.1 °C)] 97.4 °F (36.3 °C)  Pulse:  [] 89  Resp:  [11-27] 20  SpO2:  [90 %-98 %] 97 %  BP: ()/(46-78) 120/59     Weight: 63.5 kg (140 lb)  Body mass index is 21.93 kg/m².    Physical Exam  Vitals reviewed.   Constitutional:       General: She is not in acute distress.     Appearance: She is ill-appearing. She is not toxic-appearing or diaphoretic.      Comments: Rubbing her head through most of exam, appears in pain   HENT:      Head: Normocephalic and atraumatic.      Right Ear: External ear normal.      Left Ear: External ear normal.      Nose: Nose normal. No congestion.      Mouth/Throat:      Mouth: Mucous membranes are moist.   Eyes:      Extraocular Movements: Extraocular movements intact.      Conjunctiva/sclera: Conjunctivae normal.      Pupils: Pupils are equal, round, and reactive to light.   Cardiovascular:      Rate and Rhythm: Regular rhythm. Tachycardia present.      Pulses: Normal pulses.      Heart sounds: No murmur heard.    No gallop.   Pulmonary:      Effort: Pulmonary effort is normal. No respiratory distress.      Breath sounds: No wheezing or rales.   Abdominal:      Palpations: Abdomen is soft. There is no mass.      Comments: Non-tender to deep palpation, no RUQ tenderness to palpation, bowel sounds normal and active   Musculoskeletal:      Right lower leg: No edema.      Left lower leg: No edema.   Skin:     General: Skin is warm.      Coloration: Skin is not pale.      Comments: Multiple healing ulcers across upper extremities, trunk, lower extremities with erythematous border and central clearing. Erythematous rash in groin and under breasts bilaterally consistent with fungal infection   Neurological:      Mental Status: She is alert and oriented to person, place, and time. Mental status is at baseline.   Psychiatric:      Comments: Anxious, tearful, restless          CRANIAL NERVES     CN III, IV, VI   Pupils are equal, round, and reactive to light.     Significant Labs: All pertinent labs within the past 24 hours have been reviewed.  CBC:   Recent Labs   Lab 05/25/22  0920   WBC 15.07*   HGB 11.0*   HCT 35.7*   *     CMP:   Recent Labs   Lab 05/25/22  0920      K 3.7      CO2 25   GLU 79   BUN 14   CREATININE 0.6   CALCIUM 9.1   PROT 6.8   ALBUMIN 2.7*   BILITOT 1.4*   ALKPHOS 729*   *   ALT 63*   ANIONGAP 11   EGFRNONAA >60.0     Lactic Acid:   Recent Labs   Lab 05/25/22  1030 05/25/22  1437   LACTATE 1.8 1.9       Significant Imaging: I have reviewed all pertinent imaging results/findings within the past 24 hours.  X-Ray Chest AP Portable   Final Result      1. No acute chest disease.   2. Right subclavian Port-A-Cath.         Electronically signed by: Adalberto Gonzalez   Date:    05/25/2022   Time:    10:10            Assessment/Plan:     * Fever  Concern for sepsis with tachycardia, hypotension, leukocytosis though lactic normal  Repeat lactic  Given appropriate fluid resuscitation  Check uric acid, ldh   Broad spectrum antibiotics for now - IV Vanc/Merrem  No obvious source - CXR, u/a without etiology  Monitor fever curve, avoid antipyretics if possible until fever >101  Daily cbc, cmp, mag  F/u blood cultures        Severe headache  Will start with CT Head  Last imaging only 3 months ago without evidence of mass etc  Associated with nausea/vomiting  New onset in last 2-3 days      Depression  Continue home medications - Pristiq, lexapro      Altered mental status  Uncertain if patient was altered, history uncertain      Hypothyroidism  Continue home levothyroxine      Severe malnutrition  Nutrition consulted. Most recent weight and BMI monitored-                         Measurements:  Wt Readings from Last 1 Encounters:   05/25/22 63.5 kg (140 lb)   Body mass index is 21.93 kg/m².    Recommendations:   continue home medications for appetite      Patient has been screened and assessed by RD. RD will follow patient.      Secondary malignant neoplasm of liver        Colorectal carcinoma  Check LDH, uric acid      VTE Risk Mitigation (From admission, onward)         Ordered     enoxaparin injection 40 mg  Daily         05/25/22 1500     IP VTE HIGH RISK PATIENT  Once         05/25/22 1500     Place sequential compression device  Until discontinued         05/25/22 1500                   Court Hirsch MD  Department of Hospital Medicine   [facility]

## 2022-05-25 NOTE — SUBJECTIVE & OBJECTIVE
Past Medical History:   Diagnosis Date    Allergy     Colon cancer     Hypertension     Liver cancer     Nontoxic uninodular goiter     Osteoarthrosis, unspecified whether generalized or localized, hand     Other abnormal glucose     Pure hypercholesterolemia     Severe sepsis        Past Surgical History:   Procedure Laterality Date    CYST REMOVAL  1994    cyst removed from neck    infusaport placement Right     THYROIDECTOMY      TONSILLECTOMY  age 14       Review of patient's allergies indicates:   Allergen Reactions    Lisinopril Rash       No current facility-administered medications on file prior to encounter.     Current Outpatient Medications on File Prior to Encounter   Medication Sig    ALPRAZolam (XANAX) 0.25 MG tablet Take 1 tablet (0.25 mg total) by mouth 3 (three) times daily as needed for Anxiety.    calcium carbonate (TUMS) 200 mg calcium (500 mg) chewable tablet Take 2 tablets (1,000 mg total) by mouth 3 (three) times daily before meals.    calcium-vitamin D3 (OYSTER SHELL CALCIUM-VIT D3) 500 mg-5 mcg (200 unit) per tablet Take 1 tablet by mouth 2 (two) times daily.    desvenlafaxine succinate (PRISTIQ) 50 MG Tb24 Take 1 tablet (50 mg total) by mouth once daily.    dronabinoL (MARINOL) 10 MG capsule Take 1 capsule (10 mg total) by mouth 2 (two) times daily before meals.    ergocalciferol (ERGOCALCIFEROL) 50,000 unit Cap Take 50,000 Units by mouth every 7 days.    EScitalopram oxalate (LEXAPRO) 10 MG tablet Take 1 tablet (10 mg total) by mouth once daily.    famotidine (PEPCID) 20 MG tablet Take 1 tablet (20 mg total) by mouth once daily.    HYDROcodone-acetaminophen (NORCO) 5-325 mg per tablet Take 1 tablet by mouth every 6 (six) hours as needed for Pain.    KLOR-CON M20 20 mEq tablet Take 20 mEq by mouth 2 (two) times daily.    levothyroxine (SYNTHROID) 200 MCG tablet Take 1 tablet (200 mcg total) by mouth once daily.    loperamide (IMODIUM) 2 mg capsule Take 1 capsule (2 mg total) by mouth 4  (four) times daily as needed for Diarrhea.    magnesium oxide (MAGOX) 400 mg (241.3 mg magnesium) tablet Take 1 tablet (400 mg total) by mouth once daily.    megestroL (MEGACE ES) 625 mg/5 mL (125 mg/mL) Susp Take 5 mLs (625 mg total) by mouth once daily.    ondansetron (ZOFRAN) 4 MG tablet Take 1 tablet (4 mg total) by mouth as needed for Nausea.    ondansetron (ZOFRAN-ODT) 4 MG TbDL Take by mouth daily as needed.    predniSONE (DELTASONE) 10 MG tablet Take 1 tablet (10 mg total) by mouth once daily. Pt taking 15mg daily    traZODone (DESYREL) 50 MG tablet Take 1 tablet (50 mg total) by mouth nightly as needed for Insomnia.    [DISCONTINUED] furosemide (LASIX) 20 MG tablet Take 1 tablet (20 mg total) by mouth once daily.     Family History       Problem Relation (Age of Onset)    Lung cancer Father          Tobacco Use    Smoking status: Never Smoker    Smokeless tobacco: Never Used   Substance and Sexual Activity    Alcohol use: No    Drug use: Never    Sexual activity: Not on file     Review of Systems   Constitutional:  Positive for fever. Negative for chills and diaphoresis.   HENT:  Negative for congestion and sore throat.    Eyes: Negative.    Respiratory:  Negative for cough, chest tightness and shortness of breath.    Cardiovascular:  Negative for chest pain and leg swelling.   Gastrointestinal:  Positive for nausea and vomiting. Negative for abdominal pain.   Endocrine: Negative.    Genitourinary: Negative.    Musculoskeletal:  Negative for arthralgias, back pain and neck pain.   Allergic/Immunologic: Negative.    Neurological:  Positive for headaches. Negative for seizures, syncope, light-headedness and numbness.   Psychiatric/Behavioral:  Positive for sleep disturbance. Negative for confusion, decreased concentration and suicidal ideas. The patient is nervous/anxious.    Objective:     Vital Signs (Most Recent):  Temp: 97.4 °F (36.3 °C) (05/25/22 1434)  Pulse: 89 (05/25/22 1445)  Resp: 20 (05/25/22  1445)  BP: (!) 120/59 (05/25/22 1434)  SpO2: 97 % (05/25/22 1445)   Vital Signs (24h Range):  Temp:  [97.4 °F (36.3 °C)-98.7 °F (37.1 °C)] 97.4 °F (36.3 °C)  Pulse:  [] 89  Resp:  [11-27] 20  SpO2:  [90 %-98 %] 97 %  BP: ()/(46-78) 120/59     Weight: 63.5 kg (140 lb)  Body mass index is 21.93 kg/m².    Physical Exam  Vitals reviewed.   Constitutional:       General: She is not in acute distress.     Appearance: She is ill-appearing. She is not toxic-appearing or diaphoretic.      Comments: Rubbing her head through most of exam, appears in pain   HENT:      Head: Normocephalic and atraumatic.      Right Ear: External ear normal.      Left Ear: External ear normal.      Nose: Nose normal. No congestion.      Mouth/Throat:      Mouth: Mucous membranes are moist.   Eyes:      Extraocular Movements: Extraocular movements intact.      Conjunctiva/sclera: Conjunctivae normal.      Pupils: Pupils are equal, round, and reactive to light.   Cardiovascular:      Rate and Rhythm: Regular rhythm. Tachycardia present.      Pulses: Normal pulses.      Heart sounds: No murmur heard.    No gallop.   Pulmonary:      Effort: Pulmonary effort is normal. No respiratory distress.      Breath sounds: No wheezing or rales.   Abdominal:      Palpations: Abdomen is soft. There is no mass.      Comments: Non-tender to deep palpation, no RUQ tenderness to palpation, bowel sounds normal and active   Musculoskeletal:      Right lower leg: No edema.      Left lower leg: No edema.   Skin:     General: Skin is warm.      Coloration: Skin is not pale.      Comments: Multiple healing ulcers across upper extremities, trunk, lower extremities with erythematous border and central clearing. Erythematous rash in groin and under breasts bilaterally consistent with fungal infection   Neurological:      Mental Status: She is alert and oriented to person, place, and time. Mental status is at baseline.   Psychiatric:      Comments: Anxious,  tearful, restless         CRANIAL NERVES     CN III, IV, VI   Pupils are equal, round, and reactive to light.     Significant Labs: All pertinent labs within the past 24 hours have been reviewed.  CBC:   Recent Labs   Lab 05/25/22  0920   WBC 15.07*   HGB 11.0*   HCT 35.7*   *     CMP:   Recent Labs   Lab 05/25/22  0920      K 3.7      CO2 25   GLU 79   BUN 14   CREATININE 0.6   CALCIUM 9.1   PROT 6.8   ALBUMIN 2.7*   BILITOT 1.4*   ALKPHOS 729*   *   ALT 63*   ANIONGAP 11   EGFRNONAA >60.0     Lactic Acid:   Recent Labs   Lab 05/25/22  1030 05/25/22  1437   LACTATE 1.8 1.9       Significant Imaging: I have reviewed all pertinent imaging results/findings within the past 24 hours.  X-Ray Chest AP Portable   Final Result      1. No acute chest disease.   2. Right subclavian Port-A-Cath.         Electronically signed by: Adalberto Gonzalez   Date:    05/25/2022   Time:    10:10

## 2022-05-26 LAB
ALBUMIN SERPL BCP-MCNC: 2.4 G/DL (ref 3.5–5.2)
ALP SERPL-CCNC: 550 U/L (ref 55–135)
ALT SERPL W/O P-5'-P-CCNC: 51 U/L (ref 10–44)
ANION GAP SERPL CALC-SCNC: 11 MMOL/L (ref 8–16)
AST SERPL-CCNC: 72 U/L (ref 10–40)
BILIRUB SERPL-MCNC: 2.1 MG/DL (ref 0.1–1)
BUN SERPL-MCNC: 8 MG/DL (ref 8–23)
CALCIUM SERPL-MCNC: 8.9 MG/DL (ref 8.7–10.5)
CHLORIDE SERPL-SCNC: 96 MMOL/L (ref 95–110)
CO2 SERPL-SCNC: 26 MMOL/L (ref 23–29)
CREAT SERPL-MCNC: 0.5 MG/DL (ref 0.5–1.4)
EST. GFR  (AFRICAN AMERICAN): >60 ML/MIN/1.73 M^2
EST. GFR  (NON AFRICAN AMERICAN): >60 ML/MIN/1.73 M^2
FOLATE SERPL-MCNC: 10.9 NG/ML (ref 4–24)
GLUCOSE SERPL-MCNC: 125 MG/DL (ref 70–110)
MAGNESIUM SERPL-MCNC: 1.3 MG/DL (ref 1.6–2.6)
POTASSIUM SERPL-SCNC: 3.5 MMOL/L (ref 3.5–5.1)
PROT SERPL-MCNC: 6.2 G/DL (ref 6–8.4)
SODIUM SERPL-SCNC: 133 MMOL/L (ref 136–145)
TSH SERPL DL<=0.005 MIU/L-ACNC: 0.59 UIU/ML (ref 0.4–4)
VANCOMYCIN TROUGH SERPL-MCNC: 10.7 UG/ML (ref 10–22)

## 2022-05-26 PROCEDURE — 97530 THERAPEUTIC ACTIVITIES: CPT

## 2022-05-26 PROCEDURE — 82746 ASSAY OF FOLIC ACID SERUM: CPT | Performed by: HOSPITALIST

## 2022-05-26 PROCEDURE — 63700000 PHARM REV CODE 250 ALT 637 W/O HCPCS: Performed by: FAMILY MEDICINE

## 2022-05-26 PROCEDURE — 80053 COMPREHEN METABOLIC PANEL: CPT | Performed by: FAMILY MEDICINE

## 2022-05-26 PROCEDURE — 97166 OT EVAL MOD COMPLEX 45 MIN: CPT

## 2022-05-26 PROCEDURE — 36415 COLL VENOUS BLD VENIPUNCTURE: CPT | Performed by: FAMILY MEDICINE

## 2022-05-26 PROCEDURE — 82608 B-12 BINDING CAPACITY: CPT | Performed by: HOSPITALIST

## 2022-05-26 PROCEDURE — 99900035 HC TECH TIME PER 15 MIN (STAT)

## 2022-05-26 PROCEDURE — 25000003 PHARM REV CODE 250: Performed by: FAMILY MEDICINE

## 2022-05-26 PROCEDURE — 99232 PR SUBSEQUENT HOSPITAL CARE,LEVL II: ICD-10-PCS | Mod: ,,, | Performed by: HOSPITALIST

## 2022-05-26 PROCEDURE — 21400001 HC TELEMETRY ROOM

## 2022-05-26 PROCEDURE — 99232 SBSQ HOSP IP/OBS MODERATE 35: CPT | Mod: ,,, | Performed by: HOSPITALIST

## 2022-05-26 PROCEDURE — 84443 ASSAY THYROID STIM HORMONE: CPT | Performed by: HOSPITALIST

## 2022-05-26 PROCEDURE — 94761 N-INVAS EAR/PLS OXIMETRY MLT: CPT

## 2022-05-26 PROCEDURE — 83735 ASSAY OF MAGNESIUM: CPT | Performed by: FAMILY MEDICINE

## 2022-05-26 PROCEDURE — 63600175 PHARM REV CODE 636 W HCPCS: Performed by: FAMILY MEDICINE

## 2022-05-26 PROCEDURE — 80202 ASSAY OF VANCOMYCIN: CPT | Performed by: FAMILY MEDICINE

## 2022-05-26 RX ORDER — LANOLIN ALCOHOL/MO/W.PET/CERES
800 CREAM (GRAM) TOPICAL EVERY 4 HOURS
Status: DISCONTINUED | OUTPATIENT
Start: 2022-05-26 | End: 2022-05-26 | Stop reason: SDUPTHER

## 2022-05-26 RX ADMIN — VANCOMYCIN HYDROCHLORIDE 1000 MG: 1 INJECTION, POWDER, LYOPHILIZED, FOR SOLUTION INTRAVENOUS at 11:05

## 2022-05-26 RX ADMIN — LEVOTHYROXINE SODIUM 200 MCG: 0.11 TABLET ORAL at 05:05

## 2022-05-26 RX ADMIN — ALPRAZOLAM 0.25 MG: 0.25 TABLET ORAL at 09:05

## 2022-05-26 RX ADMIN — MAGNESIUM OXIDE TAB 400 MG (241.3 MG ELEMENTAL MG) 800 MG: 400 (241.3 MG) TAB at 09:05

## 2022-05-26 RX ADMIN — CALCIUM CARBONATE (ANTACID) CHEW TAB 500 MG 1000 MG: 500 CHEW TAB at 10:05

## 2022-05-26 RX ADMIN — FAMOTIDINE 20 MG: 20 TABLET ORAL at 09:05

## 2022-05-26 RX ADMIN — MAGNESIUM OXIDE TAB 400 MG (241.3 MG ELEMENTAL MG) 800 MG: 400 (241.3 MG) TAB at 05:05

## 2022-05-26 RX ADMIN — MEROPENEM AND SODIUM CHLORIDE 1 G: 1 INJECTION, SOLUTION INTRAVENOUS at 08:05

## 2022-05-26 RX ADMIN — POTASSIUM BICARBONATE 50 MEQ: 978 TABLET, EFFERVESCENT ORAL at 09:05

## 2022-05-26 RX ADMIN — ENOXAPARIN SODIUM 40 MG: 40 INJECTION SUBCUTANEOUS at 04:05

## 2022-05-26 RX ADMIN — Medication 1 TABLET: at 09:05

## 2022-05-26 RX ADMIN — ALPRAZOLAM 0.25 MG: 0.25 TABLET ORAL at 08:05

## 2022-05-26 RX ADMIN — ESCITALOPRAM OXALATE 10 MG: 10 TABLET ORAL at 09:05

## 2022-05-26 RX ADMIN — DRONABINOL 10 MG: 10 CAPSULE ORAL at 03:05

## 2022-05-26 RX ADMIN — MEROPENEM AND SODIUM CHLORIDE 1 G: 1 INJECTION, SOLUTION INTRAVENOUS at 01:05

## 2022-05-26 RX ADMIN — CALCIUM CARBONATE (ANTACID) CHEW TAB 500 MG 1000 MG: 500 CHEW TAB at 04:05

## 2022-05-26 RX ADMIN — Medication 1 TABLET: at 08:05

## 2022-05-26 RX ADMIN — DESVENLAFAXINE SUCCINATE 50 MG: 50 TABLET, FILM COATED, EXTENDED RELEASE ORAL at 09:05

## 2022-05-26 RX ADMIN — MEROPENEM AND SODIUM CHLORIDE 1 G: 1 INJECTION, SOLUTION INTRAVENOUS at 05:05

## 2022-05-26 RX ADMIN — MAGNESIUM OXIDE TAB 400 MG (241.3 MG ELEMENTAL MG) 800 MG: 400 (241.3 MG) TAB at 08:05

## 2022-05-26 RX ADMIN — CALCIUM CARBONATE (ANTACID) CHEW TAB 500 MG 1000 MG: 500 CHEW TAB at 06:05

## 2022-05-26 RX ADMIN — ACETAMINOPHEN 650 MG: 325 TABLET ORAL at 05:05

## 2022-05-26 RX ADMIN — PREDNISONE 10 MG: 10 TABLET ORAL at 09:05

## 2022-05-26 NOTE — PLAN OF CARE
Problem: Adult Inpatient Plan of Care  Goal: Plan of Care Review  Outcome: Ongoing, Progressing  Goal: Patient-Specific Goal (Individualized)  Outcome: Ongoing, Progressing  Goal: Absence of Hospital-Acquired Illness or Injury  Outcome: Ongoing, Progressing  Goal: Optimal Comfort and Wellbeing  Outcome: Ongoing, Progressing  Goal: Readiness for Transition of Care  Outcome: Ongoing, Progressing     Problem: Adjustment to Illness (Sepsis/Septic Shock)  Goal: Optimal Coping  Outcome: Ongoing, Progressing     Problem: Bleeding (Sepsis/Septic Shock)  Goal: Absence of Bleeding  Outcome: Ongoing, Progressing     Problem: Glycemic Control Impaired (Sepsis/Septic Shock)  Goal: Blood Glucose Level Within Desired Range  Outcome: Ongoing, Progressing     Problem: Infection Progression (Sepsis/Septic Shock)  Goal: Absence of Infection Signs and Symptoms  Outcome: Ongoing, Progressing     Problem: Nutrition Impaired (Sepsis/Septic Shock)  Goal: Optimal Nutrition Intake  Outcome: Ongoing, Progressing     Problem: Infection  Goal: Absence of Infection Signs and Symptoms  Outcome: Ongoing, Progressing     Problem: Impaired Wound Healing  Goal: Optimal Wound Healing  Outcome: Ongoing, Progressing     Problem: Fall Injury Risk  Goal: Absence of Fall and Fall-Related Injury  Outcome: Ongoing, Progressing     Problem: Anxiety  Goal: Anxiety Reduction or Resolution  Outcome: Ongoing, Progressing

## 2022-05-26 NOTE — PLAN OF CARE
Problem: Adult Inpatient Plan of Care  Goal: Plan of Care Review  Outcome: Ongoing, Progressing     Problem: Adjustment to Illness (Sepsis/Septic Shock)  Goal: Optimal Coping  Outcome: Ongoing, Progressing     Problem: Bleeding (Sepsis/Septic Shock)  Goal: Absence of Bleeding  Outcome: Ongoing, Progressing     Problem: Infection  Goal: Absence of Infection Signs and Symptoms  Outcome: Ongoing, Progressing     Problem: Fall Injury Risk  Goal: Absence of Fall and Fall-Related Injury  Outcome: Ongoing, Progressing     Problem: Anxiety  Goal: Anxiety Reduction or Resolution  Outcome: Ongoing, Progressing     Plan of care reviewed with patient and ; verbalized understanding. Frequently crying. Xanax given for anxiety. Medications and possible side effects reviewed and administered as ordered. Purposeful Rounding completed. Safety precautions maintained. Bed in low and locked position. Call light within reach. Side rails up x2. Frequent rounds.  Bed alarm in use.

## 2022-05-26 NOTE — PLAN OF CARE
05/26/22 1050   Discharge Assessment   Assessment Type Discharge Planning Assessment   Confirmed/corrected address, phone number and insurance Yes   Confirmed Demographics Correct on Facesheet   Source of Information family   When was your last doctors appointment? 05/12/22   Communicated CATHY with patient/caregiver Date not available/Unable to determine   Reason For Admission fever & disoriented   Lives With spouse   Do you expect to return to your current living situation? Yes   Do you have help at home or someone to help you manage your care at home? Yes   Who are your caregiver(s) and their phone number(s)? Edouard Lemus spouse [phone]   Prior to hospitilization cognitive status: Alert/Oriented   Current cognitive status: Alert/Oriented   Walking or Climbing Stairs Difficulty ambulation difficulty, requires equipment   Mobility Management uses a rollator   Dressing/Bathing Difficulty bathing difficulty, requires equipment   Dressing/Bathing Management uses shower chair   Do you have any problems with: Errands/Grocery   Home Accessibility wheelchair accessible   Home Layout Able to live on 1st floor   Equipment Currently Used at Home rollator;shower chair;other (see comments)  (toilet rails)   Readmission within 30 days? No   Patient currently being followed by outpatient case management? No   Do you currently have service(s) that help you manage your care at home? No   Do you take prescription medications? Yes   Do you have prescription coverage? Yes   Coverage [employer]   Do you have any problems affording any of your prescribed medications? No   Is the patient taking medications as prescribed? yes   Who is going to help you get home at discharge? Edouard Lemus spouse [phone]   How do you get to doctors appointments? family or friend will provide   Are you on dialysis? No   Do you take coumadin? No   Discharge Plan A Home with family   Discharge Plan B Home with family   DME Needed Upon Discharge  none    Discharge Plan discussed with: Spouse/sig other   Name(s) and Number(s) Edouard Lemus spouse [phone]   Discharge Barriers Identified None   Relationship/Environment   Name(s) of Who Lives With Patient Edouard Lemus spouse [phone]   Patient asleep; assessment done with spouse Edouard. Patient is normally alert & oriented. She uses a rollator at home & able to do most ADLs independently. She has recently returned back to her [employer] at [employer] & their women's prayer group. She uses Dr Banks for oncology & will be starting another round of chemo soon. She will be using Dr Jacobo Samuel for primary care but has not seen him in the office yet; she has an appointment scheduled for June 13th. Will attempt to get her in sooner for hospital follow up. Spouse denies needing home health at this time as he is working from home & always with her. Denies any needs at this time. Will continue to follow.

## 2022-05-26 NOTE — NURSING
IC message sent to Dr. GIOVANNI Melendrez re: /85 in left arm and 192/86 in right arm. Awaiting response.

## 2022-05-26 NOTE — ASSESSMENT & PLAN NOTE
Will start with CT Head  Last imaging only 3 months ago without evidence of mass etc  Associated with nausea/vomiting  New onset in last 2-3 days  CT scan negative

## 2022-05-26 NOTE — ASSESSMENT & PLAN NOTE
Concern for sepsis with tachycardia, hypotension, leukocytosis though lactic normal  Repeat lactic  Given appropriate fluid resuscitation  Check uric acid, ldh   Broad spectrum antibiotics for now - IV Vanc/Merrem  No obvious source - CXR, u/a without etiology  Monitor fever curve, avoid antipyretics if possible until fever >101  Daily cbc, cmp, mag  F/u blood cultures  Blood cultures are pending

## 2022-05-26 NOTE — PLAN OF CARE
05/26/22 1050   Medicare Message   Important Message from Medicare regarding Discharge Appeal Rights Given to patient/caregiver;Explained to patient/caregiver;Signed/date by patient/caregiver   Date IMM was signed 05/26/22   Time IMM was signed 1050

## 2022-05-26 NOTE — PT/OT/SLP EVAL
PT consult received and chart reviewed. PT spoke with patient and spouse at bedside who stated his wife is in here for an infection and she is too weak to participate in physical therapy. PT explained to them that weakness is the reason for the PT consult but both declined at this time. RN and MD notified. PT to follow and evaluate when agreeable with patient and spouse.

## 2022-05-26 NOTE — PT/OT/SLP EVAL
"Occupational Therapy   Evaluation    Name: Regine Lemus  MRN: [id number]  Admitting Diagnosis:  Fever  Recent Surgery: * No surgery found *      Recommendations:     Discharge Recommendations:   dc to home w/ HH services.    Discharge Equipment Recommendations:   dc to home w/ hh   Barriers to discharge:   none    Assessment:     Regine Lemus is a 66 y.o. female with a medical diagnosis of Fever.  She presents with decreased Ind w/ all adls. Performance deficits affecting function:  decreased strength, decreased functional activity tolerance.      Rehab Prognosis: Good; patient would benefit from acute skilled OT services to address these deficits and reach maximum level of function.       Plan:     Patient to be seen  3-5 x's  Per week  to address the above listed problems via  ot intervention  · Plan of Care Expires:  upon dc   · Plan of Care Reviewed with:  patient and case management    Subjective     Chief Complaint: fever  Patient/Family Comments/goals: "to go home"    Occupational Profile:  Living Environment: patient was living at home w/ .    Previous level of function: ind w/ all adls and iadls   Roles and Routines: self care/retired  Equipment Used at Home:   rollator   Assistance upon Discharge: patient will have assistance from      Pain/Comfort:  ·  no  Pain at this time     Patients cultural, spiritual, Lutheran conflicts given the current situation:  will not interfere with patient care    Objective:     Communicated with: NURSING prior to session.  Patient found supine with   upon OT entry to room.    General Precautions: Standard,     Orthopedic Precautions:  none  Braces:  none  Respiratory Status: Room air    Occupational Performance:    Bed Mobility:    · Patient completed Rolling/Turning to Left with  stand by assistance  · Patient completed Rolling/Turning to Right with stand by assistance  · Patient completed Scooting/Bridging with stand by assistance  · Patient completed " Supine to Sit with stand by assistance  · Patient completed Sit to Supine with stand by assistance    Functional Mobility/Transfers:  · Patient completed Sit <> Stand Transfer with stand by assistance  with  no assistive device   ·     Activities of Daily Living:  sba w/ self feed  Sua w/ g/h   Sua w/ ub dressing  Min a  w/ lb dressing  sba w/ toileting  Cognitive/Visual Perceptual:  Cognitive/Psychosocial Skills:     -       Oriented to: Person, Place, Time and Situation     Physical Exam:  bue strength wfl's all planes; static/dynamic stand balance fair+/fair; poor functional activity tolerance.       Treatment & Education:  Patient received initial ot evaluation.  Patient performed static/dyEducation:  namic standing balance activities.   Patient left supine with all lines intact, nursing notified and  present    GOALS:   1. PATIENT WILL PERFORM LB DRESSING W/ MOD IND  2. PATIENT WILL PERFORM  TOILETING W/ MOD IND  3. PATIENT WILL BE IND W/ ALL ADLS.     History:     Past Medical History:   Diagnosis Date    Allergy     Colon cancer     Hypertension     Liver cancer     Nontoxic uninodular goiter     Osteoarthrosis, unspecified whether generalized or localized, hand     Other abnormal glucose     Proctocolitis 3/26/2022    Pure hypercholesterolemia     Severe sepsis        Past Surgical History:   Procedure Laterality Date    CYST REMOVAL  1994    cyst removed from neck    infusaport placement Right     THYROIDECTOMY      TONSILLECTOMY  age 14       Time Tracking:     OT Date of Treatment:  05/26/2022  OT Start Time:  15:58  OT Stop Time:  16:28  OT Total Time (min):  30    Billable Minutes:Evaluation 15  Therapeutic Activity 15    5/26/2022

## 2022-05-26 NOTE — PROGRESS NOTES
[facility] Medicine  Progress Note    Patient Name: Regine Lemus  MRN: [id number]  Patient Class: IP- Inpatient   Admission Date: 5/25/2022  Length of Stay: 1 days  Attending Physician: Court Hirsch MD  Primary Care Provider: Primary Doctor No        Subjective:     Principal Problem:Fever        HPI:  67 yo female with PMH of colon cancer with metastatic disease to the liver, goiter, hypertension, allergies, hld, h/o sepsis presents to the ER by ambulance with generalized complaints/feeling bad. History obtained from patient and separately from  and are somewhat conflicting. Was previously on chemotherapy but had to stop treatments due to diffuse skin reaction. Has been off of medication for the last 2 months and has felt better than she has in a long time. Skin ulcers are healing. Appetite improving significantly. Per the , however, she woke up this morning and started screaming uncontrollably that she was in pain. Fire department was called and brought her to the ER. Per patient, she was not in pain but was extremely frustrated because she started thinking about her cancer, was trying to complete a task and had difficulty and became overwhelmed and could not stop herself from screaming. She then started to throw up. Her only other symptom was a headache over the last 2-3 days however on exam she states it is the worst headache of her life. There is report of a fever up to 101.9 but not certain where this came from. Denies any cough, congestion, sore throat, shortness of breath, neck pain, back pain, dysuria, stomach pain, blood in stool, change in bowel movements. In the ER, she was hypotensive, lactic normal, WBC 15K, HR 120s. Hospital team called for admission.       Overview/Hospital Course:  No notes on file    Interval History:  She is feeling somewhat better today.  Denies fever, chills, nausea or vomiting.    Review of Systems   Constitutional: Negative.    HENT:  Negative.     Eyes: Negative.    Respiratory: Negative.     Cardiovascular: Negative.    Gastrointestinal: Negative.    Endocrine: Negative.    Genitourinary: Negative.    Musculoskeletal: Negative.    Skin:  Positive for rash.   Allergic/Immunologic: Negative.    Neurological: Negative.    Hematological: Negative.    Psychiatric/Behavioral: Negative.     Objective:     Vital Signs (Most Recent):  Temp: 98.1 °F (36.7 °C) (05/26/22 0743)  Pulse: 99 (05/26/22 0743)  Resp: 16 (05/26/22 0743)  BP: (!) 146/70 (05/26/22 0743)  SpO2: (!) 94 % (05/26/22 0743)   Vital Signs (24h Range):  Temp:  [97.4 °F (36.3 °C)-100.5 °F (38.1 °C)] 98.1 °F (36.7 °C)  Pulse:  [] 99  Resp:  [11-23] 16  SpO2:  [90 %-98 %] 94 %  BP: ()/(46-93) 146/70     Weight: 63.5 kg (140 lb)  Body mass index is 21.93 kg/m².    Intake/Output Summary (Last 24 hours) at 5/26/2022 1006  Last data filed at 5/26/2022 0553  Gross per 24 hour   Intake 3784.32 ml   Output 950 ml   Net 2834.32 ml      Physical Exam  Vitals and nursing note reviewed.   Constitutional:       Appearance: Normal appearance.   HENT:      Head: Normocephalic and atraumatic.      Right Ear: External ear normal.      Left Ear: External ear normal.      Nose: Nose normal.      Mouth/Throat:      Mouth: Mucous membranes are moist.   Eyes:      Extraocular Movements: Extraocular movements intact.   Cardiovascular:      Rate and Rhythm: Normal rate and regular rhythm.      Pulses: Normal pulses.      Heart sounds: Normal heart sounds.   Pulmonary:      Effort: Pulmonary effort is normal.      Breath sounds: Normal breath sounds.   Abdominal:      General: Abdomen is flat. Bowel sounds are normal.      Palpations: Abdomen is soft.   Musculoskeletal:         General: Normal range of motion.      Cervical back: Normal range of motion and neck supple.   Skin:     Capillary Refill: Capillary refill takes 2 to 3 seconds.      Findings: Erythema and rash present.   Neurological:       General: No focal deficit present.      Mental Status: She is alert and oriented to person, place, and time. Mental status is at baseline.   Psychiatric:         Mood and Affect: Mood normal.         Behavior: Behavior normal.         Thought Content: Thought content normal.       Significant Labs: All pertinent labs within the past 24 hours have been reviewed.    Significant Imaging: I have reviewed all pertinent imaging results/findings within the past 24 hours.      Assessment/Plan:      * Fever  Concern for sepsis with tachycardia, hypotension, leukocytosis though lactic normal  Repeat lactic  Given appropriate fluid resuscitation  Check uric acid, ldh   Broad spectrum antibiotics for now - IV Vanc/Merrem  No obvious source - CXR, u/a without etiology  Monitor fever curve, avoid antipyretics if possible until fever >101  Daily cbc, cmp, mag  F/u blood cultures  Blood cultures are pending        Severe headache  Will start with CT Head  Last imaging only 3 months ago without evidence of mass etc  Associated with nausea/vomiting  New onset in last 2-3 days  CT scan negative    Depression  Continue home medications - Pristiq, lexapro      Altered mental status  Uncertain if patient was altered, history uncertain      Hypothyroidism  Continue home levothyroxine      Severe malnutrition  Nutrition consulted. Most recent weight and BMI monitored-                         Measurements:  Wt Readings from Last 1 Encounters:   05/25/22 63.5 kg (140 lb)   Body mass index is 21.93 kg/m².    Recommendations:   continue home medications for appetite     Patient has been screened and assessed by RD. RD will follow patient.      Secondary malignant neoplasm of liver        Colorectal carcinoma  Check LDH, uric acid        VTE Risk Mitigation (From admission, onward)         Ordered     enoxaparin injection 40 mg  Daily         05/25/22 1500     IP VTE HIGH RISK PATIENT  Once         05/25/22 1500     Place sequential compression  device  Until discontinued         05/25/22 1500                Discharge Planning   CATHY:      Code Status: Full Code   Is the patient medically ready for discharge?:     Reason for patient still in hospital (select all that apply): Treatment                     Joseph Joe MD  Department of [facility]   [facility]

## 2022-05-26 NOTE — SUBJECTIVE & OBJECTIVE
Interval History:  She is feeling somewhat better today.  Denies fever, chills, nausea or vomiting.    Review of Systems   Constitutional: Negative.    HENT: Negative.     Eyes: Negative.    Respiratory: Negative.     Cardiovascular: Negative.    Gastrointestinal: Negative.    Endocrine: Negative.    Genitourinary: Negative.    Musculoskeletal: Negative.    Skin:  Positive for rash.   Allergic/Immunologic: Negative.    Neurological: Negative.    Hematological: Negative.    Psychiatric/Behavioral: Negative.     Objective:     Vital Signs (Most Recent):  Temp: 98.1 °F (36.7 °C) (05/26/22 0743)  Pulse: 99 (05/26/22 0743)  Resp: 16 (05/26/22 0743)  BP: (!) 146/70 (05/26/22 0743)  SpO2: (!) 94 % (05/26/22 0743)   Vital Signs (24h Range):  Temp:  [97.4 °F (36.3 °C)-100.5 °F (38.1 °C)] 98.1 °F (36.7 °C)  Pulse:  [] 99  Resp:  [11-23] 16  SpO2:  [90 %-98 %] 94 %  BP: ()/(46-93) 146/70     Weight: 63.5 kg (140 lb)  Body mass index is 21.93 kg/m².    Intake/Output Summary (Last 24 hours) at 5/26/2022 1006  Last data filed at 5/26/2022 0553  Gross per 24 hour   Intake 3784.32 ml   Output 950 ml   Net 2834.32 ml      Physical Exam  Vitals and nursing note reviewed.   Constitutional:       Appearance: Normal appearance.   HENT:      Head: Normocephalic and atraumatic.      Right Ear: External ear normal.      Left Ear: External ear normal.      Nose: Nose normal.      Mouth/Throat:      Mouth: Mucous membranes are moist.   Eyes:      Extraocular Movements: Extraocular movements intact.   Cardiovascular:      Rate and Rhythm: Normal rate and regular rhythm.      Pulses: Normal pulses.      Heart sounds: Normal heart sounds.   Pulmonary:      Effort: Pulmonary effort is normal.      Breath sounds: Normal breath sounds.   Abdominal:      General: Abdomen is flat. Bowel sounds are normal.      Palpations: Abdomen is soft.   Musculoskeletal:         General: Normal range of motion.      Cervical back: Normal range of  motion and neck supple.   Skin:     Capillary Refill: Capillary refill takes 2 to 3 seconds.      Findings: Erythema and rash present.   Neurological:      General: No focal deficit present.      Mental Status: She is alert and oriented to person, place, and time. Mental status is at baseline.   Psychiatric:         Mood and Affect: Mood normal.         Behavior: Behavior normal.         Thought Content: Thought content normal.       Significant Labs: All pertinent labs within the past 24 hours have been reviewed.    Significant Imaging: I have reviewed all pertinent imaging results/findings within the past 24 hours.

## 2022-05-27 ENCOUNTER — PATIENT MESSAGE (OUTPATIENT)
Dept: HEMATOLOGY/ONCOLOGY | Facility: CLINIC | Age: 67
End: 2022-05-27
Payer: OTHER GOVERNMENT

## 2022-05-27 ENCOUNTER — TELEPHONE (OUTPATIENT)
Dept: HEMATOLOGY/ONCOLOGY | Facility: CLINIC | Age: 67
End: 2022-05-27
Payer: OTHER GOVERNMENT

## 2022-05-27 VITALS
HEIGHT: 67 IN | DIASTOLIC BLOOD PRESSURE: 65 MMHG | OXYGEN SATURATION: 94 % | WEIGHT: 198.63 LBS | TEMPERATURE: 97 F | BODY MASS INDEX: 31.18 KG/M2 | HEART RATE: 86 BPM | SYSTOLIC BLOOD PRESSURE: 136 MMHG | RESPIRATION RATE: 16 BRPM

## 2022-05-27 PROBLEM — R50.9 FEVER: Status: RESOLVED | Noted: 2022-05-25 | Resolved: 2022-05-27

## 2022-05-27 LAB
ALBUMIN SERPL BCP-MCNC: 2.4 G/DL (ref 3.5–5.2)
ALP SERPL-CCNC: 574 U/L (ref 55–135)
ALT SERPL W/O P-5'-P-CCNC: 45 U/L (ref 10–44)
ANION GAP SERPL CALC-SCNC: 9 MMOL/L (ref 8–16)
AST SERPL-CCNC: 52 U/L (ref 10–40)
BASOPHILS # BLD AUTO: 0.02 K/UL (ref 0–0.2)
BASOPHILS NFR BLD: 0.3 % (ref 0–1.9)
BILIRUB SERPL-MCNC: 0.8 MG/DL (ref 0.1–1)
BUN SERPL-MCNC: 9 MG/DL (ref 8–23)
CALCIUM SERPL-MCNC: 9.6 MG/DL (ref 8.7–10.5)
CHLORIDE SERPL-SCNC: 99 MMOL/L (ref 95–110)
CO2 SERPL-SCNC: 31 MMOL/L (ref 23–29)
CREAT SERPL-MCNC: 0.5 MG/DL (ref 0.5–1.4)
DIFFERENTIAL METHOD: ABNORMAL
EOSINOPHIL # BLD AUTO: 0.1 K/UL (ref 0–0.5)
EOSINOPHIL NFR BLD: 0.8 % (ref 0–8)
ERYTHROCYTE [DISTWIDTH] IN BLOOD BY AUTOMATED COUNT: 14.5 % (ref 11.5–14.5)
EST. GFR  (AFRICAN AMERICAN): >60 ML/MIN/1.73 M^2
EST. GFR  (NON AFRICAN AMERICAN): >60 ML/MIN/1.73 M^2
GLUCOSE SERPL-MCNC: 90 MG/DL (ref 70–110)
HCT VFR BLD AUTO: 32.4 % (ref 37–48.5)
HGB BLD-MCNC: 10 G/DL (ref 12–16)
IMM GRANULOCYTES # BLD AUTO: 0.04 K/UL (ref 0–0.04)
IMM GRANULOCYTES NFR BLD AUTO: 0.5 % (ref 0–0.5)
LYMPHOCYTES # BLD AUTO: 1.4 K/UL (ref 1–4.8)
LYMPHOCYTES NFR BLD: 17.5 % (ref 18–48)
MAGNESIUM SERPL-MCNC: 1.7 MG/DL (ref 1.6–2.6)
MCH RBC QN AUTO: 30.9 PG (ref 27–31)
MCHC RBC AUTO-ENTMCNC: 30.9 G/DL (ref 32–36)
MCV RBC AUTO: 100 FL (ref 82–98)
MONOCYTES # BLD AUTO: 0.7 K/UL (ref 0.3–1)
MONOCYTES NFR BLD: 8.9 % (ref 4–15)
NEUTROPHILS # BLD AUTO: 5.7 K/UL (ref 1.8–7.7)
NEUTROPHILS NFR BLD: 72 % (ref 38–73)
NRBC BLD-RTO: 0 /100 WBC
PLATELET # BLD AUTO: 125 K/UL (ref 150–450)
PMV BLD AUTO: 10.6 FL (ref 9.2–12.9)
POTASSIUM SERPL-SCNC: 3.3 MMOL/L (ref 3.5–5.1)
PROT SERPL-MCNC: 6.2 G/DL (ref 6–8.4)
RBC # BLD AUTO: 3.24 M/UL (ref 4–5.4)
SODIUM SERPL-SCNC: 139 MMOL/L (ref 136–145)
WBC # BLD AUTO: 7.9 K/UL (ref 3.9–12.7)

## 2022-05-27 PROCEDURE — 25000003 PHARM REV CODE 250: Performed by: FAMILY MEDICINE

## 2022-05-27 PROCEDURE — 63600175 PHARM REV CODE 636 W HCPCS: Performed by: FAMILY MEDICINE

## 2022-05-27 PROCEDURE — 25000003 PHARM REV CODE 250: Performed by: HOSPITALIST

## 2022-05-27 PROCEDURE — 99238 PR HOSPITAL DISCHARGE DAY,<30 MIN: ICD-10-PCS | Mod: ,,, | Performed by: HOSPITALIST

## 2022-05-27 PROCEDURE — 97162 PT EVAL MOD COMPLEX 30 MIN: CPT

## 2022-05-27 PROCEDURE — 36415 COLL VENOUS BLD VENIPUNCTURE: CPT | Performed by: FAMILY MEDICINE

## 2022-05-27 PROCEDURE — 63700000 PHARM REV CODE 250 ALT 637 W/O HCPCS: Performed by: FAMILY MEDICINE

## 2022-05-27 PROCEDURE — 83735 ASSAY OF MAGNESIUM: CPT | Performed by: FAMILY MEDICINE

## 2022-05-27 PROCEDURE — 80053 COMPREHEN METABOLIC PANEL: CPT | Performed by: FAMILY MEDICINE

## 2022-05-27 PROCEDURE — 99238 HOSP IP/OBS DSCHRG MGMT 30/<: CPT | Mod: ,,, | Performed by: HOSPITALIST

## 2022-05-27 PROCEDURE — 85025 COMPLETE CBC W/AUTO DIFF WBC: CPT | Performed by: HOSPITALIST

## 2022-05-27 RX ORDER — LANOLIN ALCOHOL/MO/W.PET/CERES
800 CREAM (GRAM) TOPICAL ONCE
Status: COMPLETED | OUTPATIENT
Start: 2022-05-27 | End: 2022-05-27

## 2022-05-27 RX ORDER — LEVOFLOXACIN 500 MG/1
500 TABLET, FILM COATED ORAL DAILY
Qty: 7 TABLET | Refills: 0 | Status: SHIPPED | OUTPATIENT
Start: 2022-05-27 | End: 2022-06-03

## 2022-05-27 RX ORDER — POTASSIUM CHLORIDE 20 MEQ/1
20 TABLET, EXTENDED RELEASE ORAL ONCE
Status: COMPLETED | OUTPATIENT
Start: 2022-05-27 | End: 2022-05-27

## 2022-05-27 RX ADMIN — CALCIUM CARBONATE (ANTACID) CHEW TAB 500 MG 1000 MG: 500 CHEW TAB at 05:05

## 2022-05-27 RX ADMIN — FAMOTIDINE 20 MG: 20 TABLET ORAL at 09:05

## 2022-05-27 RX ADMIN — Medication 800 MG: at 09:05

## 2022-05-27 RX ADMIN — ONDANSETRON 4 MG: 2 INJECTION INTRAMUSCULAR; INTRAVENOUS at 05:05

## 2022-05-27 RX ADMIN — ESCITALOPRAM OXALATE 10 MG: 10 TABLET ORAL at 09:05

## 2022-05-27 RX ADMIN — PREDNISONE 10 MG: 10 TABLET ORAL at 09:05

## 2022-05-27 RX ADMIN — DESVENLAFAXINE SUCCINATE 50 MG: 50 TABLET, FILM COATED, EXTENDED RELEASE ORAL at 09:05

## 2022-05-27 RX ADMIN — VANCOMYCIN HYDROCHLORIDE 1000 MG: 1 INJECTION, POWDER, LYOPHILIZED, FOR SOLUTION INTRAVENOUS at 12:05

## 2022-05-27 RX ADMIN — Medication 1 TABLET: at 09:05

## 2022-05-27 RX ADMIN — MEROPENEM AND SODIUM CHLORIDE 1 G: 1 INJECTION, SOLUTION INTRAVENOUS at 05:05

## 2022-05-27 RX ADMIN — LEVOTHYROXINE SODIUM 200 MCG: 0.11 TABLET ORAL at 05:05

## 2022-05-27 RX ADMIN — DRONABINOL 10 MG: 10 CAPSULE ORAL at 09:05

## 2022-05-27 RX ADMIN — POTASSIUM CHLORIDE 20 MEQ: 1500 TABLET, EXTENDED RELEASE ORAL at 09:05

## 2022-05-27 NOTE — PT/OT/SLP EVAL
Physical Therapy Evaluation    Patient Name:  Regine Lemus   MRN:  [id number]    Recommendations:     Discharge Recommendations:   (home with spouse)   Discharge Equipment Recommendations: none   Barriers to discharge: None    Assessment:     Regine Lemus is a 66 y.o. female admitted with a medical diagnosis of Fever.  She presents with the following impairments/functional limitations:  weakness, impaired endurance. Patient is safe from a PT standpoint to discharge home with her spouse today.    Rehab Prognosis: Good; patient would benefit from acute skilled PT services to address these deficits and reach maximum level of function.    Recent Surgery: * No surgery found *      Plan:     During this hospitalization, patient to be seen  (PT evaluation only) to address the identified rehab impairments via  (none, patient is discharging home today) and progress toward the following goals:    · Plan of Care Expires:  05/27/22    Subjective     Chief Complaint: weakness  Patient/Family Comments/goals: patient is discharging home with her spouse today  Pain/Comfort:  · Pain Rating 1: 0/10    Patients cultural, spiritual, Mosque conflicts given the current situation: no    Living Environment:  Patient lives with her spouse in a  home with no steps to enter. Spouse works from home so is available to assist patient as needed.  Prior to admission, patients level of function was mod I with ADL's, occasional use of rollator or SPC when needed for safe gait.  Equipment used at home: shower chair, 3-in-1 commode, cane, straight, rollator.  DME owned (not currently used): none.  Upon discharge, patient will have assistance from spouse.    Objective:     Communicated with RN and MD prior to session.  Patient found sitting edge of bed with peripheral IV, pulse ox (continuous), telemetry  upon PT entry to room.    General Precautions: Standard,     Orthopedic Precautions:N/A   Braces: N/A  Respiratory Status: Room  air    Exams:  · Cognitive Exam:  Patient is oriented to Person, Place, Time and Situation  · LUE ROM: WFL  · LUE Strength: in 4-/5 to 4/5 range  · RLE ROM: WFL  · RLE Strength: in 4-/5 to 4/5 range    Functional Mobility:  · Bed Mobility:  Supine to Sit: stand by assistance  · Sit to Supine: stand by assistance  · Transfers:  Sit to Stand:  stand by assistance with no AD  · Gait: patient ambulated in room with SBA with no use of AD, no LOB or gait abnormalities noted    Therapeutic Activities and Exercises:  Patient performed bed mobility and transfers with SBA and no use of AD. Patient able to stand at bedside with SBA to perform lower body dressing w/o LOB noted. Patient ambulatory in room with SBA using no AD and demonstrating no LOB or gait abnormalities.    AM-PAC 6 CLICK MOBILITY  Total Score:      Patient left sitting edge of bed with all lines intact, call button in reach, RN notified and spouse present.    GOALS:   PT initial eval to assess for discharge needs    History:     Past Medical History:   Diagnosis Date    Allergy     Colon cancer     Hypertension     Liver cancer     Nontoxic uninodular goiter     Osteoarthrosis, unspecified whether generalized or localized, hand     Other abnormal glucose     Proctocolitis 3/26/2022    Pure hypercholesterolemia     Severe sepsis        Past Surgical History:   Procedure Laterality Date    CYST REMOVAL  1994    cyst removed from neck    infusaport placement Right     THYROIDECTOMY      TONSILLECTOMY  age 14       Time Tracking:     PT Received On: 05/27/22  PT Start Time: 0826     PT Stop Time: 0845  PT Total Time (min): 19 min     Billable Minutes: Evaluation 19 min      05/27/2022

## 2022-05-27 NOTE — PLAN OF CARE
05/27/22 0835   Final Note   Assessment Type Final Discharge Note   Anticipated Discharge Disposition Home   What phone number can be called within the next 1-3 days to see how you are doing after discharge? [phone]   Hospital Resources/Appts/Education Provided Appointments scheduled and added to AVS   Post-Acute Status   Discharge Delays None known at this time   Patient sitting on side of bed feeling much better. Spouse in room. Verbal & written follow up appointment with Dr Samuel provided to patient & spouse. Demonstrated understanding by verbal feedback. Denies any needs at this time.

## 2022-05-27 NOTE — DISCHARGE SUMMARY
[facility]  Discharge Summary      Patient Name: Regine Lemus  MRN: [id number]  Admission Date: 5/25/2022  Hospital Length of Stay: 2 days  Discharge Date and Time:  05/27/2022 9:17 AM  Attending Physician: Court Hirsch MD   Discharging Provider: Joseph Joe MD  Primary Care Provider: Primary Doctor No    Admitting diagnosis:    1. Fever    2. Severe headache    3. Depression    4. Hypothyroidism    5. Altered mental status    Final diagnosis:    1. Fever    2. Severe headache    3. Depression    4. Hypothyroidism    5. Altered mental status    6. Severe malnutrition    7. Metastatic colon CA    Reason for hospitalization:  To evaluate headaches and fever         HPI:  This 66-year-old female with history:  CA with metastasis to the liver presented to the emergency room complaining of a severe headache and fever.  She has been having a headache for 2 or 3 days.  She has also been frustrated about her colon cancer and has been screaming at home because of her cancer.  She was seen and evaluated in the emergency room and placed on sepsis protocol with IV fluids and IV antibiotics.  Her WBC count was 15,000 and she had heart rate of 120 beats per minute and was hypotensive CT scan of brain was negative for any acute disease.  Hospitalist service was asked to admit and evaluate for headaches and fever.    * No surgery found *      Hospital Course:  She was treated with IV fluids and given Tylenol for fever.  Blood cultures were done.  She was started on meropenum and vancomycin.  Her headache improved.  She became afebrile.  Her white blood cell count decreased to 7.90.  She did have mild hypokalemia and hypo magnesemia which were treated with potassium chloride and magnesium oxide.  He continue to improve and remained afebrile.  It was felt that the patient was stable enough for discharge.  The exact etiology of her fever is unknown.  Some her elevation in white blood cell count  "may have been secondary to her being on prednisone.    Consults:   Consults (From admission, onward)        Status Ordering Provider     Pharmacy to dose Vancomycin consult  Once        Provider:  (Not yet assigned)   "And" Linked Group Details    Acknowledged KALE PAREDESRemington          Final Active Diagnoses:    Diagnosis Date Noted POA    Altered mental status [R41.82] 05/25/2022 Yes    Depression [F32.A] 05/25/2022 Yes    Severe headache [R51.9] 05/25/2022 Yes    Hypothyroidism [E03.9] 04/28/2022 Yes    Severe malnutrition [E43] 03/02/2022 Yes    Secondary malignant neoplasm of liver [C78.7] 01/24/2021 Yes    Colorectal carcinoma [C19] 01/23/2021 Yes      Problems Resolved During this Admission:    Diagnosis Date Noted Date Resolved POA    PRINCIPAL PROBLEM:  Fever [R50.9] 05/25/2022 05/27/2022 Yes    Proctocolitis [K52.9] 03/26/2022 05/25/2022 Yes      Discharged Condition: stable    Disposition: Home or Self Care    Follow Up:   Follow-up Information     Jacobo Samuel MD. Go on 6/1/2022.    Specialty: Family Medicine  Why: appointment Wednesday June 1st at 11:00am for hospital follow up  Contact information:  [address]  [facility] [address] [phone]                       Patient Instructions:   No discharge procedures on file.  Medications:  Reconciled Home Medications:      Medication List      START taking these medications    levoFLOXacin 500 MG tablet  Commonly known as: LEVAQUIN  Take 1 tablet (500 mg total) by mouth once daily. for 7 days        CONTINUE taking these medications    ALPRAZolam 0.25 MG tablet  Commonly known as: XANAX  Take 1 tablet (0.25 mg total) by mouth 3 (three) times daily as needed for Anxiety.     calcium carbonate 200 mg calcium (500 mg) chewable tablet  Commonly known as: TUMS  Take 2 tablets (1,000 mg total) by mouth 3 (three) times daily before meals.     calcium-vitamin D3 500 mg-5 mcg (200 unit) per tablet  Commonly known as: OYSTER SHELL " CALCIUM-VIT D3  Take 1 tablet by mouth 2 (two) times daily.     desvenlafaxine succinate 50 MG Tb24  Commonly known as: PRISTIQ  Take 1 tablet (50 mg total) by mouth once daily.     dronabinoL 10 MG capsule  Commonly known as: MARINOL  Take 1 capsule (10 mg total) by mouth 2 (two) times daily before meals.     ergocalciferol 50,000 unit Cap  Commonly known as: ERGOCALCIFEROL  Take 50,000 Units by mouth every 7 days.     EScitalopram oxalate 10 MG tablet  Commonly known as: LEXAPRO  Take 1 tablet (10 mg total) by mouth once daily.     famotidine 20 MG tablet  Commonly known as: PEPCID  Take 1 tablet (20 mg total) by mouth once daily.     HYDROcodone-acetaminophen 5-325 mg per tablet  Commonly known as: NORCO  Take 1 tablet by mouth every 6 (six) hours as needed for Pain.     KLOR-CON M20 20 MEQ tablet  Generic drug: potassium chloride SA  Take 20 mEq by mouth 2 (two) times daily.     levothyroxine 200 MCG tablet  Commonly known as: SYNTHROID  Take 1 tablet (200 mcg total) by mouth once daily.     loperamide 2 mg capsule  Commonly known as: IMODIUM  Take 1 capsule (2 mg total) by mouth 4 (four) times daily as needed for Diarrhea.     magnesium oxide 400 mg (241.3 mg magnesium) tablet  Commonly known as: MAGOX  Take 1 tablet (400 mg total) by mouth once daily.     megestroL 625 mg/5 mL (125 mg/mL) Susp  Commonly known as: MEGACE ES  Take 5 mLs (625 mg total) by mouth once daily.     ondansetron 4 MG tablet  Commonly known as: ZOFRAN  Take 1 tablet (4 mg total) by mouth as needed for Nausea.     ondansetron 4 MG Tbdl  Commonly known as: ZOFRAN-ODT  Take by mouth daily as needed.     predniSONE 10 MG tablet  Commonly known as: DELTASONE  Take 1 tablet (10 mg total) by mouth once daily. Pt taking 15mg daily     traZODone 50 MG tablet  Commonly known as: DESYREL  Take 1 tablet (50 mg total) by mouth nightly as needed for Insomnia.        STOP taking these medications    furosemide 20 MG tablet  Commonly known as: LASIX             Significant Diagnostic Studies: Labs:   CMP   Recent Labs   Lab 05/25/22  0920 05/26/22  0621 05/27/22  0610    133* 139   K 3.7 3.5 3.3*    96 99   CO2 25 26 31*   GLU 79 125* 90   BUN 14 8 9   CREATININE 0.6 0.5 0.5   CALCIUM 9.1 8.9 9.6   PROT 6.8 6.2 6.2   ALBUMIN 2.7* 2.4* 2.4*   BILITOT 1.4* 2.1* 0.8   ALKPHOS 729* 550* 574*   * 72* 52*   ALT 63* 51* 45*   ANIONGAP 11 11 9   ESTGFRAFRICA >60.0 >60.0 >60.0   EGFRNONAA >60.0 >60.0 >60.0    and CBC   Recent Labs   Lab 05/25/22  0920 05/27/22  0610   WBC 15.07* 7.90   HGB 11.0* 10.0*   HCT 35.7* 32.4*   * 125*     Microbiology:   Blood Culture   Lab Results   Component Value Date    LABBLOO No Growth to date 05/25/2022    LABBLOO No Growth to date 05/25/2022    and Urine Culture    Lab Results   Component Value Date    LABURIN No growth 01/24/2021       Pending Diagnostic Studies:     Procedure Component Value Units Date/Time    B-12 binding capacity [[id number]] Collected: 05/26/22 1113    Order Status: Sent Lab Status: In process Updated: 05/26/22 1823    Specimen: Blood         Indwelling Lines/Drains at time of discharge:   Lines/Drains/Airways     Central Venous Catheter Line  Duration           Port A Cath Single Lumen 03/14/22 0900 right atrial 74 days                Time spent on the discharge of patient: 20 minutes         Joseph Joe MD  Department of Hospital Medicine  [facility]

## 2022-05-27 NOTE — NURSING
New orders received for discharge, patient is agreeable with discharge. PIV removed, catheter tip intact. Dressing applied. Discharge teaching done at bedside, verbalized understanding. Presciptions e-scripted to pharmacy of choice. Medications reviewed, appointments given. Will d/c home with all belongings per w/c.

## 2022-05-27 NOTE — PLAN OF CARE
Problem: Adult Inpatient Plan of Care  Goal: Plan of Care Review  Outcome: Ongoing, Progressing     Problem: Infection Progression (Sepsis/Septic Shock)  Goal: Absence of Infection Signs and Symptoms  Outcome: Ongoing, Progressing     Problem: Infection  Goal: Absence of Infection Signs and Symptoms  Outcome: Ongoing, Progressing     Problem: Fall Injury Risk  Goal: Absence of Fall and Fall-Related Injury  Outcome: Ongoing, Progressing     Problem: Anxiety  Goal: Anxiety Reduction or Resolution  Outcome: Ongoing, Progressing     Plan of care reviewed with patient; verbalized understanding. Medications and possible side effects reviewed and administered as ordered. Reports feeling better this shift.  VSS. Purposeful Rounding completed. Safety precautions maintained. Call light within reach, bed wheels locked, bed in lowest position, side rails upx2, safety maintained.

## 2022-05-30 ENCOUNTER — PATIENT OUTREACH (OUTPATIENT)
Dept: ADMINISTRATIVE | Facility: CLINIC | Age: 67
End: 2022-05-30
Payer: OTHER GOVERNMENT

## 2022-05-30 LAB
BACTERIA BLD CULT: NORMAL
BACTERIA BLD CULT: NORMAL

## 2022-06-01 ENCOUNTER — OFFICE VISIT (OUTPATIENT)
Dept: FAMILY MEDICINE | Facility: CLINIC | Age: 67
End: 2022-06-01
Payer: OTHER GOVERNMENT

## 2022-06-01 ENCOUNTER — TELEPHONE (OUTPATIENT)
Dept: HEMATOLOGY/ONCOLOGY | Facility: CLINIC | Age: 67
End: 2022-06-01
Payer: OTHER GOVERNMENT

## 2022-06-01 VITALS
BODY MASS INDEX: 21.22 KG/M2 | HEIGHT: 67 IN | DIASTOLIC BLOOD PRESSURE: 76 MMHG | OXYGEN SATURATION: 98 % | WEIGHT: 135.19 LBS | RESPIRATION RATE: 18 BRPM | HEART RATE: 106 BPM | SYSTOLIC BLOOD PRESSURE: 128 MMHG

## 2022-06-01 DIAGNOSIS — I10 ESSENTIAL (PRIMARY) HYPERTENSION: ICD-10-CM

## 2022-06-01 DIAGNOSIS — A41.9 SEVERE SEPSIS: ICD-10-CM

## 2022-06-01 DIAGNOSIS — R65.20 SEVERE SEPSIS: ICD-10-CM

## 2022-06-01 DIAGNOSIS — E78.00 HYPERCHOLESTEREMIA: ICD-10-CM

## 2022-06-01 DIAGNOSIS — E43 SEVERE MALNUTRITION: ICD-10-CM

## 2022-06-01 DIAGNOSIS — E83.42 HYPOMAGNESEMIA: ICD-10-CM

## 2022-06-01 DIAGNOSIS — G93.41 ENCEPHALOPATHY, METABOLIC: ICD-10-CM

## 2022-06-01 DIAGNOSIS — C19 COLORECTAL CARCINOMA: Primary | ICD-10-CM

## 2022-06-01 DIAGNOSIS — Z76.89 ENCOUNTER FOR SUPPORT AND COORDINATION OF TRANSITION OF CARE: ICD-10-CM

## 2022-06-01 DIAGNOSIS — E83.51 HYPOCALCEMIA: ICD-10-CM

## 2022-06-01 DIAGNOSIS — C78.7 SECONDARY MALIGNANT NEOPLASM OF LIVER: ICD-10-CM

## 2022-06-01 DIAGNOSIS — Z71.89 GOALS OF CARE, COUNSELING/DISCUSSION: ICD-10-CM

## 2022-06-01 DIAGNOSIS — D64.9 ANEMIA, UNSPECIFIED TYPE: ICD-10-CM

## 2022-06-01 PROCEDURE — 99204 OFFICE O/P NEW MOD 45 MIN: CPT | Mod: S$GLB,,, | Performed by: FAMILY MEDICINE

## 2022-06-01 PROCEDURE — 99999 PR PBB SHADOW E&M-EST. PATIENT-LVL V: ICD-10-PCS | Mod: PBBFAC,,, | Performed by: FAMILY MEDICINE

## 2022-06-01 PROCEDURE — 99204 PR OFFICE/OUTPT VISIT, NEW, LEVL IV, 45-59 MIN: ICD-10-PCS | Mod: S$GLB,,, | Performed by: FAMILY MEDICINE

## 2022-06-01 PROCEDURE — 99999 PR PBB SHADOW E&M-EST. PATIENT-LVL V: CPT | Mod: PBBFAC,,, | Performed by: FAMILY MEDICINE

## 2022-06-01 NOTE — PROGRESS NOTES
" Patient ID: Regine Edwardsn is a 66 y.o. female.    Chief Complaint: Establish Care      Reviewed family, medical, surgical, and social history.    No cp/sob  No change in mental status  No fever  No asymmetrical limb swelling    Objective:      /76 (BP Location: Right arm, Patient Position: Sitting, BP Method: Small (Automatic))   Pulse 106   Resp 18   Ht 5' 7" (1.702 m)   Wt 61.3 kg (135 lb 3.2 oz)   LMP  (LMP Unknown)   SpO2 98%   BMI 21.18 kg/m²   RRR, CTAB, s/nt/nd, no c/c/e, non-toxic appearing, no focal weakness  Assessment:       1. Colorectal carcinoma    2. Encounter for support and coordination of transition of care    3. Essential (primary) hypertension    4. Anemia, unspecified type    5. Severe sepsis    6. Severe malnutrition    7. Goals of care, counseling/discussion    8. Secondary malignant neoplasm of liver    9. Hypercholesteremia    10. Encephalopathy, metabolic    11. Hypomagnesemia    12. Hypocalcemia        Plan:       Colorectal carcinoma    Encounter for support and coordination of transition of care    Essential (primary) hypertension    Anemia, unspecified type    Severe sepsis    Severe malnutrition    Goals of care, counseling/discussion    Secondary malignant neoplasm of liver    Hypercholesteremia    Encephalopathy, metabolic    Hypomagnesemia    Hypocalcemia            Reviewed, monitored, evaluated, and assessed chronic conditions as outlined above  Continue current medicines, any changes noted above  As shown  Labs, radiology studies, and procedures/notes from the last 3 months were reviewed.    Risks, benefits, and side effects were discussed with the patient. All questions were answered to the fullest satisfaction of the patient, and pt verbalized understanding and agreement to treatment plan. Pt was to call with any new or worsening symptoms, or present to the ER.    "

## 2022-06-02 ENCOUNTER — INFUSION (OUTPATIENT)
Dept: INFUSION THERAPY | Facility: HOSPITAL | Age: 67
End: 2022-06-02
Attending: INTERNAL MEDICINE
Payer: OTHER GOVERNMENT

## 2022-06-02 ENCOUNTER — PATIENT MESSAGE (OUTPATIENT)
Dept: FAMILY MEDICINE | Facility: CLINIC | Age: 67
End: 2022-06-02
Payer: OTHER GOVERNMENT

## 2022-06-02 VITALS
TEMPERATURE: 98 F | OXYGEN SATURATION: 97 % | DIASTOLIC BLOOD PRESSURE: 68 MMHG | RESPIRATION RATE: 18 BRPM | WEIGHT: 135.13 LBS | HEIGHT: 67 IN | BODY MASS INDEX: 21.21 KG/M2 | HEART RATE: 80 BPM | SYSTOLIC BLOOD PRESSURE: 142 MMHG

## 2022-06-02 DIAGNOSIS — D50.9 IRON DEFICIENCY ANEMIA, UNSPECIFIED IRON DEFICIENCY ANEMIA TYPE: Primary | ICD-10-CM

## 2022-06-02 LAB — VIT B12 USB SERPL-MCNC: 617 PG/ML (ref 800–2600)

## 2022-06-02 PROCEDURE — 63600175 PHARM REV CODE 636 W HCPCS: Performed by: INTERNAL MEDICINE

## 2022-06-02 PROCEDURE — 25000003 PHARM REV CODE 250: Performed by: INTERNAL MEDICINE

## 2022-06-02 PROCEDURE — 96365 THER/PROPH/DIAG IV INF INIT: CPT

## 2022-06-02 PROCEDURE — A4216 STERILE WATER/SALINE, 10 ML: HCPCS | Performed by: INTERNAL MEDICINE

## 2022-06-02 RX ORDER — HEPARIN 100 UNIT/ML
500 SYRINGE INTRAVENOUS
Status: CANCELLED | OUTPATIENT
Start: 2022-06-08

## 2022-06-02 RX ORDER — FAMOTIDINE 10 MG/ML
20 INJECTION INTRAVENOUS
Status: CANCELLED
Start: 2022-06-08

## 2022-06-02 RX ORDER — DIPHENHYDRAMINE HYDROCHLORIDE 50 MG/ML
25 INJECTION INTRAMUSCULAR; INTRAVENOUS
Status: DISCONTINUED | OUTPATIENT
Start: 2022-06-02 | End: 2022-06-02 | Stop reason: HOSPADM

## 2022-06-02 RX ORDER — DIPHENHYDRAMINE HYDROCHLORIDE 50 MG/ML
25 INJECTION INTRAMUSCULAR; INTRAVENOUS
Status: CANCELLED
Start: 2022-06-08

## 2022-06-02 RX ORDER — SODIUM CHLORIDE 0.9 % (FLUSH) 0.9 %
10 SYRINGE (ML) INJECTION
Status: DISCONTINUED | OUTPATIENT
Start: 2022-06-02 | End: 2022-06-02 | Stop reason: HOSPADM

## 2022-06-02 RX ORDER — DEXAMETHASONE SODIUM PHOSPHATE 4 MG/ML
4 INJECTION, SOLUTION INTRA-ARTICULAR; INTRALESIONAL; INTRAMUSCULAR; INTRAVENOUS; SOFT TISSUE
Status: DISCONTINUED | OUTPATIENT
Start: 2022-06-02 | End: 2022-06-02 | Stop reason: HOSPADM

## 2022-06-02 RX ORDER — DEXAMETHASONE SODIUM PHOSPHATE 4 MG/ML
4 INJECTION, SOLUTION INTRA-ARTICULAR; INTRALESIONAL; INTRAMUSCULAR; INTRAVENOUS; SOFT TISSUE
Status: CANCELLED
Start: 2022-06-08

## 2022-06-02 RX ORDER — HEPARIN 100 UNIT/ML
500 SYRINGE INTRAVENOUS
Status: DISCONTINUED | OUTPATIENT
Start: 2022-06-02 | End: 2022-06-02 | Stop reason: HOSPADM

## 2022-06-02 RX ORDER — SODIUM CHLORIDE 0.9 % (FLUSH) 0.9 %
10 SYRINGE (ML) INJECTION
Status: CANCELLED | OUTPATIENT
Start: 2022-06-08

## 2022-06-02 RX ORDER — FAMOTIDINE 10 MG/ML
20 INJECTION INTRAVENOUS
Status: DISCONTINUED | OUTPATIENT
Start: 2022-06-02 | End: 2022-06-02 | Stop reason: HOSPADM

## 2022-06-02 RX ADMIN — SODIUM CHLORIDE: 0.9 INJECTION, SOLUTION INTRAVENOUS at 01:06

## 2022-06-02 RX ADMIN — IRON SUCROSE 200 MG: 20 INJECTION, SOLUTION INTRAVENOUS at 01:06

## 2022-06-02 RX ADMIN — Medication 10 ML: at 03:06

## 2022-06-02 RX ADMIN — Medication 500 UNITS: at 03:06

## 2022-06-02 NOTE — PLAN OF CARE
"Problem: Adult Inpatient Plan of Care  Goal: Plan of Care Review  Outcome: Ongoing, Progressing  Flowsheets (Taken 6/2/2022 1453)  Plan of Care Reviewed With: patient  /69 (BP Location: Left arm, Patient Position: Sitting)   Pulse 94   Temp 98.2 °F (36.8 °C) (Oral)   Resp 20   Ht 5' 7" (1.702 m)   Wt 61.3 kg (135 lb 2.3 oz)   LMP  (LMP Unknown)   SpO2 97%   BMI 21.17 kg/m² Pleasant alert and oriented patient ambulated with rollator to clinic accompanied by sister for venofer infusion #5 - port accessed and patient tolerated procedure well - pt premedicated - tolerated well Venofer infusion without medication reactions - all infusions completed.       "

## 2022-06-03 NOTE — PHYSICIAN QUERY
PT Name: Regine Lemus  MR #: [id number]     Documentation Clarification      CDS: Sasha Camacho RN         Contact information:[email or web address] or (cell) [phone]    This form is a permanent document in the medical record.     Query Date: Josefa 3, 2022    By submitting this query, we are merely seeking further clarification of documentation. Please utilize your independent clinical judgment when addressing the question(s) below.    The Medical Record reflects the following:    Clinical Findings Location in Medical Records   Severe malnutrition  Nutrition consulted. Most recent weight and BMI monitored   Measurements:  Wt Readings from Last 1 Encounters:  05/25/22 63.5 kg (140 lb)  Body mass index is 21.93 kg/m².   Recommendations:   continue home medications for appetite    Patient has been screened and assessed by RD. RD will follow patient.    Secondary malignant neoplasm of liver   Colorectal carcinoma   H&P 5/25                                                                                Provider, please clarify the diagnosis of Severe Malnutrition:      [   ] Diagnosis is confirmed and additional clinical support/decision-making indicators for the diagnosis include (please specify):________________     [   ] Above stated diagnosis is not confirmed and/or it has been ruled out     [   ] Above stated diagnosis is not confirmed and/or it has been ruled out, other diagnosis ruled in (please specify):_______________     [  x ] Other clarification (please specify): ___patient has this diagnosis and is receiving treatment for it which has been working________________     [  ] Clinically undetermined

## 2022-06-07 ENCOUNTER — PATIENT MESSAGE (OUTPATIENT)
Dept: HEMATOLOGY/ONCOLOGY | Facility: CLINIC | Age: 67
End: 2022-06-07
Payer: OTHER GOVERNMENT

## 2022-06-09 ENCOUNTER — TELEPHONE (OUTPATIENT)
Dept: HEMATOLOGY/ONCOLOGY | Facility: CLINIC | Age: 67
End: 2022-06-09
Payer: OTHER GOVERNMENT

## 2022-06-09 NOTE — TELEPHONE ENCOUNTER
This nurse messaged NP regarding peer to peer. She states she has been unable to reach them and will try again tomorrow morning.

## 2022-06-10 ENCOUNTER — OFFICE VISIT (OUTPATIENT)
Dept: HEMATOLOGY/ONCOLOGY | Facility: CLINIC | Age: 67
End: 2022-06-10
Payer: OTHER GOVERNMENT

## 2022-06-10 ENCOUNTER — TELEPHONE (OUTPATIENT)
Dept: HEMATOLOGY/ONCOLOGY | Facility: CLINIC | Age: 67
End: 2022-06-10
Payer: OTHER GOVERNMENT

## 2022-06-10 ENCOUNTER — LAB VISIT (OUTPATIENT)
Dept: LAB | Facility: HOSPITAL | Age: 67
End: 2022-06-10
Attending: NURSE PRACTITIONER
Payer: OTHER GOVERNMENT

## 2022-06-10 DIAGNOSIS — R50.9 FEVER, UNSPECIFIED FEVER CAUSE: ICD-10-CM

## 2022-06-10 DIAGNOSIS — C19 COLORECTAL CARCINOMA: Primary | ICD-10-CM

## 2022-06-10 LAB
BILIRUB UR QL STRIP: NEGATIVE
CLARITY UR: CLEAR
COLOR UR: YELLOW
GLUCOSE UR QL STRIP: NEGATIVE
HGB UR QL STRIP: NEGATIVE
KETONES UR QL STRIP: NEGATIVE
LEUKOCYTE ESTERASE UR QL STRIP: NEGATIVE
NITRITE UR QL STRIP: NEGATIVE
PH UR STRIP: 6 [PH] (ref 5–8)
PROT UR QL STRIP: NEGATIVE
SP GR UR STRIP: <=1.005 (ref 1–1.03)
URN SPEC COLLECT METH UR: NORMAL
UROBILINOGEN UR STRIP-ACNC: NEGATIVE EU/DL

## 2022-06-10 PROCEDURE — 99214 OFFICE O/P EST MOD 30 MIN: CPT | Mod: 95,,, | Performed by: NURSE PRACTITIONER

## 2022-06-10 PROCEDURE — 81003 URINALYSIS AUTO W/O SCOPE: CPT | Performed by: NURSE PRACTITIONER

## 2022-06-10 PROCEDURE — 99214 PR OFFICE/OUTPT VISIT, EST, LEVL IV, 30-39 MIN: ICD-10-PCS | Mod: 95,,, | Performed by: NURSE PRACTITIONER

## 2022-06-10 NOTE — PROGRESS NOTES
The patient location is: home  Visit type: Virtual visit with synchronous audio and video  Each patient to whom he or she provides medical services by telemedicine is:  (1) informed of the relationship between the physician and patient and the respective role of any other health care provider with respect to management of the patient; and (2) notified that he or she may decline to receive medical services by telemedicine and may withdraw from such care at any time.    History of present illness:  The patient is a 66-year-old white female with metastatic colon carcinoma involving the liver who is treated with Xelox/Avastin and returns clinic for evaluation prior to next cycle of therapy.  Patient's recent course has been complicated by protein/calorie malnutrition, diarrhea, hand-foot syndrome, and electrolyte disturbances.    Since returning home following an 8 day hospitalization in [place], patient remains extremely weak and fatigued.  Oral intake for solids and liquids has improved.  She denies pain.  Diarrhea appears to have resolved.  Patient spends about 70% of her day in bed and about 30% in the chair.    Patient returns to clinic to review results of interval study and to further discuss plan of care.  Patient's appetite remains poor.  She requests refill on Marinol.  Patient continues to lose weight.  Abdominal bloating and distension appears to improve with ambulation.  She is making efforts at doing this.  Patient is experiencing night sweats.  She has not taken her temperature during these episodes.  Patient is reporting crying spells and is currently on no medications for control of mood or anxiety.  She reports continued compliance with Synthroid 150 mcg p.o. daily.  Patient has had another ER visit for dehydration and electrolyte disturbance since her last office evaluation.    Patient returns to clinic for interval reassessment and to consider whether not she can resume palliative chemotherapy.   Patient's energy has improved since initiation of intravenous iron replacement and Synthroid.  Patient has had no additional ER visits for dehydration/electrolyte disturbances.  She just returned from a vacation to visit family in [facility].  Patient now spends more than 50% of her day out of bed.  She is motivated toward trying additional palliative therapy.    Patient patient returns today for virtual visit for chemotherapy education.  She is complaining today of fatigue and low-grade fevers    Review of Systems   Constitutional: Positive for fever and malaise/fatigue.   HENT: Negative.    Eyes: Negative.    Respiratory: Negative.    Cardiovascular: Negative.    Gastrointestinal: Negative.    Genitourinary: Negative.    Musculoskeletal: Negative.    Skin: Negative.    Neurological: Negative.    Endo/Heme/Allergies: Negative.    Psychiatric/Behavioral: Negative.        Physical examination:  The patient is a well-developed, frail, elderly, white female, in no acute distress,     HEENT: Normocephalic, atraumatic.  No abnormalities observed  NECK: Supple, no abnormalities observed.   LUNGS:  Visualized portion of the chest appears unremarkable, Normal respiratory effort.   ABDOMEN:  Visualized portion of the abdomen appears nondistended  EXTREMITIES:  Moves all extremity.          Guardant 360 panel:  No actionable mutations noted.    Impression:  1. Metastatic colon carcinoma involving liver with disease progression per most recent imaging.    -planning-irinotecan q.2 weeks to care plan      Chemotherapy education:  -I met with the patient today for chemotherapy education.  Patient will be starting treatment with irinotecan for metastatic colon cancer.  We discussed the mechanism of action, potential side effects of this treatment as well as ways she can manage them at home.  Some of the side effects include but are not limited to fever, nausea, vomiting, decreased appetite, fatigue, weakness, cytopenias,  myalgia/arthralgia, constipation, diarrhea, bleeding, headache, shortness of breath, nail changes, takes change, hair thinning/loss, mood disturbances, or edema.  I explained to patient that we will obtain labs prior to treat the patient will follow-up with a physician or nurse practitioner for toxicity monitoring throughout treatment.  Chemo care doc, education was verbalized with patient regarding each chemotherapy drug.  Patient was instructed to call any time 24/7 because there is a physician call for any problems that may arise.  Patient was also instructed to report to [facility] if they cannot get in touch with a physician after hours.  The patient acknowledged full understanding of the risk, recommendations and plan.  He understands risks versus benefits of therapy.  I have answered all of the patient's questions and concerns.       nausea related to chemotherapy:  Compazine 10 mg every 8 hours as needed  Zofran 8 mg every 6 hours as needed    Nutrition:  Educated on the importance of maintaining healthy diet with adequate hydration    Malignancy related pain:  Continue Norco as directed    Anxiety related to diagnosis:  Continue Xanax as directed    Malnutrition:  Continue Marinol 2 mg p.o. b.i.d.

## 2022-06-14 ENCOUNTER — TELEPHONE (OUTPATIENT)
Dept: HEMATOLOGY/ONCOLOGY | Facility: CLINIC | Age: 67
End: 2022-06-14
Payer: OTHER GOVERNMENT

## 2022-06-15 ENCOUNTER — TELEPHONE (OUTPATIENT)
Dept: HEMATOLOGY/ONCOLOGY | Facility: CLINIC | Age: 67
End: 2022-06-15
Payer: OTHER GOVERNMENT

## 2022-06-20 ENCOUNTER — PATIENT MESSAGE (OUTPATIENT)
Dept: HEMATOLOGY/ONCOLOGY | Facility: CLINIC | Age: 67
End: 2022-06-20
Payer: OTHER GOVERNMENT

## 2022-06-20 ENCOUNTER — TELEPHONE (OUTPATIENT)
Dept: HEMATOLOGY/ONCOLOGY | Facility: CLINIC | Age: 67
End: 2022-06-20
Payer: OTHER GOVERNMENT

## 2022-06-20 DIAGNOSIS — F51.01 PRIMARY INSOMNIA: Primary | ICD-10-CM

## 2022-06-20 NOTE — TELEPHONE ENCOUNTER
routed to Dr Banks for advice.    ----- Message from Kellen Arguello sent at 6/20/2022  2:06 PM CDT -----  Regarding: RE: chemo regimen/neulasta  I tried calling but they are closed today.  Will put a note to call the plan tomorrow.   ----- Message -----  From: Erica Carter RN  Sent: 6/20/2022   1:55 PM CDT  To: Kellen Arguello  Subject: chemo regimen/neulasta                           Please provide update for pt's regimen.

## 2022-06-21 ENCOUNTER — PATIENT MESSAGE (OUTPATIENT)
Dept: HEMATOLOGY/ONCOLOGY | Facility: CLINIC | Age: 67
End: 2022-06-21
Payer: OTHER GOVERNMENT

## 2022-06-21 ENCOUNTER — PATIENT MESSAGE (OUTPATIENT)
Dept: FAMILY MEDICINE | Facility: CLINIC | Age: 67
End: 2022-06-21
Payer: OTHER GOVERNMENT

## 2022-06-21 ENCOUNTER — INFUSION (OUTPATIENT)
Dept: INFUSION THERAPY | Facility: HOSPITAL | Age: 67
End: 2022-06-21
Attending: FAMILY MEDICINE
Payer: OTHER GOVERNMENT

## 2022-06-21 VITALS
SYSTOLIC BLOOD PRESSURE: 131 MMHG | HEART RATE: 101 BPM | RESPIRATION RATE: 18 BRPM | WEIGHT: 149.69 LBS | DIASTOLIC BLOOD PRESSURE: 68 MMHG | OXYGEN SATURATION: 99 % | BODY MASS INDEX: 23.49 KG/M2 | HEIGHT: 67 IN | TEMPERATURE: 98 F

## 2022-06-21 DIAGNOSIS — C19 METASTATIC COLORECTAL CANCER: Primary | ICD-10-CM

## 2022-06-21 DIAGNOSIS — C78.7 SECONDARY MALIGNANT NEOPLASM OF LIVER: ICD-10-CM

## 2022-06-21 LAB
ALBUMIN SERPL BCP-MCNC: 2.7 G/DL (ref 3.5–5.2)
ALP SERPL-CCNC: 759 U/L (ref 55–135)
ALT SERPL W/O P-5'-P-CCNC: 48 U/L (ref 10–44)
ANION GAP SERPL CALC-SCNC: 11 MMOL/L (ref 8–16)
AST SERPL-CCNC: 56 U/L (ref 10–40)
BASOPHILS # BLD AUTO: 0.03 K/UL (ref 0–0.2)
BASOPHILS NFR BLD: 0.2 % (ref 0–1.9)
BILIRUB SERPL-MCNC: 0.8 MG/DL (ref 0.1–1)
BUN SERPL-MCNC: 15 MG/DL (ref 8–23)
CALCIUM SERPL-MCNC: 9 MG/DL (ref 8.7–10.5)
CHLORIDE SERPL-SCNC: 101 MMOL/L (ref 95–110)
CO2 SERPL-SCNC: 25 MMOL/L (ref 23–29)
CREAT SERPL-MCNC: 0.7 MG/DL (ref 0.5–1.4)
DIFFERENTIAL METHOD: ABNORMAL
EOSINOPHIL # BLD AUTO: 0 K/UL (ref 0–0.5)
EOSINOPHIL NFR BLD: 0.2 % (ref 0–8)
ERYTHROCYTE [DISTWIDTH] IN BLOOD BY AUTOMATED COUNT: 14.8 % (ref 11.5–14.5)
EST. GFR  (AFRICAN AMERICAN): >60 ML/MIN/1.73 M^2
EST. GFR  (NON AFRICAN AMERICAN): >60 ML/MIN/1.73 M^2
GLUCOSE SERPL-MCNC: 269 MG/DL (ref 70–110)
HCT VFR BLD AUTO: 34.2 % (ref 37–48.5)
HGB BLD-MCNC: 10.7 G/DL (ref 12–16)
IMM GRANULOCYTES # BLD AUTO: 0.06 K/UL (ref 0–0.04)
IMM GRANULOCYTES NFR BLD AUTO: 0.5 % (ref 0–0.5)
LDH SERPL L TO P-CCNC: 518 U/L (ref 110–260)
LYMPHOCYTES # BLD AUTO: 1.2 K/UL (ref 1–4.8)
LYMPHOCYTES NFR BLD: 8.9 % (ref 18–48)
MAGNESIUM SERPL-MCNC: 1.7 MG/DL (ref 1.6–2.6)
MCH RBC QN AUTO: 29.9 PG (ref 27–31)
MCHC RBC AUTO-ENTMCNC: 31.3 G/DL (ref 32–36)
MCV RBC AUTO: 96 FL (ref 82–98)
MONOCYTES # BLD AUTO: 0.6 K/UL (ref 0.3–1)
MONOCYTES NFR BLD: 4.5 % (ref 4–15)
NEUTROPHILS # BLD AUTO: 11.2 K/UL (ref 1.8–7.7)
NEUTROPHILS NFR BLD: 85.7 % (ref 38–73)
NRBC BLD-RTO: 0 /100 WBC
PLATELET # BLD AUTO: 208 K/UL (ref 150–450)
PMV BLD AUTO: 9.8 FL (ref 9.2–12.9)
POTASSIUM SERPL-SCNC: 4.7 MMOL/L (ref 3.5–5.1)
PROT SERPL-MCNC: 6.9 G/DL (ref 6–8.4)
RBC # BLD AUTO: 3.58 M/UL (ref 4–5.4)
SODIUM SERPL-SCNC: 137 MMOL/L (ref 136–145)
WBC # BLD AUTO: 13.08 K/UL (ref 3.9–12.7)

## 2022-06-21 PROCEDURE — 83615 LACTATE (LD) (LDH) ENZYME: CPT | Performed by: INTERNAL MEDICINE

## 2022-06-21 PROCEDURE — 83735 ASSAY OF MAGNESIUM: CPT | Performed by: INTERNAL MEDICINE

## 2022-06-21 PROCEDURE — 80053 COMPREHEN METABOLIC PANEL: CPT | Performed by: INTERNAL MEDICINE

## 2022-06-21 PROCEDURE — 96523 IRRIG DRUG DELIVERY DEVICE: CPT

## 2022-06-21 PROCEDURE — 85025 COMPLETE CBC W/AUTO DIFF WBC: CPT | Performed by: INTERNAL MEDICINE

## 2022-06-21 RX ORDER — SODIUM CHLORIDE 0.9 % (FLUSH) 0.9 %
10 SYRINGE (ML) INJECTION
Status: CANCELLED | OUTPATIENT
Start: 2022-06-21

## 2022-06-21 RX ORDER — HEPARIN 100 UNIT/ML
500 SYRINGE INTRAVENOUS
Status: DISCONTINUED | OUTPATIENT
Start: 2022-06-21 | End: 2022-06-21 | Stop reason: HOSPADM

## 2022-06-21 RX ORDER — HEPARIN 100 UNIT/ML
500 SYRINGE INTRAVENOUS
Status: CANCELLED | OUTPATIENT
Start: 2022-06-21

## 2022-06-21 RX ORDER — SODIUM CHLORIDE 0.9 % (FLUSH) 0.9 %
10 SYRINGE (ML) INJECTION
Status: DISCONTINUED | OUTPATIENT
Start: 2022-06-21 | End: 2022-06-21 | Stop reason: HOSPADM

## 2022-06-21 RX ORDER — TRAZODONE HYDROCHLORIDE 50 MG/1
50 TABLET ORAL NIGHTLY PRN
Qty: 30 TABLET | Refills: 1 | Status: ON HOLD | OUTPATIENT
Start: 2022-06-21 | End: 2022-07-01 | Stop reason: HOSPADM

## 2022-06-21 NOTE — PLAN OF CARE
Pleasant alert and oriented patient ambulated with rollator and accompanied by spouse to clinic for port flush with labs - pt tolerated well - specimens taken to [facility] lab - heparin locked and pt to FU with oncologist on Thursday

## 2022-06-22 ENCOUNTER — TELEPHONE (OUTPATIENT)
Dept: HEMATOLOGY/ONCOLOGY | Facility: CLINIC | Age: 67
End: 2022-06-22
Payer: OTHER GOVERNMENT

## 2022-06-23 ENCOUNTER — OFFICE VISIT (OUTPATIENT)
Dept: HEMATOLOGY/ONCOLOGY | Facility: CLINIC | Age: 67
End: 2022-06-23
Payer: OTHER GOVERNMENT

## 2022-06-23 VITALS
DIASTOLIC BLOOD PRESSURE: 75 MMHG | OXYGEN SATURATION: 97 % | WEIGHT: 136.5 LBS | HEIGHT: 67 IN | BODY MASS INDEX: 21.43 KG/M2 | HEART RATE: 104 BPM | SYSTOLIC BLOOD PRESSURE: 127 MMHG

## 2022-06-23 DIAGNOSIS — E03.9 HYPOTHYROIDISM, UNSPECIFIED TYPE: ICD-10-CM

## 2022-06-23 DIAGNOSIS — F32.0 CURRENT MILD EPISODE OF MAJOR DEPRESSIVE DISORDER WITHOUT PRIOR EPISODE: ICD-10-CM

## 2022-06-23 DIAGNOSIS — C78.7 SECONDARY MALIGNANT NEOPLASM OF LIVER: ICD-10-CM

## 2022-06-23 DIAGNOSIS — C19 COLORECTAL CARCINOMA: ICD-10-CM

## 2022-06-23 DIAGNOSIS — R50.9 FEVER, UNSPECIFIED FEVER CAUSE: ICD-10-CM

## 2022-06-23 DIAGNOSIS — G89.3 NEOPLASM RELATED PAIN: ICD-10-CM

## 2022-06-23 DIAGNOSIS — R63.0 LOSS OF APPETITE: ICD-10-CM

## 2022-06-23 DIAGNOSIS — F51.01 PRIMARY INSOMNIA: Primary | ICD-10-CM

## 2022-06-23 DIAGNOSIS — R63.4 WEIGHT LOSS: ICD-10-CM

## 2022-06-23 PROCEDURE — 99999 PR PBB SHADOW E&M-EST. PATIENT-LVL IV: ICD-10-PCS | Mod: PBBFAC,,, | Performed by: INTERNAL MEDICINE

## 2022-06-23 PROCEDURE — 99214 PR OFFICE/OUTPT VISIT, EST, LEVL IV, 30-39 MIN: ICD-10-PCS | Mod: S$GLB,,, | Performed by: INTERNAL MEDICINE

## 2022-06-23 PROCEDURE — 99214 OFFICE O/P EST MOD 30 MIN: CPT | Mod: S$GLB,,, | Performed by: INTERNAL MEDICINE

## 2022-06-23 PROCEDURE — 99999 PR PBB SHADOW E&M-EST. PATIENT-LVL IV: CPT | Mod: PBBFAC,,, | Performed by: INTERNAL MEDICINE

## 2022-06-23 RX ORDER — LEVOTHYROXINE SODIUM 200 UG/1
200 TABLET ORAL DAILY
Qty: 90 TABLET | Refills: 3 | Status: SHIPPED | OUTPATIENT
Start: 2022-06-23 | End: 2023-06-23

## 2022-06-23 NOTE — PROGRESS NOTES
History of present illness:  The patient is a 66-year-old white female with metastatic colon carcinoma involving the liver who is treated with Xelox/Avastin and returns clinic for evaluation prior to next cycle of therapy.  Patient's recent course has been complicated by protein/calorie malnutrition, diarrhea, hand-foot syndrome, and electrolyte disturbances.    Since returning home following an 8 day hospitalization in [place], patient remains extremely weak and fatigued.  Oral intake for solids and liquids has improved.  She denies pain.  Diarrhea appears to have resolved.  Patient spends about 70% of her day in bed and about 30% in the chair.    Patient returns to clinic to review results of interval study and to further discuss plan of care.  Patient's appetite remains poor.  She requests refill on Marinol.  Patient continues to lose weight.  Abdominal bloating and distension appears to improve with ambulation.  She is making efforts at doing this.  Patient is experiencing night sweats.  She has not taken her temperature during these episodes.  Patient is reporting crying spells and is currently on no medications for control of mood or anxiety.  She reports continued compliance with Synthroid 150 mcg p.o. daily.  Patient has had another ER visit for dehydration and electrolyte disturbance since her last office evaluation.    Patient returns to clinic for interval reassessment and to consider whether not she can resume palliative chemotherapy.  Patient's energy has improved since initiation of intravenous iron replacement and Synthroid.  Patient has had no additional ER visits for dehydration/electrolyte disturbances.  She just returned from a vacation to visit family in [facility].  Patient now spends more than 50% of her day out of bed.  She is motivated toward trying additional palliative therapy.    Patient returns to clinic for re-evaluation prior to initiating palliative Camptosar.    Physical  examination:  The patient is a well-developed, frail, elderly, white female, in no acute distress, who has a weight of 136.5 lb  (decreased by 3.5 lb).  VITAL SIGNS: Documented  and reviewed this visit.  HEENT: Normocephalic, atraumatic. Oral mucosa pink and moist. Lips without lesions. Tongue midline. Oropharynx clear. Nonicteric sclerae.   NECK: Supple, no adenopathy. No carotid bruits, thyromegaly or thyroid nodule.   HEART: Regular rate and rhythm without murmur, gallop or rub.   LUNGS: Clear to auscultation bilaterally. Normal respiratory effort.   ABDOMEN: Soft, nontender, nondistended with positive normoactive bowel sounds, no hepatosplenomegaly.   EXTREMITIES: No cyanosis, clubbing or edema. Distal pulses are intact.   AXILLAE AND GROIN: No palpable pathologic lymphadenopathy is appreciated.   SKIN: Intact/turgor normal   NEUROLOGIC: Cranial nerves II-XII grossly intact. Motor:  Generalized loss of muscle bulk and tone. Strength/sensory 5/5 throughout. Gait stable.     Laboratory:  White count 13.1, hemoglobin 10.7, hematocrit 34.2, platelets 208, absolute neutrophil count is 11,200.  Sodium 137, potassium 4.7, chloride 101, CO2 25, BUN 15, creatinine 0.7, glucose 269, calcium 9, magnesium 1.7, alkaline phosphatase 759, AST 56, ALT 48, , GFR greater than 60.     Guardant 360 panel:  No actionable mutations noted.    Impression:  1. Metastatic colon carcinoma involving liver with disease progression per most recent imaging.    2. Protein/calorie malnutrition.  3. Hand-foot syndrome.  4. Multiple thoracic/lumbar spinal compression fractures of undetermined age.  5.  Iron deficiency anemia.  6.  Hypothyroidism on inadequate replacement.  7. Treatment associated thrombocytopenia.  8.  Depression/anxiety  9.  Night sweats-suspect tumor fever.    Plan:  1. Continue Synthroid  200 mcg p.o. daily.  2. Continue Marinol per patient.  3. Continue patient on Pristiq 50 mg q.a.m./Xanax 0.25 mg p.o. t.i.d. as  needed for anxiety   4. Megace syrup for stimulation of appetite/weight gain  5. Start patient on second-line therapy to consist of Camptosar at a dose of 150 mg per m2 every 14 days.  6. Patient will receive DP 10/0.25 and as needed Compazine for control of acute and delayed emesis.  7. Patient will receive typical atropine premedication for French and a secretory diarrhea in association with Camptosar infusion.  8. In light of patient's advanced age, pretreated state, and relative difficulties with nutrition/weight loss, she will be provided prophylactic Neulasta 6 mg subQ via on body injector for prevention luis associated sepsis and/or treatment delay.  9. 40 minutes contact time was spent counseling the patient regarding plan of care as detailed above.  She voices understanding wishes to proceed.    10. Patient is to return to clinic in 2 weeks from initiation of therapy with interval CBC, CMP, LDH, magnesium, and CEA prior to cycle 2.       This note was created using voice recognition software and may contain grammatical errors.

## 2022-06-24 ENCOUNTER — INFUSION (OUTPATIENT)
Dept: INFUSION THERAPY | Facility: HOSPITAL | Age: 67
End: 2022-06-24
Payer: OTHER GOVERNMENT

## 2022-06-24 ENCOUNTER — TELEPHONE (OUTPATIENT)
Dept: HEMATOLOGY/ONCOLOGY | Facility: CLINIC | Age: 67
End: 2022-06-24
Payer: OTHER GOVERNMENT

## 2022-06-24 VITALS
TEMPERATURE: 98 F | SYSTOLIC BLOOD PRESSURE: 116 MMHG | HEART RATE: 79 BPM | WEIGHT: 136.5 LBS | BODY MASS INDEX: 21.43 KG/M2 | HEIGHT: 67 IN | RESPIRATION RATE: 16 BRPM | DIASTOLIC BLOOD PRESSURE: 59 MMHG

## 2022-06-24 DIAGNOSIS — C78.7 SECONDARY MALIGNANT NEOPLASM OF LIVER: Primary | ICD-10-CM

## 2022-06-24 DIAGNOSIS — C19 COLORECTAL CARCINOMA: ICD-10-CM

## 2022-06-24 PROCEDURE — 96413 CHEMO IV INFUSION 1 HR: CPT

## 2022-06-24 PROCEDURE — 96415 CHEMO IV INFUSION ADDL HR: CPT

## 2022-06-24 PROCEDURE — 25000003 PHARM REV CODE 250: Performed by: INTERNAL MEDICINE

## 2022-06-24 PROCEDURE — 96372 THER/PROPH/DIAG INJ SC/IM: CPT | Mod: 59

## 2022-06-24 PROCEDURE — 96367 TX/PROPH/DG ADDL SEQ IV INF: CPT

## 2022-06-24 PROCEDURE — 96377 APPLICATON ON-BODY INJECTOR: CPT

## 2022-06-24 PROCEDURE — 63600175 PHARM REV CODE 636 W HCPCS: Performed by: INTERNAL MEDICINE

## 2022-06-24 RX ORDER — HEPARIN 100 UNIT/ML
500 SYRINGE INTRAVENOUS
Status: DISCONTINUED | OUTPATIENT
Start: 2022-06-24 | End: 2022-06-24 | Stop reason: HOSPADM

## 2022-06-24 RX ORDER — SODIUM CHLORIDE 0.9 % (FLUSH) 0.9 %
10 SYRINGE (ML) INJECTION
Status: DISCONTINUED | OUTPATIENT
Start: 2022-06-24 | End: 2022-06-24 | Stop reason: HOSPADM

## 2022-06-24 RX ORDER — ATROPINE SULFATE 0.4 MG/ML
0.4 INJECTION, SOLUTION ENDOTRACHEAL; INTRAMEDULLARY; INTRAMUSCULAR; INTRAVENOUS; SUBCUTANEOUS ONCE
Status: COMPLETED | OUTPATIENT
Start: 2022-06-24 | End: 2022-06-24

## 2022-06-24 RX ADMIN — SODIUM CHLORIDE: 9 INJECTION, SOLUTION INTRAVENOUS at 10:06

## 2022-06-24 RX ADMIN — SODIUM CHLORIDE 260 MG: 0.9 INJECTION, SOLUTION INTRAVENOUS at 11:06

## 2022-06-24 RX ADMIN — Medication 500 UNITS: at 01:06

## 2022-06-24 RX ADMIN — PEGFILGRASTIM 6 MG: KIT SUBCUTANEOUS at 01:06

## 2022-06-24 RX ADMIN — ATROPINE SULFATE 0.4 MG: 0.4 INJECTION, SOLUTION INTRAMUSCULAR; INTRAVENOUS; SUBCUTANEOUS at 10:06

## 2022-06-24 RX ADMIN — DEXAMETHASONE SODIUM PHOSPHATE 0.25 MG: 4 INJECTION, SOLUTION INTRA-ARTICULAR; INTRALESIONAL; INTRAMUSCULAR; INTRAVENOUS; SOFT TISSUE at 10:06

## 2022-06-24 NOTE — PLAN OF CARE
Pt tolerated plan well - neulasta placed on left arm - pt and spouse VU to use of Neulasta OBI - pt discharged home with no concerns - pt to RTC 7/8.

## 2022-06-24 NOTE — TELEPHONE ENCOUNTER
This nurse spoke with Dr Banks. Per Dr Banks, pt is to proceed with tx w/o auth per Dr Marcelino Garcia.

## 2022-06-24 NOTE — PLAN OF CARE
Pleasant patient ambulated with rollator to clinic for induction of irinotecan - pt accompanied by spouse - pt verbalized understanding of medication education and pt signed her consent form that was faxed to Erica Carter RN - pt previously received education and information on new chemo theraputic drug.  Pt received premedications and has been tolerating new regiemen well - sleeping throughout visit - has been offered lunch, snacks and drinks - head on pillow, recliner back.

## 2022-06-29 ENCOUNTER — HOSPITAL ENCOUNTER (OUTPATIENT)
Facility: HOSPITAL | Age: 67
Discharge: HOSPICE | End: 2022-07-01
Attending: EMERGENCY MEDICINE | Admitting: HOSPITALIST
Payer: OTHER GOVERNMENT

## 2022-06-29 DIAGNOSIS — K83.8 PNEUMOBILIA: Primary | ICD-10-CM

## 2022-06-29 DIAGNOSIS — D72.828 OTHER ELEVATED WHITE BLOOD CELL (WBC) COUNT: ICD-10-CM

## 2022-06-29 DIAGNOSIS — R10.32 LEFT LOWER QUADRANT ABDOMINAL PAIN: ICD-10-CM

## 2022-06-29 DIAGNOSIS — C19 COLORECTAL CARCINOMA: ICD-10-CM

## 2022-06-29 DIAGNOSIS — R10.9 ABDOMINAL PAIN: ICD-10-CM

## 2022-06-29 LAB
ALBUMIN SERPL BCP-MCNC: 3.1 G/DL (ref 3.5–5.2)
ALP SERPL-CCNC: 798 U/L (ref 55–135)
ALT SERPL W/O P-5'-P-CCNC: 41 U/L (ref 10–44)
ANION GAP SERPL CALC-SCNC: 15 MMOL/L (ref 8–16)
AST SERPL-CCNC: 34 U/L (ref 10–40)
BASOPHILS # BLD AUTO: 0.02 K/UL (ref 0–0.2)
BASOPHILS NFR BLD: 0.1 % (ref 0–1.9)
BILIRUB SERPL-MCNC: 1 MG/DL (ref 0.1–1)
BILIRUB UR QL STRIP: NEGATIVE
BUN SERPL-MCNC: 16 MG/DL (ref 8–23)
CALCIUM SERPL-MCNC: 9.8 MG/DL (ref 8.7–10.5)
CHLORIDE SERPL-SCNC: 98 MMOL/L (ref 95–110)
CLARITY UR: CLEAR
CO2 SERPL-SCNC: 23 MMOL/L (ref 23–29)
COLOR UR: YELLOW
CREAT SERPL-MCNC: 0.8 MG/DL (ref 0.5–1.4)
DIFFERENTIAL METHOD: ABNORMAL
EOSINOPHIL # BLD AUTO: 0 K/UL (ref 0–0.5)
EOSINOPHIL NFR BLD: 0 % (ref 0–8)
ERYTHROCYTE [DISTWIDTH] IN BLOOD BY AUTOMATED COUNT: 14.6 % (ref 11.5–14.5)
EST. GFR  (AFRICAN AMERICAN): >60 ML/MIN/1.73 M^2
EST. GFR  (NON AFRICAN AMERICAN): >60 ML/MIN/1.73 M^2
GLUCOSE SERPL-MCNC: 201 MG/DL (ref 70–110)
GLUCOSE UR QL STRIP: NEGATIVE
HCT VFR BLD AUTO: 38.3 % (ref 37–48.5)
HGB BLD-MCNC: 12.1 G/DL (ref 12–16)
HGB UR QL STRIP: NEGATIVE
IMM GRANULOCYTES # BLD AUTO: 0.48 K/UL (ref 0–0.04)
IMM GRANULOCYTES NFR BLD AUTO: 2.3 % (ref 0–0.5)
KETONES UR QL STRIP: NEGATIVE
LEUKOCYTE ESTERASE UR QL STRIP: NEGATIVE
LIPASE SERPL-CCNC: 10 U/L (ref 4–60)
LYMPHOCYTES # BLD AUTO: 2 K/UL (ref 1–4.8)
LYMPHOCYTES NFR BLD: 9.7 % (ref 18–48)
MCH RBC QN AUTO: 29.4 PG (ref 27–31)
MCHC RBC AUTO-ENTMCNC: 31.6 G/DL (ref 32–36)
MCV RBC AUTO: 93 FL (ref 82–98)
MONOCYTES # BLD AUTO: 1 K/UL (ref 0.3–1)
MONOCYTES NFR BLD: 4.8 % (ref 4–15)
NEUTROPHILS # BLD AUTO: 17.2 K/UL (ref 1.8–7.7)
NEUTROPHILS NFR BLD: 83.1 % (ref 38–73)
NITRITE UR QL STRIP: NEGATIVE
NRBC BLD-RTO: 0 /100 WBC
PH UR STRIP: 6 [PH] (ref 5–8)
PLATELET # BLD AUTO: 191 K/UL (ref 150–450)
PMV BLD AUTO: 10 FL (ref 9.2–12.9)
POTASSIUM SERPL-SCNC: 4.7 MMOL/L (ref 3.5–5.1)
PROT SERPL-MCNC: 7.6 G/DL (ref 6–8.4)
PROT UR QL STRIP: NEGATIVE
RBC # BLD AUTO: 4.11 M/UL (ref 4–5.4)
SODIUM SERPL-SCNC: 136 MMOL/L (ref 136–145)
SP GR UR STRIP: 1.02 (ref 1–1.03)
URN SPEC COLLECT METH UR: NORMAL
UROBILINOGEN UR STRIP-ACNC: 1 EU/DL
WBC # BLD AUTO: 20.68 K/UL (ref 3.9–12.7)

## 2022-06-29 PROCEDURE — 96361 HYDRATE IV INFUSION ADD-ON: CPT

## 2022-06-29 PROCEDURE — 83690 ASSAY OF LIPASE: CPT | Performed by: EMERGENCY MEDICINE

## 2022-06-29 PROCEDURE — 63600175 PHARM REV CODE 636 W HCPCS: Performed by: EMERGENCY MEDICINE

## 2022-06-29 PROCEDURE — 99285 EMERGENCY DEPT VISIT HI MDM: CPT | Mod: 25

## 2022-06-29 PROCEDURE — 96376 TX/PRO/DX INJ SAME DRUG ADON: CPT

## 2022-06-29 PROCEDURE — 96375 TX/PRO/DX INJ NEW DRUG ADDON: CPT

## 2022-06-29 PROCEDURE — 74176 CT ABDOMEN PELVIS WITHOUT CONTRAST: ICD-10-PCS | Mod: 26,,, | Performed by: RADIOLOGY

## 2022-06-29 PROCEDURE — 81003 URINALYSIS AUTO W/O SCOPE: CPT | Performed by: EMERGENCY MEDICINE

## 2022-06-29 PROCEDURE — 74176 CT ABD & PELVIS W/O CONTRAST: CPT | Mod: 26,,, | Performed by: RADIOLOGY

## 2022-06-29 PROCEDURE — 93005 ELECTROCARDIOGRAM TRACING: CPT

## 2022-06-29 PROCEDURE — 25000003 PHARM REV CODE 250: Performed by: EMERGENCY MEDICINE

## 2022-06-29 PROCEDURE — 74176 CT ABD & PELVIS W/O CONTRAST: CPT | Mod: TC

## 2022-06-29 PROCEDURE — 93010 ELECTROCARDIOGRAM REPORT: CPT | Mod: ,,, | Performed by: INTERNAL MEDICINE

## 2022-06-29 PROCEDURE — 74019 RADEX ABDOMEN 2 VIEWS: CPT | Mod: TC

## 2022-06-29 PROCEDURE — 74019 XR ABDOMEN FLAT AND ERECT: ICD-10-PCS | Mod: 26,,, | Performed by: RADIOLOGY

## 2022-06-29 PROCEDURE — 93010 EKG 12-LEAD: ICD-10-PCS | Mod: ,,, | Performed by: INTERNAL MEDICINE

## 2022-06-29 PROCEDURE — 74019 RADEX ABDOMEN 2 VIEWS: CPT | Mod: 26,,, | Performed by: RADIOLOGY

## 2022-06-29 PROCEDURE — 80053 COMPREHEN METABOLIC PANEL: CPT | Performed by: EMERGENCY MEDICINE

## 2022-06-29 PROCEDURE — 85025 COMPLETE CBC W/AUTO DIFF WBC: CPT | Performed by: EMERGENCY MEDICINE

## 2022-06-29 RX ORDER — SODIUM CHLORIDE 9 MG/ML
INJECTION, SOLUTION INTRAVENOUS
Status: COMPLETED | OUTPATIENT
Start: 2022-06-29 | End: 2022-06-29

## 2022-06-29 RX ORDER — HYDROMORPHONE HYDROCHLORIDE 2 MG/ML
0.5 INJECTION, SOLUTION INTRAMUSCULAR; INTRAVENOUS; SUBCUTANEOUS
Status: COMPLETED | OUTPATIENT
Start: 2022-06-29 | End: 2022-06-29

## 2022-06-29 RX ORDER — ONDANSETRON 2 MG/ML
4 INJECTION INTRAMUSCULAR; INTRAVENOUS
Status: COMPLETED | OUTPATIENT
Start: 2022-06-29 | End: 2022-06-29

## 2022-06-29 RX ORDER — MORPHINE SULFATE 4 MG/ML
4 INJECTION, SOLUTION INTRAMUSCULAR; INTRAVENOUS
Status: COMPLETED | OUTPATIENT
Start: 2022-06-29 | End: 2022-06-29

## 2022-06-29 RX ADMIN — MORPHINE SULFATE 4 MG: 4 INJECTION, SOLUTION INTRAMUSCULAR; INTRAVENOUS at 08:06

## 2022-06-29 RX ADMIN — SODIUM CHLORIDE 1000 ML: 0.9 INJECTION, SOLUTION INTRAVENOUS at 08:06

## 2022-06-29 RX ADMIN — ONDANSETRON 4 MG: 2 INJECTION INTRAMUSCULAR; INTRAVENOUS at 08:06

## 2022-06-29 RX ADMIN — HYDROMORPHONE HYDROCHLORIDE 0.5 MG: 2 INJECTION INTRAMUSCULAR; INTRAVENOUS; SUBCUTANEOUS at 09:06

## 2022-06-29 RX ADMIN — HYDROMORPHONE HYDROCHLORIDE 0.5 MG: 2 INJECTION INTRAMUSCULAR; INTRAVENOUS; SUBCUTANEOUS at 11:06

## 2022-06-30 ENCOUNTER — TELEPHONE (OUTPATIENT)
Dept: HEMATOLOGY/ONCOLOGY | Facility: CLINIC | Age: 67
End: 2022-06-30
Payer: OTHER GOVERNMENT

## 2022-06-30 PROBLEM — R10.9 ABDOMINAL PAIN: Status: ACTIVE | Noted: 2022-06-30

## 2022-06-30 LAB
ALBUMIN SERPL BCP-MCNC: 2.5 G/DL (ref 3.5–5.2)
ALP SERPL-CCNC: 582 U/L (ref 55–135)
ALT SERPL W/O P-5'-P-CCNC: 28 U/L (ref 10–44)
ANION GAP SERPL CALC-SCNC: 12 MMOL/L (ref 8–16)
AST SERPL-CCNC: 24 U/L (ref 10–40)
BASOPHILS NFR BLD: 0 % (ref 0–1.9)
BILIRUB SERPL-MCNC: 1.8 MG/DL (ref 0.1–1)
BUN SERPL-MCNC: 14 MG/DL (ref 8–23)
CALCIUM SERPL-MCNC: 8.6 MG/DL (ref 8.7–10.5)
CHLORIDE SERPL-SCNC: 101 MMOL/L (ref 95–110)
CO2 SERPL-SCNC: 20 MMOL/L (ref 23–29)
CREAT SERPL-MCNC: 0.7 MG/DL (ref 0.5–1.4)
DIFFERENTIAL METHOD: ABNORMAL
EOSINOPHIL NFR BLD: 2 % (ref 0–8)
ERYTHROCYTE [DISTWIDTH] IN BLOOD BY AUTOMATED COUNT: 15.1 % (ref 11.5–14.5)
EST. GFR  (AFRICAN AMERICAN): >60 ML/MIN/1.73 M^2
EST. GFR  (NON AFRICAN AMERICAN): >60 ML/MIN/1.73 M^2
GLUCOSE SERPL-MCNC: 165 MG/DL (ref 70–110)
HCT VFR BLD AUTO: 38.3 % (ref 37–48.5)
HGB BLD-MCNC: 12.2 G/DL (ref 12–16)
IMM GRANULOCYTES # BLD AUTO: ABNORMAL K/UL (ref 0–0.04)
IMM GRANULOCYTES NFR BLD AUTO: ABNORMAL % (ref 0–0.5)
LACTATE SERPL-SCNC: 1.9 MMOL/L (ref 0.5–2.2)
LYMPHOCYTES NFR BLD: 14 % (ref 18–48)
MAGNESIUM SERPL-MCNC: 2 MG/DL (ref 1.6–2.6)
MCH RBC QN AUTO: 29.5 PG (ref 27–31)
MCHC RBC AUTO-ENTMCNC: 31.9 G/DL (ref 32–36)
MCV RBC AUTO: 93 FL (ref 82–98)
MONOCYTES NFR BLD: 4 % (ref 4–15)
NEUTROPHILS NFR BLD: 76 % (ref 38–73)
NEUTS BAND NFR BLD MANUAL: 4 %
NRBC BLD-RTO: 0 /100 WBC
PHOSPHATE SERPL-MCNC: 4.4 MG/DL (ref 2.7–4.5)
PLATELET # BLD AUTO: 197 K/UL (ref 150–450)
PMV BLD AUTO: 10.2 FL (ref 9.2–12.9)
POTASSIUM SERPL-SCNC: 4.3 MMOL/L (ref 3.5–5.1)
PROT SERPL-MCNC: 6.1 G/DL (ref 6–8.4)
RBC # BLD AUTO: 4.13 M/UL (ref 4–5.4)
SARS-COV-2 RDRP RESP QL NAA+PROBE: NEGATIVE
SODIUM SERPL-SCNC: 133 MMOL/L (ref 136–145)
WBC # BLD AUTO: 33.01 K/UL (ref 3.9–12.7)

## 2022-06-30 PROCEDURE — 25000003 PHARM REV CODE 250: Performed by: EMERGENCY MEDICINE

## 2022-06-30 PROCEDURE — U0002 COVID-19 LAB TEST NON-CDC: HCPCS | Performed by: HOSPITALIST

## 2022-06-30 PROCEDURE — 96376 TX/PRO/DX INJ SAME DRUG ADON: CPT

## 2022-06-30 PROCEDURE — 96365 THER/PROPH/DIAG IV INF INIT: CPT

## 2022-06-30 PROCEDURE — 63600175 PHARM REV CODE 636 W HCPCS: Performed by: HOSPITALIST

## 2022-06-30 PROCEDURE — G0378 HOSPITAL OBSERVATION PER HR: HCPCS

## 2022-06-30 PROCEDURE — 83605 ASSAY OF LACTIC ACID: CPT | Performed by: EMERGENCY MEDICINE

## 2022-06-30 PROCEDURE — 27000221 HC OXYGEN, UP TO 24 HOURS

## 2022-06-30 PROCEDURE — 99220 PR INITIAL OBSERVATION CARE,LEVL III: ICD-10-PCS | Mod: GT,,, | Performed by: HOSPITALIST

## 2022-06-30 PROCEDURE — 85025 COMPLETE CBC W/AUTO DIFF WBC: CPT | Performed by: EMERGENCY MEDICINE

## 2022-06-30 PROCEDURE — 96375 TX/PRO/DX INJ NEW DRUG ADDON: CPT

## 2022-06-30 PROCEDURE — 25000003 PHARM REV CODE 250: Performed by: HOSPITALIST

## 2022-06-30 PROCEDURE — 96372 THER/PROPH/DIAG INJ SC/IM: CPT | Mod: 59 | Performed by: HOSPITALIST

## 2022-06-30 PROCEDURE — 99220 PR INITIAL OBSERVATION CARE,LEVL III: CPT | Mod: GT,,, | Performed by: HOSPITALIST

## 2022-06-30 PROCEDURE — 96367 TX/PROPH/DG ADDL SEQ IV INF: CPT

## 2022-06-30 PROCEDURE — 63600175 PHARM REV CODE 636 W HCPCS: Performed by: EMERGENCY MEDICINE

## 2022-06-30 PROCEDURE — 84100 ASSAY OF PHOSPHORUS: CPT | Performed by: EMERGENCY MEDICINE

## 2022-06-30 PROCEDURE — 83735 ASSAY OF MAGNESIUM: CPT | Performed by: EMERGENCY MEDICINE

## 2022-06-30 PROCEDURE — 80053 COMPREHEN METABOLIC PANEL: CPT | Performed by: EMERGENCY MEDICINE

## 2022-06-30 PROCEDURE — 96366 THER/PROPH/DIAG IV INF ADDON: CPT

## 2022-06-30 PROCEDURE — 96361 HYDRATE IV INFUSION ADD-ON: CPT

## 2022-06-30 PROCEDURE — 36415 COLL VENOUS BLD VENIPUNCTURE: CPT | Performed by: EMERGENCY MEDICINE

## 2022-06-30 RX ORDER — MEROPENEM AND SODIUM CHLORIDE 1 G/50ML
1 INJECTION, SOLUTION INTRAVENOUS
Status: DISCONTINUED | OUTPATIENT
Start: 2022-06-30 | End: 2022-07-01

## 2022-06-30 RX ORDER — IBUPROFEN 200 MG
24 TABLET ORAL
Status: DISCONTINUED | OUTPATIENT
Start: 2022-06-30 | End: 2022-07-01 | Stop reason: HOSPADM

## 2022-06-30 RX ORDER — FAMOTIDINE 20 MG/1
20 TABLET, FILM COATED ORAL DAILY
Status: DISCONTINUED | OUTPATIENT
Start: 2022-06-30 | End: 2022-06-30

## 2022-06-30 RX ORDER — IBUPROFEN 200 MG
16 TABLET ORAL
Status: DISCONTINUED | OUTPATIENT
Start: 2022-06-30 | End: 2022-07-01 | Stop reason: HOSPADM

## 2022-06-30 RX ORDER — DESVENLAFAXINE SUCCINATE 50 MG/1
50 TABLET, EXTENDED RELEASE ORAL DAILY
Status: DISCONTINUED | OUTPATIENT
Start: 2022-06-30 | End: 2022-06-30

## 2022-06-30 RX ORDER — ALPRAZOLAM 0.25 MG/1
0.25 TABLET ORAL 3 TIMES DAILY PRN
Status: DISCONTINUED | OUTPATIENT
Start: 2022-06-30 | End: 2022-06-30

## 2022-06-30 RX ORDER — HYDROMORPHONE HYDROCHLORIDE 2 MG/ML
0.5 INJECTION, SOLUTION INTRAMUSCULAR; INTRAVENOUS; SUBCUTANEOUS
Status: COMPLETED | OUTPATIENT
Start: 2022-06-30 | End: 2022-06-30

## 2022-06-30 RX ORDER — DRONABINOL 2.5 MG/1
10 CAPSULE ORAL DAILY
Status: DISCONTINUED | OUTPATIENT
Start: 2022-06-30 | End: 2022-06-30

## 2022-06-30 RX ORDER — SODIUM CHLORIDE 9 MG/ML
1000 INJECTION, SOLUTION INTRAVENOUS
Status: COMPLETED | OUTPATIENT
Start: 2022-06-30 | End: 2022-06-30

## 2022-06-30 RX ORDER — CALCIUM CARBONATE 200(500)MG
1000 TABLET,CHEWABLE ORAL 2 TIMES DAILY
Status: DISCONTINUED | OUTPATIENT
Start: 2022-06-30 | End: 2022-06-30

## 2022-06-30 RX ORDER — VANCOMYCIN HCL IN 5 % DEXTROSE 1G/250ML
1000 PLASTIC BAG, INJECTION (ML) INTRAVENOUS
Status: COMPLETED | OUTPATIENT
Start: 2022-06-30 | End: 2022-06-30

## 2022-06-30 RX ORDER — ENOXAPARIN SODIUM 100 MG/ML
40 INJECTION SUBCUTANEOUS EVERY 24 HOURS
Status: DISCONTINUED | OUTPATIENT
Start: 2022-06-30 | End: 2022-06-30

## 2022-06-30 RX ORDER — NALOXONE HCL 0.4 MG/ML
0.02 VIAL (ML) INJECTION
Status: DISCONTINUED | OUTPATIENT
Start: 2022-06-30 | End: 2022-07-01 | Stop reason: HOSPADM

## 2022-06-30 RX ORDER — FERROUS SULFATE, DRIED 160(50) MG
1 TABLET, EXTENDED RELEASE ORAL 2 TIMES DAILY
Status: DISCONTINUED | OUTPATIENT
Start: 2022-06-30 | End: 2022-06-30

## 2022-06-30 RX ORDER — TRAZODONE HYDROCHLORIDE 50 MG/1
50 TABLET ORAL NIGHTLY PRN
Status: DISCONTINUED | OUTPATIENT
Start: 2022-06-30 | End: 2022-07-01 | Stop reason: HOSPADM

## 2022-06-30 RX ORDER — HYDROMORPHONE HYDROCHLORIDE 2 MG/ML
1 INJECTION, SOLUTION INTRAMUSCULAR; INTRAVENOUS; SUBCUTANEOUS
Status: COMPLETED | OUTPATIENT
Start: 2022-06-30 | End: 2022-06-30

## 2022-06-30 RX ORDER — HYDROCODONE BITARTRATE AND ACETAMINOPHEN 5; 325 MG/1; MG/1
1 TABLET ORAL EVERY 6 HOURS PRN
Status: DISCONTINUED | OUTPATIENT
Start: 2022-06-30 | End: 2022-07-01 | Stop reason: HOSPADM

## 2022-06-30 RX ORDER — HYDROMORPHONE HYDROCHLORIDE 2 MG/ML
1 INJECTION, SOLUTION INTRAMUSCULAR; INTRAVENOUS; SUBCUTANEOUS EVERY 4 HOURS PRN
Status: DISCONTINUED | OUTPATIENT
Start: 2022-06-30 | End: 2022-07-01

## 2022-06-30 RX ORDER — ACETAMINOPHEN 325 MG/1
650 TABLET ORAL EVERY 4 HOURS PRN
Status: DISCONTINUED | OUTPATIENT
Start: 2022-06-30 | End: 2022-07-01 | Stop reason: HOSPADM

## 2022-06-30 RX ORDER — ESCITALOPRAM OXALATE 10 MG/1
10 TABLET ORAL DAILY
Status: DISCONTINUED | OUTPATIENT
Start: 2022-06-30 | End: 2022-06-30

## 2022-06-30 RX ORDER — ONDANSETRON 2 MG/ML
4 INJECTION INTRAMUSCULAR; INTRAVENOUS EVERY 6 HOURS PRN
Status: DISCONTINUED | OUTPATIENT
Start: 2022-06-30 | End: 2022-07-01 | Stop reason: HOSPADM

## 2022-06-30 RX ORDER — SODIUM CHLORIDE 0.9 % (FLUSH) 0.9 %
10 SYRINGE (ML) INJECTION
Status: DISCONTINUED | OUTPATIENT
Start: 2022-06-30 | End: 2022-07-01 | Stop reason: HOSPADM

## 2022-06-30 RX ORDER — LEVOFLOXACIN 500 MG/1
500 TABLET, FILM COATED ORAL DAILY
Status: ON HOLD | COMMUNITY
End: 2022-07-01 | Stop reason: HOSPADM

## 2022-06-30 RX ORDER — SODIUM CHLORIDE 9 MG/ML
INJECTION, SOLUTION INTRAVENOUS CONTINUOUS
Status: DISCONTINUED | OUTPATIENT
Start: 2022-06-30 | End: 2022-07-01

## 2022-06-30 RX ORDER — ACETAMINOPHEN 10 MG/ML
1000 INJECTION, SOLUTION INTRAVENOUS ONCE
Status: DISCONTINUED | OUTPATIENT
Start: 2022-06-30 | End: 2022-06-30

## 2022-06-30 RX ORDER — IPRATROPIUM BROMIDE AND ALBUTEROL SULFATE 2.5; .5 MG/3ML; MG/3ML
3 SOLUTION RESPIRATORY (INHALATION) EVERY 4 HOURS PRN
Status: DISCONTINUED | OUTPATIENT
Start: 2022-06-30 | End: 2022-07-01 | Stop reason: HOSPADM

## 2022-06-30 RX ORDER — HYDROMORPHONE HYDROCHLORIDE 2 MG/ML
0.5 INJECTION, SOLUTION INTRAMUSCULAR; INTRAVENOUS; SUBCUTANEOUS EVERY 4 HOURS PRN
Status: DISCONTINUED | OUTPATIENT
Start: 2022-06-30 | End: 2022-06-30 | Stop reason: SDUPTHER

## 2022-06-30 RX ORDER — BACLOFEN 10 MG/1
5 TABLET ORAL 3 TIMES DAILY
COMMUNITY
End: 2022-06-30 | Stop reason: ALTCHOICE

## 2022-06-30 RX ORDER — ACETAMINOPHEN 10 MG/ML
1000 INJECTION, SOLUTION INTRAVENOUS ONCE
Status: COMPLETED | OUTPATIENT
Start: 2022-06-30 | End: 2022-06-30

## 2022-06-30 RX ORDER — LORAZEPAM 2 MG/ML
1 INJECTION INTRAMUSCULAR EVERY 4 HOURS PRN
Status: DISCONTINUED | OUTPATIENT
Start: 2022-06-30 | End: 2022-07-01

## 2022-06-30 RX ORDER — GLUCAGON 1 MG
1 KIT INJECTION
Status: DISCONTINUED | OUTPATIENT
Start: 2022-06-30 | End: 2022-07-01 | Stop reason: HOSPADM

## 2022-06-30 RX ADMIN — HYDROMORPHONE HYDROCHLORIDE 0.5 MG: 2 INJECTION INTRAMUSCULAR; INTRAVENOUS; SUBCUTANEOUS at 02:06

## 2022-06-30 RX ADMIN — ENOXAPARIN SODIUM 40 MG: 40 INJECTION SUBCUTANEOUS at 05:06

## 2022-06-30 RX ADMIN — HYDROMORPHONE HYDROCHLORIDE 0.5 MG: 2 INJECTION INTRAMUSCULAR; INTRAVENOUS; SUBCUTANEOUS at 12:06

## 2022-06-30 RX ADMIN — PROMETHAZINE HYDROCHLORIDE 12.5 MG: 25 INJECTION INTRAMUSCULAR; INTRAVENOUS at 08:06

## 2022-06-30 RX ADMIN — VANCOMYCIN HYDROCHLORIDE 1000 MG: 1 INJECTION, POWDER, LYOPHILIZED, FOR SOLUTION INTRAVENOUS at 05:06

## 2022-06-30 RX ADMIN — SODIUM CHLORIDE: 0.9 INJECTION, SOLUTION INTRAVENOUS at 07:06

## 2022-06-30 RX ADMIN — PIPERACILLIN SODIUM AND TAZOBACTAM SODIUM 3.38 G: 3; .375 INJECTION, POWDER, FOR SOLUTION INTRAVENOUS at 12:06

## 2022-06-30 RX ADMIN — HYDROMORPHONE HYDROCHLORIDE 0.5 MG: 2 INJECTION INTRAMUSCULAR; INTRAVENOUS; SUBCUTANEOUS at 08:06

## 2022-06-30 RX ADMIN — SODIUM CHLORIDE 1000 ML: 0.9 INJECTION, SOLUTION INTRAVENOUS at 04:06

## 2022-06-30 RX ADMIN — SODIUM CHLORIDE 1000 ML: 0.9 INJECTION, SOLUTION INTRAVENOUS at 07:06

## 2022-06-30 RX ADMIN — HYDROMORPHONE HYDROCHLORIDE 0.5 MG: 2 INJECTION INTRAMUSCULAR; INTRAVENOUS; SUBCUTANEOUS at 05:06

## 2022-06-30 RX ADMIN — PIPERACILLIN SODIUM AND TAZOBACTAM SODIUM 3.38 G: 3; .375 INJECTION, POWDER, FOR SOLUTION INTRAVENOUS at 04:06

## 2022-06-30 RX ADMIN — HYDROMORPHONE HYDROCHLORIDE 0.5 MG: 2 INJECTION INTRAMUSCULAR; INTRAVENOUS; SUBCUTANEOUS at 03:06

## 2022-06-30 RX ADMIN — HYDROMORPHONE HYDROCHLORIDE 1 MG: 2 INJECTION INTRAMUSCULAR; INTRAVENOUS; SUBCUTANEOUS at 07:06

## 2022-06-30 RX ADMIN — MEROPENEM AND SODIUM CHLORIDE 1 G: 1 INJECTION, SOLUTION INTRAVENOUS at 07:06

## 2022-06-30 RX ADMIN — LORAZEPAM 1 MG: 2 INJECTION INTRAMUSCULAR; INTRAVENOUS at 10:06

## 2022-06-30 RX ADMIN — ACETAMINOPHEN 1000 MG: 10 INJECTION INTRAVENOUS at 08:06

## 2022-06-30 NOTE — ED NOTES
Assumed care of pt at this time. Report received from JAYCEE Fontana. Pt waiting for bed placement at [facility]. Pt unable to tolerate PO meds per previous shift RN.

## 2022-06-30 NOTE — H&P
[facility] Medicine  History & Physical    Patient Name: Regine Lemus  MRN: [id number]  Admission Date: 6/29/2022  Attending Physician: Andry Burnette MD   Primary Care Provider: Jacobo Samuel MD         Patient information was obtained from patient, spouse/SO and ER records.       Subjective:     Principal Problem:Abdominal pain    Chief Complaint:   Chief Complaint   Patient presents with    Abdominal Pain     Generalized lower abdominal pain radiating into lower back starting today with associated n/v. Pt reports starting a new chemo on Friday.         HPI: The patient is a 65 y/o female with PMH of metastatic colon cancer, HTN, HLP, and hypothyroidism. She presented with abdominal pain more localized in the LLQ which started about one day ago. She has had some nausea and vomiting. She is followed by oncologist Dr. Nguyen and is on chemotherapy with an agent that is known to cause these side effects. She had had a small bowel movement. Work up shows a leukocytosis of 20 K and CT suggestive of significant amount of stool but official read pending. She had occasional fevers which are also attributed to her chemo. No CP, SOB, or other symptoms reported.         Past Medical History:   Diagnosis Date    Allergy     Colon cancer     Hypertension     Liver cancer     Nontoxic uninodular goiter     Osteoarthrosis, unspecified whether generalized or localized, hand     Other abnormal glucose     Proctocolitis 3/26/2022    Pure hypercholesterolemia     Severe sepsis        Past Surgical History:   Procedure Laterality Date    CYST REMOVAL  1994    cyst removed from neck    infusaport placement Right     THYROIDECTOMY      TONSILLECTOMY  age 14       Review of patient's allergies indicates:   Allergen Reactions    Lisinopril Rash       No current facility-administered medications on file prior to encounter.     Current Outpatient Medications on File Prior to Encounter    Medication Sig    ALPRAZolam (XANAX) 0.25 MG tablet Take 1 tablet (0.25 mg total) by mouth 3 (three) times daily as needed for Anxiety.    calcium carbonate (TUMS) 200 mg calcium (500 mg) chewable tablet Take 2 tablets (1,000 mg total) by mouth 3 (three) times daily before meals. (Patient taking differently: Take 1,000 mg by mouth 2 (two) times daily.)    calcium-vitamin D3 (OYSTER SHELL CALCIUM-VIT D3) 500 mg-5 mcg (200 unit) per tablet Take 1 tablet by mouth 2 (two) times daily.    desvenlafaxine succinate (PRISTIQ) 50 MG Tb24 Take 1 tablet (50 mg total) by mouth once daily.    dronabinoL (MARINOL) 10 MG capsule Take 1 capsule (10 mg total) by mouth 2 (two) times daily before meals. (Patient taking differently: Take 10 mg by mouth once daily. Pt can take up to two times a day but is only taking once a day.)    EScitalopram oxalate (LEXAPRO) 10 MG tablet Take 1 tablet (10 mg total) by mouth once daily.    famotidine (PEPCID) 20 MG tablet Take 1 tablet (20 mg total) by mouth once daily.    HYDROcodone-acetaminophen (NORCO) 5-325 mg per tablet Take 1 tablet by mouth every 6 (six) hours as needed for Pain.    KLOR-CON M20 20 mEq tablet Take 20 mEq by mouth 2 (two) times daily.    levothyroxine (SYNTHROID) 200 MCG tablet Take 1 tablet (200 mcg total) by mouth once daily. (Patient taking differently: Take 200 mcg by mouth once daily. Pt advised she takes this around 3am every morning.)    magnesium oxide (MAGOX) 400 mg (241.3 mg magnesium) tablet Take 1 tablet (400 mg total) by mouth once daily.    megestroL (MEGACE ES) 625 mg/5 mL (125 mg/mL) Susp Take 5 mLs (625 mg total) by mouth once daily.    ondansetron (ZOFRAN) 4 MG tablet Take 1 tablet (4 mg total) by mouth as needed for Nausea.    ondansetron (ZOFRAN-ODT) 4 MG TbDL Take by mouth daily as needed.    predniSONE (DELTASONE) 10 MG tablet Take 1 tablet (10 mg total) by mouth once daily. Pt taking 15mg daily (Patient taking differently: Take 15 mg by  mouth once daily. Pt taking 15mg daily)    traZODone (DESYREL) 50 MG tablet Take 1 tablet (50 mg total) by mouth nightly as needed for Insomnia.    HYDROcodone-acetaminophen (NORCO) 5-325 mg per tablet Take 1 tablet by mouth every 6 (six) hours as needed for Pain.    levoFLOXacin (LEVAQUIN) 500 MG tablet Take 500 mg by mouth once daily.    loperamide (IMODIUM) 2 mg capsule Take 1 capsule (2 mg total) by mouth 4 (four) times daily as needed for Diarrhea.    [DISCONTINUED] baclofen (LIORESAL) 10 MG tablet Take 5 mg by mouth 3 (three) times daily.    [DISCONTINUED] ergocalciferol (ERGOCALCIFEROL) 50,000 unit Cap Take 50,000 Units by mouth every 7 days.    [DISCONTINUED] furosemide (LASIX) 20 MG tablet Take 1 tablet (20 mg total) by mouth once daily.     Family History       Problem Relation (Age of Onset)    Lung cancer Father          Tobacco Use    Smoking status: Never Smoker    Smokeless tobacco: Never Used   Substance and Sexual Activity    Alcohol use: No    Drug use: Never    Sexual activity: Not on file     Review of Systems   Constitutional:  Positive for fatigue and fever. Negative for activity change, appetite change and chills.   HENT:  Negative for congestion, rhinorrhea, sneezing and sore throat.    Eyes:  Negative for photophobia and visual disturbance.   Respiratory:  Negative for cough, shortness of breath and wheezing.    Cardiovascular:  Negative for chest pain, palpitations and leg swelling.   Gastrointestinal:  Positive for abdominal pain, nausea and vomiting. Negative for blood in stool, constipation and diarrhea.   Endocrine: Negative for polydipsia and polyuria.   Genitourinary:  Negative for difficulty urinating, dysuria, flank pain, frequency and hematuria.   Musculoskeletal:  Negative for arthralgias and myalgias.   Skin:  Negative for rash and wound.   Neurological:  Negative for syncope, weakness, light-headedness and headaches.   Hematological:  Does not bruise/bleed easily.    Psychiatric/Behavioral:  Negative for confusion and hallucinations.    Objective:     Vital Signs (Most Recent):  Temp: 98 °F (36.7 °C) (06/29/22 1957)  Pulse: 105 (06/30/22 0147)  Resp: 10 (06/30/22 0147)  BP: 133/75 (06/30/22 0147)  SpO2: 95 % (06/30/22 0147) Vital Signs (24h Range):  Temp:  [98 °F (36.7 °C)] 98 °F (36.7 °C)  Pulse:  [] 105  Resp:  [10-22] 10  SpO2:  [80 %-98 %] 95 %  BP: (122-162)/(66-89) 133/75     Weight: 61.7 kg (136 lb)  Body mass index is 21.3 kg/m².    Physical Exam  Vitals and nursing note reviewed.   Constitutional:       Comments: fatigued   HENT:      Head: Normocephalic and atraumatic.   Eyes:      General: No scleral icterus.  Cardiovascular:      Rate and Rhythm: Tachycardia present.   Pulmonary:      Effort: Pulmonary effort is normal. No respiratory distress.   Abdominal:      Tenderness: There is abdominal tenderness (LLQ tenderness on patient palpation).   Musculoskeletal:         General: No swelling.      Right lower leg: No edema.      Left lower leg: No edema.   Skin:     Coloration: Skin is not jaundiced.      Findings: No erythema.   Neurological:      General: No focal deficit present.      Mental Status: She is alert and oriented to person, place, and time.           Significant Labs: All pertinent labs within the past 24 hours have been reviewed.    Significant Imaging: I have reviewed all pertinent imaging results/findings within the past 24 hours.    Assessment/Plan:     * Abdominal pain  Follow up official ct read but preliminary view appears like there is significant constipation.   Patient agreable to enema but is very tired and would like to get some rest; ok to give early in the am   IV hydration  Antiemetics   Pain control with dilaudid    Depression  Continue pristique and lexapro     Hypothyroidism  Continue levothyroxine     Colon cancer  Follow up with oncology as scheduled  Continue prednisone and marinol        VTE Risk Mitigation (From admission,  onward)    None               The attending portion of this evaluation, treatment, and documentation was performed per Aly Melendrez MD via Telemedicine AudioVisual using the secure [employer] software platform with 2 way audio/video. The provider was located off-site and the patient is located in the hospital. The aforementioned video software was utilized to document the relevant history and physical exam      Aly Melendrez MD  Department of Hospital Medicine   [facility] - Emergency Dept

## 2022-06-30 NOTE — ED PROVIDER NOTES
Encounter Date: 6/29/2022       History     Chief Complaint   Patient presents with    Abdominal Pain     Generalized lower abdominal pain radiating into lower back starting today with associated n/v. Pt reports starting a new chemo on Friday.      66-year-old female with unfortunate diagnosis of metastatic colon cancer to her liver presently on chemotherapy in her 2nd year of treatment who sees Dr. Duran comes to the emergency room secondary to abdominal pain and cramping in the left lower abdomen.  Patient was recently initiated on a new chemotherapeutic this past Friday.  Part of the symptoms and complications of this medication or indeed abdominal pain and cramping.  Denies any fevers or chills.  Has been trying to maintain her weight.  Denies any recent shortness of breath or other symptoms.  Has had small bowel movements today yesterday and previously.  Her oral intake has been somewhat supplemented with Marinol prescribed by her oncologist.  She appears clinically dehydrated.  The effects of her disease process her apparent.  At this point she feels better with her abdominal pain in the cramping which has subsided substantially.  Appears to be in moderately good spirits.  Pain was described as 10/10 at its worse.  Presently 5/10.  Dull crampy.  Generalized lower abdomen with a predilection for the left-hand side.        Review of patient's allergies indicates:   Allergen Reactions    Lisinopril Rash     Past Medical History:   Diagnosis Date    Allergy     Colon cancer     Hypertension     Liver cancer     Nontoxic uninodular goiter     Osteoarthrosis, unspecified whether generalized or localized, hand     Other abnormal glucose     Proctocolitis 3/26/2022    Pure hypercholesterolemia     Severe sepsis      Past Surgical History:   Procedure Laterality Date    CYST REMOVAL  1994    cyst removed from neck    infusaport placement Right     THYROIDECTOMY      TONSILLECTOMY  age 14     Family  History   Problem Relation Age of Onset    Lung cancer Father      Social History     Tobacco Use    Smoking status: Never Smoker    Smokeless tobacco: Never Used   Substance Use Topics    Alcohol use: No    Drug use: Never     Review of Systems   Constitutional: Positive for activity change, appetite change, diaphoresis and fatigue. Negative for fever.   HENT: Negative.    Respiratory: Negative.  Negative for shortness of breath.    Cardiovascular: Negative.  Negative for chest pain.   Gastrointestinal: Positive for abdominal distention, abdominal pain, constipation and nausea.   Genitourinary: Negative.  Negative for difficulty urinating and dysuria.   Musculoskeletal: Negative.    All other systems reviewed and are negative.      Physical Exam     Initial Vitals [06/29/22 1957]   BP Pulse Resp Temp SpO2   (!) 162/89 108 20 98 °F (36.7 °C) 97 %      MAP       --         Physical Exam    Vitals reviewed.  Constitutional: She appears well-developed and well-nourished.   Moderately ill-appearing.  Clinically dehydrated appearing.   HENT:   Head: Normocephalic and atraumatic.   Eyes: EOM are normal. Pupils are equal, round, and reactive to light.   Neck:   Normal range of motion.  Cardiovascular: Normal rate, regular rhythm and normal heart sounds.   Pulmonary/Chest: Breath sounds normal.   MediPort noted in the patient's right upper chest wall.   Abdominal: Abdomen is soft. Bowel sounds are normal.   Musculoskeletal:      Cervical back: Normal range of motion.     Neurological: She is alert and oriented to person, place, and time. She has normal strength. GCS score is 15. GCS eye subscore is 4. GCS verbal subscore is 5. GCS motor subscore is 6.   Skin: Skin is warm.   Poor turgor   Psychiatric: She has a normal mood and affect. Thought content normal.         ED Course   Procedures  Labs Reviewed   CBC W/ AUTO DIFFERENTIAL - Abnormal; Notable for the following components:       Result Value    WBC 20.68 (*)      MCHC 31.6 (*)     RDW 14.6 (*)     Immature Granulocytes 2.3 (*)     Gran # (ANC) 17.2 (*)     Immature Grans (Abs) 0.48 (*)     Gran % 83.1 (*)     Lymph % 9.7 (*)     All other components within normal limits   COMPREHENSIVE METABOLIC PANEL - Abnormal; Notable for the following components:    Glucose 201 (*)     Albumin 3.1 (*)     Alkaline Phosphatase 798 (*)     All other components within normal limits   CBC W/ AUTO DIFFERENTIAL - Abnormal; Notable for the following components:    WBC 33.01 (*)     MCHC 31.9 (*)     RDW 15.1 (*)     Gran % 76.0 (*)     Lymph % 14.0 (*)     All other components within normal limits   COMPREHENSIVE METABOLIC PANEL - Abnormal; Notable for the following components:    Sodium 133 (*)     CO2 20 (*)     Glucose 165 (*)     Calcium 8.6 (*)     Albumin 2.5 (*)     Total Bilirubin 1.8 (*)     Alkaline Phosphatase 582 (*)     All other components within normal limits   LIPASE   URINALYSIS, REFLEX TO URINE CULTURE    Narrative:     Preferred Collection Type->Urine, Clean Catch  Specimen Source->Urine   LACTIC ACID, PLASMA   MAGNESIUM   PHOSPHORUS   SARS-COV-2 RNA AMPLIFICATION, QUAL    Narrative:     Is the patient symptomatic?->No     EKG Readings: (Independently Interpreted)   Rate 99, normal sinus rhythm, left axis deviation, normal QRS, possible inferior infarct based on Q-waves in the lead 3.  Also in AVF.  Also abnormal P-wave in V1 with possible left atrial enlargement.  Abnormal EKG.     ECG Results          EKG 12-lead (Final result)  Result time 06/30/22 12:56:47    Final result by Interface, [facility] (06/30/22 12:56:47)                 Narrative:    Test Reason : R10.9,    Vent. Rate : 099 BPM     Atrial Rate : 099 BPM     P-R Int : 124 ms          QRS Dur : 072 ms      QT Int : 332 ms       P-R-T Axes : 000 -23 027 degrees     QTc Int : 426 ms    Normal sinus rhythm  Cannot rule out Anterior infarct (cited on or before 03-MAY-2021)  Abnormal ECG  When compared with ECG  of 25-MAY-2022 09:03,  No significant change was found  Confirmed by Adair Oliva MD ([id number]) on 6/30/2022 12:56:39 PM    Referred By: [email or web address]   SELF           Confirmed By:Adair Oliva MD                            Imaging Results          CT Abdomen Pelvis  Without Contrast (Final result)  Result time 06/30/22 08:52:09    Final result by Monique Schneider MD (06/30/22 08:52:09)                 Impression:      Ill-defined mass in the right lobe of the liver with coarse calcification or small somewhat punctate foci of hemorrhage within it.  Mass less well defined on the current study with current study without contrast.  The small foci of calcification or hemorrhage appear new, and there is some increased fat stranding along the anterior margin the liver.  Perihepatic fluid is decreased.    Air within the gallbladder concerning for emphysematous cholecystitis which is new.  Also new mild dilatation of bile ducts in the left lobe of the liver.    Large amount of stool within rectum and colon mild wall thickening suggesting mild colitis/proctitis the amount of stool greater than on the prior exam and as before focal area of relative narrowing in the rectum approximately 5 cm above the anal verge suggesting site of obstruction from the narrowing and the large amount of stool.    New left adrenal nodule and new enlarged nodes at the julian hepatis suggesting metastatic disease.    Additional findings as detailed above.    Final read      Electronically signed by: Monique Schneider MD  Date:    06/30/2022  Time:    08:52             Narrative:    EXAMINATION:  CT ABDOMEN PELVIS WITHOUT CONTRAST    CLINICAL HISTORY:  Abdominal abscess/infection suspected;    TECHNIQUE:  Low dose axial images, sagittal and coronal reformations were obtained from the lung bases to the pubic symphysis.  1000 mL Omnipaque 9 p.o.    COMPARISON:  03/26/2022    FINDINGS:  Large amount of stool within rectum and colon more so today than on the prior exam.   Mild circumferential wall thickening of the rectum and sigmoid colon which may be due to the large amount of stool or mild colitis and proctitis.  Area of relative narrowing of the rectum for example coronal series 8, image 39 appearing similar to the prior exam.    Lack of IV contrast limit evaluation of solid organs, liver, spleen, kidneys.  In this patient with known liver masses defined masses evident in the right lobe of the liver with coarse calcification or small foci of hemorrhage of the mass spanning a distance of approximately 8 cm similar to the prior CT of 03/26/2022.  The small foci of high density within it have become apparent since the prior exam although prior study was with contrast.  There is overlying fat stranding along the anterior margin of the liver more so today than on the prior exam.  Trace perihepatic fluid which is decreased and no ascites seen elsewhere.  There is air within the gallbladder on today's study which is new compared to the prior exams and biliary duct dilatation the left lobe of the liver which also appears new compared to the prior study.  Common duct does not appear dilated.    Spleen mildly enlarged    Adrenal glands right adrenal gland unremarkable appearance.  Oval 2.2 cm left adrenal nodule indeterminate, has become apparent since the prior abdominal CT suggesting metastatic disease to the adrenal gland.  Further evaluation could be obtained with follow-up CT or MRI adrenal protocol.    New enlarged peripancreatic nodes at the julian hepatis for example axial series 4, image 30 short axis diameter 13 mm.    Abdominal aorta no aneurysm    Bilateral perinephric stranding which is most likely chronic with no hydronephrosis opaque renal stone.  Urinary bladder decompressed at the time of the exam and as visualized unremarkable appearance.    Reproductive organs unremarkable appearance for the patient's age    No free intraperitoneal air    Multiple vertebral body and mild  retrolisthesis L5-S1 appearing similar to the prior exam.    IVC appears flattened suggesting dehydration.  Questionable degree of rotation with duodenum appearing on the right.                               X-Ray Abdomen Flat And Erect (Final result)  Result time 06/30/22 08:26:00    Final result by Adalberto Gonzalez MD (06/30/22 08:26:00)                 Impression:      Large amount of stool throughout the distal colon without plain film evidence for bowel obstruction.      Electronically signed by: Adalberto Gonzalez  Date:    06/30/2022  Time:    08:26             Narrative:    EXAMINATION:  XR ABDOMEN FLAT AND ERECT    CLINICAL HISTORY:  Unspecified abdominal pain    TECHNIQUE:  Flat and erect AP views of the abdomen were performed.    COMPARISON:  08/09/2021.    FINDINGS:  Large amount of stool throughout the distal colon.  No plain film evidence for bowel obstruction.  No dilated loops of small bowel.    No significant abdominal calcifications.    Mild S shaped scoliosis.  Bony pelvis intact.                              X-Rays:   Independently Interpreted Readings:   Other Readings:  IMPRESSION:  1. New gas within the gallbladder fundus. Findings concerning for emphysematous cholecystitis.  Recommend emergent surgical consultation.  2. Wall thickening of the distal sigmoid colon and proximal rectum with adjacent fat stranding  consistent with nonspecific colitis.  3. Again focal narrowing of the rectum approximately 5 cm above the anal verge. The distal rectum  beyond this point is relatively decompressed compared to the upstream bowel. Given what appears  to be persistent fixed narrowing, question stricturing or patient's known rectal cancer.  4. Large amount of stool throughout the colon upstream from the suspected rectal stricturing.  Findings suspicious for high-grade partial colonic obstruction.  5. Again ill-defined mass within the right lobe of the liver with several coarse calcifications and  overlying  capsular retraction, better seen and evaluated on prior contrast enhanced study.  6. New intrahepatic biliary dilatation involving the lower aspect of the left lobe of the liver (series 8,  images 11-12) concerning for new central biliary obstruction.  7. New splenomegaly. Again trace ascites. Findings suggest worsening portal hypertension.  8. Enlarged lymph node in the julian hepatis adjacent to the pancreatic body is new from prior study.  9. New 2.2 cm left adrenal nodule.  10. Underdistended IVC suggests hypovolemia/dehydration.  11. Other chronic findings detailed above.  COMMENTS:  THIS REPORT CONTAINS FINDINGS THAT MAY BE CRITICAL TO PATIENT CARE. The findings  were verbally communicated via telephone conference with RUIZ CONTRERAS at 2:29 AM CDT on  6/30/2022. The findings were acknowledged and understood.  Thank you for allowing us to participate in the care of your patien    Medications   iohexoL (OMNIPAQUE 9) oral solution 1,000 mL (has no administration in time range)   HYDROcodone-acetaminophen 5-325 mg per tablet 1 tablet (1 tablet Oral Not Given 6/30/22 1130)   traZODone tablet 50 mg (has no administration in time range)   sodium chloride 0.9% flush 10 mL (has no administration in time range)   albuterol-ipratropium 2.5 mg-0.5 mg/3 mL nebulizer solution 3 mL (has no administration in time range)   naloxone 0.4 mg/mL injection 0.02 mg (has no administration in time range)   glucose chewable tablet 16 g (has no administration in time range)   glucose chewable tablet 24 g (has no administration in time range)   glucagon (human recombinant) injection 1 mg (has no administration in time range)   dextrose 10% bolus 125 mL (has no administration in time range)   dextrose 10% bolus 250 mL (has no administration in time range)   acetaminophen tablet 650 mg (has no administration in time range)   HYDROmorphone (PF) injection 0.5 mg (has no administration in time range)   ondansetron injection 4 mg (has no  administration in time range)   0.9%  NaCl infusion ( Intravenous New Bag 6/30/22 1933)   meropenem-0.9% sodium chloride 1 g/50 mL IVPB (1 g Intravenous New Bag 6/30/22 1938)   lorazepam injection 1 mg (has no administration in time range)   HYDROmorphone (PF) injection 1 mg (has no administration in time range)   morphine injection 4 mg (4 mg Intravenous Given 6/29/22 2015)   ondansetron injection 4 mg (4 mg Intravenous Given 6/29/22 2015)   0.9%  NaCl infusion ( Intravenous Stopped 6/29/22 2145)   HYDROmorphone (PF) injection 0.5 mg (0.5 mg Intravenous Given 6/29/22 2125)   HYDROmorphone (PF) injection 0.5 mg (0.5 mg Intravenous Given 6/29/22 2329)   HYDROmorphone (PF) injection 0.5 mg (0.5 mg Intravenous Given 6/30/22 0210)   0.9%  NaCl infusion (0 mLs Intravenous Stopped 6/30/22 0700)   HYDROmorphone (PF) injection 0.5 mg (0.5 mg Intravenous Given 6/30/22 0512)   HYDROmorphone (PF) injection 0.5 mg (0.5 mg Intravenous Given 6/30/22 0846)   promethazine (PHENERGAN) 12.5 mg in dextrose 5 % 50 mL IVPB (0 mg Intravenous Stopped 6/30/22 0915)   HYDROmorphone (PF) injection 0.5 mg (0.5 mg Intravenous Given 6/30/22 1236)   HYDROmorphone (PF) injection 0.5 mg (0.5 mg Intravenous Given 6/30/22 1503)   vancomycin in dextrose 5 % 1 gram/250 mL IVPB 1,000 mg (0 mg Intravenous Stopped 6/30/22 1922)   HYDROmorphone (PF) injection 1 mg (1 mg Intravenous Given 6/30/22 1946)   0.9%  NaCl infusion (1,000 mLs Intravenous New Bag 6/30/22 1945)     Medical Decision Making:   Differential Diagnosis:   Pancreatitis, colitis, diverticulitis, epiploic appendicitis, appendicitis, cholelithiasis, Meckel's diverticulum, volvulus, SBO, UTI.    ED Management:  Patient appears stable at this time but dehydrated.  I suspect he likely side effect from her new medication, Camptosar.  63% of patients taking this medication will develop abdominal pain per [employer]scape.  I will give the patient IV fluids.  Evaluate laboratory values.  Control her pain.   Laboratories pending.    Further evaluation laboratory values reveals a 20,000 white count, patient's pain is localized to the left lower quadrant abdomen however her last CT scan I can find on record is and March of this past year.  I will obtain a new CT scan.    The CT scan read is concerning for possible air within the gallbladder.  Although the patient has no actual side of cholangitis this is a very concerning finding.  The CT does demonstrate multiple new findings of worsening disease process with partial sigmoid obstruction, bowel wall thickening in the colon, new liver and spleen metastasis, dehydration with IVC collapse, id pneumobilia, hepatosplenomegaly.  I spoken back with Dr. TEO Deutsch, she will take the patient to her service but have the patient transferred over to [facility] for hepatobiliary services and evaluation.  Word-finding difficulties with transferring patients to [facility] at this time due to bed availability.  Transfer pending.    I did have a discussion with the patient in the .  They are very realistic about her disease process.  She is a DNR at this point.    I had a long discussion with the  a few moments ago.  He discussed with me that she appears to be in increasing amounts of pain.  That the 0.5 mg of Dilaudid may not be taking care of all of her pain and that she does not want to suffer.  He and she clearly understand that this is a terminal process.  I explained to him clearly that with the increased amount of narcotic medication this may decrease her catecholamine response thereby expediting her dying process.  He clearly understands that increase in the amount of Dilaudid may lead to her death quicker.  I revisited the DNR paperwork and had him resign a DNR.  He states that he does not want her to suffer and is requesting this.  States that is what her wishes would be.  I will indeed increase the Dilaudid to 1 mg at this time.  IV fluids will be maintained to  complement her fluid loss through diarrhea.  At this point the patient will be transferred from the emergency room to the floor for supportive care with DNR in place.  I further pabon Dr. Get Melendrez who is taking care of her as well.   I explained to him what the 's wishes were.  He agrees with this plan of action.             ED Course as of 06/30/22 2003   Thu Jun 30, 2022 1924 WBC(!): 33.01 [NP]      ED Course User Index  [NP] NISA Munoz             Clinical Impression:   Final diagnoses:  [R10.9] Abdominal pain  [K83.8] Pneumobilia (Primary)  [R10.32] Left lower quadrant abdominal pain  [D72.828] Other elevated white blood cell (WBC) count          ED Disposition Condition    Observation               Andry Burnette MD  06/30/22 0300       Andry Burnette MD  06/30/22 0510       Andry Burnette MD  06/30/22 2003

## 2022-06-30 NOTE — ED NOTES
Pt in bed.  Tearful.  States that she is in pain.  Physician notified.  Awaiting new orders.  Will continue to monitor.

## 2022-06-30 NOTE — SUBJECTIVE & OBJECTIVE
Past Medical History:   Diagnosis Date    Allergy     Colon cancer     Hypertension     Liver cancer     Nontoxic uninodular goiter     Osteoarthrosis, unspecified whether generalized or localized, hand     Other abnormal glucose     Proctocolitis 3/26/2022    Pure hypercholesterolemia     Severe sepsis        Past Surgical History:   Procedure Laterality Date    CYST REMOVAL  1994    cyst removed from neck    infusaport placement Right     THYROIDECTOMY      TONSILLECTOMY  age 14       Review of patient's allergies indicates:   Allergen Reactions    Lisinopril Rash       No current facility-administered medications on file prior to encounter.     Current Outpatient Medications on File Prior to Encounter   Medication Sig    ALPRAZolam (XANAX) 0.25 MG tablet Take 1 tablet (0.25 mg total) by mouth 3 (three) times daily as needed for Anxiety.    calcium carbonate (TUMS) 200 mg calcium (500 mg) chewable tablet Take 2 tablets (1,000 mg total) by mouth 3 (three) times daily before meals. (Patient taking differently: Take 1,000 mg by mouth 2 (two) times daily.)    calcium-vitamin D3 (OYSTER SHELL CALCIUM-VIT D3) 500 mg-5 mcg (200 unit) per tablet Take 1 tablet by mouth 2 (two) times daily.    desvenlafaxine succinate (PRISTIQ) 50 MG Tb24 Take 1 tablet (50 mg total) by mouth once daily.    dronabinoL (MARINOL) 10 MG capsule Take 1 capsule (10 mg total) by mouth 2 (two) times daily before meals. (Patient taking differently: Take 10 mg by mouth once daily. Pt can take up to two times a day but is only taking once a day.)    EScitalopram oxalate (LEXAPRO) 10 MG tablet Take 1 tablet (10 mg total) by mouth once daily.    famotidine (PEPCID) 20 MG tablet Take 1 tablet (20 mg total) by mouth once daily.    HYDROcodone-acetaminophen (NORCO) 5-325 mg per tablet Take 1 tablet by mouth every 6 (six) hours as needed for Pain.    KLOR-CON M20 20 mEq tablet Take 20 mEq by mouth 2 (two) times daily.    levothyroxine (SYNTHROID) 200 MCG  tablet Take 1 tablet (200 mcg total) by mouth once daily. (Patient taking differently: Take 200 mcg by mouth once daily. Pt advised she takes this around 3am every morning.)    magnesium oxide (MAGOX) 400 mg (241.3 mg magnesium) tablet Take 1 tablet (400 mg total) by mouth once daily.    megestroL (MEGACE ES) 625 mg/5 mL (125 mg/mL) Susp Take 5 mLs (625 mg total) by mouth once daily.    ondansetron (ZOFRAN) 4 MG tablet Take 1 tablet (4 mg total) by mouth as needed for Nausea.    ondansetron (ZOFRAN-ODT) 4 MG TbDL Take by mouth daily as needed.    predniSONE (DELTASONE) 10 MG tablet Take 1 tablet (10 mg total) by mouth once daily. Pt taking 15mg daily (Patient taking differently: Take 15 mg by mouth once daily. Pt taking 15mg daily)    traZODone (DESYREL) 50 MG tablet Take 1 tablet (50 mg total) by mouth nightly as needed for Insomnia.    HYDROcodone-acetaminophen (NORCO) 5-325 mg per tablet Take 1 tablet by mouth every 6 (six) hours as needed for Pain.    levoFLOXacin (LEVAQUIN) 500 MG tablet Take 500 mg by mouth once daily.    loperamide (IMODIUM) 2 mg capsule Take 1 capsule (2 mg total) by mouth 4 (four) times daily as needed for Diarrhea.    [DISCONTINUED] baclofen (LIORESAL) 10 MG tablet Take 5 mg by mouth 3 (three) times daily.    [DISCONTINUED] ergocalciferol (ERGOCALCIFEROL) 50,000 unit Cap Take 50,000 Units by mouth every 7 days.    [DISCONTINUED] furosemide (LASIX) 20 MG tablet Take 1 tablet (20 mg total) by mouth once daily.     Family History       Problem Relation (Age of Onset)    Lung cancer Father          Tobacco Use    Smoking status: Never Smoker    Smokeless tobacco: Never Used   Substance and Sexual Activity    Alcohol use: No    Drug use: Never    Sexual activity: Not on file     Review of Systems   Constitutional:  Positive for fatigue and fever. Negative for activity change, appetite change and chills.   HENT:  Negative for congestion, rhinorrhea, sneezing and sore throat.    Eyes:  Negative  for photophobia and visual disturbance.   Respiratory:  Negative for cough, shortness of breath and wheezing.    Cardiovascular:  Negative for chest pain, palpitations and leg swelling.   Gastrointestinal:  Positive for abdominal pain, nausea and vomiting. Negative for blood in stool, constipation and diarrhea.   Endocrine: Negative for polydipsia and polyuria.   Genitourinary:  Negative for difficulty urinating, dysuria, flank pain, frequency and hematuria.   Musculoskeletal:  Negative for arthralgias and myalgias.   Skin:  Negative for rash and wound.   Neurological:  Negative for syncope, weakness, light-headedness and headaches.   Hematological:  Does not bruise/bleed easily.   Psychiatric/Behavioral:  Negative for confusion and hallucinations.    Objective:     Vital Signs (Most Recent):  Temp: 98 °F (36.7 °C) (06/29/22 1957)  Pulse: 105 (06/30/22 0147)  Resp: 10 (06/30/22 0147)  BP: 133/75 (06/30/22 0147)  SpO2: 95 % (06/30/22 0147) Vital Signs (24h Range):  Temp:  [98 °F (36.7 °C)] 98 °F (36.7 °C)  Pulse:  [] 105  Resp:  [10-22] 10  SpO2:  [80 %-98 %] 95 %  BP: (122-162)/(66-89) 133/75     Weight: 61.7 kg (136 lb)  Body mass index is 21.3 kg/m².    Physical Exam  Vitals and nursing note reviewed.   Constitutional:       Comments: fatigued   HENT:      Head: Normocephalic and atraumatic.   Eyes:      General: No scleral icterus.  Cardiovascular:      Rate and Rhythm: Tachycardia present.   Pulmonary:      Effort: Pulmonary effort is normal. No respiratory distress.   Abdominal:      Tenderness: There is abdominal tenderness (LLQ tenderness on patient palpation).   Musculoskeletal:         General: No swelling.      Right lower leg: No edema.      Left lower leg: No edema.   Skin:     Coloration: Skin is not jaundiced.      Findings: No erythema.   Neurological:      General: No focal deficit present.      Mental Status: She is alert and oriented to person, place, and time.           Significant Labs: All  pertinent labs within the past 24 hours have been reviewed.    Significant Imaging: I have reviewed all pertinent imaging results/findings within the past 24 hours.

## 2022-06-30 NOTE — ASSESSMENT & PLAN NOTE
Follow up official ct read but preliminary view appears like there is significant constipation.   Patient agreable to enema but is very tired and would like to get some rest; ok to give early in the am   IV hydration  Antiemetics   Pain control with dilaudid

## 2022-06-30 NOTE — PROVIDER TRANSFER
Outside Transfer Acceptance Note / Regional Referral Center    Referring facility: [facility]  Referring provider: RUIZ CONTRERAS  Accepting facility: [facility]  Accepting provider: GEORGIA KANG  Admitting provider: GEORGIA KANG   Reason for transfer:  MULTIDISCIPLINARY CONSULTS  Transfer diagnosis: ABDOMINAL PAIN   Transfer specialty requested: GENERAL SURGERY, HEPATOLOGY, AES, ONCOLOGY  Transfer specialty notified: no  Transfer level: NUMBER 1-5: 2  Bed type requested: TELEMETRY   Isolation status: No active isolations   Admission class or status: OP- Observation      Narrative     The patient is a 67 y/o female with PMH of HTN, HLP, hypothyroidism, and metastatic colon ca who presented with abdominal pain. The patient is on active chemo with Camptosar and is followed by Dr. Banks in oncology. The patient's symptoms have been attributed the this chemotherapeutic agent as these are known side effects; nausea, vomiting, abdominal pain, poor po intake, generalized weakness, intermittent fevers. The patient was to be admitted for possible constipation however after the CT read came back from [facility], there was concern for air within the gallbladder. She had a leukocytosis of 20K which prompted the CT; UA negative. CT also showed progression of her disease. Her pain is located in the LLQ and she does not have elevated LFTs however radiology was quite concerned for the presence of air. She will be given zosyn as well. Given the concern for possible cholangitis, the patient will need to be transferred for general surgery evaluation but given her progressive disease, she may need AES/hepatology, and oncology input as well which is the reason for transfer to [facility]. Patient is hemodynamically stable.     Objective     Vitals: Temp: 98 °F (36.7 °C) (06/29/22 1957)  Pulse: 101 (06/30/22 0217)  Resp: 11 (06/30/22 0217)  BP: 121/77 (06/30/22 0417)  SpO2: (!) 93 % (06/30/22 0417)  Recent Labs:  All pertinent labs within the past 24 hours have been reviewed.  Recent imaging: as above   Airway:     Vent settings:     IV access:        Peripheral IV - Single Lumen 06/29/22 2000 20 G Right Forearm (Active)   Site Assessment Clean;Dry;Intact;No redness;No warmth;No drainage 06/29/22 2000   Extremity Assessment Distal to IV No redness;No swelling;No warmth 06/29/22 2000   Line Status Blood return noted;Flushed;Saline locked 06/29/22 2000   Dressing Status Clean;Dry;Intact 06/29/22 2000     Infusions:   Allergies:   Review of patient's allergies indicates:   Allergen Reactions    Lisinopril Rash      NPO: Yes      Anticoagulation:   Anticoagulants     None           Instructions      [facility]-  Admit to Hospital Medicine  Upon patient arrival to floor, please send SecureChat to [facility] or call extension [phone] (if no answer, this will flip to a beeper, so enter your call back number) for Hospital Medicine admit team assignment and for additional admit orders for the patient.  Do not page the attending physician associated with the patient on arrival (this physician may not be on duty at the time of arrival).  Rather, always call [phone] to reach the triage physician for orders and team assignment.

## 2022-06-30 NOTE — TELEPHONE ENCOUNTER
"Pt's spouse came to clinic to notify MD Banks that pt is currently in ED. He states "her colon is 90-95% obstructed and her cancer is rapidly spreading." He states that "she will be transported to [facility]. They are just waiting on a bed. He wanted to let Dr Banks know." This nurse notified MD.  "

## 2022-06-30 NOTE — ED NOTES
Pt placed on telemetry unit bed for comfort.  Pt moaning.  Medication offered and provided.  Pt refused to take meds.  Enema preformed.  Pt bathed and linen changed. NAD noted. Will continue to monitor.

## 2022-06-30 NOTE — HPI
The patient is a 65 y/o female with PMH of metastatic colon cancer, HTN, HLP, and hypothyroidism. She presented with abdominal pain more localized in the LLQ which started about one day ago. She has had some nausea and vomiting. She is followed by oncologist Dr. Nguyen and is on chemotherapy with an agent that is known to cause these side effects. She had had a small bowel movement. Work up shows a leukocytosis of 20 K and CT suggestive of significant amount of stool but official read pending. She had occasional fevers which are also attributed to her chemo. No CP, SOB, or other symptoms reported.

## 2022-06-30 NOTE — ED NOTES
Pt resting in bed.   at bedside.  Pt and spouse updated on plan of care.  NAD noted.  No complaints or needs voiced at this time. Will continue to monitor.

## 2022-07-01 ENCOUNTER — PATIENT MESSAGE (OUTPATIENT)
Dept: HEMATOLOGY/ONCOLOGY | Facility: CLINIC | Age: 67
End: 2022-07-01
Payer: OTHER GOVERNMENT

## 2022-07-01 ENCOUNTER — TELEPHONE (OUTPATIENT)
Dept: HEMATOLOGY/ONCOLOGY | Facility: CLINIC | Age: 67
End: 2022-07-01
Payer: OTHER GOVERNMENT

## 2022-07-01 VITALS
RESPIRATION RATE: 16 BRPM | SYSTOLIC BLOOD PRESSURE: 128 MMHG | DIASTOLIC BLOOD PRESSURE: 58 MMHG | HEART RATE: 110 BPM | BODY MASS INDEX: 21.35 KG/M2 | HEIGHT: 67 IN | TEMPERATURE: 99 F | WEIGHT: 136 LBS | OXYGEN SATURATION: 92 %

## 2022-07-01 PROBLEM — Z51.5 COMFORT MEASURES ONLY STATUS: Status: ACTIVE | Noted: 2022-07-01

## 2022-07-01 LAB
AMMONIA PLAS-SCNC: 29 UMOL/L (ref 10–50)
ANION GAP SERPL CALC-SCNC: 10 MMOL/L (ref 8–16)
ANION GAP SERPL CALC-SCNC: 9 MMOL/L (ref 8–16)
BASOPHILS NFR BLD: 0 % (ref 0–1.9)
BASOPHILS NFR BLD: 0 % (ref 0–1.9)
BUN SERPL-MCNC: 11 MG/DL (ref 8–23)
BUN SERPL-MCNC: 9 MG/DL (ref 8–23)
CALCIUM SERPL-MCNC: 8.2 MG/DL (ref 8.7–10.5)
CALCIUM SERPL-MCNC: 8.2 MG/DL (ref 8.7–10.5)
CHLORIDE SERPL-SCNC: 101 MMOL/L (ref 95–110)
CHLORIDE SERPL-SCNC: 104 MMOL/L (ref 95–110)
CO2 SERPL-SCNC: 21 MMOL/L (ref 23–29)
CO2 SERPL-SCNC: 23 MMOL/L (ref 23–29)
CREAT SERPL-MCNC: 0.6 MG/DL (ref 0.5–1.4)
CREAT SERPL-MCNC: 0.6 MG/DL (ref 0.5–1.4)
DIFFERENTIAL METHOD: ABNORMAL
DIFFERENTIAL METHOD: ABNORMAL
EOSINOPHIL NFR BLD: 0 % (ref 0–8)
EOSINOPHIL NFR BLD: 0 % (ref 0–8)
ERYTHROCYTE [DISTWIDTH] IN BLOOD BY AUTOMATED COUNT: 15.1 % (ref 11.5–14.5)
ERYTHROCYTE [DISTWIDTH] IN BLOOD BY AUTOMATED COUNT: 15.2 % (ref 11.5–14.5)
EST. GFR  (AFRICAN AMERICAN): >60 ML/MIN/1.73 M^2
EST. GFR  (AFRICAN AMERICAN): >60 ML/MIN/1.73 M^2
EST. GFR  (NON AFRICAN AMERICAN): >60 ML/MIN/1.73 M^2
EST. GFR  (NON AFRICAN AMERICAN): >60 ML/MIN/1.73 M^2
GLUCOSE SERPL-MCNC: 104 MG/DL (ref 70–110)
GLUCOSE SERPL-MCNC: 108 MG/DL (ref 70–110)
HCT VFR BLD AUTO: 29.6 % (ref 37–48.5)
HCT VFR BLD AUTO: 31.7 % (ref 37–48.5)
HGB BLD-MCNC: 10.2 G/DL (ref 12–16)
HGB BLD-MCNC: 9.5 G/DL (ref 12–16)
IMM GRANULOCYTES # BLD AUTO: ABNORMAL K/UL (ref 0–0.04)
IMM GRANULOCYTES # BLD AUTO: ABNORMAL K/UL (ref 0–0.04)
IMM GRANULOCYTES NFR BLD AUTO: ABNORMAL % (ref 0–0.5)
IMM GRANULOCYTES NFR BLD AUTO: ABNORMAL % (ref 0–0.5)
LYMPHOCYTES NFR BLD: 12 % (ref 18–48)
LYMPHOCYTES NFR BLD: 9 % (ref 18–48)
MAGNESIUM SERPL-MCNC: 1.7 MG/DL (ref 1.6–2.6)
MAGNESIUM SERPL-MCNC: 1.8 MG/DL (ref 1.6–2.6)
MCH RBC QN AUTO: 29.4 PG (ref 27–31)
MCH RBC QN AUTO: 29.5 PG (ref 27–31)
MCHC RBC AUTO-ENTMCNC: 32.1 G/DL (ref 32–36)
MCHC RBC AUTO-ENTMCNC: 32.2 G/DL (ref 32–36)
MCV RBC AUTO: 91 FL (ref 82–98)
MCV RBC AUTO: 92 FL (ref 82–98)
MONOCYTES NFR BLD: 5 % (ref 4–15)
MONOCYTES NFR BLD: 9 % (ref 4–15)
NEUTROPHILS NFR BLD: 78 % (ref 38–73)
NEUTROPHILS NFR BLD: 84 % (ref 38–73)
NEUTS BAND NFR BLD MANUAL: 1 %
NEUTS BAND NFR BLD MANUAL: 2 %
NRBC BLD-RTO: 0 /100 WBC
NRBC BLD-RTO: 0 /100 WBC
PHOSPHATE SERPL-MCNC: 2.4 MG/DL (ref 2.7–4.5)
PHOSPHATE SERPL-MCNC: 3.2 MG/DL (ref 2.7–4.5)
PLATELET # BLD AUTO: 125 K/UL (ref 150–450)
PLATELET # BLD AUTO: 144 K/UL (ref 150–450)
PMV BLD AUTO: 10.1 FL (ref 9.2–12.9)
PMV BLD AUTO: 9.7 FL (ref 9.2–12.9)
POTASSIUM SERPL-SCNC: 3.2 MMOL/L (ref 3.5–5.1)
POTASSIUM SERPL-SCNC: 4.2 MMOL/L (ref 3.5–5.1)
RBC # BLD AUTO: 3.22 M/UL (ref 4–5.4)
RBC # BLD AUTO: 3.47 M/UL (ref 4–5.4)
SODIUM SERPL-SCNC: 134 MMOL/L (ref 136–145)
SODIUM SERPL-SCNC: 134 MMOL/L (ref 136–145)
TOXIC GRANULES BLD QL SMEAR: PRESENT
WBC # BLD AUTO: 21.91 K/UL (ref 3.9–12.7)
WBC # BLD AUTO: 25.95 K/UL (ref 3.9–12.7)

## 2022-07-01 PROCEDURE — 96375 TX/PRO/DX INJ NEW DRUG ADDON: CPT

## 2022-07-01 PROCEDURE — 80048 BASIC METABOLIC PNL TOTAL CA: CPT | Performed by: HOSPITALIST

## 2022-07-01 PROCEDURE — 25000003 PHARM REV CODE 250: Performed by: STUDENT IN AN ORGANIZED HEALTH CARE EDUCATION/TRAINING PROGRAM

## 2022-07-01 PROCEDURE — 63600175 PHARM REV CODE 636 W HCPCS: Performed by: EMERGENCY MEDICINE

## 2022-07-01 PROCEDURE — 63600175 PHARM REV CODE 636 W HCPCS: Performed by: STUDENT IN AN ORGANIZED HEALTH CARE EDUCATION/TRAINING PROGRAM

## 2022-07-01 PROCEDURE — 96376 TX/PRO/DX INJ SAME DRUG ADON: CPT

## 2022-07-01 PROCEDURE — 36415 COLL VENOUS BLD VENIPUNCTURE: CPT | Performed by: STUDENT IN AN ORGANIZED HEALTH CARE EDUCATION/TRAINING PROGRAM

## 2022-07-01 PROCEDURE — 36415 COLL VENOUS BLD VENIPUNCTURE: CPT | Performed by: HOSPITALIST

## 2022-07-01 PROCEDURE — G0378 HOSPITAL OBSERVATION PER HR: HCPCS

## 2022-07-01 PROCEDURE — 84100 ASSAY OF PHOSPHORUS: CPT | Performed by: HOSPITALIST

## 2022-07-01 PROCEDURE — 99217 PR OBSERVATION CARE DISCHARGE: ICD-10-PCS | Mod: ,,, | Performed by: STUDENT IN AN ORGANIZED HEALTH CARE EDUCATION/TRAINING PROGRAM

## 2022-07-01 PROCEDURE — 94761 N-INVAS EAR/PLS OXIMETRY MLT: CPT

## 2022-07-01 PROCEDURE — 96366 THER/PROPH/DIAG IV INF ADDON: CPT

## 2022-07-01 PROCEDURE — 85007 BL SMEAR W/DIFF WBC COUNT: CPT | Mod: 91,NCS | Performed by: STUDENT IN AN ORGANIZED HEALTH CARE EDUCATION/TRAINING PROGRAM

## 2022-07-01 PROCEDURE — 83735 ASSAY OF MAGNESIUM: CPT | Performed by: HOSPITALIST

## 2022-07-01 PROCEDURE — 83735 ASSAY OF MAGNESIUM: CPT | Mod: 91 | Performed by: STUDENT IN AN ORGANIZED HEALTH CARE EDUCATION/TRAINING PROGRAM

## 2022-07-01 PROCEDURE — 85027 COMPLETE CBC AUTOMATED: CPT | Performed by: HOSPITALIST

## 2022-07-01 PROCEDURE — 85027 COMPLETE CBC AUTOMATED: CPT | Mod: 91 | Performed by: STUDENT IN AN ORGANIZED HEALTH CARE EDUCATION/TRAINING PROGRAM

## 2022-07-01 PROCEDURE — 99217 PR OBSERVATION CARE DISCHARGE: CPT | Mod: ,,, | Performed by: STUDENT IN AN ORGANIZED HEALTH CARE EDUCATION/TRAINING PROGRAM

## 2022-07-01 PROCEDURE — 82140 ASSAY OF AMMONIA: CPT | Performed by: STUDENT IN AN ORGANIZED HEALTH CARE EDUCATION/TRAINING PROGRAM

## 2022-07-01 PROCEDURE — 84100 ASSAY OF PHOSPHORUS: CPT | Mod: 91 | Performed by: STUDENT IN AN ORGANIZED HEALTH CARE EDUCATION/TRAINING PROGRAM

## 2022-07-01 PROCEDURE — 96361 HYDRATE IV INFUSION ADD-ON: CPT

## 2022-07-01 PROCEDURE — 85007 BL SMEAR W/DIFF WBC COUNT: CPT | Performed by: HOSPITALIST

## 2022-07-01 PROCEDURE — 25000003 PHARM REV CODE 250: Performed by: HOSPITALIST

## 2022-07-01 PROCEDURE — 80048 BASIC METABOLIC PNL TOTAL CA: CPT | Mod: 91 | Performed by: STUDENT IN AN ORGANIZED HEALTH CARE EDUCATION/TRAINING PROGRAM

## 2022-07-01 RX ORDER — HYDROXYZINE HYDROCHLORIDE 25 MG/1
25 TABLET, FILM COATED ORAL 3 TIMES DAILY PRN
Status: DISCONTINUED | OUTPATIENT
Start: 2022-07-01 | End: 2022-07-01 | Stop reason: HOSPADM

## 2022-07-01 RX ORDER — HYDROXYZINE HYDROCHLORIDE 10 MG/1
10 TABLET, FILM COATED ORAL 3 TIMES DAILY PRN
Status: DISCONTINUED | OUTPATIENT
Start: 2022-07-01 | End: 2022-07-01

## 2022-07-01 RX ORDER — DIAZEPAM 10 MG/2ML
2 INJECTION INTRAMUSCULAR EVERY 6 HOURS PRN
Status: DISCONTINUED | OUTPATIENT
Start: 2022-07-01 | End: 2022-07-01 | Stop reason: HOSPADM

## 2022-07-01 RX ORDER — HYDROMORPHONE HYDROCHLORIDE 2 MG/ML
1 INJECTION, SOLUTION INTRAMUSCULAR; INTRAVENOUS; SUBCUTANEOUS
Status: DISCONTINUED | OUTPATIENT
Start: 2022-07-01 | End: 2022-07-01 | Stop reason: HOSPADM

## 2022-07-01 RX ORDER — SODIUM CHLORIDE, SODIUM LACTATE, POTASSIUM CHLORIDE, CALCIUM CHLORIDE 600; 310; 30; 20 MG/100ML; MG/100ML; MG/100ML; MG/100ML
INJECTION, SOLUTION INTRAVENOUS CONTINUOUS
Status: DISCONTINUED | OUTPATIENT
Start: 2022-07-01 | End: 2022-07-01 | Stop reason: HOSPADM

## 2022-07-01 RX ADMIN — HYDROMORPHONE HYDROCHLORIDE 1 MG: 2 INJECTION, SOLUTION INTRAMUSCULAR; INTRAVENOUS; SUBCUTANEOUS at 02:07

## 2022-07-01 RX ADMIN — MEROPENEM AND SODIUM CHLORIDE 1 G: 1 INJECTION, SOLUTION INTRAVENOUS at 02:07

## 2022-07-01 RX ADMIN — HYDROXYZINE HYDROCHLORIDE 10 MG: 10 TABLET, FILM COATED ORAL at 11:07

## 2022-07-01 RX ADMIN — SODIUM CHLORIDE, SODIUM LACTATE, POTASSIUM CHLORIDE, AND CALCIUM CHLORIDE: .6; .31; .03; .02 INJECTION, SOLUTION INTRAVENOUS at 10:07

## 2022-07-01 RX ADMIN — HYDROMORPHONE HYDROCHLORIDE 1 MG: 2 INJECTION, SOLUTION INTRAMUSCULAR; INTRAVENOUS; SUBCUTANEOUS at 05:07

## 2022-07-01 RX ADMIN — DIAZEPAM 2 MG: 10 INJECTION, SOLUTION INTRAMUSCULAR; INTRAVENOUS at 11:07

## 2022-07-01 RX ADMIN — HYDROXYZINE HYDROCHLORIDE 25 MG: 25 TABLET, FILM COATED ORAL at 04:07

## 2022-07-01 RX ADMIN — HYDROMORPHONE HYDROCHLORIDE 1 MG: 2 INJECTION, SOLUTION INTRAMUSCULAR; INTRAVENOUS; SUBCUTANEOUS at 10:07

## 2022-07-01 RX ADMIN — HYDROMORPHONE HYDROCHLORIDE 1 MG: 2 INJECTION, SOLUTION INTRAMUSCULAR; INTRAVENOUS; SUBCUTANEOUS at 07:07

## 2022-07-01 RX ADMIN — SODIUM CHLORIDE: 0.9 INJECTION, SOLUTION INTRAVENOUS at 05:07

## 2022-07-01 RX ADMIN — MEROPENEM AND SODIUM CHLORIDE 1 G: 1 INJECTION, SOLUTION INTRAVENOUS at 10:07

## 2022-07-01 NOTE — ED NOTES
Pt cleaned of loose stool , new gown placed and barrier cream applied to buttocks. Spouse in [place], spoke with Vasile Drake concerning code status. Pt is a dnr

## 2022-07-01 NOTE — PROGRESS NOTES
Hospice care consulted and planned to meet at the patients house at 1900. AVS given to . IV removed. Patient wheeled to husbands vehicle safely.

## 2022-07-01 NOTE — PLAN OF CARE
07/01/22 1600   Discharge Reassessment   Assessment Type Discharge Planning Reassessment   Did the patient's condition or plan change since previous assessment? No   Discharge Plan discussed with: Patient;Spouse/sig other   Name(s) and Number(s) Edouard Lemus [phone]   Communicated CATHY with patient/caregiver Yes   Discharge Plan A Hospice/home   DME Needed Upon Discharge  suction machine;oxygen   Discharge Barriers Identified None   Post-Acute Status   Post-Acute Authorization Hospice   Hospice Status Pending equipment/medication delivery   Discharge Delays None known at this time   Erasto with [facility] here to do paperwork. Spouse does not want hospital bed. Sandra notified of just needing meds, oxygen, suction & pads, diapers, wipes. Will call her once patient is ready for discharge.

## 2022-07-01 NOTE — ED NOTES
Call placed to transfer center, updated them of pt admit to [facility] palliative   care

## 2022-07-01 NOTE — PLAN OF CARE
07/01/22 1025   Discharge Assessment   Assessment Type Discharge Planning Assessment   Confirmed/corrected address, phone number and insurance Yes   Confirmed Demographics Correct on Facesheet   Source of Information family   When was your last doctors appointment? 06/23/22   Does patient/caregiver understand observation status Yes   Communicated CATHY with patient/caregiver Yes   Reason For Admission severe abdominal pain   Lives With spouse   Do you expect to return to your current living situation? Yes   Do you have help at home or someone to help you manage your care at home? Yes   Who are your caregiver(s) and their phone number(s)? Edouard Lemus spouse [phone]   Prior to hospitilization cognitive status: Alert/Oriented   Current cognitive status: Alert/Oriented   Walking or Climbing Stairs Difficulty ambulation difficulty, requires equipment   Mobility Management uses a rollator   Dressing/Bathing Difficulty bathing difficulty, requires equipment   Dressing/Bathing Management uses shower chair   Do you have any problems with: Errands/Grocery   Home Accessibility wheelchair accessible   Home Layout Able to live on 1st floor   Equipment Currently Used at Home grab bar;rollator;shower chair   Readmission within 30 days? No   Patient currently being followed by outpatient case management? No   Do you currently have service(s) that help you manage your care at home? No   Do you take prescription medications? Yes   Do you have prescription coverage? Yes   Coverage [employer]   Do you have any problems affording any of your prescribed medications? No   Is the patient taking medications as prescribed? yes   Who is going to help you get home at discharge? Edouard Lemus spouse [phone]   How do you get to doctors appointments? family or friend will provide   Are you on dialysis? No   Do you take coumadin? No   Discharge Plan A Hospice/home   Discharge Plan B Home with family   DME Needed Upon Discharge  hospital  bed;oxygen;suction machine   Discharge Plan discussed with: Spouse/sig other   Name(s) and Number(s) Edouard Lemus spouse [phone]   Discharge Barriers Identified None   Relationship/Environment   Name(s) of Who Lives With Patient Edouard Lemus spouse [phone]   Patient awake with spouse Edouard & patient's sister at bedside. She had a new chemo last Friday & last saw Dr Banks last Thursday. She had been doing pretty good until she started having a lot of abdominal pain. Dr Leach in room to discuss findings at this time. He will attempt to discuss case with Dr Banks & update family. Will continue to follow.

## 2022-07-01 NOTE — PLAN OF CARE
07/01/22 1400   Discharge Reassessment   Assessment Type Discharge Planning Reassessment   Did the patient's condition or plan change since previous assessment? No   Discharge Plan discussed with: Spouse/sig other   Name(s) and Number(s) Edouard Lemus spouse [phone]   Communicated CATHY with patient/caregiver Yes   Discharge Plan A Hospice/home   DME Needed Upon Discharge  hospital bed   Discharge Barriers Identified None   Why the patient remains in the hospital Placement issues   Post-Acute Status   Post-Acute Authorization Hospice   Hospice Status Referrals Sent   Discharge Delays None known at this time   Patient's spouse in to speak with me regarding hospice. Provided him with names of companies. States he was told not to use one of the them but doesn't have a preference to any. [facility] will be fine with him. Referral sent to them.

## 2022-07-01 NOTE — ASSESSMENT & PLAN NOTE
CT abdomen and pelvis w/o contras tImpression:     Ill-defined mass in the right lobe of the liver with coarse calcification or small somewhat punctate foci of hemorrhage within it.  Mass less well defined on the current study with current study without contrast.  The small foci of calcification or hemorrhage appear new, and there is some increased fat stranding along the anterior margin the liver.  Perihepatic fluid is decreased.     Air within the gallbladder concerning for emphysematous cholecystitis which is new.  Also new mild dilatation of bile ducts in the left lobe of the liver.     Large amount of stool within rectum and colon mild wall thickening suggesting mild colitis/proctitis the amount of stool greater than on the prior exam and as before focal area of relative narrowing in the rectum approximately 5 cm above the anal verge suggesting site of obstruction from the narrowing and the large amount of stool.     New left adrenal nodule and new enlarged nodes at the julian hepatis suggesting metastatic disease.     Additional findings as detailed above.    Discussed results with patient    Attempted to discuss with patient's oncologist but they were unavailable. Patient is currently on chemotherapy as outpatient.

## 2022-07-01 NOTE — DISCHARGE SUMMARY
[facility] Medicine  Discharge Summary      Patient Name: Regine Lemus  MRN: [id number]  Patient Class: OP- Observation  Admission Date: 6/29/2022  Hospital Length of Stay: 0 days  Discharge Date and Time:  07/01/2022 4:17 PM  Attending Physician: No att. providers found   Discharging Provider: Michael Leach MD  Primary Care Provider: Jacobo Samuel MD      HPI:   The patient is a 67 y/o female with PMH of metastatic colon cancer, HTN, HLP, and hypothyroidism. She presented with abdominal pain more localized in the LLQ which started about one day ago. She has had some nausea and vomiting. She is followed by oncologist Dr. Nguyen and is on chemotherapy with an agent that is known to cause these side effects. She had had a small bowel movement. Work up shows a leukocytosis of 20 K and CT suggestive of significant amount of stool but official read pending. She had occasional fevers which are also attributed to her chemo. No CP, SOB, or other symptoms reported.         * No surgery found *      Hospital Course:   65 yo woman w/ hx of metastatic colon carcinoma involving the liver who is treated with Xelox/Avastin who was admitted for intra-abdominal sepsis w/ leukocytosis, abdominal pain, and abnormal imaging on CT abdomen suggestive of emphysematous cholecystitis and relative narrowing in the rectum approximately 5 cm above the anal verge suggesting site of obstruction. Patient was started on IV abx, IV pain medications and IVF. Initial plan was for transfer to [facility] for possible surgical management of above findings on CT. The patient was then admitted to facilitate pain management and escalation of care. Patient's pain is now under control. Showing signs of metabolic vs septic vs drug induced encephalopathy so CT head was obtained which showed no acute findings. I personally had conversation with patient and  regarding goals of care. They decided that patient would not want any  aggressive surgical measures and are now requesting to be sent home with hospice services. Working with case management to facilitate this. In the meantime will stop any further aggressive treatments such as IV abx and labs. Will continue pain medications and IVF for now since patient not really tolerating PO. Family is now discussing hospice options. Kept patient inpatient until this was facilitated. To be sent home w/ hospice services today.       Goals of Care Treatment Preferences:  Code Status: DNR      Consults:     No new Assessment & Plan notes have been filed under this hospital service since the last note was generated.  Service: Hospital Medicine    Final Active Diagnoses:    Diagnosis Date Noted POA    PRINCIPAL PROBLEM:  Severe sepsis [A41.9, R65.20]  Yes    Comfort measures only status [Z51.5] 07/01/2022 Not Applicable    Abdominal pain [R10.9] 06/30/2022 Yes    Depression [F32.A] 05/25/2022 Yes    Hypothyroidism [E03.9] 04/28/2022 Yes    Severe malnutrition [E43] 03/02/2022 Yes    Goals of care, counseling/discussion [Z71.89] 02/27/2022 Not Applicable    Colon cancer [C18.9]  Yes    Colorectal carcinoma [C19] 01/23/2021 Yes      Problems Resolved During this Admission:       Discharged Condition: stable    Disposition: Hospice/Home    Follow Up:    Patient Instructions:      Ambulatory referral/consult to Hospice   Standing Status: Future   Referral Priority: Routine Referral Type: Consultation   Referral Reason: Specialty Services Required   Requested Specialty: Hospice and Palliative Medicine   Number of Visits Requested: 1       Significant Diagnostic Studies: Labs:   CBC   Recent Labs   Lab 06/30/22  1436 07/01/22  0623 07/01/22  1153   WBC 33.01* 25.95* 21.91*   HGB 12.2 10.2* 9.5*   HCT 38.3 31.7* 29.6*    144* 125*     CT Head Without Contrast   Final Result      1. Cortical atrophy with periventricular deep white matter change consistent with chronic small vessel ischemic  disease.         Electronically signed by: Adalberto Gonzalez   Date:    07/01/2022   Time:    13:50      CT Abdomen Pelvis  Without Contrast   Final Result      Ill-defined mass in the right lobe of the liver with coarse calcification or small somewhat punctate foci of hemorrhage within it.  Mass less well defined on the current study with current study without contrast.  The small foci of calcification or hemorrhage appear new, and there is some increased fat stranding along the anterior margin the liver.  Perihepatic fluid is decreased.      Air within the gallbladder concerning for emphysematous cholecystitis which is new.  Also new mild dilatation of bile ducts in the left lobe of the liver.      Large amount of stool within rectum and colon mild wall thickening suggesting mild colitis/proctitis the amount of stool greater than on the prior exam and as before focal area of relative narrowing in the rectum approximately 5 cm above the anal verge suggesting site of obstruction from the narrowing and the large amount of stool.      New left adrenal nodule and new enlarged nodes at the julian hepatis suggesting metastatic disease.      Additional findings as detailed above.      Final read         Electronically signed by: Monique Schneider MD   Date:    06/30/2022   Time:    08:52      X-Ray Abdomen Flat And Erect   Final Result      Large amount of stool throughout the distal colon without plain film evidence for bowel obstruction.         Electronically signed by: Adalberto Gonzalez   Date:    06/30/2022   Time:    08:26            Pending Diagnostic Studies:     None         Medications:  Reconciled Home Medications:      Medication List      CHANGE how you take these medications    dronabinoL 10 MG capsule  Commonly known as: MARINOL  Take 1 capsule (10 mg total) by mouth 2 (two) times daily before meals.  What changed:   · when to take this  · additional instructions     levothyroxine 200 MCG tablet  Commonly known as:  SYNTHROID  Take 1 tablet (200 mcg total) by mouth once daily.  What changed: additional instructions        CONTINUE taking these medications    desvenlafaxine succinate 50 MG Tb24  Commonly known as: PRISTIQ  Take 1 tablet (50 mg total) by mouth once daily.     EScitalopram oxalate 10 MG tablet  Commonly known as: LEXAPRO  Take 1 tablet (10 mg total) by mouth once daily.     famotidine 20 MG tablet  Commonly known as: PEPCID  Take 1 tablet (20 mg total) by mouth once daily.     megestroL 625 mg/5 mL (125 mg/mL) Susp  Commonly known as: MEGACE ES  Take 5 mLs (625 mg total) by mouth once daily.        STOP taking these medications    ALPRAZolam 0.25 MG tablet  Commonly known as: XANAX     calcium carbonate 200 mg calcium (500 mg) chewable tablet  Commonly known as: TUMS     calcium-vitamin D3 500 mg-5 mcg (200 unit) per tablet  Commonly known as: OYSTER SHELL CALCIUM-VIT D3     furosemide 20 MG tablet  Commonly known as: LASIX     HYDROcodone-acetaminophen 5-325 mg per tablet  Commonly known as: NORCO     KLOR-CON M20 20 MEQ tablet  Generic drug: potassium chloride SA     levoFLOXacin 500 MG tablet  Commonly known as: LEVAQUIN     loperamide 2 mg capsule  Commonly known as: IMODIUM     magnesium oxide 400 mg (241.3 mg magnesium) tablet  Commonly known as: MAGOX     ondansetron 4 MG tablet  Commonly known as: ZOFRAN     ondansetron 4 MG Tbdl  Commonly known as: ZOFRAN-ODT     predniSONE 10 MG tablet  Commonly known as: DELTASONE     traZODone 50 MG tablet  Commonly known as: DESYREL          Continue medications as needed for comfort. Ok to hold PO meds if patient cannot tolerate PO.    Indwelling Lines/Drains at time of discharge:   Lines/Drains/Airways     Central Venous Catheter Line  Duration           Port A Cath Single Lumen 03/14/22 0900 right atrial 109 days                Time spent on the discharge of patient: 60 minutes         Michael Leach MD  Department of Hospital Medicine  [facility]

## 2022-07-01 NOTE — ASSESSMENT & PLAN NOTE
Continue PRN IV pain medications and anxiolytics  Will add other medications as needed for comfort  Stopped all unnecessary medications and avoiding any further lab draws

## 2022-07-01 NOTE — SUBJECTIVE & OBJECTIVE
Interval History: Patients vitals have become more stable. Pain regimen seems optimal currently w/ dilaudid q2h PRN. After discussion with family they have decided to go home with hospice services.    Review of Systems   All other systems reviewed and are negative.  Objective:     Vital Signs (Most Recent):  Temp: 98.9 °F (37.2 °C) (07/01/22 1100)  Pulse: 110 (07/01/22 1100)  Resp: 18 (07/01/22 1015)  BP: (!) 128/58 (07/01/22 1100)  SpO2: (!) 92 % (07/01/22 1100)   Vital Signs (24h Range):  Temp:  [96.5 °F (35.8 °C)-100.3 °F (37.9 °C)] 98.9 °F (37.2 °C)  Pulse:  [110-130] 110  Resp:  [14-24] 18  SpO2:  [92 %-100 %] 92 %  BP: (124-147)/(58-86) 128/58     Weight: 61.7 kg (136 lb)  Body mass index is 21.3 kg/m².    Intake/Output Summary (Last 24 hours) at 7/1/2022 1405  Last data filed at 7/1/2022 0554  Gross per 24 hour   Intake 2485.77 ml   Output --   Net 2485.77 ml      Physical Exam  Vitals reviewed.   Constitutional:       General: She is not in acute distress.     Appearance: She is not diaphoretic.   HENT:      Head: Normocephalic and atraumatic.      Mouth/Throat:      Mouth: Mucous membranes are dry.   Eyes:      Extraocular Movements: Extraocular movements intact.      Pupils: Pupils are equal, round, and reactive to light.   Cardiovascular:      Rate and Rhythm: Regular rhythm. Tachycardia present.   Pulmonary:      Effort: Pulmonary effort is normal. No respiratory distress.      Breath sounds: No wheezing.   Abdominal:      Tenderness: There is abdominal tenderness. There is no guarding.   Skin:     General: Skin is warm and dry.   Neurological:      General: No focal deficit present.      Mental Status: She is disoriented.        Significant Labs: All pertinent labs within the past 24 hours have been reviewed.    Significant Imaging: I have reviewed all pertinent imaging results/findings within the past 24 hours.

## 2022-07-01 NOTE — NURSING
Received from ER via bed.  Pt c/o severe abd cramping.  Assisted to bathroom for bm    Loose incontinent     Spouse at bedside.  Isabella area severely excoriated.    Mepelex patch applied to buttocks.

## 2022-07-01 NOTE — HOSPITAL COURSE
65 yo woman w/ hx of metastatic colon carcinoma involving the liver who is treated with Xelox/Avastin who was admitted for intra-abdominal sepsis w/ leukocytosis, abdominal pain, and abnormal imaging on CT abdomen suggestive of emphysematous cholecystitis and relative narrowing in the rectum approximately 5 cm above the anal verge suggesting site of obstruction. Patient was started on IV abx, IV pain medications and IVF. Initial plan was for transfer to [facility] for possible surgical management of above findings on CT. The patient was then admitted to facilitate pain management and escalation of care. Patient's pain is now under control. Showing signs of metabolic vs septic vs drug induced encephalopathy so CT head was obtained which showed no acute findings. I personally had conversation with patient and  regarding goals of care. They decided that patient would not want any aggressive surgical measures and are now requesting to be sent home with hospice services. Working with case management to facilitate this. In the meantime will stop any further aggressive treatments such as IV abx and labs. Will continue pain medications and IVF for now since patient not really tolerating PO. Family is now discussing hospice options. Will keep inpatient until this is facilitated.

## 2022-07-01 NOTE — NURSING
Pt and spouse sleeping soundly at this time.   Has been in ER over 24 hours    On palliative care.   Awakens often in severe pain so I did not get 4am vital signs.

## 2022-07-01 NOTE — TELEPHONE ENCOUNTER
This nurse called pt's  to discuss cancellation of appts. He states that pt is going [facility] and the physician at the hospital would like to speak with Dr Banks. This nurse spoke with the physician at [facility], got his number and attempted to reach Dr Banks. Per Mary Anne, Dr Banks's MD KATLYN is out today. Information relayed to LAKIA [place]. Will cancel appts per request. Condolences given to pt's . Notified him to contact our office for any needs/questions.

## 2022-07-01 NOTE — PLAN OF CARE
07/01/22 1714   Final Note   Assessment Type Final Discharge Note   Anticipated Discharge Disposition HospiceHome   What phone number can be called within the next 1-3 days to see how you are doing after discharge? [phone]   Post-Acute Status   Post-Acute Authorization Hospice   Hospice Status Set-up Complete/Auth obtained   Discharge Delays None known at this time   Erasto with hospice finished with paper work. Sandra from [facility] will see patient at home around 7:00pm tonight to admit her. Patient would like pain medication prior to going home. Nurse notified.  Patient's spouse ok with getting her home via private vehicle. States he will be able to get her inside their home. He was notified he could call the fire dept if he has any problems getting her in the house. States he lives 2 doors down from them.  No other needs at this time.

## 2022-07-01 NOTE — PLAN OF CARE
Problem: Nutrition Impaired (Sepsis/Septic Shock)  Goal: Optimal Nutrition Intake  Outcome: Ongoing, Not Progressing     Problem: Skin Injury Risk Increased  Goal: Skin Health and Integrity  Outcome: Ongoing, Not Progressing     Problem: Palliative Care  Goal: Enhanced Quality of Life  Outcome: Ongoing, Progressing

## 2022-07-01 NOTE — PROGRESS NOTES
[facility] Medicine  Progress Note    Patient Name: Regine Lemus  MRN: [id number]  Patient Class: OP- Observation   Admission Date: 6/29/2022  Length of Stay: 0 days  Attending Physician: No att. providers found  Primary Care Provider: Jacobo Samuel MD        Subjective:     Principal Problem:Severe sepsis        HPI:  The patient is a 67 y/o female with PMH of metastatic colon cancer, HTN, HLP, and hypothyroidism. She presented with abdominal pain more localized in the LLQ which started about one day ago. She has had some nausea and vomiting. She is followed by oncologist Dr. Nguyen and is on chemotherapy with an agent that is known to cause these side effects. She had had a small bowel movement. Work up shows a leukocytosis of 20 K and CT suggestive of significant amount of stool but official read pending. She had occasional fevers which are also attributed to her chemo. No CP, SOB, or other symptoms reported.         Overview/Hospital Course:  67 yo woman w/ hx of metastatic colon carcinoma involving the liver who is treated with Xelox/Avastin who was admitted for intra-abdominal sepsis w/ leukocytosis, abdominal pain, and abnormal imaging on CT abdomen suggestive of emphysematous cholecystitis and relative narrowing in the rectum approximately 5 cm above the anal verge suggesting site of obstruction. Patient was started on IV abx, IV pain medications and IVF. Initial plan was for transfer to [facility] for possible surgical management of above findings on CT. The patient was then admitted to facilitate pain management and escalation of care. Patient's pain is now under control. Showing signs of metabolic vs septic vs drug induced encephalopathy so CT head was obtained which showed no acute findings. I personally had conversation with patient and  regarding goals of care. They decided that patient would not want any aggressive surgical measures and are now requesting to be sent home  with hospice services. Working with case management to facilitate this. In the meantime will stop any further aggressive treatments such as IV abx and labs. Will continue pain medications and IVF for now since patient not really tolerating PO. Family is now discussing hospice options. Will keep inpatient until this is facilitated.      Interval History: Patients vitals have become more stable. Pain regimen seems optimal currently w/ dilaudid q2h PRN. After discussion with family they have decided to go home with hospice services.    Review of Systems   All other systems reviewed and are negative.  Objective:     Vital Signs (Most Recent):  Temp: 98.9 °F (37.2 °C) (07/01/22 1100)  Pulse: 110 (07/01/22 1100)  Resp: 18 (07/01/22 1015)  BP: (!) 128/58 (07/01/22 1100)  SpO2: (!) 92 % (07/01/22 1100)   Vital Signs (24h Range):  Temp:  [96.5 °F (35.8 °C)-100.3 °F (37.9 °C)] 98.9 °F (37.2 °C)  Pulse:  [110-130] 110  Resp:  [14-24] 18  SpO2:  [92 %-100 %] 92 %  BP: (124-147)/(58-86) 128/58     Weight: 61.7 kg (136 lb)  Body mass index is 21.3 kg/m².    Intake/Output Summary (Last 24 hours) at 7/1/2022 1405  Last data filed at 7/1/2022 0554  Gross per 24 hour   Intake 2485.77 ml   Output --   Net 2485.77 ml      Physical Exam  Vitals reviewed.   Constitutional:       General: She is not in acute distress.     Appearance: She is not diaphoretic.   HENT:      Head: Normocephalic and atraumatic.      Mouth/Throat:      Mouth: Mucous membranes are dry.   Eyes:      Extraocular Movements: Extraocular movements intact.      Pupils: Pupils are equal, round, and reactive to light.   Cardiovascular:      Rate and Rhythm: Regular rhythm. Tachycardia present.   Pulmonary:      Effort: Pulmonary effort is normal. No respiratory distress.      Breath sounds: No wheezing.   Abdominal:      Tenderness: There is abdominal tenderness. There is no guarding.   Skin:     General: Skin is warm and dry.   Neurological:      General: No focal  deficit present.      Mental Status: She is disoriented.        Significant Labs: All pertinent labs within the past 24 hours have been reviewed.    Significant Imaging: I have reviewed all pertinent imaging results/findings within the past 24 hours.      Assessment/Plan:      * Severe sepsis  This patient does have evidence of infective focus  My overall impression is sepsis. Vital signs were reviewed and noted in progress note.  Antibiotics given-   Antibiotics (From admission, onward)            None        Cultures were taken-   Microbiology Results (last 7 days)     ** No results found for the last 168 hours. **        Latest lactate reviewed, they are-  Recent Labs   Lab 06/30/22  1436   LACTATE 1.9       Organ dysfunction indicated by Encephalopathy   Source- Intra-abdominal     Source control Achieved by- IV abx    Stopping abx and not going to consult surgery because family is not interested in pursuing any further aggressive measures    Cultures not obtained in ED. Will not obtain at this point as it will not guide clinical management.      Comfort measures only status  Continue PRN IV pain medications and anxiolytics  Will add other medications as needed for comfort  Stopped all unnecessary medications and avoiding any further lab draws    Abdominal pain  Continue IV pain medications PRN    Depression  Continue pristique and lexapro     Hypothyroidism  Continue levothyroxine     Colon cancer        Colorectal carcinoma  CT abdomen and pelvis w/o contras tImpression:     Ill-defined mass in the right lobe of the liver with coarse calcification or small somewhat punctate foci of hemorrhage within it.  Mass less well defined on the current study with current study without contrast.  The small foci of calcification or hemorrhage appear new, and there is some increased fat stranding along the anterior margin the liver.  Perihepatic fluid is decreased.     Air within the gallbladder concerning for emphysematous  cholecystitis which is new.  Also new mild dilatation of bile ducts in the left lobe of the liver.     Large amount of stool within rectum and colon mild wall thickening suggesting mild colitis/proctitis the amount of stool greater than on the prior exam and as before focal area of relative narrowing in the rectum approximately 5 cm above the anal verge suggesting site of obstruction from the narrowing and the large amount of stool.     New left adrenal nodule and new enlarged nodes at the julian hepatis suggesting metastatic disease.     Additional findings as detailed above.    Discussed results with patient    Attempted to discuss with patient's oncologist but they were unavailable. Patient is currently on chemotherapy as outpatient.        VTE Risk Mitigation (From admission, onward)    None          Discharge Planning   CATHY:      Code Status: DNR   Is the patient medically ready for discharge?:     Reason for patient still in hospital (select all that apply): Treatment  Discharge Plan A: (P) Hospice/home   Discharge Delays: (P) None known at this time              Michael Leach MD  Department of Hospital Medicine   [facility]

## 2022-07-01 NOTE — ASSESSMENT & PLAN NOTE
This patient does have evidence of infective focus  My overall impression is sepsis. Vital signs were reviewed and noted in progress note.  Antibiotics given-   Antibiotics (From admission, onward)            None        Cultures were taken-   Microbiology Results (last 7 days)     ** No results found for the last 168 hours. **        Latest lactate reviewed, they are-  Recent Labs   Lab 06/30/22  1436   LACTATE 1.9       Organ dysfunction indicated by Encephalopathy   Source- Intra-abdominal     Source control Achieved by- IV abx    Stopping abx and not going to consult surgery because family is not interested in pursuing any further aggressive measures    Cultures not obtained in ED. Will not obtain at this point as it will not guide clinical management.

## 2022-07-01 NOTE — PLAN OF CARE
07/01/22 1315   Discharge Reassessment   Assessment Type Discharge Planning Reassessment   Did the patient's condition or plan change since previous assessment? No   Discharge Plan discussed with: Patient;Spouse/sig other   Name(s) and Number(s) Edouard Lemus spouse [phone]   Communicated CATHY with patient/caregiver Yes   Discharge Plan A Hospice/home   Discharge Plan B Hospice/home   DME Needed Upon Discharge  hospital bed   Discharge Barriers Identified None   Why the patient remains in the hospital Requires continued medical care   Post-Acute Status   Post-Acute Authorization Hospice   Hospice Status Referrals Sent   Discharge Delays None known at this time   Dr Leach in to speak with patient & spouse. They don't want to be transferred for surgery. They are both in agreement to hospice. He will let me know who he would like to use. Will continue to follow.

## 2022-07-05 ENCOUNTER — PATIENT MESSAGE (OUTPATIENT)
Dept: HEMATOLOGY/ONCOLOGY | Facility: CLINIC | Age: 67
End: 2022-07-05
Payer: OTHER GOVERNMENT

## 2022-07-18 ENCOUNTER — TELEPHONE (OUTPATIENT)
Dept: FAMILY MEDICINE | Facility: CLINIC | Age: 67
End: 2022-07-18

## 2022-08-31 DIAGNOSIS — Z78.0 MENOPAUSE: ICD-10-CM

## 2022-09-01 ENCOUNTER — PATIENT MESSAGE (OUTPATIENT)
Dept: HEMATOLOGY/ONCOLOGY | Facility: CLINIC | Age: 67
End: 2022-09-01
Payer: OTHER GOVERNMENT

## 2022-09-13 NOTE — ED NOTES
Pt in room lying in bed. Pt is awake alert and orientated. Pt denies any pain at this time. Pt states that she feels better, but feels dehydrated.    electronic

## 2023-02-17 ENCOUNTER — PATIENT OUTREACH (OUTPATIENT)
Dept: ADMINISTRATIVE | Facility: OTHER | Age: 68
End: 2023-02-17
Payer: OTHER GOVERNMENT

## 2023-02-17 NOTE — PROGRESS NOTES
CHW - Outreach Attempt    Community Health Worker left a voicemail message for 1st attempt to contact patient regarding: SDOH  Community Health Worker to attempt to contact patient on: 02/17/2023      CHW - Outreach Attempt    Community Health Worker left a voicemail message for 2nd attempt to contact patient regarding: SDOH  Community Health Worker to attempt to contact patient on: 02/22/2023      CHW - Case Closure    This Community Health Worker spoke to patient via telephone today.   Pt/Caregiver reported: patient declined  Pt/Caregiver denied any additional needs at this time and agrees with episode closure at this time.  Provided patient with Community Health Worker's contact information and encouraged him/her to contact this Community Health Worker if additional needs arise.

## 2023-10-03 ENCOUNTER — PATIENT MESSAGE (OUTPATIENT)
Dept: ADMINISTRATIVE | Facility: HOSPITAL | Age: 68
End: 2023-10-03
Payer: OTHER GOVERNMENT

## 2023-10-17 ENCOUNTER — PATIENT MESSAGE (OUTPATIENT)
Dept: ADMINISTRATIVE | Facility: HOSPITAL | Age: 68
End: 2023-10-17
Payer: OTHER GOVERNMENT

## 2024-07-09 ENCOUNTER — PATIENT MESSAGE (OUTPATIENT)
Dept: ADMINISTRATIVE | Facility: HOSPITAL | Age: 69
End: 2024-07-09
Payer: OTHER GOVERNMENT